# Patient Record
Sex: MALE | Race: WHITE | NOT HISPANIC OR LATINO | URBAN - METROPOLITAN AREA
[De-identification: names, ages, dates, MRNs, and addresses within clinical notes are randomized per-mention and may not be internally consistent; named-entity substitution may affect disease eponyms.]

---

## 2023-01-01 ENCOUNTER — INPATIENT (INPATIENT)
Facility: HOSPITAL | Age: 58
LOS: 12 days | DRG: 432 | End: 2024-01-12
Attending: INTERNAL MEDICINE | Admitting: INTERNAL MEDICINE
Payer: COMMERCIAL

## 2023-01-01 VITALS
SYSTOLIC BLOOD PRESSURE: 103 MMHG | HEART RATE: 90 BPM | DIASTOLIC BLOOD PRESSURE: 64 MMHG | TEMPERATURE: 98 F | RESPIRATION RATE: 18 BRPM | OXYGEN SATURATION: 99 %

## 2023-01-01 DIAGNOSIS — G93.40 ENCEPHALOPATHY, UNSPECIFIED: ICD-10-CM

## 2023-01-01 DIAGNOSIS — N17.9 ACUTE KIDNEY FAILURE, UNSPECIFIED: ICD-10-CM

## 2023-01-01 DIAGNOSIS — K72.90 HEPATIC FAILURE, UNSPECIFIED WITHOUT COMA: ICD-10-CM

## 2023-01-01 DIAGNOSIS — K74.60 UNSPECIFIED CIRRHOSIS OF LIVER: ICD-10-CM

## 2023-01-01 DIAGNOSIS — E11.9 TYPE 2 DIABETES MELLITUS WITHOUT COMPLICATIONS: ICD-10-CM

## 2023-01-01 DIAGNOSIS — D69.6 THROMBOCYTOPENIA, UNSPECIFIED: ICD-10-CM

## 2023-01-01 DIAGNOSIS — D64.9 ANEMIA, UNSPECIFIED: ICD-10-CM

## 2023-01-01 DIAGNOSIS — D72.829 ELEVATED WHITE BLOOD CELL COUNT, UNSPECIFIED: ICD-10-CM

## 2023-01-01 DIAGNOSIS — Z29.9 ENCOUNTER FOR PROPHYLACTIC MEASURES, UNSPECIFIED: ICD-10-CM

## 2023-01-01 LAB
A1C WITH ESTIMATED AVERAGE GLUCOSE RESULT: 5.2 % — SIGNIFICANT CHANGE UP (ref 4–5.6)
A1C WITH ESTIMATED AVERAGE GLUCOSE RESULT: 5.2 % — SIGNIFICANT CHANGE UP (ref 4–5.6)
ALBUMIN SERPL ELPH-MCNC: 3.8 G/DL — SIGNIFICANT CHANGE UP (ref 3.3–5)
ALBUMIN SERPL ELPH-MCNC: 3.8 G/DL — SIGNIFICANT CHANGE UP (ref 3.3–5)
ALBUMIN SERPL ELPH-MCNC: 4.2 G/DL — SIGNIFICANT CHANGE UP (ref 3.3–5)
ALBUMIN SERPL ELPH-MCNC: 4.2 G/DL — SIGNIFICANT CHANGE UP (ref 3.3–5)
ALP SERPL-CCNC: 413 U/L — HIGH (ref 40–120)
ALT FLD-CCNC: 21 U/L — SIGNIFICANT CHANGE UP (ref 10–45)
ALT FLD-CCNC: 21 U/L — SIGNIFICANT CHANGE UP (ref 10–45)
ALT FLD-CCNC: 24 U/L — SIGNIFICANT CHANGE UP (ref 10–45)
ALT FLD-CCNC: 24 U/L — SIGNIFICANT CHANGE UP (ref 10–45)
AMMONIA BLD-MCNC: 35 UMOL/L — SIGNIFICANT CHANGE UP (ref 11–55)
AMMONIA BLD-MCNC: 35 UMOL/L — SIGNIFICANT CHANGE UP (ref 11–55)
ANION GAP SERPL CALC-SCNC: 18 MMOL/L — HIGH (ref 5–17)
ANION GAP SERPL CALC-SCNC: 18 MMOL/L — HIGH (ref 5–17)
ANION GAP SERPL CALC-SCNC: 23 MMOL/L — HIGH (ref 5–17)
ANION GAP SERPL CALC-SCNC: 23 MMOL/L — HIGH (ref 5–17)
APPEARANCE UR: ABNORMAL
APPEARANCE UR: ABNORMAL
APTT BLD: 33.3 SEC — SIGNIFICANT CHANGE UP (ref 24.5–35.6)
APTT BLD: 33.3 SEC — SIGNIFICANT CHANGE UP (ref 24.5–35.6)
APTT BLD: 33.7 SEC — SIGNIFICANT CHANGE UP (ref 24.5–35.6)
APTT BLD: 33.7 SEC — SIGNIFICANT CHANGE UP (ref 24.5–35.6)
AST SERPL-CCNC: 78 U/L — HIGH (ref 10–40)
AST SERPL-CCNC: 78 U/L — HIGH (ref 10–40)
AST SERPL-CCNC: 82 U/L — HIGH (ref 10–40)
AST SERPL-CCNC: 82 U/L — HIGH (ref 10–40)
BACTERIA # UR AUTO: NEGATIVE /HPF — SIGNIFICANT CHANGE UP
BACTERIA # UR AUTO: NEGATIVE /HPF — SIGNIFICANT CHANGE UP
BILIRUB SERPL-MCNC: 12.7 MG/DL — HIGH (ref 0.2–1.2)
BILIRUB SERPL-MCNC: 12.7 MG/DL — HIGH (ref 0.2–1.2)
BILIRUB SERPL-MCNC: 12.8 MG/DL — HIGH (ref 0.2–1.2)
BILIRUB SERPL-MCNC: 12.8 MG/DL — HIGH (ref 0.2–1.2)
BILIRUB UR-MCNC: ABNORMAL
BILIRUB UR-MCNC: ABNORMAL
BLD GP AB SCN SERPL QL: NEGATIVE — SIGNIFICANT CHANGE UP
BLD GP AB SCN SERPL QL: NEGATIVE — SIGNIFICANT CHANGE UP
BUN SERPL-MCNC: 100 MG/DL — HIGH (ref 7–23)
BUN SERPL-MCNC: 100 MG/DL — HIGH (ref 7–23)
BUN SERPL-MCNC: 110 MG/DL — HIGH (ref 7–23)
BUN SERPL-MCNC: 110 MG/DL — HIGH (ref 7–23)
CALCIUM SERPL-MCNC: 10 MG/DL — SIGNIFICANT CHANGE UP (ref 8.4–10.5)
CALCIUM SERPL-MCNC: 10 MG/DL — SIGNIFICANT CHANGE UP (ref 8.4–10.5)
CALCIUM SERPL-MCNC: 9.6 MG/DL — SIGNIFICANT CHANGE UP (ref 8.4–10.5)
CALCIUM SERPL-MCNC: 9.6 MG/DL — SIGNIFICANT CHANGE UP (ref 8.4–10.5)
CAST: 0 /LPF — SIGNIFICANT CHANGE UP (ref 0–4)
CAST: 0 /LPF — SIGNIFICANT CHANGE UP (ref 0–4)
CHLORIDE SERPL-SCNC: 103 MMOL/L — SIGNIFICANT CHANGE UP (ref 96–108)
CK SERPL-CCNC: 12 U/L — LOW (ref 30–200)
CK SERPL-CCNC: 12 U/L — LOW (ref 30–200)
CO2 SERPL-SCNC: 13 MMOL/L — LOW (ref 22–31)
CO2 SERPL-SCNC: 13 MMOL/L — LOW (ref 22–31)
CO2 SERPL-SCNC: 16 MMOL/L — LOW (ref 22–31)
CO2 SERPL-SCNC: 16 MMOL/L — LOW (ref 22–31)
COLOR SPEC: SIGNIFICANT CHANGE UP
COLOR SPEC: SIGNIFICANT CHANGE UP
CREAT ?TM UR-MCNC: 183 MG/DL — SIGNIFICANT CHANGE UP
CREAT ?TM UR-MCNC: 183 MG/DL — SIGNIFICANT CHANGE UP
CREAT SERPL-MCNC: 4.14 MG/DL — HIGH (ref 0.5–1.3)
CREAT SERPL-MCNC: 4.14 MG/DL — HIGH (ref 0.5–1.3)
CREAT SERPL-MCNC: 4.67 MG/DL — HIGH (ref 0.5–1.3)
CREAT SERPL-MCNC: 4.67 MG/DL — HIGH (ref 0.5–1.3)
DIFF PNL FLD: NEGATIVE — SIGNIFICANT CHANGE UP
DIFF PNL FLD: NEGATIVE — SIGNIFICANT CHANGE UP
EGFR: 14 ML/MIN/1.73M2 — LOW
EGFR: 14 ML/MIN/1.73M2 — LOW
EGFR: 16 ML/MIN/1.73M2 — LOW
EGFR: 16 ML/MIN/1.73M2 — LOW
ESTIMATED AVERAGE GLUCOSE: 103 MG/DL — SIGNIFICANT CHANGE UP (ref 68–114)
ESTIMATED AVERAGE GLUCOSE: 103 MG/DL — SIGNIFICANT CHANGE UP (ref 68–114)
FERRITIN SERPL-MCNC: 285 NG/ML — SIGNIFICANT CHANGE UP (ref 30–400)
FERRITIN SERPL-MCNC: 285 NG/ML — SIGNIFICANT CHANGE UP (ref 30–400)
FOLATE SERPL-MCNC: 8.1 NG/ML — SIGNIFICANT CHANGE UP
FOLATE SERPL-MCNC: 8.1 NG/ML — SIGNIFICANT CHANGE UP
GLUCOSE BLDC GLUCOMTR-MCNC: 139 MG/DL — HIGH (ref 70–99)
GLUCOSE BLDC GLUCOMTR-MCNC: 139 MG/DL — HIGH (ref 70–99)
GLUCOSE BLDC GLUCOMTR-MCNC: 157 MG/DL — HIGH (ref 70–99)
GLUCOSE BLDC GLUCOMTR-MCNC: 157 MG/DL — HIGH (ref 70–99)
GLUCOSE BLDC GLUCOMTR-MCNC: 186 MG/DL — HIGH (ref 70–99)
GLUCOSE BLDC GLUCOMTR-MCNC: 186 MG/DL — HIGH (ref 70–99)
GLUCOSE BLDC GLUCOMTR-MCNC: 197 MG/DL — HIGH (ref 70–99)
GLUCOSE BLDC GLUCOMTR-MCNC: 197 MG/DL — HIGH (ref 70–99)
GLUCOSE BLDC GLUCOMTR-MCNC: 282 MG/DL — HIGH (ref 70–99)
GLUCOSE BLDC GLUCOMTR-MCNC: 282 MG/DL — HIGH (ref 70–99)
GLUCOSE SERPL-MCNC: 166 MG/DL — HIGH (ref 70–99)
GLUCOSE SERPL-MCNC: 166 MG/DL — HIGH (ref 70–99)
GLUCOSE SERPL-MCNC: 169 MG/DL — HIGH (ref 70–99)
GLUCOSE SERPL-MCNC: 169 MG/DL — HIGH (ref 70–99)
GLUCOSE UR QL: NEGATIVE MG/DL — SIGNIFICANT CHANGE UP
GLUCOSE UR QL: NEGATIVE MG/DL — SIGNIFICANT CHANGE UP
HAV IGM SER-ACNC: SIGNIFICANT CHANGE UP
HBV CORE IGM SER-ACNC: SIGNIFICANT CHANGE UP
HBV SURFACE AG SER-ACNC: SIGNIFICANT CHANGE UP
HCT VFR BLD CALC: 25.4 % — LOW (ref 39–50)
HCV AB S/CO SERPL IA: 0.17 S/CO — SIGNIFICANT CHANGE UP (ref 0–0.99)
HCV AB S/CO SERPL IA: 0.17 S/CO — SIGNIFICANT CHANGE UP (ref 0–0.99)
HCV AB S/CO SERPL IA: 0.19 S/CO — SIGNIFICANT CHANGE UP (ref 0–0.99)
HCV AB S/CO SERPL IA: 0.19 S/CO — SIGNIFICANT CHANGE UP (ref 0–0.99)
HCV AB SERPL-IMP: SIGNIFICANT CHANGE UP
HGB BLD-MCNC: 8.2 G/DL — LOW (ref 13–17)
HGB BLD-MCNC: 8.2 G/DL — LOW (ref 13–17)
HGB BLD-MCNC: 8.3 G/DL — LOW (ref 13–17)
HGB BLD-MCNC: 8.3 G/DL — LOW (ref 13–17)
HIV 1+2 AB+HIV1 P24 AG SERPL QL IA: SIGNIFICANT CHANGE UP
HIV 1+2 AB+HIV1 P24 AG SERPL QL IA: SIGNIFICANT CHANGE UP
INR BLD: 1.23 RATIO — HIGH (ref 0.85–1.18)
INR BLD: 1.23 RATIO — HIGH (ref 0.85–1.18)
INR BLD: 1.31 RATIO — HIGH (ref 0.85–1.18)
INR BLD: 1.31 RATIO — HIGH (ref 0.85–1.18)
IRON SATN MFR SERPL: 31 % — SIGNIFICANT CHANGE UP (ref 16–55)
IRON SATN MFR SERPL: 31 % — SIGNIFICANT CHANGE UP (ref 16–55)
IRON SATN MFR SERPL: 35 UG/DL — LOW (ref 45–165)
IRON SATN MFR SERPL: 35 UG/DL — LOW (ref 45–165)
KETONES UR-MCNC: ABNORMAL MG/DL
KETONES UR-MCNC: ABNORMAL MG/DL
LEUKOCYTE ESTERASE UR-ACNC: ABNORMAL
LEUKOCYTE ESTERASE UR-ACNC: ABNORMAL
MAGNESIUM SERPL-MCNC: 2.5 MG/DL — SIGNIFICANT CHANGE UP (ref 1.6–2.6)
MAGNESIUM SERPL-MCNC: 2.5 MG/DL — SIGNIFICANT CHANGE UP (ref 1.6–2.6)
MAGNESIUM SERPL-MCNC: 2.6 MG/DL — SIGNIFICANT CHANGE UP (ref 1.6–2.6)
MAGNESIUM SERPL-MCNC: 2.6 MG/DL — SIGNIFICANT CHANGE UP (ref 1.6–2.6)
MCHC RBC-ENTMCNC: 27.1 PG — SIGNIFICANT CHANGE UP (ref 27–34)
MCHC RBC-ENTMCNC: 27.1 PG — SIGNIFICANT CHANGE UP (ref 27–34)
MCHC RBC-ENTMCNC: 27.2 PG — SIGNIFICANT CHANGE UP (ref 27–34)
MCHC RBC-ENTMCNC: 27.2 PG — SIGNIFICANT CHANGE UP (ref 27–34)
MCHC RBC-ENTMCNC: 32.3 GM/DL — SIGNIFICANT CHANGE UP (ref 32–36)
MCHC RBC-ENTMCNC: 32.3 GM/DL — SIGNIFICANT CHANGE UP (ref 32–36)
MCHC RBC-ENTMCNC: 32.7 GM/DL — SIGNIFICANT CHANGE UP (ref 32–36)
MCHC RBC-ENTMCNC: 32.7 GM/DL — SIGNIFICANT CHANGE UP (ref 32–36)
MCV RBC AUTO: 83 FL — SIGNIFICANT CHANGE UP (ref 80–100)
MCV RBC AUTO: 83 FL — SIGNIFICANT CHANGE UP (ref 80–100)
MCV RBC AUTO: 84.1 FL — SIGNIFICANT CHANGE UP (ref 80–100)
MCV RBC AUTO: 84.1 FL — SIGNIFICANT CHANGE UP (ref 80–100)
MELD SCORE WITH DIALYSIS: 32 POINTS — SIGNIFICANT CHANGE UP
MELD SCORE WITH DIALYSIS: 32 POINTS — SIGNIFICANT CHANGE UP
MELD SCORE WITHOUT DIALYSIS: 32 POINTS — SIGNIFICANT CHANGE UP
MELD SCORE WITHOUT DIALYSIS: 32 POINTS — SIGNIFICANT CHANGE UP
NITRITE UR-MCNC: NEGATIVE — SIGNIFICANT CHANGE UP
NITRITE UR-MCNC: NEGATIVE — SIGNIFICANT CHANGE UP
NRBC # BLD: 0 /100 WBCS — SIGNIFICANT CHANGE UP (ref 0–0)
NT-PROBNP SERPL-SCNC: 5513 PG/ML — HIGH (ref 0–300)
NT-PROBNP SERPL-SCNC: 5513 PG/ML — HIGH (ref 0–300)
NT-PROBNP SERPL-SCNC: 5604 PG/ML — HIGH (ref 0–300)
NT-PROBNP SERPL-SCNC: 5604 PG/ML — HIGH (ref 0–300)
PH UR: 5.5 — SIGNIFICANT CHANGE UP (ref 5–8)
PH UR: 5.5 — SIGNIFICANT CHANGE UP (ref 5–8)
PHOSPHATE SERPL-MCNC: 4.1 MG/DL — SIGNIFICANT CHANGE UP (ref 2.5–4.5)
PHOSPHATE SERPL-MCNC: 4.1 MG/DL — SIGNIFICANT CHANGE UP (ref 2.5–4.5)
PHOSPHATE SERPL-MCNC: 4.7 MG/DL — HIGH (ref 2.5–4.5)
PHOSPHATE SERPL-MCNC: 4.7 MG/DL — HIGH (ref 2.5–4.5)
PLATELET # BLD AUTO: 70 K/UL — LOW (ref 150–400)
PLATELET # BLD AUTO: 70 K/UL — LOW (ref 150–400)
PLATELET # BLD AUTO: 77 K/UL — LOW (ref 150–400)
PLATELET # BLD AUTO: 77 K/UL — LOW (ref 150–400)
POTASSIUM SERPL-MCNC: 4.1 MMOL/L — SIGNIFICANT CHANGE UP (ref 3.5–5.3)
POTASSIUM SERPL-MCNC: 4.1 MMOL/L — SIGNIFICANT CHANGE UP (ref 3.5–5.3)
POTASSIUM SERPL-MCNC: 4.2 MMOL/L — SIGNIFICANT CHANGE UP (ref 3.5–5.3)
POTASSIUM SERPL-MCNC: 4.2 MMOL/L — SIGNIFICANT CHANGE UP (ref 3.5–5.3)
POTASSIUM SERPL-SCNC: 4.1 MMOL/L — SIGNIFICANT CHANGE UP (ref 3.5–5.3)
POTASSIUM SERPL-SCNC: 4.1 MMOL/L — SIGNIFICANT CHANGE UP (ref 3.5–5.3)
POTASSIUM SERPL-SCNC: 4.2 MMOL/L — SIGNIFICANT CHANGE UP (ref 3.5–5.3)
POTASSIUM SERPL-SCNC: 4.2 MMOL/L — SIGNIFICANT CHANGE UP (ref 3.5–5.3)
PROT SERPL-MCNC: 5.8 G/DL — LOW (ref 6–8.3)
PROT SERPL-MCNC: 5.8 G/DL — LOW (ref 6–8.3)
PROT SERPL-MCNC: 6.2 G/DL — SIGNIFICANT CHANGE UP (ref 6–8.3)
PROT SERPL-MCNC: 6.2 G/DL — SIGNIFICANT CHANGE UP (ref 6–8.3)
PROT UR-MCNC: 100 MG/DL
PROT UR-MCNC: 100 MG/DL
PROTHROM AB SERPL-ACNC: 13.5 SEC — HIGH (ref 9.5–13)
PROTHROM AB SERPL-ACNC: 13.5 SEC — HIGH (ref 9.5–13)
PROTHROM AB SERPL-ACNC: 13.6 SEC — HIGH (ref 9.5–13)
PROTHROM AB SERPL-ACNC: 13.6 SEC — HIGH (ref 9.5–13)
RBC # BLD: 3.02 M/UL — LOW (ref 4.2–5.8)
RBC # BLD: 3.02 M/UL — LOW (ref 4.2–5.8)
RBC # BLD: 3.06 M/UL — LOW (ref 4.2–5.8)
RBC # BLD: 3.06 M/UL — LOW (ref 4.2–5.8)
RBC # FLD: 24.8 % — HIGH (ref 10.3–14.5)
RBC CASTS # UR COMP ASSIST: 51 /HPF — HIGH (ref 0–4)
RBC CASTS # UR COMP ASSIST: 51 /HPF — HIGH (ref 0–4)
REVIEW: SIGNIFICANT CHANGE UP
REVIEW: SIGNIFICANT CHANGE UP
RH IG SCN BLD-IMP: POSITIVE — SIGNIFICANT CHANGE UP
RH IG SCN BLD-IMP: POSITIVE — SIGNIFICANT CHANGE UP
SODIUM SERPL-SCNC: 137 MMOL/L — SIGNIFICANT CHANGE UP (ref 135–145)
SODIUM SERPL-SCNC: 137 MMOL/L — SIGNIFICANT CHANGE UP (ref 135–145)
SODIUM SERPL-SCNC: 139 MMOL/L — SIGNIFICANT CHANGE UP (ref 135–145)
SODIUM SERPL-SCNC: 139 MMOL/L — SIGNIFICANT CHANGE UP (ref 135–145)
SODIUM UR-SCNC: 9 MMOL/L — SIGNIFICANT CHANGE UP
SODIUM UR-SCNC: 9 MMOL/L — SIGNIFICANT CHANGE UP
SP GR SPEC: 1.02 — SIGNIFICANT CHANGE UP (ref 1–1.03)
SP GR SPEC: 1.02 — SIGNIFICANT CHANGE UP (ref 1–1.03)
SQUAMOUS # UR AUTO: 7 /HPF — HIGH (ref 0–5)
SQUAMOUS # UR AUTO: 7 /HPF — HIGH (ref 0–5)
TIBC SERPL-MCNC: 114 UG/DL — LOW (ref 220–430)
TIBC SERPL-MCNC: 114 UG/DL — LOW (ref 220–430)
UIBC SERPL-MCNC: 78 UG/DL — LOW (ref 110–370)
UIBC SERPL-MCNC: 78 UG/DL — LOW (ref 110–370)
UROBILINOGEN FLD QL: 1 MG/DL — SIGNIFICANT CHANGE UP (ref 0.2–1)
UROBILINOGEN FLD QL: 1 MG/DL — SIGNIFICANT CHANGE UP (ref 0.2–1)
UUN UR-MCNC: 404 MG/DL — SIGNIFICANT CHANGE UP
UUN UR-MCNC: 404 MG/DL — SIGNIFICANT CHANGE UP
VIT B12 SERPL-MCNC: >2000 PG/ML — HIGH (ref 232–1245)
VIT B12 SERPL-MCNC: >2000 PG/ML — HIGH (ref 232–1245)
WBC # BLD: 10.56 K/UL — HIGH (ref 3.8–10.5)
WBC # BLD: 10.56 K/UL — HIGH (ref 3.8–10.5)
WBC # BLD: 13.57 K/UL — HIGH (ref 3.8–10.5)
WBC # BLD: 13.57 K/UL — HIGH (ref 3.8–10.5)
WBC # FLD AUTO: 10.56 K/UL — HIGH (ref 3.8–10.5)
WBC # FLD AUTO: 10.56 K/UL — HIGH (ref 3.8–10.5)
WBC # FLD AUTO: 13.57 K/UL — HIGH (ref 3.8–10.5)
WBC # FLD AUTO: 13.57 K/UL — HIGH (ref 3.8–10.5)
WBC UR QL: 211 /HPF — HIGH (ref 0–5)
WBC UR QL: 211 /HPF — HIGH (ref 0–5)
YEAST-LIKE CELLS: PRESENT
YEAST-LIKE CELLS: PRESENT

## 2023-01-01 PROCEDURE — 99223 1ST HOSP IP/OBS HIGH 75: CPT | Mod: GC

## 2023-01-01 PROCEDURE — 93010 ELECTROCARDIOGRAM REPORT: CPT

## 2023-01-01 PROCEDURE — 99223 1ST HOSP IP/OBS HIGH 75: CPT

## 2023-01-01 PROCEDURE — 36556 INSERT NON-TUNNEL CV CATH: CPT

## 2023-01-01 PROCEDURE — 99233 SBSQ HOSP IP/OBS HIGH 50: CPT

## 2023-01-01 RX ORDER — INSULIN LISPRO 100/ML
VIAL (ML) SUBCUTANEOUS
Refills: 0 | Status: DISCONTINUED | OUTPATIENT
Start: 2023-01-01 | End: 2024-01-01

## 2023-01-01 RX ORDER — LIPASE/PROTEASE/AMYLASE 16-48-48K
1 CAPSULE,DELAYED RELEASE (ENTERIC COATED) ORAL
Refills: 0 | DISCHARGE

## 2023-01-01 RX ORDER — LACTULOSE 10 G/15ML
10 SOLUTION ORAL THREE TIMES A DAY
Refills: 0 | Status: DISCONTINUED | OUTPATIENT
Start: 2023-01-01 | End: 2024-01-01

## 2023-01-01 RX ORDER — SODIUM CHLORIDE 9 MG/ML
1000 INJECTION, SOLUTION INTRAVENOUS
Refills: 0 | Status: DISCONTINUED | OUTPATIENT
Start: 2023-01-01 | End: 2024-01-01

## 2023-01-01 RX ORDER — GABAPENTIN 400 MG/1
1 CAPSULE ORAL
Refills: 0 | DISCHARGE

## 2023-01-01 RX ORDER — DEXTROSE 50 % IN WATER 50 %
25 SYRINGE (ML) INTRAVENOUS ONCE
Refills: 0 | Status: DISCONTINUED | OUTPATIENT
Start: 2023-01-01 | End: 2024-01-01

## 2023-01-01 RX ORDER — FOLIC ACID 0.8 MG
1 TABLET ORAL
Refills: 0 | DISCHARGE

## 2023-01-01 RX ORDER — FAMOTIDINE 10 MG/ML
1 INJECTION INTRAVENOUS
Refills: 0 | DISCHARGE

## 2023-01-01 RX ORDER — FUROSEMIDE 40 MG
1 TABLET ORAL
Refills: 0 | DISCHARGE

## 2023-01-01 RX ORDER — FOLIC ACID 0.8 MG
1 TABLET ORAL DAILY
Refills: 0 | Status: DISCONTINUED | OUTPATIENT
Start: 2023-01-01 | End: 2024-01-01

## 2023-01-01 RX ORDER — NADOLOL 80 MG/1
1 TABLET ORAL
Refills: 0 | DISCHARGE

## 2023-01-01 RX ORDER — MIDODRINE HYDROCHLORIDE 2.5 MG/1
10 TABLET ORAL THREE TIMES A DAY
Refills: 0 | Status: DISCONTINUED | OUTPATIENT
Start: 2023-01-01 | End: 2024-01-01

## 2023-01-01 RX ORDER — DEXTROSE 50 % IN WATER 50 %
15 SYRINGE (ML) INTRAVENOUS ONCE
Refills: 0 | Status: DISCONTINUED | OUTPATIENT
Start: 2023-01-01 | End: 2024-01-01

## 2023-01-01 RX ORDER — ALBUMIN HUMAN 25 %
100 VIAL (ML) INTRAVENOUS EVERY 8 HOURS
Refills: 0 | Status: COMPLETED | OUTPATIENT
Start: 2023-01-01 | End: 2023-01-01

## 2023-01-01 RX ORDER — FLUTICASONE PROPIONATE 220 MCG
1 AEROSOL WITH ADAPTER (GRAM) INHALATION
Refills: 0 | DISCHARGE

## 2023-01-01 RX ORDER — TAMSULOSIN HYDROCHLORIDE 0.4 MG/1
0.4 CAPSULE ORAL AT BEDTIME
Refills: 0 | Status: DISCONTINUED | OUTPATIENT
Start: 2023-01-01 | End: 2024-01-01

## 2023-01-01 RX ORDER — LIPASE/PROTEASE/AMYLASE 16-48-48K
1 CAPSULE,DELAYED RELEASE (ENTERIC COATED) ORAL
Refills: 0 | Status: DISCONTINUED | OUTPATIENT
Start: 2023-01-01 | End: 2024-01-01

## 2023-01-01 RX ORDER — THIAMINE MONONITRATE (VIT B1) 100 MG
100 TABLET ORAL DAILY
Refills: 0 | Status: DISCONTINUED | OUTPATIENT
Start: 2023-01-01 | End: 2024-01-01

## 2023-01-01 RX ORDER — PREGABALIN 225 MG/1
1000 CAPSULE ORAL DAILY
Refills: 0 | Status: DISCONTINUED | OUTPATIENT
Start: 2023-01-01 | End: 2024-01-01

## 2023-01-01 RX ORDER — DEXTROSE 50 % IN WATER 50 %
12.5 SYRINGE (ML) INTRAVENOUS ONCE
Refills: 0 | Status: DISCONTINUED | OUTPATIENT
Start: 2023-01-01 | End: 2024-01-01

## 2023-01-01 RX ORDER — LANOLIN ALCOHOL/MO/W.PET/CERES
1 CREAM (GRAM) TOPICAL AT BEDTIME
Refills: 0 | Status: DISCONTINUED | OUTPATIENT
Start: 2023-01-01 | End: 2024-01-01

## 2023-01-01 RX ORDER — PREGABALIN 225 MG/1
1 CAPSULE ORAL
Refills: 0 | DISCHARGE

## 2023-01-01 RX ORDER — TAMSULOSIN HYDROCHLORIDE 0.4 MG/1
1 CAPSULE ORAL
Refills: 0 | DISCHARGE

## 2023-01-01 RX ORDER — OCTREOTIDE ACETATE 200 UG/ML
200 INJECTION, SOLUTION INTRAVENOUS; SUBCUTANEOUS THREE TIMES A DAY
Refills: 0 | Status: DISCONTINUED | OUTPATIENT
Start: 2023-01-01 | End: 2024-01-01

## 2023-01-01 RX ORDER — GLUCAGON INJECTION, SOLUTION 0.5 MG/.1ML
1 INJECTION, SOLUTION SUBCUTANEOUS ONCE
Refills: 0 | Status: DISCONTINUED | OUTPATIENT
Start: 2023-01-01 | End: 2024-01-01

## 2023-01-01 RX ORDER — THIAMINE MONONITRATE (VIT B1) 100 MG
1 TABLET ORAL
Refills: 0 | DISCHARGE

## 2023-01-01 RX ORDER — MEROPENEM 1 G/30ML
500 INJECTION INTRAVENOUS EVERY 12 HOURS
Refills: 0 | Status: DISCONTINUED | OUTPATIENT
Start: 2023-01-01 | End: 2024-01-01

## 2023-01-01 RX ORDER — LACTULOSE 10 G/15ML
1 SOLUTION ORAL
Refills: 0 | DISCHARGE

## 2023-01-01 RX ORDER — INSULIN LISPRO 100/ML
VIAL (ML) SUBCUTANEOUS AT BEDTIME
Refills: 0 | Status: DISCONTINUED | OUTPATIENT
Start: 2023-01-01 | End: 2024-01-01

## 2023-01-01 RX ADMIN — LACTULOSE 10 GRAM(S): 10 SOLUTION ORAL at 23:37

## 2023-01-01 RX ADMIN — Medication 1 CAPSULE(S): at 11:41

## 2023-01-01 RX ADMIN — Medication 1 MILLIGRAM(S): at 11:42

## 2023-01-01 RX ADMIN — Medication 1 CAPSULE(S): at 07:46

## 2023-01-01 RX ADMIN — OCTREOTIDE ACETATE 200 MICROGRAM(S): 200 INJECTION, SOLUTION INTRAVENOUS; SUBCUTANEOUS at 23:40

## 2023-01-01 RX ADMIN — LACTULOSE 10 GRAM(S): 10 SOLUTION ORAL at 23:24

## 2023-01-01 RX ADMIN — PREGABALIN 1000 MICROGRAM(S): 225 CAPSULE ORAL at 11:43

## 2023-01-01 RX ADMIN — Medication 100 MILLIGRAM(S): at 11:42

## 2023-01-01 RX ADMIN — Medication 50 MILLILITER(S): at 13:06

## 2023-01-01 RX ADMIN — MEROPENEM 100 MILLIGRAM(S): 1 INJECTION INTRAVENOUS at 06:33

## 2023-01-01 RX ADMIN — Medication 1 CAPSULE(S): at 17:00

## 2023-01-01 RX ADMIN — Medication 50 MILLILITER(S): at 21:27

## 2023-01-01 RX ADMIN — MIDODRINE HYDROCHLORIDE 10 MILLIGRAM(S): 2.5 TABLET ORAL at 23:24

## 2023-01-01 RX ADMIN — TAMSULOSIN HYDROCHLORIDE 0.4 MILLIGRAM(S): 0.4 CAPSULE ORAL at 23:37

## 2023-01-01 RX ADMIN — MIDODRINE HYDROCHLORIDE 10 MILLIGRAM(S): 2.5 TABLET ORAL at 17:00

## 2023-01-01 RX ADMIN — MEROPENEM 100 MILLIGRAM(S): 1 INJECTION INTRAVENOUS at 17:02

## 2023-01-01 RX ADMIN — Medication 1: at 08:56

## 2023-01-01 RX ADMIN — Medication 50 MILLILITER(S): at 06:58

## 2023-01-01 RX ADMIN — TAMSULOSIN HYDROCHLORIDE 0.4 MILLIGRAM(S): 0.4 CAPSULE ORAL at 23:24

## 2023-01-01 RX ADMIN — OCTREOTIDE ACETATE 200 MICROGRAM(S): 200 INJECTION, SOLUTION INTRAVENOUS; SUBCUTANEOUS at 23:24

## 2023-01-01 RX ADMIN — OCTREOTIDE ACETATE 200 MICROGRAM(S): 200 INJECTION, SOLUTION INTRAVENOUS; SUBCUTANEOUS at 13:05

## 2023-01-01 RX ADMIN — MIDODRINE HYDROCHLORIDE 10 MILLIGRAM(S): 2.5 TABLET ORAL at 11:42

## 2023-01-01 RX ADMIN — Medication 1 MILLIGRAM(S): at 23:58

## 2023-01-01 RX ADMIN — OCTREOTIDE ACETATE 200 MICROGRAM(S): 200 INJECTION, SOLUTION INTRAVENOUS; SUBCUTANEOUS at 06:59

## 2023-01-01 RX ADMIN — Medication 3: at 12:43

## 2023-01-01 RX ADMIN — Medication 1 MILLIGRAM(S): at 23:03

## 2023-01-01 RX ADMIN — LACTULOSE 10 GRAM(S): 10 SOLUTION ORAL at 13:04

## 2023-01-01 RX ADMIN — MIDODRINE HYDROCHLORIDE 10 MILLIGRAM(S): 2.5 TABLET ORAL at 06:59

## 2023-01-01 RX ADMIN — Medication 50 MILLILITER(S): at 23:24

## 2023-01-01 RX ADMIN — LACTULOSE 10 GRAM(S): 10 SOLUTION ORAL at 06:59

## 2023-12-30 NOTE — H&P ADULT - PROBLEM SELECTOR PLAN 6
History of T2DM previously on metformin however held due to kidney dysfunction    Plan:  >ALEKS at meals and bedtime, uptitrate prn   >f/u HbA1c

## 2023-12-30 NOTE — H&P ADULT - ATTENDING COMMENTS
58  M w/ PMH recently  alcoholic cirrhosis and DM2 admitted to [facility] for acute decompensated cirrhosis c/b HRS , t/f to [facility] for hepatology/ transplant eval , patient provides limited history , transfer record incomplete,  jaundice appearing on exam , + asterixis , alert and oriented x 2, + tense but compressible ascites present , minimal peripheral edema . labs significant for Tbili 12 , scr 4.4 , platelet 77,  wbc 13 , MELD 32 , per transfer record patient was on Meropenem unclear indication , and nadolol held; Will continue to treat for HRS w/ octreotide and midodrine , can continue to hold nadolol , obtain US abd w/ hepatic doppler , IR consult for paracentesis , hepatology consult, unclear why on meropenem , but would continue until further clarified 58  M w/ PMH recently  alcoholic cirrhosis and DM2 admitted to [facility] for acute decompensated cirrhosis c/b HRS , t/f to [facility] for hepatology/ transplant eval , patient provides limited history , transfer record incomplete,  jaundice appearing on exam , + asterixis , alert and oriented x 2, + tense but compressible ascites present , minimal peripheral edema . labs significant for Tbili 12 , scr 4.4 , platelet 77,  wbc 13 , MELD 32 , per transfer record patient was on Meropenem unclear indication , and nadolol held; Will continue to treat for HRS w/ octreotide and midodrine , can continue to hold nadolol , obtain US abd w/ hepatic doppler , IR consult for paracentesis , hepatology consult, unclear why on meropenem , but would continue until further clarified

## 2023-12-30 NOTE — H&P ADULT - PROBLEM SELECTOR PLAN 2
Cr 4.14, per HCP Baseline previously ~2   C/f Hepatorenal syndrome   Started albumin, octreotide and midodrine at OSH    Plan:  >nephro consult in AM   >c/w albumin, octreotide and midodrine  >Strict I/Os   >f/u urine studies  >Ctm Cr Cr 4.14, per HCP Baseline previously ~2   C/f Hepatorenal syndrome   Started albumin, octreotide and midodrine at OSH    Plan:  >nephro consult in AM   >c/w albumin, octreotide and midodrine  >Strict I/Os   >f/u urine studies  >f/u PVR  >Ctm Cr

## 2023-12-30 NOTE — H&P ADULT - NSHPPHYSICALEXAM_GEN_ALL_CORE
T(C): 36.6 (12-30-23 @ 18:50), Max: 36.6 (12-30-23 @ 18:50)  HR: 90 (12-30-23 @ 18:50) (90 - 90)  BP: 103/64 (12-30-23 @ 18:50) (103/64 - 103/64)  RR: 18 (12-30-23 @ 18:50) (18 - 18)  SpO2: 99% (12-30-23 @ 18:50) (99% - 99%)    GENERAL: patient appears well, no acute distress, appropriate, pleasant  EYES: sclera clear, no exudates  ENMT: oropharynx clear without erythema, no exudates, moist mucous membranes  NECK: supple, soft, no thyromegaly noted  LUNGS: good air entry bilaterally, clear to auscultation, symmetric breath sounds, no wheezing or rhonchi appreciated  HEART: soft S1/S2, regular rate and rhythm, no murmurs noted, no lower extremity edema  GASTROINTESTINAL: abdomen is soft, nontender, distended with + fluid wave, normoactive bowel sounds, no palpable masses  INTEGUMENT: good skin turgor, no lesions noted  MUSCULOSKELETAL: no clubbing or cyanosis, no obvious deformity  NEUROLOGIC: awake, alert, oriented x2, asterixis noted  PSYCHIATRIC: mood is good, affect is congruent, linear and logical thought process  HEME/LYMPH: no palpable supraclavicular nodules, no obvious ecchymosis or petechiae

## 2023-12-30 NOTE — H&P ADULT - NSHPSOCIALHISTORY_GEN_ALL_CORE
History of prior alcohol use   Not used for several months per patient and HCP  Lives in Assisted Living

## 2023-12-30 NOTE — H&P ADULT - NSHPREVIEWOFSYSTEMS_GEN_ALL_CORE
REVIEW OF SYSTEMS:    CONSTITUTIONAL: Weakness  EYES/ENT: No visual changes;  No vertigo or throat pain   NECK: No pain or stiffness  RESPIRATORY: No cough, wheezing, hemoptysis; No shortness of breath  CARDIOVASCULAR: No chest pain or palpitations  GASTROINTESTINAL: No abdominal or epigastric pain. No nausea, vomiting, or hematemesis; No diarrhea or constipation. No melena or hematochezia. Abdominal Distension, increasing from prior   GENITOURINARY: No dysuria, frequency or hematuria  NEUROLOGICAL: No numbness or weakness, tremors  SKIN: No itching, rashes

## 2023-12-30 NOTE — H&P ADULT - NSHPLABSRESULTS_GEN_ALL_CORE
LABS: When present labs, imaging, and ECG were personally reviewed                          8.3    13.57 )-----------( 77       ( 30 Dec 2023 22:00 )             25.4                               PT/INR - ( 30 Dec 2023 22:00 )   PT: 13.6 sec;   INR: 1.31 ratio         PTT - ( 30 Dec 2023 22:00 )  PTT:33.3 sec    Lactate Trend            CAPILLARY BLOOD GLUCOSE                RADIOLOGY & ADDITIONAL TESTS:

## 2023-12-30 NOTE — H&P ADULT - PROBLEM SELECTOR PLAN 7
Diet: DASH with 1.2 L fluid restriction  Dispo: Medically active  DVT:SCDs  Code status: Full code; HCP form in chart WBC 13.5 unclear etiology   Patient on meropenem at OSH however no information on reason why at this time     >c/w meropenem until further information obtained

## 2023-12-30 NOTE — H&P ADULT - PROBLEM SELECTOR PLAN 1
Recently diagnosed alcoholic cirrhosis   last drink several months ago   Accepted as transfer to [facility] for transplant eval  GI emailed  c/w Thiamine, B12, Folic Acid, lactulose   IR consult placed for paracentesis   Holding off on SBP PPx at this time  Patient previously on nadolol per outside hospital notes    Plan:  >f/u GI recs  >ctm CMP, MELD, coags  >Blood cultures ordered for transplant eval workup  >f/u IR for paracentesis Recently diagnosed alcoholic cirrhosis   last drink several months ago   Accepted as transfer to [facility] for transplant eval  GI emailed  c/w Thiamine, B12, Folic Acid, lactulose   IR consult placed for paracentesis   Holding off on SBP PPx at this time  Patient previously on nadolol per outside hospital notes    Plan:  >f/u GI recs  >ctm CMP, MELD, coags  >Blood cultures ordered for transplant eval workup  >f/u IR for paracentesis Recently diagnosed alcoholic cirrhosis   last drink several months ago   Accepted as transfer to [facility] for transplant eval  GI emailed  c/w Thiamine, B12, Folic Acid, lactulose   IR consult placed for paracentesis   Holding off on SBP PPx at this time  Patient previously on nadolol per outside hospital notes    Plan:  >f/u GI recs  >ctm CMP, MELD, coags  >Blood cultures ordered for transplant eval workup  >f/u IR for paracentesis  >f/u CT A/P Recently diagnosed alcoholic cirrhosis   last drink several months ago   Accepted as transfer to [facility] for transplant eval  GI emailed  c/w Thiamine, B12, Folic Acid, lactulose   IR consult placed for paracentesis   Holding off on SBP PPx at this time  Patient previously on nadolol per outside hospital notes    Plan:  >f/u GI recs  >ctm CMP, MELD, coags  >Blood cultures ordered for transplant eval workup  >f/u IR for paracentesis  >f/u CT A/P Recently diagnosed alcoholic cirrhosis   last drink several months ago   Accepted as transfer to [facility] for transplant eval  GI emailed  c/w Thiamine, B12, Folic Acid, lactulose   IR consult placed for paracentesis   Holding off on SBP PPx at this time  Patient previously on nadolol per outside hospital notes  MELD 32    Plan:  >f/u GI recs  >ctm CMP, MELD, coags  >Blood cultures ordered for transplant eval workup  >f/u IR for paracentesis  >f/u CT A/P Recently diagnosed alcoholic cirrhosis   last drink several months ago   Accepted as transfer to [facility] for transplant eval  GI emailed  c/w Thiamine, B12, Folic Acid, lactulose   IR consult placed for paracentesis   Holding off on SBP PPx at this time  Patient previously on nadolol per outside hospital notes  MELD 32    Plan:  >f/u GI recs  >ctm CMP, MELD, coags  >Blood cultures ordered for transplant eval workup  >f/u IR for paracentesis  >f/u CT A/P Recently diagnosed alcoholic cirrhosis   last drink several months ago   Accepted as transfer to [facility] for transplant eval  GI emailed  c/w Thiamine, B12, Folic Acid, lactulose   IR consult placed for paracentesis   Holding off on SBP PPx at this time  Patient previously on nadolol per outside hospital notes; unclear why held - f/u GI recs  MELD 32    Plan:  >f/u GI recs  >ctm CMP, MELD, coags  >Blood cultures ordered for transplant eval workup  >f/u IR for paracentesis  >f/u CT A/P Recently diagnosed alcoholic cirrhosis   last drink several months ago   Accepted as transfer to [facility] for transplant eval  GI emailed  c/w Thiamine, B12, Folic Acid, lactulose   IR consult placed for paracentesis   Holding off on SBP PPx at this time  Patient previously on nadolol per outside hospital notes; unclear why held - f/u GI recs  MELD 32    Plan:  >f/u GI recs  >ctm CMP, MELD, coags  >Blood cultures ordered for transplant eval workup  >f/u IR for paracentesis  >f/u CT A/P Recently diagnosed alcoholic cirrhosis   last drink several months ago   Accepted as transfer to [facility] for transplant eval  GI emailed  c/w Thiamine, B12, Folic Acid, lactulose   IR consult placed for paracentesis   Holding off on SBP PPx at this time  Patient previously on nadolol per outside hospital notes; unclear why held - f/u GI recs  MELD 32    Plan:  >f/u GI recs  >ctm CMP, MELD, coags  >Blood cultures ordered for transplant eval workup  >f/u IR for paracentesis  >f/u Abdominal US with hepatic doppler Recently diagnosed alcoholic cirrhosis   last drink several months ago   Accepted as transfer to [facility] for transplant eval  GI emailed  c/w Thiamine, B12, Folic Acid, lactulose   IR consult placed for paracentesis   Holding off on SBP PPx at this time  Patient previously on nadolol per outside hospital notes; unclear why held - f/u GI recs  MELD 32    Plan:  >f/u GI recs  >ctm CMP, MELD, coags  >Blood cultures ordered for transplant eval workup  >f/u IR for paracentesis  >f/u Abdominal US with hepatic doppler

## 2023-12-30 NOTE — H&P ADULT - HISTORY OF PRESENT ILLNESS
Patient history limited by patient mental status. Spoke to family member/HCP Juan Grajeda who also had limited information.   57 y/o M PMH DM2, recently diagnosed alcoholic cirrhosis (3 weeks ago) presenting as a transfer from [facility] for transplant eval for decompensated liver cirrhosis (accepted by Dr. Amezquita). Patient initially presented to [facility] (from assisted living) last week with dizziness and worsening AMS. Per HCP, patient first diagnosed with cirrhosis 3 weeks ago after found lethargic. Underwent abdominal paracentesis with large volume fluid removal at that time. Patient discharged to assisted [facility] with hepatology followup. Patient represented to OSH with AMS, dizziness, and increased abdominal distension last week. Noted to have worsening renal function, however still able to produce urine.     At OSH, patient was started on albumin, octreotide, midodrine for concern for HRS. Also started on meropenem per discharge paperwork, no clear indication listed.     Patient directly transferred from [facility] to medicine Floors. Initial EKG demonstrating sinus rhythm with frequent PVCs.  Patient history limited by patient mental status. Spoke to family member/HCP Juan Grajeda who also had limited information.   59 y/o M PMH DM2, recently diagnosed alcoholic cirrhosis (3 weeks ago) presenting as a transfer from [facility] for transplant eval for decompensated liver cirrhosis (accepted by Dr. Amezquita). Patient initially presented to [facility] (from assisted living) last week with dizziness and worsening AMS. Per HCP, patient first diagnosed with cirrhosis 3 weeks ago after found lethargic. Underwent abdominal paracentesis with large volume fluid removal at that time. Patient discharged to assisted [facility] with hepatology followup. Patient represented to OSH with AMS, dizziness, and increased abdominal distension last week. Noted to have worsening renal function, however still able to produce urine.     At OSH, patient was started on albumin, octreotide, midodrine for concern for HRS. Also started on meropenem per discharge paperwork, no clear indication listed.     Patient directly transferred from [facility] to medicine Floors. Initial EKG demonstrating sinus rhythm with frequent PVCs.

## 2023-12-30 NOTE — H&P ADULT - PROBLEM SELECTOR PLAN 8
Diet: DASH with 1.2 L fluid restriction  Dispo: Medically active  DVT:SCDs  Code status: Full code; HCP form in chart    Please call [facility] in AM for further information Diet: DASH with 1.2 L fluid restriction  Dispo: Medically active  DVT:SCDs  Code status: Full code; HCP form in chart    Please call [facility] in AM for further information

## 2023-12-30 NOTE — H&P ADULT - PROBLEM SELECTOR PLAN 4
Normocytic anemia on initial CBC   likely 2/2 vitamin deficiencies  and anemia of chronic disease     >f/u B12, Folate levels  >f/u Iron studies  >transfuse Hb < 7

## 2023-12-30 NOTE — H&P ADULT - ASSESSMENT
59 y/o M PMH prior EtOH use with alcoholic cirrhosis (diagnosed 3 weeks ago) and DM2 presenting from OSH for transplant eval. Course c/b concern for hepatorenal syndrome.

## 2023-12-30 NOTE — H&P ADULT - PROBLEM SELECTOR PLAN 3
Patient with acute encephalopathy (A&Ox2, per HCP baselien A&Ox3)   Likely 2/2 hepatic encephalopathy vs acute metabolic encephalopathy     Plan:   >ctm electrolytes  >c/w lactulose 10 TID for 3-4 BM per day  >ctm mental status/asterixis  >f/u ammonia

## 2023-12-30 NOTE — H&P ADULT - PROBLEM SELECTOR PLAN 5
Likely in the setting of liver dysfunction     >ctm daily CBC   >hold chemical DVT ppx given platelets ~75   >transfuse <50 if bleeding, < 20 if febrile, < 10

## 2023-12-31 NOTE — CONSULT NOTE ADULT - ASSESSMENT
58M PMhx DM, recent dx ETOH cirrhosis, presents as tsf from [facility] for transplant evaluation i/s/o decompensated liver cirrhosis. Cf HRS requiring urgent HD. Vascular consulted for shiley placement.     Plan:   -S/p [facility]G RFV shiley placement   -OK to use Shiley for HD  -Vasc to follow  -Care per primary team    Discussed with Fellow on call on behalf of Attending Surgeon Dr. Sudheer Solo MD PGY3  Vascular, [id number]  58M PMhx DM, recent dx ETOH cirrhosis, presents as tsf from [facility] for transplant evaluation i/s/o decompensated liver cirrhosis. Cf HRS requiring urgent HD. Vascular consulted for shiley placement.     Plan:   -S/p [facility]G RFV shiley placement   -OK to use Shiley for HD  -Vasc to follow  -Care per primary team    Discussed with Fellow on call on behalf of Attending Surgeon Dr. Sudheer Solo MD PGY3  Vascular, [id number]

## 2023-12-31 NOTE — CONSULT NOTE ADULT - ASSESSMENT
59 y/o M PMH DM2, recently diagnosed alcoholic cirrhosis (3 weeks ago) presenting as a transfer from [facility] for transplant eval for decompensated liver cirrhosis. Patient initially presented to [facility] (from assisted living) last week with dizziness and worsening AMS.    #Decompensated cirrhosis likely 2/2 alcohol and MASLD  HE: present on exam   EV: unk   HCC screening: will need MRI   Ascites: seen on exam, plan for para on Tuesday   Infection hx: unclear why hes on meropenem started at OSH  Bleeding hx: no s/s of bleeding     #RAHUL   #Frailty and muscle weakness/pain     Recommendations:   - Please get full infectious workup including BCx, UCx, dx para (if not already done at OSH), CXR, RVP, skin check  - Send UA and urine lytes   - please consult transplant nephrology for worsening renal function   - continue with lactulose and titrate to 3-5BM daily   - continue with rifaxamin   - please get CPK    - abx per primary team   - Send PETH   - please send HAV, HBVsAb, HBVsAg, HBVcAb, HCV Ab, HCV RNA, Hep D and E for viral etiologies  - please send JOHNNY, anti-mitochondrial antibody, anti-smooth muscle antibody, immunoglobulins (IgG, IgM, IgA quantitative) for autoimmune etiologies   - please send EBV and CMV for viral etiology   - can consider HIV testing  - avoid hepatotoxic agents    Brandi Beltran MD  Gastroenterology/Hepatology Fellow, PGY-4  Please contact via TEAMS    NON-URGENT CONSULTS:  Please email [email or web address]  [email or web address]     59 y/o M PMH DM2, recently diagnosed alcoholic cirrhosis (3 weeks ago) presenting as a transfer from [facility] for transplant eval for decompensated liver cirrhosis. Patient initially presented to [facility] (from assisted living) last week with dizziness and worsening AMS.    #Decompensated cirrhosis likely 2/2 alcohol and MASLD  HE: present on exam   EV: unk   HCC screening: will need MRI   Ascites: seen on exam, plan for para on Tuesday   Infection hx: unclear why hes on meropenem started at OSH  Bleeding hx: no s/s of bleeding     #RAHUL   #Frailty and muscle weakness/pain     Recommendations:   - Please get full infectious workup including BCx, UCx, dx para (if not already done at OSH), CXR, RVP, skin check  - Send UA and urine lytes   - please consult transplant nephrology for worsening renal function   - continue with lactulose and titrate to 3-5BM daily   - continue with rifaxamin   - please get CPK    - abx per primary team   - Send PETH   - please send HAV, HBVsAb, HBVsAg, HBVcAb, HCV Ab, HCV RNA, Hep D and E for viral etiologies  - please send JOHNNY, anti-mitochondrial antibody, anti-smooth muscle antibody, immunoglobulins (IgG, IgM, IgA quantitative) for autoimmune etiologies   - please send EBV and CMV for viral etiology   - can consider HIV testing  - avoid hepatotoxic agents    Brandi Beltran MD  Gastroenterology/Hepatology Fellow, PGY-4  Please contact via TEAMS    NON-URGENT CONSULTS:  Please email [email or web address]  [email or web address]

## 2023-12-31 NOTE — PHYSICAL THERAPY INITIAL EVALUATION ADULT - GENERAL OBSERVATIONS, REHAB EVAL
Pt transfer from [facility] for AMS, dizziness, transplant eval. Pt received supine in bed, +IVL, +condom cath, +tele and pulse ox, A&Ox2-3, confused at times. IR consult planning for para 1/2/24. Pt transfer from [facility] for AMS, dizziness, transplant eval. Pt received supine in bed, +IVL, +condom cath, +tele and pulse ox, A&Ox2-3, confused at times. IR consult planning for para 1/2/24.

## 2023-12-31 NOTE — PHYSICAL THERAPY INITIAL EVALUATION ADULT - IMPAIRED TRANSFERS: SIT/STAND, REHAB EVAL
decreased endurance/impaired balance/cognition/impaired coordination/impaired motor control/impaired postural control/decreased strength

## 2023-12-31 NOTE — PHYSICAL THERAPY INITIAL EVALUATION ADULT - ADDITIONAL COMMENTS
Per pt and chart, pt resides at [facility] with rolling walker, reports being ind dressing, wears glasses, +RH Per pt and chart, pt resides at [facility] with rolling walker, reports being ind dressing, wears glasses, +RH

## 2023-12-31 NOTE — CONSULT NOTE ADULT - ASSESSMENT
58 year old man with h/o type II DM was on metformin, diagnosed with liver cirrhosis 3 weeks ago, complicated by ascites and encephalopathy Also with renal dysfunction of unclear duration.    - RAHUL vs CKD with BUN/Cr 110/4.6. Urine sodium low possible HRS  - Anion gap Metabolic acidosis with bicarb 13   - Remainder of electrolytes (Na, K and Phos ) are fair   - Has ascites otherwise no significant peripheral edema, not hypoxic   - Anemia Hgb 8.2   - Lethargy due to liver/kidney dysfunction       Suggest  - Obtain bilateral renal US   - Urine spot protein/creatinine ratio   - Continue HRS rx with albumin, midodrine and octreotide   - Sodium bicarbonate 1300mg po TID   -  Monitor strict I/O   -  Will likely need dialysis, will discuss with team  58 year old man with h/o type II DM was on metformin and Jardiance,  diagnosed with liver cirrhosis 3 weeks ago, complicated by ascites. He underwent paracenthesis x 3 in the past 3 weeks. Course complicated by worseking RAHUL and encephalopathy. He was transferred to [facility] for liver transplant evaluation.     - RAHUL vs CKD with BUN/Cr 110/4.6. Urine sodium low possible HRS  - Anion gap Metabolic acidosis with bicarb 13   - Remainder of electrolytes (Na, K and Phos) are fair   - Has ascites otherwise no significant peripheral edema, not hypoxic   - Anemia Hgb 8.2   - Lethargy due to liver/kidney dysfunction     I spoke to patient's emergency contact and GIO Martinez. Discussed regarding his kidney failure and possible need for dialysis.   I obtained consent for dialysis over the phone.     Suggest  - Obtain bilateral renal US   - Urine spot protein/creatinine ratio   - Continue HRS rx with albumin, midodrine and octreotide   - Sodium bicarbonate 1300mg po TID   -  Monitor strict I/O   -  Will likely need dialysis, will discuss with team    58 year old man with h/o type II DM was on metformin and Jardiance,  diagnosed with liver cirrhosis 3 weeks ago, complicated by ascites. He underwent paracenthesis x 3 in the past 3 weeks. Course complicated by worseking RAHUL and encephalopathy. He was transferred to [facility] for liver transplant evaluation.     - RAHUL vs CKD with BUN/Cr 110/4.6. Urine sodium low possible HRS  - Anion gap Metabolic acidosis with bicarb 13   - Remainder of electrolytes (Na, K and Phos) are fair   - Has ascites otherwise no significant peripheral edema, not hypoxic   - Anemia Hgb 8.2   - Lethargy due to liver/kidney dysfunction     I spoke to patient's emergency contact and GIO Martinez. Discussed regarding his kidney failure and possible need for dialysis.   I obtained consent for dialysis over the phone.     Suggest  - Obtain bilateral renal US   - Urine spot protein/creatinine ratio   - Continue HRS rx with albumin, midodrine and octreotide   - Sodium bicarbonate 1300mg po TID   -  Monitor strict I/O   -  Will likely need dialysis, will discuss with team

## 2023-12-31 NOTE — CONSULT NOTE ADULT - SUBJECTIVE AND OBJECTIVE BOX
Initial GI Consult    Patient is a 58y old  Male who presents with a chief complaint of decomp cirrhosis     HPI: Obtained from documentation in chart...  Patient history limited by patient mental status. Family member/HCP Juan Clteresa who also had limited information.     59 y/o M PMH DM2, recently diagnosed alcoholic cirrhosis (3 weeks ago) presenting as a transfer from [facility] for transplant eval for decompensated liver cirrhosis. Patient initially presented to [facility] (from assisted living) last week with dizziness and worsening AMS. Per HCP, patient first diagnosed with cirrhosis 3 weeks ago after found lethargic. Underwent abdominal paracentesis with large volume fluid removal at that time. Patient discharged to [facility] with hepatology followup. Patient represented to OSH with AMS, dizziness, and increased abdominal distension last week. Noted to have worsening renal function, however still able to produce urine.     At OSH, patient was started on albumin, octreotide, midodrine for concern for HRS. Also started on meropenem per discharge paperwork, no clear indication listed.     Patient directly transferred from [facility] to medicine Floors. Initial EKG demonstrating sinus rhythm with frequent PVCs. .    On my interview with the patient he appeared very lethargic and only answering some questions. Patient notes severe extremity pain and the inability to walk for several months. Patient denies any SOB, chest pain, fevers and chills, abd pain.      PAST MEDICAL & SURGICAL HISTORY:  Type 2 diabetes mellitus      Decompensated hepatic cirrhosis      No significant past surgical history        FH:  FAMILY HISTORY:  No pertinent family history in first degree relatives        MEDS:  MEDICATIONS  (STANDING):  albumin human 25% IVPB 100 milliLiter(s) IV Intermittent every 8 hours  cyanocobalamin 1000 MICROGram(s) Oral daily  dextrose 5%. 1000 milliLiter(s) (100 mL/Hr) IV Continuous <Continuous>  dextrose 5%. 1000 milliLiter(s) (50 mL/Hr) IV Continuous <Continuous>  dextrose 50% Injectable 25 Gram(s) IV Push once  dextrose 50% Injectable 25 Gram(s) IV Push once  dextrose 50% Injectable 12.5 Gram(s) IV Push once  folic acid 1 milliGRAM(s) Oral daily  glucagon  Injectable 1 milliGRAM(s) IntraMuscular once  insulin lispro (ADMELOG) corrective regimen sliding scale   SubCutaneous at bedtime  insulin lispro (ADMELOG) corrective regimen sliding scale   SubCutaneous three times a day before meals  lactulose Syrup 10 Gram(s) Oral three times a day  melatonin 1 milliGRAM(s) Oral at bedtime  meropenem  IVPB 500 milliGRAM(s) IV Intermittent every 12 hours  midodrine. 10 milliGRAM(s) Oral three times a day  octreotide  Injectable 200 MICROGram(s) SubCutaneous three times a day  pancrelipase  (CREON  6,000 Lipase Units) 1 Capsule(s) Oral three times a day with meals  tamsulosin 0.4 milliGRAM(s) Oral at bedtime  thiamine 100 milliGRAM(s) Oral daily    MEDICATIONS  (PRN):  dextrose Oral Gel 15 Gram(s) Oral once PRN Blood Glucose LESS THAN 70 milliGRAM(s)/deciliter    Allergies    No Known Allergies    Intolerances          Unable to perform ROS due to mental status   ______________________________________________________________________  PHYSICAL EXAM:  T(C): 36.4 (12-31-23 @ 09:00), Max: 36.8 (12-30-23 @ 23:00)  HR: 87 (12-31-23 @ 09:00)  BP: 99/58 (12-31-23 @ 09:00)  RR: 18 (12-31-23 @ 09:00)  SpO2: 95% (12-31-23 @ 09:00)  Wt(kg): --    12-30 - 12-31  --------------------------------------------------------  IN:    Oral Fluid: 120 mL  Total IN: 120 mL    OUT:    Voided (mL): 100 mL  Total OUT: 100 mL    Total NET: 20 mL      GEN: NAD, normocephalic  CVS: normal hr   CHEST: reg resp rate, no increased WOB  ABD: soft, nontender, distended, + fluid wave   EXTR: no cyanosis, no clubbing, mild edema, very tender to palpation   NEURO: A&OX3 but very lethargic and needs to be asked the questions multiple times prior to answering   SKIN:  warm;  non icteric    ______________________________________________________________________  LABS:                        8.2    10.56 )-----------( 70       ( 31 Dec 2023 07:34 )             25.4     12-31    139  |  103  |  110<H>  ----------------------------<  169<H>  4.2   |  13<L>  |  4.67<H>    Ca    10.0      31 Dec 2023 07:34  Phos  4.7     12-31  Mg     2.6     12-31    TPro  5.8<L>  /  Alb  3.8  /  TBili  12.7<H>  /  DBili  x   /  AST  82<H>  /  ALT  21  /  AlkPhos  413<H>  12-31    LIVER FUNCTIONS - ( 31 Dec 2023 07:34 )  Alb: 3.8 g/dL / Pro: 5.8 g/dL / ALK PHOS: 413 U/L / ALT: 21 U/L / AST: 82 U/L / GGT: x           PT/INR - ( 31 Dec 2023 07:34 )   PT: 13.5 sec;   INR: 1.23 ratio         PTT - ( 31 Dec 2023 07:34 )  PTT:33.7 sec  ____________________________________________         Initial GI Consult    Patient is a 58y old  Male who presents with a chief complaint of decomp cirrhosis     HPI: Obtained from documentation in chart...  Patient history limited by patient mental status. Family member/HCP Juan Clteresa who also had limited information.     57 y/o M PMH DM2, recently diagnosed alcoholic cirrhosis (3 weeks ago) presenting as a transfer from [facility] for transplant eval for decompensated liver cirrhosis. Patient initially presented to [facility] (from assisted living) last week with dizziness and worsening AMS. Per HCP, patient first diagnosed with cirrhosis 3 weeks ago after found lethargic. Underwent abdominal paracentesis with large volume fluid removal at that time. Patient discharged to [facility] with hepatology followup. Patient represented to OSH with AMS, dizziness, and increased abdominal distension last week. Noted to have worsening renal function, however still able to produce urine.     At OSH, patient was started on albumin, octreotide, midodrine for concern for HRS. Also started on meropenem per discharge paperwork, no clear indication listed.     Patient directly transferred from [facility] to medicine Floors. Initial EKG demonstrating sinus rhythm with frequent PVCs. .    On my interview with the patient he appeared very lethargic and only answering some questions. Patient notes severe extremity pain and the inability to walk for several months. Patient denies any SOB, chest pain, fevers and chills, abd pain.      PAST MEDICAL & SURGICAL HISTORY:  Type 2 diabetes mellitus      Decompensated hepatic cirrhosis      No significant past surgical history        FH:  FAMILY HISTORY:  No pertinent family history in first degree relatives        MEDS:  MEDICATIONS  (STANDING):  albumin human 25% IVPB 100 milliLiter(s) IV Intermittent every 8 hours  cyanocobalamin 1000 MICROGram(s) Oral daily  dextrose 5%. 1000 milliLiter(s) (100 mL/Hr) IV Continuous <Continuous>  dextrose 5%. 1000 milliLiter(s) (50 mL/Hr) IV Continuous <Continuous>  dextrose 50% Injectable 25 Gram(s) IV Push once  dextrose 50% Injectable 25 Gram(s) IV Push once  dextrose 50% Injectable 12.5 Gram(s) IV Push once  folic acid 1 milliGRAM(s) Oral daily  glucagon  Injectable 1 milliGRAM(s) IntraMuscular once  insulin lispro (ADMELOG) corrective regimen sliding scale   SubCutaneous at bedtime  insulin lispro (ADMELOG) corrective regimen sliding scale   SubCutaneous three times a day before meals  lactulose Syrup 10 Gram(s) Oral three times a day  melatonin 1 milliGRAM(s) Oral at bedtime  meropenem  IVPB 500 milliGRAM(s) IV Intermittent every 12 hours  midodrine. 10 milliGRAM(s) Oral three times a day  octreotide  Injectable 200 MICROGram(s) SubCutaneous three times a day  pancrelipase  (CREON  6,000 Lipase Units) 1 Capsule(s) Oral three times a day with meals  tamsulosin 0.4 milliGRAM(s) Oral at bedtime  thiamine 100 milliGRAM(s) Oral daily    MEDICATIONS  (PRN):  dextrose Oral Gel 15 Gram(s) Oral once PRN Blood Glucose LESS THAN 70 milliGRAM(s)/deciliter    Allergies    No Known Allergies    Intolerances          Unable to perform ROS due to mental status   ______________________________________________________________________  PHYSICAL EXAM:  T(C): 36.4 (12-31-23 @ 09:00), Max: 36.8 (12-30-23 @ 23:00)  HR: 87 (12-31-23 @ 09:00)  BP: 99/58 (12-31-23 @ 09:00)  RR: 18 (12-31-23 @ 09:00)  SpO2: 95% (12-31-23 @ 09:00)  Wt(kg): --    12-30 - 12-31  --------------------------------------------------------  IN:    Oral Fluid: 120 mL  Total IN: 120 mL    OUT:    Voided (mL): 100 mL  Total OUT: 100 mL    Total NET: 20 mL      GEN: NAD, normocephalic  CVS: normal hr   CHEST: reg resp rate, no increased WOB  ABD: soft, nontender, distended, + fluid wave   EXTR: no cyanosis, no clubbing, mild edema, very tender to palpation   NEURO: A&OX3 but very lethargic and needs to be asked the questions multiple times prior to answering   SKIN:  warm;  non icteric    ______________________________________________________________________  LABS:                        8.2    10.56 )-----------( 70       ( 31 Dec 2023 07:34 )             25.4     12-31    139  |  103  |  110<H>  ----------------------------<  169<H>  4.2   |  13<L>  |  4.67<H>    Ca    10.0      31 Dec 2023 07:34  Phos  4.7     12-31  Mg     2.6     12-31    TPro  5.8<L>  /  Alb  3.8  /  TBili  12.7<H>  /  DBili  x   /  AST  82<H>  /  ALT  21  /  AlkPhos  413<H>  12-31    LIVER FUNCTIONS - ( 31 Dec 2023 07:34 )  Alb: 3.8 g/dL / Pro: 5.8 g/dL / ALK PHOS: 413 U/L / ALT: 21 U/L / AST: 82 U/L / GGT: x           PT/INR - ( 31 Dec 2023 07:34 )   PT: 13.5 sec;   INR: 1.23 ratio         PTT - ( 31 Dec 2023 07:34 )  PTT:33.7 sec  ____________________________________________

## 2023-12-31 NOTE — CONSULT NOTE ADULT - ATTENDING COMMENTS
57 y/o M PMH DM2, recently diagnosed alcoholic cirrhosis (3 weeks ago) presenting as a transfer from [place] for transplant eval for decompensated liver cirrhosis. Patient initially presented to [place] (from assisted living) last week with dizziness and worsening AMS, RAHUL, shock (likely sepsis), HE 59 y/o M PMH DM2, recently diagnosed alcoholic cirrhosis (3 weeks ago) presenting as a transfer from [facility] for transplant eval for decompensated liver cirrhosis. Patient initially presented to [facility] (from assisted living) last week with dizziness and worsening AMS, RAHUL, shock (likely sepsis), HE

## 2023-12-31 NOTE — PROGRESS NOTE ADULT - PROBLEM SELECTOR PLAN 1
Recently diagnosed alcoholic cirrhosis   last drink several months ago will check PETH  Accepted as transfer to [facility] for transplant eval, appreicate recs  c/w Thiamine, B12, Folic Acid, lactulose   IR consult placed for paracentesis planned for tuesday- US pending  Holding off on SBP PPx at this time  Patient previously on nadolol per outside hospital notes; unclear why held - f/u GI recs  MELD 32  HE: present on exam   EV: unk will need screening EGD at some point  HCC screening: will need MRI- US pending first  Ascites: seen on exam, plan for para on Tuesday   Infection hx: unclear why hes on meropenem started at OSH  Bleeding hx: no s/s of bleeding Recently diagnosed alcoholic cirrhosis   last drink several months ago will check PETH  Accepted as transfer to [facility] for transplant eval, appreicate recs  c/w Thiamine, B12, Folic Acid, lactulose   IR consult placed for paracentesis planned for tuesday- US pending  Holding off on SBP PPx at this time  Patient previously on nadolol per outside hospital notes; unclear why held - f/u GI recs  MELD 32  HE: present on exam   EV: unk will need screening EGD at some point  HCC screening: will need MRI- US pending first  Ascites: seen on exam, plan for para on Tuesday   Infection hx: unclear why hes on meropenem started at OSH  Bleeding hx: no s/s of bleeding

## 2023-12-31 NOTE — PHYSICAL THERAPY INITIAL EVALUATION ADULT - PERTINENT HX OF CURRENT PROBLEM, REHAB EVAL
Pt is a  57 y/o M admitted to [facility] on 12/30/23 PMH DM2, recently diagnosed alcoholic cirrhosis (3 weeks ago) presenting as a transfer from [facility] for transplant eval for decompensated liver cirrhosis (accepted by Dr. Amezquita). Pt initially presented to [facility] (from assisted living) last week with dizziness and worsening AMS. Per HCP, pt first diagnosed with cirrhosis 3 weeks ago after found lethargic. Underwent abdominal paracentesis with large volume fluid removal at that time. Pt discharged to assisted living with hepatology followup. Pt represented to OSH with AMS, dizziness, and increased abdominal distension last week. Noted to have worsening renal function, however still able to produce urine.  At OSH, pt was started on albumin, octreotide, midodrine for concern for HRS. Also started on meropenem per discharge paperwork, no clear indication listed, tx to [facility]. Hospital course: Pt directly transferred from [facility] to medicine Floors. Initial EKG demonstrating sinus rhythm with frequent PVCs. . +admitted for transplant eval. Course c/b concern for hepatorenal syndrome. +Recently diagnosed alcoholic cirrhosis, IR consult placed for paracentesis (plan for 1/2/24), MELD 32, Pt with acute encephalopathy (A&Ox2, per HCP baselien A&Ox3), DVT:SCDs Pt is a  59 y/o M admitted to [facility] on 12/30/23 PMH DM2, recently diagnosed alcoholic cirrhosis (3 weeks ago) presenting as a transfer from [facility] for transplant eval for decompensated liver cirrhosis (accepted by Dr. Amezquita). Pt initially presented to [facility] (from assisted living) last week with dizziness and worsening AMS. Per HCP, pt first diagnosed with cirrhosis 3 weeks ago after found lethargic. Underwent abdominal paracentesis with large volume fluid removal at that time. Pt discharged to assisted living with hepatology followup. Pt represented to OSH with AMS, dizziness, and increased abdominal distension last week. Noted to have worsening renal function, however still able to produce urine.  At OSH, pt was started on albumin, octreotide, midodrine for concern for HRS. Also started on meropenem per discharge paperwork, no clear indication listed, tx to [facility]. Hospital course: Pt directly transferred from [facility] to medicine Floors. Initial EKG demonstrating sinus rhythm with frequent PVCs. . +admitted for transplant eval. Course c/b concern for hepatorenal syndrome. +Recently diagnosed alcoholic cirrhosis, IR consult placed for paracentesis (plan for 1/2/24), MELD 32, Pt with acute encephalopathy (A&Ox2, per HCP baselien A&Ox3), DVT:SCDs

## 2023-12-31 NOTE — CONSULT NOTE ADULT - SUBJECTIVE AND OBJECTIVE BOX
[facility] DIVISION OF KIDNEY DISEASES AND HYPERTENSION -- INITIAL CONSULT NOTE  --------------------------------------------------------------------------------  Authored by: Donald Vante   Cell # [phone]     HPI: 58 year old man with h/o diabetes type II on metformin, poor medical follow up, recently diagnosed with liver cirrhosis 3 weeks ago.   Patient lethargic and not able to give full history, obtained additional hx from chart review   He was  transferred from [facility] for transplant eval for decompensated liver cirrhosis. Patient initially presented to [facility] (from assisted living) last week with dizziness and worsening AMS. Per HCP, patient first diagnosed with cirrhosis 3 weeks ago after found lethargic. Underwent abdominal paracentesis with large volume fluid removal at that time. Patient discharged to [facility] living with hepatology followup.   Pt states he was told to have abnormal kidney function 3 weeks ago. No know kidney disease in the past but did not have follow up.   Noted to have RAHUL at [facility]. He was started on albumin, octreotide, midodrine for concern for HRS. Also started on meropenem          PAST HISTORY  --------------------------------------------------------------------------------  PAST MEDICAL & SURGICAL HISTORY:  Type 2 diabetes mellitus  Decompensated hepatic cirrhosis    No significant past surgical history        FAMILY HISTORY:  No pertinent family history in first degree relatives      Social History:  History of prior alcohol use   Not used for several months per patient and HCP  Lives in Assisted Living (30 Dec 2023 19:56)        ALLERGIES & MEDICATIONS  --------------------------------------------------------------------------------  Allergies  No Known Allergies    Standing Inpatient Medications  albumin human 25% IVPB 100 milliLiter(s) IV Intermittent every 8 hours  cyanocobalamin 1000 MICROGram(s) Oral daily  folic acid 1 milliGRAM(s) Oral daily  glucagon  Injectable 1 milliGRAM(s) IntraMuscular once  insulin lispro (ADMELOG) corrective regimen sliding scale   SubCutaneous at bedtime  insulin lispro (ADMELOG) corrective regimen sliding scale   SubCutaneous three times a day before meals  lactulose Syrup 10 Gram(s) Oral three times a day  melatonin 1 milliGRAM(s) Oral at bedtime  meropenem  IVPB 500 milliGRAM(s) IV Intermittent every 12 hours  midodrine. 10 milliGRAM(s) Oral three times a day  octreotide  Injectable 200 MICROGram(s) SubCutaneous three times a day  pancrelipase  (CREON  6,000 Lipase Units) 1 Capsule(s) Oral three times a day with meals  tamsulosin 0.4 milliGRAM(s) Oral at bedtime  thiamine 100 milliGRAM(s) Oral daily    PRN Inpatient Medications  dextrose Oral Gel 15 Gram(s) Oral once PRN      REVIEW OF SYSTEMS - negative except for those mentioned in HPI         VITALS/PHYSICAL EXAM  --------------------------------------------------------------------------------  T(C): 36.4 (12-31-23 @ 13:00), Max: 36.8 (12-30-23 @ 23:00)  HR: 91 (12-31-23 @ 13:00) (87 - 93)  BP: 107/66 (12-31-23 @ 13:00) (90/45 - 113/73)  RR: 18 (12-31-23 @ 13:00) (18 - 18)  SpO2: 96% (12-31-23 @ 13:00) (94% - 99%)        12-30-23 @ 07:01  -  12-31-23 @ 07:00  --------------------------------------------------------  IN: 120 mL / OUT: 100 mL / NET: 20 mL    12-31-23 @ 07:01  -  12-31-23 @ 13:28  --------------------------------------------------------  IN: 600 mL / OUT: 250 mL / NET: 350 mL      Physical Exam:  Awake, sluggish but oriented and responsive  Comfortably resting in bed    Icterus +   Lungs clear  Ascites present, softly distended non tender  No edema   No asterixes       LABS/STUDIES  --------------------------------------------------------------------------------              8.2    10.56 >-----------<  70       [12-31-23 @ 07:34]              25.4     139  |  103  |  110  ----------------------------<  169      [12-31-23 @ 07:34]  4.2   |  13  |  4.67        Ca     10.0     [12-31-23 @ 07:34]      Mg     2.6     [12-31-23 @ 07:34]      Phos  4.7     [12-31-23 @ 07:34]    TPro  5.8  /  Alb  3.8  /  TBili  12.7  /  DBili  x   /  AST  82  /  ALT  21  /  AlkPhos  413  [12-31-23 @ 07:34]    PT/INR: PT 13.5 , INR 1.23       [12-31-23 @ 07:34]  PTT: 33.7       [12-31-23 @ 07:34]      Creatinine Trend:  SCr 4.67 [12-31 @ 07:34]  SCr 4.14 [12-30 @ 22:00]    Urinalysis - [12-31-23 @ 07:34]      Color  / Appearance  / SG  / pH       Gluc 169 / Ketone   / Bili  / Urobili        Blood  / Protein  / Leuk Est  / Nitrite       RBC  / WBC  / Hyaline  / Gran  / Sq Epi  / Non Sq Epi  / Bacteria     Urine Creatinine 183      [12-31-23 @ 04:10]  Urine Sodium 9      [12-31-23 @ 04:10]  Urine Urea Nitrogen 404      [12-31-23 @ 04:10]    Iron 35, TIBC 114, %sat 31      [12-31-23 @ 07:34]  Ferritin 285      [12-31-23 @ 07:34]    HBsAg Nonreact      [12-31-23 @ 07:34]  HCV 0.17, Nonreact      [12-31-23 @ 07:34]     [facility] DIVISION OF KIDNEY DISEASES AND HYPERTENSION -- INITIAL CONSULT NOTE  --------------------------------------------------------------------------------  Authored by: Donald Vante   Cell # [phone]     HPI: 58 year old man with h/o diabetes type II on metformin, poor medical follow up, recently diagnosed with liver cirrhosis 3 weeks ago.   Patient lethargic and not able to give full history, obtained additional hx from chart review   He was  transferred from [facility] for transplant eval for decompensated liver cirrhosis. Patient initially presented to [facility] (from assisted living) last week with dizziness and worsening AMS. Per HCP, patient first diagnosed with cirrhosis 3 weeks ago after found lethargic. Underwent abdominal paracentesis with large volume fluid removal at that time. Patient discharged to [facility] living with hepatology followup.   Pt states he was told to have abnormal kidney function 3 weeks ago. No know kidney disease in the past but did not have follow up.   Noted to have RAHUL at [facility]. He was started on albumin, octreotide, midodrine for concern for HRS. Also started on meropenem          PAST HISTORY  --------------------------------------------------------------------------------  PAST MEDICAL & SURGICAL HISTORY:  Type 2 diabetes mellitus  Decompensated hepatic cirrhosis    No significant past surgical history        FAMILY HISTORY:  No pertinent family history in first degree relatives      Social History:  History of prior alcohol use   Not used for several months per patient and HCP  Lives in Assisted Living (30 Dec 2023 19:56)        ALLERGIES & MEDICATIONS  --------------------------------------------------------------------------------  Allergies  No Known Allergies    Standing Inpatient Medications  albumin human 25% IVPB 100 milliLiter(s) IV Intermittent every 8 hours  cyanocobalamin 1000 MICROGram(s) Oral daily  folic acid 1 milliGRAM(s) Oral daily  glucagon  Injectable 1 milliGRAM(s) IntraMuscular once  insulin lispro (ADMELOG) corrective regimen sliding scale   SubCutaneous at bedtime  insulin lispro (ADMELOG) corrective regimen sliding scale   SubCutaneous three times a day before meals  lactulose Syrup 10 Gram(s) Oral three times a day  melatonin 1 milliGRAM(s) Oral at bedtime  meropenem  IVPB 500 milliGRAM(s) IV Intermittent every 12 hours  midodrine. 10 milliGRAM(s) Oral three times a day  octreotide  Injectable 200 MICROGram(s) SubCutaneous three times a day  pancrelipase  (CREON  6,000 Lipase Units) 1 Capsule(s) Oral three times a day with meals  tamsulosin 0.4 milliGRAM(s) Oral at bedtime  thiamine 100 milliGRAM(s) Oral daily    PRN Inpatient Medications  dextrose Oral Gel 15 Gram(s) Oral once PRN      REVIEW OF SYSTEMS - negative except for those mentioned in HPI         VITALS/PHYSICAL EXAM  --------------------------------------------------------------------------------  T(C): 36.4 (12-31-23 @ 13:00), Max: 36.8 (12-30-23 @ 23:00)  HR: 91 (12-31-23 @ 13:00) (87 - 93)  BP: 107/66 (12-31-23 @ 13:00) (90/45 - 113/73)  RR: 18 (12-31-23 @ 13:00) (18 - 18)  SpO2: 96% (12-31-23 @ 13:00) (94% - 99%)        12-30-23 @ 07:01  -  12-31-23 @ 07:00  --------------------------------------------------------  IN: 120 mL / OUT: 100 mL / NET: 20 mL    12-31-23 @ 07:01  -  12-31-23 @ 13:28  --------------------------------------------------------  IN: 600 mL / OUT: 250 mL / NET: 350 mL      Physical Exam:  Awake, sluggish but oriented and responsive  Comfortably resting in bed    Icterus +   Lungs clear  Ascites present, softly distended non tender  No edema   No asterixes       LABS/STUDIES  --------------------------------------------------------------------------------              8.2    10.56 >-----------<  70       [12-31-23 @ 07:34]              25.4     139  |  103  |  110  ----------------------------<  169      [12-31-23 @ 07:34]  4.2   |  13  |  4.67        Ca     10.0     [12-31-23 @ 07:34]      Mg     2.6     [12-31-23 @ 07:34]      Phos  4.7     [12-31-23 @ 07:34]    TPro  5.8  /  Alb  3.8  /  TBili  12.7  /  DBili  x   /  AST  82  /  ALT  21  /  AlkPhos  413  [12-31-23 @ 07:34]    PT/INR: PT 13.5 , INR 1.23       [12-31-23 @ 07:34]  PTT: 33.7       [12-31-23 @ 07:34]      Creatinine Trend:  SCr 4.67 [12-31 @ 07:34]  SCr 4.14 [12-30 @ 22:00]    Urinalysis - [12-31-23 @ 07:34]      Color  / Appearance  / SG  / pH       Gluc 169 / Ketone   / Bili  / Urobili        Blood  / Protein  / Leuk Est  / Nitrite       RBC  / WBC  / Hyaline  / Gran  / Sq Epi  / Non Sq Epi  / Bacteria     Urine Creatinine 183      [12-31-23 @ 04:10]  Urine Sodium 9      [12-31-23 @ 04:10]  Urine Urea Nitrogen 404      [12-31-23 @ 04:10]    Iron 35, TIBC 114, %sat 31      [12-31-23 @ 07:34]  Ferritin 285      [12-31-23 @ 07:34]    HBsAg Nonreact      [12-31-23 @ 07:34]  HCV 0.17, Nonreact      [12-31-23 @ 07:34]     [facility] DIVISION OF KIDNEY DISEASES AND HYPERTENSION -- INITIAL CONSULT NOTE  --------------------------------------------------------------------------------  Authored by: Donald Olivas   Cell # [phone]     HPI: 58 year old man with h/o diabetes type II on Metformin and Jardiance , no prior kidney disease He was recently diagnosed with liver cirrhosis in November. He was transferred from [facility] for liver transplant evaluation.   Patient lethargic and not able to give full history, obtained additional hx from chart review and his emergency contact Nicci Martinez (cousin).   He initially presented to [facility] on 11/19th with lethargy, dizziness and abdominal distension. He had paracentesis done at the time, discharged to assisted living on 11/28, readmitted on 12/9 with worsening abdominal distension s/p paracenthesis 2 more times.   Per his cousin, Mr. Jeff had diabetes for 6-7 years, no other known medical problems, no known kidney disease. Kidney function was reportedly fine when he was initially admitted but started worsening last week.   She is not sure if he drank alcohol.   Pt from [place], has no other family members in the [place]. He lives alone.     He was started on albumin, octreotide, midodrine for concern for HRS. Also started on meropenem and transferred to [facility] for further evaluation         PAST HISTORY  --------------------------------------------------------------------------------  PAST MEDICAL & SURGICAL HISTORY:  Type 2 diabetes mellitus  Decompensated hepatic cirrhosis    No significant past surgical history    FAMILY HISTORY:  No pertinent family history in first degree relatives      Social History:  History of prior alcohol use   Not used for several months per patient and HCP  Single, lives alone, originally from [place], living [place] the [place]. Previously worked as an , not currently working.         ALLERGIES & MEDICATIONS  --------------------------------------------------------------------------------  Allergies  No Known Allergies    Standing Inpatient Medications  albumin human 25% IVPB 100 milliLiter(s) IV Intermittent every 8 hours  cyanocobalamin 1000 MICROGram(s) Oral daily  folic acid 1 milliGRAM(s) Oral daily  insulin lispro (ADMELOG) corrective regimen sliding scale   SubCutaneous at bedtime  insulin lispro (ADMELOG) corrective regimen sliding scale   SubCutaneous three times a day before meals  lactulose Syrup 10 Gram(s) Oral three times a day  melatonin 1 milliGRAM(s) Oral at bedtime  meropenem  IVPB 500 milliGRAM(s) IV Intermittent every 12 hours  midodrine. 10 milliGRAM(s) Oral three times a day  octreotide  Injectable 200 MICROGram(s) SubCutaneous three times a day  pancrelipase  (CREON  6,000 Lipase Units) 1 Capsule(s) Oral three times a day with meals  tamsulosin 0.4 milliGRAM(s) Oral at bedtime  thiamine 100 milliGRAM(s) Oral daily    PRN Inpatient Medications  dextrose Oral Gel 15 Gram(s) Oral once PRN      REVIEW OF SYSTEMS - negative except for those mentioned in HPI         VITALS/PHYSICAL EXAM  --------------------------------------------------------------------------------  T(C): 36.4 (12-31-23 @ 13:00), Max: 36.8 (12-30-23 @ 23:00)  HR: 91 (12-31-23 @ 13:00) (87 - 93)  BP: 107/66 (12-31-23 @ 13:00) (90/45 - 113/73)  RR: 18 (12-31-23 @ 13:00) (18 - 18)  SpO2: 96% (12-31-23 @ 13:00) (94% - 99%)        12-30-23 @ 07:01  -  12-31-23 @ 07:00  --------------------------------------------------------  IN: 120 mL / OUT: 100 mL / NET: 20 mL    12-31-23 @ 07:01  -  12-31-23 @ 13:28  --------------------------------------------------------  IN: 600 mL / OUT: 250 mL / NET: 350 mL      Physical Exam:  Awake, sluggish but oriented and responsive  Comfortably resting in bed    Icterus +   Lungs clear  Ascites present, softly distended non tender  No edema   No asterixes       LABS/STUDIES  --------------------------------------------------------------------------------              8.2    10.56 >-----------<  70       [12-31-23 @ 07:34]              25.4     139  |  103  |  110  ----------------------------<  169      [12-31-23 @ 07:34]  4.2   |  13  |  4.67        Ca     10.0     [12-31-23 @ 07:34]      Mg     2.6     [12-31-23 @ 07:34]      Phos  4.7     [12-31-23 @ 07:34]    TPro  5.8  /  Alb  3.8  /  TBili  12.7  /  DBili  x   /  AST  82  /  ALT  21  /  AlkPhos  413  [12-31-23 @ 07:34]    PT/INR: PT 13.5 , INR 1.23       [12-31-23 @ 07:34]  PTT: 33.7       [12-31-23 @ 07:34]      Creatinine Trend:  SCr 4.67 [12-31 @ 07:34]  SCr 4.14 [12-30 @ 22:00]    Urinalysis - [12-31-23 @ 07:34]      Color  / Appearance  / SG  / pH       Gluc 169 / Ketone   / Bili  / Urobili        Blood  / Protein  / Leuk Est  / Nitrite       RBC  / WBC  / Hyaline  / Gran  / Sq Epi  / Non Sq Epi  / Bacteria     Urine Creatinine 183      [12-31-23 @ 04:10]  Urine Sodium 9      [12-31-23 @ 04:10]  Urine Urea Nitrogen 404      [12-31-23 @ 04:10]    Iron 35, TIBC 114, %sat 31      [12-31-23 @ 07:34]  Ferritin 285      [12-31-23 @ 07:34]    HBsAg Nonreact      [12-31-23 @ 07:34]  HCV 0.17, Nonreact      [12-31-23 @ 07:34]     [facility] DIVISION OF KIDNEY DISEASES AND HYPERTENSION -- INITIAL CONSULT NOTE  --------------------------------------------------------------------------------  Authored by: Donald Olivas   Cell # [phone]     HPI: 58 year old man with h/o diabetes type II on Metformin and Jardiance , no prior kidney disease He was recently diagnosed with liver cirrhosis in November. He was transferred from [facility] for liver transplant evaluation.   Patient lethargic and not able to give full history, obtained additional hx from chart review and his emergency contact Nicci Martinez (cousin).   He initially presented to [facility] on 11/19th with lethargy, dizziness and abdominal distension. He had paracentesis done at the time, discharged to assisted living on 11/28, readmitted on 12/9 with worsening abdominal distension s/p paracenthesis 2 more times.   Per his cousin, Mr. Jeff had diabetes for 6-7 years, no other known medical problems, no known kidney disease. Kidney function was reportedly fine when he was initially admitted but started worsening last week.   She is not sure if he drank alcohol.   Pt from [place], has no other family members in the [place]. He lives alone.     He was started on albumin, octreotide, midodrine for concern for HRS. Also started on meropenem and transferred to [facility] for further evaluation         PAST HISTORY  --------------------------------------------------------------------------------  PAST MEDICAL & SURGICAL HISTORY:  Type 2 diabetes mellitus  Decompensated hepatic cirrhosis    No significant past surgical history    FAMILY HISTORY:  No pertinent family history in first degree relatives      Social History:  History of prior alcohol use   Not used for several months per patient and HCP  Single, lives alone, originally from [place], [place]. Previously worked as an , not currently working.         ALLERGIES & MEDICATIONS  --------------------------------------------------------------------------------  Allergies  No Known Allergies    Standing Inpatient Medications  albumin human 25% IVPB 100 milliLiter(s) IV Intermittent every 8 hours  cyanocobalamin 1000 MICROGram(s) Oral daily  folic acid 1 milliGRAM(s) Oral daily  insulin lispro (ADMELOG) corrective regimen sliding scale   SubCutaneous at bedtime  insulin lispro (ADMELOG) corrective regimen sliding scale   SubCutaneous three times a day before meals  lactulose Syrup 10 Gram(s) Oral three times a day  melatonin 1 milliGRAM(s) Oral at bedtime  meropenem  IVPB 500 milliGRAM(s) IV Intermittent every 12 hours  midodrine. 10 milliGRAM(s) Oral three times a day  octreotide  Injectable 200 MICROGram(s) SubCutaneous three times a day  pancrelipase  (CREON  6,000 Lipase Units) 1 Capsule(s) Oral three times a day with meals  tamsulosin 0.4 milliGRAM(s) Oral at bedtime  thiamine 100 milliGRAM(s) Oral daily    PRN Inpatient Medications  dextrose Oral Gel 15 Gram(s) Oral once PRN      REVIEW OF SYSTEMS - negative except for those mentioned in HPI         VITALS/PHYSICAL EXAM  --------------------------------------------------------------------------------  T(C): 36.4 (12-31-23 @ 13:00), Max: 36.8 (12-30-23 @ 23:00)  HR: 91 (12-31-23 @ 13:00) (87 - 93)  BP: 107/66 (12-31-23 @ 13:00) (90/45 - 113/73)  RR: 18 (12-31-23 @ 13:00) (18 - 18)  SpO2: 96% (12-31-23 @ 13:00) (94% - 99%)        12-30-23 @ 07:01  -  12-31-23 @ 07:00  --------------------------------------------------------  IN: 120 mL / OUT: 100 mL / NET: 20 mL    12-31-23 @ 07:01  -  12-31-23 @ 13:28  --------------------------------------------------------  IN: 600 mL / OUT: 250 mL / NET: 350 mL      Physical Exam:  Awake, sluggish but oriented and responsive  Comfortably resting in bed    Icterus +   Lungs clear  Ascites present, softly distended non tender  No edema   No asterixes       LABS/STUDIES  --------------------------------------------------------------------------------              8.2    10.56 >-----------<  70       [12-31-23 @ 07:34]              25.4     139  |  103  |  110  ----------------------------<  169      [12-31-23 @ 07:34]  4.2   |  13  |  4.67        Ca     10.0     [12-31-23 @ 07:34]      Mg     2.6     [12-31-23 @ 07:34]      Phos  4.7     [12-31-23 @ 07:34]    TPro  5.8  /  Alb  3.8  /  TBili  12.7  /  DBili  x   /  AST  82  /  ALT  21  /  AlkPhos  413  [12-31-23 @ 07:34]    PT/INR: PT 13.5 , INR 1.23       [12-31-23 @ 07:34]  PTT: 33.7       [12-31-23 @ 07:34]      Creatinine Trend:  SCr 4.67 [12-31 @ 07:34]  SCr 4.14 [12-30 @ 22:00]    Urinalysis - [12-31-23 @ 07:34]      Color  / Appearance  / SG  / pH       Gluc 169 / Ketone   / Bili  / Urobili        Blood  / Protein  / Leuk Est  / Nitrite       RBC  / WBC  / Hyaline  / Gran  / Sq Epi  / Non Sq Epi  / Bacteria     Urine Creatinine 183      [12-31-23 @ 04:10]  Urine Sodium 9      [12-31-23 @ 04:10]  Urine Urea Nitrogen 404      [12-31-23 @ 04:10]    Iron 35, TIBC 114, %sat 31      [12-31-23 @ 07:34]  Ferritin 285      [12-31-23 @ 07:34]    HBsAg Nonreact      [12-31-23 @ 07:34]  HCV 0.17, Nonreact      [12-31-23 @ 07:34]

## 2023-12-31 NOTE — CONSULT NOTE ADULT - SUBJECTIVE AND OBJECTIVE BOX
VASCULAR SURGERY CONSULT NOTE  --------------------------------------------------------------------------------------------    Patient is a 58y old  Male who presents with a chief complaint of Liver Transplant Eval (31 Dec 2023 13:27)    HPI: 58M PMhx DM, recent dx ETOH cirrhosis, presents as tsf from [facility] for transplant evaluation i/s/o decompensated liver cirrhosis. Cf HRS requiring urgent HD. Vascular consulted for shiley placement.     HDS and on RA on the floor.     Seen and examined. Subjective hx limited by patients AMS, although patient awake and following commands.       PAST MEDICAL & SURGICAL HISTORY:  Type 2 diabetes mellitus      Decompensated hepatic cirrhosis      No significant past surgical history        FAMILY HISTORY:  No pertinent family history in first degree relatives      [] Family history not pertinent as reviewed with the patient and family    ALLERGIES: No Known Allergies    CURRENT MEDICATIONS  MEDICATIONS (STANDING): cyanocobalamin 1000 MICROGram(s) Oral daily  dextrose 5%. 1000 milliLiter(s) IV Continuous <Continuous>  dextrose 5%. 1000 milliLiter(s) IV Continuous <Continuous>  dextrose 50% Injectable 25 Gram(s) IV Push once  dextrose 50% Injectable 25 Gram(s) IV Push once  dextrose 50% Injectable 12.5 Gram(s) IV Push once  folic acid 1 milliGRAM(s) Oral daily  glucagon  Injectable 1 milliGRAM(s) IntraMuscular once  insulin lispro (ADMELOG) corrective regimen sliding scale   SubCutaneous at bedtime  insulin lispro (ADMELOG) corrective regimen sliding scale   SubCutaneous three times a day before meals  lactulose Syrup 10 Gram(s) Oral three times a day  melatonin 1 milliGRAM(s) Oral at bedtime  meropenem  IVPB 500 milliGRAM(s) IV Intermittent every 12 hours  midodrine. 10 milliGRAM(s) Oral three times a day  octreotide  Injectable 200 MICROGram(s) SubCutaneous three times a day  pancrelipase  (CREON  6,000 Lipase Units) 1 Capsule(s) Oral three times a day with meals  tamsulosin 0.4 milliGRAM(s) Oral at bedtime  thiamine 100 milliGRAM(s) Oral daily    MEDICATIONS (PRN):dextrose Oral Gel 15 Gram(s) Oral once PRN Blood Glucose LESS THAN 70 milliGRAM(s)/deciliter    --------------------------------------------------------------------------------------------    Vitals:   T(C): 36.6 (12-31-23 @ 21:30), Max: 36.8 (12-30-23 @ 23:00)  HR: 92 (12-31-23 @ 21:30) (84 - 93)  BP: 124/55 (12-31-23 @ 21:30) (90/45 - 124/55)  RR: 17 (12-31-23 @ 21:30) (17 - 18)  SpO2: 97% (12-31-23 @ 21:30) (94% - 98%)  CAPILLARY BLOOD GLUCOSE      POCT Blood Glucose.: 139 mg/dL (31 Dec 2023 16:40)  POCT Blood Glucose.: 282 mg/dL (31 Dec 2023 12:35)  POCT Blood Glucose.: 186 mg/dL (31 Dec 2023 08:46)  POCT Blood Glucose.: 157 mg/dL (30 Dec 2023 22:24)    CAPILLARY BLOOD GLUCOSE      POCT Blood Glucose.: 139 mg/dL (31 Dec 2023 16:40)  POCT Blood Glucose.: 282 mg/dL (31 Dec 2023 12:35)  POCT Blood Glucose.: 186 mg/dL (31 Dec 2023 08:46)  POCT Blood Glucose.: 157 mg/dL (30 Dec 2023 22:24)      12-30 @ 07:01  -  12-31 @ 07:00  --------------------------------------------------------  IN:    Oral Fluid: 120 mL  Total IN: 120 mL    OUT:    Voided (mL): 100 mL  Total OUT: 100 mL    Total NET: 20 mL      12-31 @ 07:01  -  12-31 @ 22:01  --------------------------------------------------------  IN:    Oral Fluid: 780 mL  Total IN: 780 mL    OUT:    Voided (mL): 250 mL  Total OUT: 250 mL    Total NET: 530 mL            PHYSICAL EXAM:   General: Alert, NAD  Neuro: A+Ox1  HEENT: NC/AT, no asymmetry, no scleral icterus  Neck: Soft, supple  Cardio: RRR  Resp: Airway patent, unlabored breathing  Thorax: No chest wall tenderness  GI/Abd: Soft, NT/ND, no rebound/guarding, no masses palpated  Vascular: All 4 extremities warm   Skin: Intact, no breakdown, jaundiced   Musculoskeletal: All 4 extremities moving spontaneously, no limitations  --------------------------------------------------------------------------------------------    LABS  CBC (12-31 @ 07:34)                              8.2<L>                         10.56<H>  )----------------(  70<L>      --    % Neutrophils, --    % Lymphocytes, ANC: --                                  25.4<L>  CBC (12-30 @ 22:00)                              8.3<L>                         13.57<H>  )----------------(  77<L>      --    % Neutrophils, --    % Lymphocytes, ANC: --                                  25.4<L>    BMP (12-31 @ 07:34)             139     |  103     |  110<H>		Ca++ --      Ca 10.0               ---------------------------------( 169<H>		Mg 2.6                4.2     |  13<L>   |  4.67<H>			Ph 4.7<H>  BMP (12-30 @ 22:00)             137     |  103     |  100<H>		Ca++ --      Ca 9.6                ---------------------------------( 166<H>		Mg 2.5                4.1     |  16<L>   |  4.14<H>			Ph 4.1       LFTs (12-31 @ 07:34)      TPro 5.8<L> / Alb 3.8 / TBili 12.7<H> / DBili -- / AST 82<H> / ALT 21 / AlkPhos 413<H>  LFTs (12-30 @ 22:00)      TPro 6.2 / Alb 4.2 / TBili 12.8<H> / DBili -- / AST 78<H> / ALT 24 / AlkPhos 413<H>    Coags (12-31 @ 07:34)  aPTT 33.7 / INR 1.23<H> / PT 13.5<H>  Coags (12-30 @ 22:00)  aPTT 33.3 / INR 1.31<H> / PT 13.6<H>    Cardiac Markers (12-31 @ 14:46)     HSTrop: -- / CKMB: -- / CK: 12        --------------------------------------------------------------------------------------------    MICROBIOLOGY  Urinalysis (12-31 @ 07:34):     Color:  / Appearance:  / SG:  / pH:  / Gluc: 169<H> / Ketones:  / Bili:  / Urobili:  / Protein : / Nitrites:  / Leuk.Est:  / RBC:  / WBC:  / Sq Epi:  / Non Sq Epi:  / Bacteria          --------------------------------------------------------------------------------------------    IMAGING   VASCULAR SURGERY CONSULT NOTE  --------------------------------------------------------------------------------------------    Patient is a 58y old  Male who presents with a chief complaint of Liver Transplant Eval (31 Dec 2023 13:27)    HPI: 58M PMhx DM, recent dx ETOH cirrhosis, presents as tsf from [facility] for transplant evaluation i/s/o decompensated liver cirrhosis. Cf HRS requiring urgent HD. Vascular consulted for shiley placement.     HDS and on RA on the floor.     Seen and examined. Subjective hx limited by patients AMS, although patient awake and following commands.       PAST MEDICAL & SURGICAL HISTORY:  Type 2 diabetes mellitus      Decompensated hepatic cirrhosis      No significant past surgical history        FAMILY HISTORY:  No pertinent family history in first degree relatives      [] Family history not pertinent as reviewed with the patient and family    ALLERGIES: No Known Allergies    CURRENT MEDICATIONS  MEDICATIONS (STANDING): cyanocobalamin 1000 MICROGram(s) Oral daily  dextrose 5%. 1000 milliLiter(s) IV Continuous <Continuous>  dextrose 5%. 1000 milliLiter(s) IV Continuous <Continuous>  dextrose 50% Injectable 25 Gram(s) IV Push once  dextrose 50% Injectable 25 Gram(s) IV Push once  dextrose 50% Injectable 12.5 Gram(s) IV Push once  folic acid 1 milliGRAM(s) Oral daily  glucagon  Injectable 1 milliGRAM(s) IntraMuscular once  insulin lispro (ADMELOG) corrective regimen sliding scale   SubCutaneous at bedtime  insulin lispro (ADMELOG) corrective regimen sliding scale   SubCutaneous three times a day before meals  lactulose Syrup 10 Gram(s) Oral three times a day  melatonin 1 milliGRAM(s) Oral at bedtime  meropenem  IVPB 500 milliGRAM(s) IV Intermittent every 12 hours  midodrine. 10 milliGRAM(s) Oral three times a day  octreotide  Injectable 200 MICROGram(s) SubCutaneous three times a day  pancrelipase  (CREON  6,000 Lipase Units) 1 Capsule(s) Oral three times a day with meals  tamsulosin 0.4 milliGRAM(s) Oral at bedtime  thiamine 100 milliGRAM(s) Oral daily    MEDICATIONS (PRN):dextrose Oral Gel 15 Gram(s) Oral once PRN Blood Glucose LESS THAN 70 milliGRAM(s)/deciliter    --------------------------------------------------------------------------------------------    Vitals:   T(C): 36.6 (12-31-23 @ 21:30), Max: 36.8 (12-30-23 @ 23:00)  HR: 92 (12-31-23 @ 21:30) (84 - 93)  BP: 124/55 (12-31-23 @ 21:30) (90/45 - 124/55)  RR: 17 (12-31-23 @ 21:30) (17 - 18)  SpO2: 97% (12-31-23 @ 21:30) (94% - 98%)  CAPILLARY BLOOD GLUCOSE      POCT Blood Glucose.: 139 mg/dL (31 Dec 2023 16:40)  POCT Blood Glucose.: 282 mg/dL (31 Dec 2023 12:35)  POCT Blood Glucose.: 186 mg/dL (31 Dec 2023 08:46)  POCT Blood Glucose.: 157 mg/dL (30 Dec 2023 22:24)    CAPILLARY BLOOD GLUCOSE      POCT Blood Glucose.: 139 mg/dL (31 Dec 2023 16:40)  POCT Blood Glucose.: 282 mg/dL (31 Dec 2023 12:35)  POCT Blood Glucose.: 186 mg/dL (31 Dec 2023 08:46)  POCT Blood Glucose.: 157 mg/dL (30 Dec 2023 22:24)      12-30 @ 07:01  -  12-31 @ 07:00  --------------------------------------------------------  IN:    Oral Fluid: 120 mL  Total IN: 120 mL    OUT:    Voided (mL): 100 mL  Total OUT: 100 mL    Total NET: 20 mL      12-31 @ 07:01  -  12-31 @ 22:01  --------------------------------------------------------  IN:    Oral Fluid: 780 mL  Total IN: 780 mL    OUT:    Voided (mL): 250 mL  Total OUT: 250 mL    Total NET: 530 mL            PHYSICAL EXAM:   General: Alert, NAD  Neuro: A+Ox1  HEENT: NC/AT, no asymmetry, no scleral icterus  Neck: Soft, supple  Cardio: RRR  Resp: Airway patent, unlabored breathing  Thorax: No chest wall tenderness  GI/Abd: Soft, NT/ND, no rebound/guarding, no masses palpated  Vascular: All 4 extremities warm   Skin: Intact, no breakdown, jaundiced   Musculoskeletal: All 4 extremities moving spontaneously, no limitations  --------------------------------------------------------------------------------------------    LABS  CBC (12-31 @ 07:34)                              8.2<L>                         10.56<H>  )----------------(  70<L>      --    % Neutrophils, --    % Lymphocytes, ANC: --                                  25.4<L>  CBC (12-30 @ 22:00)                              8.3<L>                         13.57<H>  )----------------(  77<L>      --    % Neutrophils, --    % Lymphocytes, ANC: --                                  25.4<L>    BMP (12-31 @ 07:34)             139     |  103     |  110<H>		Ca++ --      Ca 10.0               ---------------------------------( 169<H>		Mg 2.6                4.2     |  13<L>   |  4.67<H>			Ph 4.7<H>  BMP (12-30 @ 22:00)             137     |  103     |  100<H>		Ca++ --      Ca 9.6                ---------------------------------( 166<H>		Mg 2.5                4.1     |  16<L>   |  4.14<H>			Ph 4.1       LFTs (12-31 @ 07:34)      TPro 5.8<L> / Alb 3.8 / TBili 12.7<H> / DBili -- / AST 82<H> / ALT 21 / AlkPhos 413<H>  LFTs (12-30 @ 22:00)      TPro 6.2 / Alb 4.2 / TBili 12.8<H> / DBili -- / AST 78<H> / ALT 24 / AlkPhos 413<H>    Coags (12-31 @ 07:34)  aPTT 33.7 / INR 1.23<H> / PT 13.5<H>  Coags (12-30 @ 22:00)  aPTT 33.3 / INR 1.31<H> / PT 13.6<H>    Cardiac Markers (12-31 @ 14:46)     HSTrop: -- / CKMB: -- / CK: 12        --------------------------------------------------------------------------------------------    MICROBIOLOGY  Urinalysis (12-31 @ 07:34):     Color:  / Appearance:  / SG:  / pH:  / Gluc: 169<H> / Ketones:  / Bili:  / Urobili:  / Protein : / Nitrites:  / Leuk.Est:  / RBC:  / WBC:  / Sq Epi:  / Non Sq Epi:  / Bacteria          --------------------------------------------------------------------------------------------    IMAGING

## 2023-12-31 NOTE — PROGRESS NOTE ADULT - ASSESSMENT
59 y/o M PMH prior EtOH use with alcoholic cirrhosis (diagnosed 3 weeks ago) and DM2 presenting from OSH for transplant eval and RAHUL.

## 2023-12-31 NOTE — PROGRESS NOTE ADULT - SUBJECTIVE AND OBJECTIVE BOX
PROGRESS NOTE:   Reena Tan, DO  Hospitalist  Teams  After 5pm/weekends or if no answer ext: [phone]      Patient is a 58y old  Male who presents with a chief complaint of Liver Transplant Eval (31 Dec 2023 10:53)      SUBJECTIVE / OVERNIGHT EVENTS:  Slow to communicate but able to provide history that he was at [facility] prior to this for about 2 days. [facility] for past 4 months prior to that was [place] [facility] [place].     ADDITIONAL REVIEW OF SYSTEMS:  no fever or chills no n/v/d      MEDICATIONS  (STANDING):  albumin human 25% IVPB 100 milliLiter(s) IV Intermittent every 8 hours  cyanocobalamin 1000 MICROGram(s) Oral daily  dextrose 5%. 1000 milliLiter(s) (100 mL/Hr) IV Continuous <Continuous>  dextrose 5%. 1000 milliLiter(s) (50 mL/Hr) IV Continuous <Continuous>  dextrose 50% Injectable 12.5 Gram(s) IV Push once  dextrose 50% Injectable 25 Gram(s) IV Push once  dextrose 50% Injectable 25 Gram(s) IV Push once  folic acid 1 milliGRAM(s) Oral daily  glucagon  Injectable 1 milliGRAM(s) IntraMuscular once  insulin lispro (ADMELOG) corrective regimen sliding scale   SubCutaneous at bedtime  insulin lispro (ADMELOG) corrective regimen sliding scale   SubCutaneous three times a day before meals  lactulose Syrup 10 Gram(s) Oral three times a day  melatonin 1 milliGRAM(s) Oral at bedtime  meropenem  IVPB 500 milliGRAM(s) IV Intermittent every 12 hours  midodrine. 10 milliGRAM(s) Oral three times a day  octreotide  Injectable 200 MICROGram(s) SubCutaneous three times a day  pancrelipase  (CREON  6,000 Lipase Units) 1 Capsule(s) Oral three times a day with meals  tamsulosin 0.4 milliGRAM(s) Oral at bedtime  thiamine 100 milliGRAM(s) Oral daily    MEDICATIONS  (PRN):  dextrose Oral Gel 15 Gram(s) Oral once PRN Blood Glucose LESS THAN 70 milliGRAM(s)/deciliter      CAPILLARY BLOOD GLUCOSE      POCT Blood Glucose.: 282 mg/dL (31 Dec 2023 12:35)  POCT Blood Glucose.: 186 mg/dL (31 Dec 2023 08:46)  POCT Blood Glucose.: 157 mg/dL (30 Dec 2023 22:24)    I&O's Summary    30 Dec 2023 07:01  -  31 Dec 2023 07:00  --------------------------------------------------------  IN: 120 mL / OUT: 100 mL / NET: 20 mL    31 Dec 2023 07:01  -  31 Dec 2023 13:16  --------------------------------------------------------  IN: 600 mL / OUT: 250 mL / NET: 350 mL        PHYSICAL EXAM:  Vital Signs Last 24 Hrs  T(C): 36.4 (31 Dec 2023 13:00), Max: 36.8 (30 Dec 2023 23:00)  T(F): 97.5 (31 Dec 2023 13:00), Max: 98.2 (30 Dec 2023 23:00)  HR: 91 (31 Dec 2023 13:00) (87 - 93)  BP: 107/66 (31 Dec 2023 13:00) (90/45 - 113/73)  BP(mean): --  RR: 18 (31 Dec 2023 13:00) (18 - 18)  SpO2: 96% (31 Dec 2023 13:00) (94% - 99%)    Parameters below as of 31 Dec 2023 13:00  Patient On (Oxygen Delivery Method): room air        CONSTITUTIONAL: NAD, thin, non toxic mild jaundice   ABDOMEN: Nontender to palpation, mildly distended, no asterixes   MUSCLOSKELETAL: no clubbing or cyanosis of digits; no joint swelling or tenderness to palpation  PSYCH: A+O to person, place, and time; affect slowed    LABS:                        8.2    10.56 )-----------( 70       ( 31 Dec 2023 07:34 )             25.4     12-31    139  |  103  |  110<H>  ----------------------------<  169<H>  4.2   |  13<L>  |  4.67<H>    Ca    10.0      31 Dec 2023 07:34  Phos  4.7     12-31  Mg     2.6     12-31    TPro  5.8<L>  /  Alb  3.8  /  TBili  12.7<H>  /  DBili  x   /  AST  82<H>  /  ALT  21  /  AlkPhos  413<H>  12-31    PT/INR - ( 31 Dec 2023 07:34 )   PT: 13.5 sec;   INR: 1.23 ratio         PTT - ( 31 Dec 2023 07:34 )  PTT:33.7 sec      Urinalysis Basic - ( 31 Dec 2023 07:34 )    Color: x / Appearance: x / SG: x / pH: x  Gluc: 169 mg/dL / Ketone: x  / Bili: x / Urobili: x   Blood: x / Protein: x / Nitrite: x   Leuk Esterase: x / RBC: x / WBC x   Sq Epi: x / Non Sq Epi: x / Bacteria: x          RADIOLOGY & ADDITIONAL TESTS:  Results Reviewed:   Imaging Personally Reviewed:  Electrocardiogram Personally Reviewed:    COORDINATION OF CARE:  Care Discussed with Consultants/Other Providers [Y/N]:  Prior or Outpatient Records Reviewed [Y/N]:   PROGRESS NOTE:   Reena Tan, DO  Hospitalist  Teams  After 5pm/weekends or if no answer ext: [phone]      Patient is a 58y old  Male who presents with a chief complaint of Liver Transplant Eval (31 Dec 2023 10:53)      SUBJECTIVE / OVERNIGHT EVENTS:  Slow to communicate but able to provide history that he was at [facility] prior to this for about 2 days. He says [facility] lives at [facility] for past 4 months prior to that was living in [facility] on his own.     ADDITIONAL REVIEW OF SYSTEMS:  no fever or chills no n/v/d      MEDICATIONS  (STANDING):  albumin human 25% IVPB 100 milliLiter(s) IV Intermittent every 8 hours  cyanocobalamin 1000 MICROGram(s) Oral daily  dextrose 5%. 1000 milliLiter(s) (100 mL/Hr) IV Continuous <Continuous>  dextrose 5%. 1000 milliLiter(s) (50 mL/Hr) IV Continuous <Continuous>  dextrose 50% Injectable 12.5 Gram(s) IV Push once  dextrose 50% Injectable 25 Gram(s) IV Push once  dextrose 50% Injectable 25 Gram(s) IV Push once  folic acid 1 milliGRAM(s) Oral daily  glucagon  Injectable 1 milliGRAM(s) IntraMuscular once  insulin lispro (ADMELOG) corrective regimen sliding scale   SubCutaneous at bedtime  insulin lispro (ADMELOG) corrective regimen sliding scale   SubCutaneous three times a day before meals  lactulose Syrup 10 Gram(s) Oral three times a day  melatonin 1 milliGRAM(s) Oral at bedtime  meropenem  IVPB 500 milliGRAM(s) IV Intermittent every 12 hours  midodrine. 10 milliGRAM(s) Oral three times a day  octreotide  Injectable 200 MICROGram(s) SubCutaneous three times a day  pancrelipase  (CREON  6,000 Lipase Units) 1 Capsule(s) Oral three times a day with meals  tamsulosin 0.4 milliGRAM(s) Oral at bedtime  thiamine 100 milliGRAM(s) Oral daily    MEDICATIONS  (PRN):  dextrose Oral Gel 15 Gram(s) Oral once PRN Blood Glucose LESS THAN 70 milliGRAM(s)/deciliter      CAPILLARY BLOOD GLUCOSE      POCT Blood Glucose.: 282 mg/dL (31 Dec 2023 12:35)  POCT Blood Glucose.: 186 mg/dL (31 Dec 2023 08:46)  POCT Blood Glucose.: 157 mg/dL (30 Dec 2023 22:24)    I&O's Summary    30 Dec 2023 07:01  -  31 Dec 2023 07:00  --------------------------------------------------------  IN: 120 mL / OUT: 100 mL / NET: 20 mL    31 Dec 2023 07:01  -  31 Dec 2023 13:16  --------------------------------------------------------  IN: 600 mL / OUT: 250 mL / NET: 350 mL        PHYSICAL EXAM:  Vital Signs Last 24 Hrs  T(C): 36.4 (31 Dec 2023 13:00), Max: 36.8 (30 Dec 2023 23:00)  T(F): 97.5 (31 Dec 2023 13:00), Max: 98.2 (30 Dec 2023 23:00)  HR: 91 (31 Dec 2023 13:00) (87 - 93)  BP: 107/66 (31 Dec 2023 13:00) (90/45 - 113/73)  BP(mean): --  RR: 18 (31 Dec 2023 13:00) (18 - 18)  SpO2: 96% (31 Dec 2023 13:00) (94% - 99%)    Parameters below as of 31 Dec 2023 13:00  Patient On (Oxygen Delivery Method): room air        CONSTITUTIONAL: NAD, thin, non toxic mild jaundice   ABDOMEN: Nontender to palpation, mildly distended, no asterixes   MUSCLOSKELETAL: no clubbing or cyanosis of digits; no joint swelling or tenderness to palpation  PSYCH: A+O to person, place, and time; affect slowed    LABS:                        8.2    10.56 )-----------( 70       ( 31 Dec 2023 07:34 )             25.4     12-31    139  |  103  |  110<H>  ----------------------------<  169<H>  4.2   |  13<L>  |  4.67<H>    Ca    10.0      31 Dec 2023 07:34  Phos  4.7     12-31  Mg     2.6     12-31    TPro  5.8<L>  /  Alb  3.8  /  TBili  12.7<H>  /  DBili  x   /  AST  82<H>  /  ALT  21  /  AlkPhos  413<H>  12-31    PT/INR - ( 31 Dec 2023 07:34 )   PT: 13.5 sec;   INR: 1.23 ratio         PTT - ( 31 Dec 2023 07:34 )  PTT:33.7 sec      Urinalysis Basic - ( 31 Dec 2023 07:34 )    Color: x / Appearance: x / SG: x / pH: x  Gluc: 169 mg/dL / Ketone: x  / Bili: x / Urobili: x   Blood: x / Protein: x / Nitrite: x   Leuk Esterase: x / RBC: x / WBC x   Sq Epi: x / Non Sq Epi: x / Bacteria: x          RADIOLOGY & ADDITIONAL TESTS:  Results Reviewed:   Imaging Personally Reviewed:  Electrocardiogram Personally Reviewed:    COORDINATION OF CARE:  Care Discussed with Consultants/Other Providers [Y/N]:  Prior or Outpatient Records Reviewed [Y/N]:

## 2023-12-31 NOTE — CONSULT NOTE ADULT - ASSESSMENT
Interventional Radiology    Evaluate for Procedure: Paracentesis    HPI: 57 y/o M PMH DM2, recently diagnosed alcoholic cirrhosis (3 weeks ago) presenting as a transfer from [facility] for transplant eval for decompensated liver cirrhosis. Patient initially presented to [facility] (from assisted living) last week with dizziness and worsening AMS. Underwent abdominal paracentesis with large volume fluid removal at that time. At OSH, patient was started on albumin, octreotide, midodrine for concern for HRS.    Allergies: No Known Allergies    Medications (Abx/Cardiac/Anticoagulation/Blood Products)  meropenem  IVPB: 100 mL/Hr IV Intermittent (12-31 @ 06:33)  midodrine.: 10 milliGRAM(s) Oral (12-31 @ 06:59)    Data:    T(C): 36.6  HR: 91  BP: 90/45  RR: 18  SpO2: 96%    -WBC 10.56 / HgB 8.2 / Hct 25.4 / Plt 70  -Na 137 / Cl 103 /  / Glucose 166  -K 4.1 / CO2 16 / Cr 4.14  -ALT 24 / Alk Phos 413 / T.Bili 12.8  -INR 1.23 / PTT 33.7      Radiology: None available at time of consult.    Assessment/Plan:   58M w/ cirrhosis for paracentesis.    -- Obtain abdominal ultrasound to evaluate for ascites.  -- If safe window, will plan for paracentesis Tuesday 1/2.  -- Hold AM AC 1/2.  -- please place IR procedure request order under Dr. Resendiz    --  Tariq Melgar, PGY-4  Vascular and Interventional Radiology   Available on [employer] Teams    - Non-emergent consults: Place IR consult order in [place]  - Emergent issues (pager): [facility] [phone]; [facility] [phone]; [phone]  - Scheduling questions: [facility] [phone]; [facility] [phone]  - Clinic/outpatient booking: [facility] [phone]; [facility] [phone] Interventional Radiology    Evaluate for Procedure: Paracentesis    HPI: 57 y/o M PMH DM2, recently diagnosed alcoholic cirrhosis (3 weeks ago) presenting as a transfer from [facility] for transplant eval for decompensated liver cirrhosis. Patient initially presented to [facility] (from assisted living) last week with dizziness and worsening AMS. Underwent abdominal paracentesis with large volume fluid removal at that time. At OSH, patient was started on albumin, octreotide, midodrine for concern for HRS.    Allergies: No Known Allergies    Medications (Abx/Cardiac/Anticoagulation/Blood Products)  meropenem  IVPB: 100 mL/Hr IV Intermittent (12-31 @ 06:33)  midodrine.: 10 milliGRAM(s) Oral (12-31 @ 06:59)    Data:    T(C): 36.6  HR: 91  BP: 90/45  RR: 18  SpO2: 96%    -WBC 10.56 / HgB 8.2 / Hct 25.4 / Plt 70  -Na 137 / Cl 103 /  / Glucose 166  -K 4.1 / CO2 16 / Cr 4.14  -ALT 24 / Alk Phos 413 / T.Bili 12.8  -INR 1.23 / PTT 33.7      Radiology: None available at time of consult.    Assessment/Plan:   58M w/ cirrhosis for paracentesis.    -- Obtain abdominal ultrasound to evaluate for ascites.  -- If safe window, will plan for paracentesis Tuesday 1/2.  -- Hold AM AC 1/2.  -- please place IR procedure request order under Dr. Resendiz    --  Tariq Melgar, PGY-4  Vascular and Interventional Radiology   Available on [employer] Teams    - Non-emergent consults: Place IR consult order in [place]  - Emergent issues (pager): [facility] [phone]; [facility] [phone]; [phone]  - Scheduling questions: [facility] [phone]; [facility] [phone]  - Clinic/outpatient booking: [facility] [phone]; [facility] [phone]

## 2024-01-01 VITALS
SYSTOLIC BLOOD PRESSURE: 91 MMHG | OXYGEN SATURATION: 100 % | DIASTOLIC BLOOD PRESSURE: 45 MMHG | HEART RATE: 95 BPM | RESPIRATION RATE: 25 BRPM

## 2024-01-01 DIAGNOSIS — Z71.89 OTHER SPECIFIED COUNSELING: ICD-10-CM

## 2024-01-01 DIAGNOSIS — C78.6 SECONDARY MALIGNANT NEOPLASM OF RETROPERITONEUM AND PERITONEUM: ICD-10-CM

## 2024-01-01 DIAGNOSIS — N28.89 OTHER SPECIFIED DISORDERS OF KIDNEY AND URETER: ICD-10-CM

## 2024-01-01 DIAGNOSIS — N17.9 ACUTE KIDNEY FAILURE, UNSPECIFIED: ICD-10-CM

## 2024-01-01 DIAGNOSIS — I95.9 HYPOTENSION, UNSPECIFIED: ICD-10-CM

## 2024-01-01 LAB
ALBUMIN FLD-MCNC: 1 G/DL — SIGNIFICANT CHANGE UP
ALBUMIN FLD-MCNC: 1 G/DL — SIGNIFICANT CHANGE UP
ALBUMIN FLD-MCNC: 1.7 G/DL — SIGNIFICANT CHANGE UP
ALBUMIN FLD-MCNC: 1.7 G/DL — SIGNIFICANT CHANGE UP
ALBUMIN SERPL ELPH-MCNC: 2.8 G/DL — LOW (ref 3.3–5)
ALBUMIN SERPL ELPH-MCNC: 2.8 G/DL — LOW (ref 3.3–5)
ALBUMIN SERPL ELPH-MCNC: 3.1 G/DL — LOW (ref 3.3–5)
ALBUMIN SERPL ELPH-MCNC: 3.1 G/DL — LOW (ref 3.3–5)
ALBUMIN SERPL ELPH-MCNC: 3.2 G/DL — LOW (ref 3.3–5)
ALBUMIN SERPL ELPH-MCNC: 3.3 G/DL — SIGNIFICANT CHANGE UP (ref 3.3–5)
ALBUMIN SERPL ELPH-MCNC: 3.4 G/DL — SIGNIFICANT CHANGE UP (ref 3.3–5)
ALBUMIN SERPL ELPH-MCNC: 3.5 G/DL — SIGNIFICANT CHANGE UP (ref 3.3–5)
ALBUMIN SERPL ELPH-MCNC: 3.6 G/DL — SIGNIFICANT CHANGE UP (ref 3.3–5)
ALBUMIN SERPL ELPH-MCNC: 3.7 G/DL — SIGNIFICANT CHANGE UP (ref 3.3–5)
ALBUMIN SERPL ELPH-MCNC: 3.8 G/DL — SIGNIFICANT CHANGE UP (ref 3.3–5)
ALBUMIN SERPL ELPH-MCNC: 3.8 G/DL — SIGNIFICANT CHANGE UP (ref 3.3–5)
ALP SERPL-CCNC: 466 U/L — HIGH (ref 40–120)
ALP SERPL-CCNC: 466 U/L — HIGH (ref 40–120)
ALP SERPL-CCNC: 503 U/L — HIGH (ref 40–120)
ALP SERPL-CCNC: 503 U/L — HIGH (ref 40–120)
ALP SERPL-CCNC: 506 U/L — HIGH (ref 40–120)
ALP SERPL-CCNC: 506 U/L — HIGH (ref 40–120)
ALP SERPL-CCNC: 560 U/L — HIGH (ref 40–120)
ALP SERPL-CCNC: 560 U/L — HIGH (ref 40–120)
ALP SERPL-CCNC: 564 U/L — HIGH (ref 40–120)
ALP SERPL-CCNC: 564 U/L — HIGH (ref 40–120)
ALP SERPL-CCNC: 575 U/L — HIGH (ref 40–120)
ALP SERPL-CCNC: 575 U/L — HIGH (ref 40–120)
ALP SERPL-CCNC: 577 U/L — HIGH (ref 40–120)
ALP SERPL-CCNC: 577 U/L — HIGH (ref 40–120)
ALP SERPL-CCNC: 584 U/L — HIGH (ref 40–120)
ALP SERPL-CCNC: 584 U/L — HIGH (ref 40–120)
ALP SERPL-CCNC: 588 U/L — HIGH (ref 40–120)
ALP SERPL-CCNC: 588 U/L — HIGH (ref 40–120)
ALP SERPL-CCNC: 591 U/L — HIGH (ref 40–120)
ALP SERPL-CCNC: 591 U/L — HIGH (ref 40–120)
ALP SERPL-CCNC: 592 U/L — HIGH (ref 40–120)
ALP SERPL-CCNC: 592 U/L — HIGH (ref 40–120)
ALP SERPL-CCNC: 599 U/L — HIGH (ref 40–120)
ALP SERPL-CCNC: 599 U/L — HIGH (ref 40–120)
ALP SERPL-CCNC: 603 U/L — HIGH (ref 40–120)
ALP SERPL-CCNC: 603 U/L — HIGH (ref 40–120)
ALP SERPL-CCNC: 608 U/L — HIGH (ref 40–120)
ALP SERPL-CCNC: 608 U/L — HIGH (ref 40–120)
ALP SERPL-CCNC: 613 U/L — HIGH (ref 40–120)
ALP SERPL-CCNC: 613 U/L — HIGH (ref 40–120)
ALP SERPL-CCNC: 619 U/L — HIGH (ref 40–120)
ALP SERPL-CCNC: 619 U/L — HIGH (ref 40–120)
ALP SERPL-CCNC: 653 U/L — HIGH (ref 40–120)
ALP SERPL-CCNC: 653 U/L — HIGH (ref 40–120)
ALP SERPL-CCNC: 661 U/L — HIGH (ref 40–120)
ALP SERPL-CCNC: 661 U/L — HIGH (ref 40–120)
ALP SERPL-CCNC: 662 U/L — HIGH (ref 40–120)
ALP SERPL-CCNC: 662 U/L — HIGH (ref 40–120)
ALP SERPL-CCNC: 673 U/L — HIGH (ref 40–120)
ALP SERPL-CCNC: 673 U/L — HIGH (ref 40–120)
ALP SERPL-CCNC: 694 U/L — HIGH (ref 40–120)
ALP SERPL-CCNC: 694 U/L — HIGH (ref 40–120)
ALP SERPL-CCNC: 696 U/L — HIGH (ref 40–120)
ALP SERPL-CCNC: 696 U/L — HIGH (ref 40–120)
ALP SERPL-CCNC: 724 U/L — HIGH (ref 40–120)
ALP SERPL-CCNC: 724 U/L — HIGH (ref 40–120)
ALP SERPL-CCNC: 725 U/L — HIGH (ref 40–120)
ALP SERPL-CCNC: 725 U/L — HIGH (ref 40–120)
ALP SERPL-CCNC: 749 U/L — HIGH (ref 40–120)
ALP SERPL-CCNC: 749 U/L — HIGH (ref 40–120)
ALP SERPL-CCNC: 783 U/L — HIGH (ref 40–120)
ALP SERPL-CCNC: 783 U/L — HIGH (ref 40–120)
ALP SERPL-CCNC: 788 U/L — HIGH (ref 40–120)
ALP SERPL-CCNC: 788 U/L — HIGH (ref 40–120)
ALP SERPL-CCNC: 811 U/L — HIGH (ref 40–120)
ALP SERPL-CCNC: 811 U/L — HIGH (ref 40–120)
ALT FLD-CCNC: 10 U/L — SIGNIFICANT CHANGE UP (ref 10–45)
ALT FLD-CCNC: 11 U/L — SIGNIFICANT CHANGE UP (ref 10–45)
ALT FLD-CCNC: 12 U/L — SIGNIFICANT CHANGE UP (ref 10–45)
ALT FLD-CCNC: 14 U/L — SIGNIFICANT CHANGE UP (ref 10–45)
ALT FLD-CCNC: 14 U/L — SIGNIFICANT CHANGE UP (ref 10–45)
ALT FLD-CCNC: 15 U/L — SIGNIFICANT CHANGE UP (ref 10–45)
ALT FLD-CCNC: 16 U/L — SIGNIFICANT CHANGE UP (ref 10–45)
ALT FLD-CCNC: 17 U/L — SIGNIFICANT CHANGE UP (ref 10–45)
ALT FLD-CCNC: 18 U/L — SIGNIFICANT CHANGE UP (ref 10–45)
ALT FLD-CCNC: 19 U/L — SIGNIFICANT CHANGE UP (ref 10–45)
ALT FLD-CCNC: 19 U/L — SIGNIFICANT CHANGE UP (ref 10–45)
ALT FLD-CCNC: 21 U/L — SIGNIFICANT CHANGE UP (ref 10–45)
ALT FLD-CCNC: 21 U/L — SIGNIFICANT CHANGE UP (ref 10–45)
ALT FLD-CCNC: 22 U/L — SIGNIFICANT CHANGE UP (ref 10–45)
ALT FLD-CCNC: 22 U/L — SIGNIFICANT CHANGE UP (ref 10–45)
ALT FLD-CCNC: 25 U/L — SIGNIFICANT CHANGE UP (ref 10–45)
ALT FLD-CCNC: 25 U/L — SIGNIFICANT CHANGE UP (ref 10–45)
ALT FLD-CCNC: 8 U/L — LOW (ref 10–45)
ALT FLD-CCNC: 8 U/L — LOW (ref 10–45)
ALT FLD-CCNC: 9 U/L — LOW (ref 10–45)
AMMONIA BLD-MCNC: 35 UMOL/L — SIGNIFICANT CHANGE UP (ref 11–55)
AMMONIA BLD-MCNC: 35 UMOL/L — SIGNIFICANT CHANGE UP (ref 11–55)
ANA PAT FLD IF-IMP: ABNORMAL
ANA PAT FLD IF-IMP: ABNORMAL
ANA TITR SER: ABNORMAL
ANA TITR SER: ABNORMAL
ANION GAP SERPL CALC-SCNC: 12 MMOL/L — SIGNIFICANT CHANGE UP (ref 5–17)
ANION GAP SERPL CALC-SCNC: 13 MMOL/L — SIGNIFICANT CHANGE UP (ref 5–17)
ANION GAP SERPL CALC-SCNC: 14 MMOL/L — SIGNIFICANT CHANGE UP (ref 5–17)
ANION GAP SERPL CALC-SCNC: 15 MMOL/L — SIGNIFICANT CHANGE UP (ref 5–17)
ANION GAP SERPL CALC-SCNC: 16 MMOL/L — SIGNIFICANT CHANGE UP (ref 5–17)
ANION GAP SERPL CALC-SCNC: 17 MMOL/L — SIGNIFICANT CHANGE UP (ref 5–17)
ANION GAP SERPL CALC-SCNC: 18 MMOL/L — HIGH (ref 5–17)
ANION GAP SERPL CALC-SCNC: 18 MMOL/L — HIGH (ref 5–17)
ANION GAP SERPL CALC-SCNC: 19 MMOL/L — HIGH (ref 5–17)
ANION GAP SERPL CALC-SCNC: 20 MMOL/L — HIGH (ref 5–17)
ANION GAP SERPL CALC-SCNC: 20 MMOL/L — HIGH (ref 5–17)
ANION GAP SERPL CALC-SCNC: 21 MMOL/L — HIGH (ref 5–17)
ANION GAP SERPL CALC-SCNC: 22 MMOL/L — HIGH (ref 5–17)
ANION GAP SERPL CALC-SCNC: 22 MMOL/L — HIGH (ref 5–17)
APTT BLD: 29.9 SEC — SIGNIFICANT CHANGE UP (ref 24.5–35.6)
APTT BLD: 29.9 SEC — SIGNIFICANT CHANGE UP (ref 24.5–35.6)
APTT BLD: 30.6 SEC — SIGNIFICANT CHANGE UP (ref 24.5–35.6)
APTT BLD: 30.6 SEC — SIGNIFICANT CHANGE UP (ref 24.5–35.6)
APTT BLD: 32.6 SEC — SIGNIFICANT CHANGE UP (ref 24.5–35.6)
APTT BLD: 32.6 SEC — SIGNIFICANT CHANGE UP (ref 24.5–35.6)
APTT BLD: 34 SEC — SIGNIFICANT CHANGE UP (ref 24.5–35.6)
APTT BLD: 34 SEC — SIGNIFICANT CHANGE UP (ref 24.5–35.6)
APTT BLD: 34.9 SEC — SIGNIFICANT CHANGE UP (ref 24.5–35.6)
APTT BLD: 34.9 SEC — SIGNIFICANT CHANGE UP (ref 24.5–35.6)
APTT BLD: 35.1 SEC — SIGNIFICANT CHANGE UP (ref 24.5–35.6)
APTT BLD: 35.1 SEC — SIGNIFICANT CHANGE UP (ref 24.5–35.6)
APTT BLD: 35.3 SEC — SIGNIFICANT CHANGE UP (ref 24.5–35.6)
APTT BLD: 35.3 SEC — SIGNIFICANT CHANGE UP (ref 24.5–35.6)
APTT BLD: 35.8 SEC — HIGH (ref 24.5–35.6)
APTT BLD: 35.8 SEC — HIGH (ref 24.5–35.6)
APTT BLD: 37.7 SEC — HIGH (ref 24.5–35.6)
APTT BLD: 37.7 SEC — HIGH (ref 24.5–35.6)
APTT BLD: 39.4 SEC — HIGH (ref 24.5–35.6)
APTT BLD: 39.4 SEC — HIGH (ref 24.5–35.6)
APTT BLD: 40.7 SEC — HIGH (ref 24.5–35.6)
APTT BLD: 40.7 SEC — HIGH (ref 24.5–35.6)
APTT BLD: 41 SEC — HIGH (ref 24.5–35.6)
APTT BLD: 41 SEC — HIGH (ref 24.5–35.6)
AST SERPL-CCNC: 101 U/L — HIGH (ref 10–40)
AST SERPL-CCNC: 101 U/L — HIGH (ref 10–40)
AST SERPL-CCNC: 104 U/L — HIGH (ref 10–40)
AST SERPL-CCNC: 104 U/L — HIGH (ref 10–40)
AST SERPL-CCNC: 107 U/L — HIGH (ref 10–40)
AST SERPL-CCNC: 111 U/L — HIGH (ref 10–40)
AST SERPL-CCNC: 112 U/L — HIGH (ref 10–40)
AST SERPL-CCNC: 112 U/L — HIGH (ref 10–40)
AST SERPL-CCNC: 113 U/L — HIGH (ref 10–40)
AST SERPL-CCNC: 115 U/L — HIGH (ref 10–40)
AST SERPL-CCNC: 117 U/L — HIGH (ref 10–40)
AST SERPL-CCNC: 117 U/L — HIGH (ref 10–40)
AST SERPL-CCNC: 118 U/L — HIGH (ref 10–40)
AST SERPL-CCNC: 119 U/L — HIGH (ref 10–40)
AST SERPL-CCNC: 121 U/L — HIGH (ref 10–40)
AST SERPL-CCNC: 121 U/L — HIGH (ref 10–40)
AST SERPL-CCNC: 122 U/L — HIGH (ref 10–40)
AST SERPL-CCNC: 123 U/L — HIGH (ref 10–40)
AST SERPL-CCNC: 123 U/L — HIGH (ref 10–40)
AST SERPL-CCNC: 125 U/L — HIGH (ref 10–40)
AST SERPL-CCNC: 125 U/L — HIGH (ref 10–40)
AST SERPL-CCNC: 128 U/L — HIGH (ref 10–40)
AST SERPL-CCNC: 130 U/L — HIGH (ref 10–40)
AST SERPL-CCNC: 133 U/L — HIGH (ref 10–40)
AST SERPL-CCNC: 133 U/L — HIGH (ref 10–40)
AST SERPL-CCNC: 90 U/L — HIGH (ref 10–40)
AST SERPL-CCNC: 90 U/L — HIGH (ref 10–40)
B PERT IGG+IGM PNL SER: ABNORMAL
B PERT IGG+IGM PNL SER: SIGNIFICANT CHANGE UP
B PERT IGG+IGM PNL SER: SIGNIFICANT CHANGE UP
BASE EXCESS BLDV CALC-SCNC: -10.9 MMOL/L — LOW (ref -2–3)
BASE EXCESS BLDV CALC-SCNC: -10.9 MMOL/L — LOW (ref -2–3)
BASE EXCESS BLDV CALC-SCNC: -11.1 MMOL/L — LOW (ref -2–3)
BASE EXCESS BLDV CALC-SCNC: -11.1 MMOL/L — LOW (ref -2–3)
BASE EXCESS BLDV CALC-SCNC: -4.5 MMOL/L — LOW (ref -2–3)
BASE EXCESS BLDV CALC-SCNC: -4.5 MMOL/L — LOW (ref -2–3)
BASE EXCESS BLDV CALC-SCNC: -4.6 MMOL/L — LOW (ref -2–3)
BASE EXCESS BLDV CALC-SCNC: -4.6 MMOL/L — LOW (ref -2–3)
BASE EXCESS BLDV CALC-SCNC: -4.8 MMOL/L — LOW (ref -2–3)
BASE EXCESS BLDV CALC-SCNC: -4.8 MMOL/L — LOW (ref -2–3)
BASE EXCESS BLDV CALC-SCNC: -4.9 MMOL/L — LOW (ref -2–3)
BASE EXCESS BLDV CALC-SCNC: -4.9 MMOL/L — LOW (ref -2–3)
BASE EXCESS BLDV CALC-SCNC: -5.1 MMOL/L — LOW (ref -2–3)
BASE EXCESS BLDV CALC-SCNC: -5.1 MMOL/L — LOW (ref -2–3)
BASE EXCESS BLDV CALC-SCNC: -5.2 MMOL/L — LOW (ref -2–3)
BASE EXCESS BLDV CALC-SCNC: -5.2 MMOL/L — LOW (ref -2–3)
BASE EXCESS BLDV CALC-SCNC: -5.3 MMOL/L — LOW (ref -2–3)
BASE EXCESS BLDV CALC-SCNC: -5.3 MMOL/L — LOW (ref -2–3)
BASE EXCESS BLDV CALC-SCNC: -5.8 MMOL/L — LOW (ref -2–3)
BASE EXCESS BLDV CALC-SCNC: -6.1 MMOL/L — LOW (ref -2–3)
BASE EXCESS BLDV CALC-SCNC: -6.1 MMOL/L — LOW (ref -2–3)
BASE EXCESS BLDV CALC-SCNC: -6.6 MMOL/L — LOW (ref -2–3)
BASE EXCESS BLDV CALC-SCNC: -6.6 MMOL/L — LOW (ref -2–3)
BASE EXCESS BLDV CALC-SCNC: -7.2 MMOL/L — LOW (ref -2–3)
BASE EXCESS BLDV CALC-SCNC: -7.2 MMOL/L — LOW (ref -2–3)
BASE EXCESS BLDV CALC-SCNC: -7.5 MMOL/L — LOW (ref -2–3)
BASE EXCESS BLDV CALC-SCNC: -7.5 MMOL/L — LOW (ref -2–3)
BASOPHILS # BLD AUTO: 0.02 K/UL — SIGNIFICANT CHANGE UP (ref 0–0.2)
BASOPHILS NFR BLD AUTO: 0.2 % — SIGNIFICANT CHANGE UP (ref 0–2)
BILIRUB SERPL-MCNC: 12.3 MG/DL — HIGH (ref 0.2–1.2)
BILIRUB SERPL-MCNC: 12.3 MG/DL — HIGH (ref 0.2–1.2)
BILIRUB SERPL-MCNC: 13.2 MG/DL — HIGH (ref 0.2–1.2)
BILIRUB SERPL-MCNC: 13.2 MG/DL — HIGH (ref 0.2–1.2)
BILIRUB SERPL-MCNC: 13.3 MG/DL — HIGH (ref 0.2–1.2)
BILIRUB SERPL-MCNC: 13.4 MG/DL — HIGH (ref 0.2–1.2)
BILIRUB SERPL-MCNC: 13.4 MG/DL — HIGH (ref 0.2–1.2)
BILIRUB SERPL-MCNC: 14.1 MG/DL — HIGH (ref 0.2–1.2)
BILIRUB SERPL-MCNC: 14.1 MG/DL — HIGH (ref 0.2–1.2)
BILIRUB SERPL-MCNC: 14.2 MG/DL — HIGH (ref 0.2–1.2)
BILIRUB SERPL-MCNC: 14.2 MG/DL — HIGH (ref 0.2–1.2)
BILIRUB SERPL-MCNC: 14.3 MG/DL — HIGH (ref 0.2–1.2)
BILIRUB SERPL-MCNC: 14.5 MG/DL — HIGH (ref 0.2–1.2)
BILIRUB SERPL-MCNC: 14.5 MG/DL — HIGH (ref 0.2–1.2)
BILIRUB SERPL-MCNC: 14.6 MG/DL — HIGH (ref 0.2–1.2)
BILIRUB SERPL-MCNC: 14.6 MG/DL — HIGH (ref 0.2–1.2)
BILIRUB SERPL-MCNC: 15.7 MG/DL — HIGH (ref 0.2–1.2)
BILIRUB SERPL-MCNC: 15.7 MG/DL — HIGH (ref 0.2–1.2)
BILIRUB SERPL-MCNC: 16.2 MG/DL — HIGH (ref 0.2–1.2)
BILIRUB SERPL-MCNC: 16.2 MG/DL — HIGH (ref 0.2–1.2)
BILIRUB SERPL-MCNC: 17.4 MG/DL — HIGH (ref 0.2–1.2)
BILIRUB SERPL-MCNC: 17.4 MG/DL — HIGH (ref 0.2–1.2)
BILIRUB SERPL-MCNC: 18.2 MG/DL — HIGH (ref 0.2–1.2)
BILIRUB SERPL-MCNC: 18.2 MG/DL — HIGH (ref 0.2–1.2)
BILIRUB SERPL-MCNC: 18.5 MG/DL — HIGH (ref 0.2–1.2)
BILIRUB SERPL-MCNC: 18.5 MG/DL — HIGH (ref 0.2–1.2)
BILIRUB SERPL-MCNC: 18.6 MG/DL — HIGH (ref 0.2–1.2)
BILIRUB SERPL-MCNC: 18.6 MG/DL — HIGH (ref 0.2–1.2)
BILIRUB SERPL-MCNC: 18.7 MG/DL — HIGH (ref 0.2–1.2)
BILIRUB SERPL-MCNC: 18.7 MG/DL — HIGH (ref 0.2–1.2)
BILIRUB SERPL-MCNC: 18.8 MG/DL — HIGH (ref 0.2–1.2)
BILIRUB SERPL-MCNC: 18.8 MG/DL — HIGH (ref 0.2–1.2)
BILIRUB SERPL-MCNC: 19.1 MG/DL — HIGH (ref 0.2–1.2)
BILIRUB SERPL-MCNC: 19.2 MG/DL — HIGH (ref 0.2–1.2)
BILIRUB SERPL-MCNC: 19.2 MG/DL — HIGH (ref 0.2–1.2)
BILIRUB SERPL-MCNC: 19.5 MG/DL — HIGH (ref 0.2–1.2)
BILIRUB SERPL-MCNC: 19.6 MG/DL — HIGH (ref 0.2–1.2)
BILIRUB SERPL-MCNC: 19.6 MG/DL — HIGH (ref 0.2–1.2)
BILIRUB SERPL-MCNC: 19.9 MG/DL — HIGH (ref 0.2–1.2)
BILIRUB SERPL-MCNC: 19.9 MG/DL — HIGH (ref 0.2–1.2)
BLD GP AB SCN SERPL QL: NEGATIVE — SIGNIFICANT CHANGE UP
BLD GP AB SCN SERPL QL: NEGATIVE — SIGNIFICANT CHANGE UP
BUN SERPL-MCNC: 103 MG/DL — HIGH (ref 7–23)
BUN SERPL-MCNC: 103 MG/DL — HIGH (ref 7–23)
BUN SERPL-MCNC: 104 MG/DL — HIGH (ref 7–23)
BUN SERPL-MCNC: 105 MG/DL — HIGH (ref 7–23)
BUN SERPL-MCNC: 105 MG/DL — HIGH (ref 7–23)
BUN SERPL-MCNC: 17 MG/DL — SIGNIFICANT CHANGE UP (ref 7–23)
BUN SERPL-MCNC: 19 MG/DL — SIGNIFICANT CHANGE UP (ref 7–23)
BUN SERPL-MCNC: 20 MG/DL — SIGNIFICANT CHANGE UP (ref 7–23)
BUN SERPL-MCNC: 20 MG/DL — SIGNIFICANT CHANGE UP (ref 7–23)
BUN SERPL-MCNC: 22 MG/DL — SIGNIFICANT CHANGE UP (ref 7–23)
BUN SERPL-MCNC: 24 MG/DL — HIGH (ref 7–23)
BUN SERPL-MCNC: 26 MG/DL — HIGH (ref 7–23)
BUN SERPL-MCNC: 26 MG/DL — HIGH (ref 7–23)
BUN SERPL-MCNC: 28 MG/DL — HIGH (ref 7–23)
BUN SERPL-MCNC: 28 MG/DL — HIGH (ref 7–23)
BUN SERPL-MCNC: 30 MG/DL — HIGH (ref 7–23)
BUN SERPL-MCNC: 30 MG/DL — HIGH (ref 7–23)
BUN SERPL-MCNC: 31 MG/DL — HIGH (ref 7–23)
BUN SERPL-MCNC: 31 MG/DL — HIGH (ref 7–23)
BUN SERPL-MCNC: 32 MG/DL — HIGH (ref 7–23)
BUN SERPL-MCNC: 32 MG/DL — HIGH (ref 7–23)
BUN SERPL-MCNC: 35 MG/DL — HIGH (ref 7–23)
BUN SERPL-MCNC: 35 MG/DL — HIGH (ref 7–23)
BUN SERPL-MCNC: 36 MG/DL — HIGH (ref 7–23)
BUN SERPL-MCNC: 36 MG/DL — HIGH (ref 7–23)
BUN SERPL-MCNC: 43 MG/DL — HIGH (ref 7–23)
BUN SERPL-MCNC: 43 MG/DL — HIGH (ref 7–23)
BUN SERPL-MCNC: 52 MG/DL — HIGH (ref 7–23)
BUN SERPL-MCNC: 52 MG/DL — HIGH (ref 7–23)
BUN SERPL-MCNC: 66 MG/DL — HIGH (ref 7–23)
BUN SERPL-MCNC: 66 MG/DL — HIGH (ref 7–23)
BUN SERPL-MCNC: 71 MG/DL — HIGH (ref 7–23)
BUN SERPL-MCNC: 71 MG/DL — HIGH (ref 7–23)
BUN SERPL-MCNC: 82 MG/DL — HIGH (ref 7–23)
BUN SERPL-MCNC: 82 MG/DL — HIGH (ref 7–23)
BUN SERPL-MCNC: 94 MG/DL — HIGH (ref 7–23)
BUN SERPL-MCNC: 94 MG/DL — HIGH (ref 7–23)
CA-I SERPL-SCNC: 1.16 MMOL/L — SIGNIFICANT CHANGE UP (ref 1.15–1.33)
CA-I SERPL-SCNC: 1.16 MMOL/L — SIGNIFICANT CHANGE UP (ref 1.15–1.33)
CA-I SERPL-SCNC: 1.17 MMOL/L — SIGNIFICANT CHANGE UP (ref 1.15–1.33)
CA-I SERPL-SCNC: 1.18 MMOL/L — SIGNIFICANT CHANGE UP (ref 1.15–1.33)
CA-I SERPL-SCNC: 1.18 MMOL/L — SIGNIFICANT CHANGE UP (ref 1.15–1.33)
CA-I SERPL-SCNC: 1.19 MMOL/L — SIGNIFICANT CHANGE UP (ref 1.15–1.33)
CA-I SERPL-SCNC: 1.2 MMOL/L — SIGNIFICANT CHANGE UP (ref 1.15–1.33)
CA-I SERPL-SCNC: 1.2 MMOL/L — SIGNIFICANT CHANGE UP (ref 1.15–1.33)
CA-I SERPL-SCNC: 1.22 MMOL/L — SIGNIFICANT CHANGE UP (ref 1.15–1.33)
CA-I SERPL-SCNC: 1.23 MMOL/L — SIGNIFICANT CHANGE UP (ref 1.15–1.33)
CA-I SERPL-SCNC: 1.25 MMOL/L — SIGNIFICANT CHANGE UP (ref 1.15–1.33)
CALCIUM SERPL-MCNC: 8.3 MG/DL — LOW (ref 8.4–10.5)
CALCIUM SERPL-MCNC: 8.3 MG/DL — LOW (ref 8.4–10.5)
CALCIUM SERPL-MCNC: 8.4 MG/DL — SIGNIFICANT CHANGE UP (ref 8.4–10.5)
CALCIUM SERPL-MCNC: 8.5 MG/DL — SIGNIFICANT CHANGE UP (ref 8.4–10.5)
CALCIUM SERPL-MCNC: 8.5 MG/DL — SIGNIFICANT CHANGE UP (ref 8.4–10.5)
CALCIUM SERPL-MCNC: 8.6 MG/DL — SIGNIFICANT CHANGE UP (ref 8.4–10.5)
CALCIUM SERPL-MCNC: 8.7 MG/DL — SIGNIFICANT CHANGE UP (ref 8.4–10.5)
CALCIUM SERPL-MCNC: 8.8 MG/DL — SIGNIFICANT CHANGE UP (ref 8.4–10.5)
CALCIUM SERPL-MCNC: 8.9 MG/DL — SIGNIFICANT CHANGE UP (ref 8.4–10.5)
CALCIUM SERPL-MCNC: 9 MG/DL — SIGNIFICANT CHANGE UP (ref 8.4–10.5)
CALCIUM SERPL-MCNC: 9.1 MG/DL — SIGNIFICANT CHANGE UP (ref 8.4–10.5)
CALCIUM SERPL-MCNC: 9.2 MG/DL — SIGNIFICANT CHANGE UP (ref 8.4–10.5)
CALCIUM SERPL-MCNC: 9.2 MG/DL — SIGNIFICANT CHANGE UP (ref 8.4–10.5)
CALCIUM SERPL-MCNC: 9.3 MG/DL — SIGNIFICANT CHANGE UP (ref 8.4–10.5)
CALCIUM SERPL-MCNC: 9.3 MG/DL — SIGNIFICANT CHANGE UP (ref 8.4–10.5)
CALCIUM SERPL-MCNC: 9.4 MG/DL — SIGNIFICANT CHANGE UP (ref 8.4–10.5)
CALCIUM SERPL-MCNC: 9.4 MG/DL — SIGNIFICANT CHANGE UP (ref 8.4–10.5)
CALCIUM SERPL-MCNC: 9.6 MG/DL — SIGNIFICANT CHANGE UP (ref 8.4–10.5)
CHLORIDE BLDV-SCNC: 100 MMOL/L — SIGNIFICANT CHANGE UP (ref 96–108)
CHLORIDE BLDV-SCNC: 100 MMOL/L — SIGNIFICANT CHANGE UP (ref 96–108)
CHLORIDE BLDV-SCNC: 101 MMOL/L — SIGNIFICANT CHANGE UP (ref 96–108)
CHLORIDE BLDV-SCNC: 102 MMOL/L — SIGNIFICANT CHANGE UP (ref 96–108)
CHLORIDE BLDV-SCNC: 103 MMOL/L — SIGNIFICANT CHANGE UP (ref 96–108)
CHLORIDE BLDV-SCNC: 104 MMOL/L — SIGNIFICANT CHANGE UP (ref 96–108)
CHLORIDE BLDV-SCNC: 104 MMOL/L — SIGNIFICANT CHANGE UP (ref 96–108)
CHLORIDE BLDV-SCNC: 98 MMOL/L — SIGNIFICANT CHANGE UP (ref 96–108)
CHLORIDE BLDV-SCNC: 98 MMOL/L — SIGNIFICANT CHANGE UP (ref 96–108)
CHLORIDE BLDV-SCNC: 99 MMOL/L — SIGNIFICANT CHANGE UP (ref 96–108)
CHLORIDE BLDV-SCNC: 99 MMOL/L — SIGNIFICANT CHANGE UP (ref 96–108)
CHLORIDE SERPL-SCNC: 100 MMOL/L — SIGNIFICANT CHANGE UP (ref 96–108)
CHLORIDE SERPL-SCNC: 101 MMOL/L — SIGNIFICANT CHANGE UP (ref 96–108)
CHLORIDE SERPL-SCNC: 102 MMOL/L — SIGNIFICANT CHANGE UP (ref 96–108)
CHLORIDE SERPL-SCNC: 102 MMOL/L — SIGNIFICANT CHANGE UP (ref 96–108)
CHLORIDE SERPL-SCNC: 103 MMOL/L — SIGNIFICANT CHANGE UP (ref 96–108)
CHLORIDE SERPL-SCNC: 103 MMOL/L — SIGNIFICANT CHANGE UP (ref 96–108)
CHLORIDE SERPL-SCNC: 96 MMOL/L — SIGNIFICANT CHANGE UP (ref 96–108)
CHLORIDE SERPL-SCNC: 97 MMOL/L — SIGNIFICANT CHANGE UP (ref 96–108)
CHLORIDE SERPL-SCNC: 98 MMOL/L — SIGNIFICANT CHANGE UP (ref 96–108)
CHLORIDE SERPL-SCNC: 99 MMOL/L — SIGNIFICANT CHANGE UP (ref 96–108)
CMV IGG FLD QL: >10 U/ML — HIGH
CMV IGG FLD QL: >10 U/ML — HIGH
CMV IGG SERPL-IMP: POSITIVE
CMV IGG SERPL-IMP: POSITIVE
CMV IGM FLD-ACNC: 12 AU/ML — SIGNIFICANT CHANGE UP
CMV IGM FLD-ACNC: 12 AU/ML — SIGNIFICANT CHANGE UP
CMV IGM SERPL QL: NEGATIVE — SIGNIFICANT CHANGE UP
CMV IGM SERPL QL: NEGATIVE — SIGNIFICANT CHANGE UP
CO2 BLDV-SCNC: 17 MMOL/L — LOW (ref 22–26)
CO2 BLDV-SCNC: 19 MMOL/L — LOW (ref 22–26)
CO2 BLDV-SCNC: 19 MMOL/L — LOW (ref 22–26)
CO2 BLDV-SCNC: 20 MMOL/L — LOW (ref 22–26)
CO2 BLDV-SCNC: 20 MMOL/L — LOW (ref 22–26)
CO2 BLDV-SCNC: 22 MMOL/L — SIGNIFICANT CHANGE UP (ref 22–26)
CO2 BLDV-SCNC: 23 MMOL/L — SIGNIFICANT CHANGE UP (ref 22–26)
CO2 BLDV-SCNC: 24 MMOL/L — SIGNIFICANT CHANGE UP (ref 22–26)
CO2 BLDV-SCNC: 24 MMOL/L — SIGNIFICANT CHANGE UP (ref 22–26)
CO2 SERPL-SCNC: 14 MMOL/L — LOW (ref 22–31)
CO2 SERPL-SCNC: 15 MMOL/L — LOW (ref 22–31)
CO2 SERPL-SCNC: 16 MMOL/L — LOW (ref 22–31)
CO2 SERPL-SCNC: 17 MMOL/L — LOW (ref 22–31)
CO2 SERPL-SCNC: 17 MMOL/L — LOW (ref 22–31)
CO2 SERPL-SCNC: 18 MMOL/L — LOW (ref 22–31)
CO2 SERPL-SCNC: 19 MMOL/L — LOW (ref 22–31)
CO2 SERPL-SCNC: 20 MMOL/L — LOW (ref 22–31)
CO2 SERPL-SCNC: 21 MMOL/L — LOW (ref 22–31)
COLOR FLD: ABNORMAL
COLOR FLD: SIGNIFICANT CHANGE UP
COLOR FLD: SIGNIFICANT CHANGE UP
CREAT ?TM UR-MCNC: 206 MG/DL — SIGNIFICANT CHANGE UP
CREAT ?TM UR-MCNC: 206 MG/DL — SIGNIFICANT CHANGE UP
CREAT SERPL-MCNC: 1.39 MG/DL — HIGH (ref 0.5–1.3)
CREAT SERPL-MCNC: 1.39 MG/DL — HIGH (ref 0.5–1.3)
CREAT SERPL-MCNC: 1.43 MG/DL — HIGH (ref 0.5–1.3)
CREAT SERPL-MCNC: 1.43 MG/DL — HIGH (ref 0.5–1.3)
CREAT SERPL-MCNC: 1.45 MG/DL — HIGH (ref 0.5–1.3)
CREAT SERPL-MCNC: 1.45 MG/DL — HIGH (ref 0.5–1.3)
CREAT SERPL-MCNC: 1.5 MG/DL — HIGH (ref 0.5–1.3)
CREAT SERPL-MCNC: 1.5 MG/DL — HIGH (ref 0.5–1.3)
CREAT SERPL-MCNC: 1.51 MG/DL — HIGH (ref 0.5–1.3)
CREAT SERPL-MCNC: 1.51 MG/DL — HIGH (ref 0.5–1.3)
CREAT SERPL-MCNC: 1.61 MG/DL — HIGH (ref 0.5–1.3)
CREAT SERPL-MCNC: 1.61 MG/DL — HIGH (ref 0.5–1.3)
CREAT SERPL-MCNC: 1.62 MG/DL — HIGH (ref 0.5–1.3)
CREAT SERPL-MCNC: 1.62 MG/DL — HIGH (ref 0.5–1.3)
CREAT SERPL-MCNC: 1.69 MG/DL — HIGH (ref 0.5–1.3)
CREAT SERPL-MCNC: 1.69 MG/DL — HIGH (ref 0.5–1.3)
CREAT SERPL-MCNC: 1.74 MG/DL — HIGH (ref 0.5–1.3)
CREAT SERPL-MCNC: 1.79 MG/DL — HIGH (ref 0.5–1.3)
CREAT SERPL-MCNC: 1.79 MG/DL — HIGH (ref 0.5–1.3)
CREAT SERPL-MCNC: 1.8 MG/DL — HIGH (ref 0.5–1.3)
CREAT SERPL-MCNC: 1.8 MG/DL — HIGH (ref 0.5–1.3)
CREAT SERPL-MCNC: 1.91 MG/DL — HIGH (ref 0.5–1.3)
CREAT SERPL-MCNC: 1.91 MG/DL — HIGH (ref 0.5–1.3)
CREAT SERPL-MCNC: 2.03 MG/DL — HIGH (ref 0.5–1.3)
CREAT SERPL-MCNC: 2.03 MG/DL — HIGH (ref 0.5–1.3)
CREAT SERPL-MCNC: 2.12 MG/DL — HIGH (ref 0.5–1.3)
CREAT SERPL-MCNC: 2.12 MG/DL — HIGH (ref 0.5–1.3)
CREAT SERPL-MCNC: 2.17 MG/DL — HIGH (ref 0.5–1.3)
CREAT SERPL-MCNC: 2.17 MG/DL — HIGH (ref 0.5–1.3)
CREAT SERPL-MCNC: 2.36 MG/DL — HIGH (ref 0.5–1.3)
CREAT SERPL-MCNC: 2.36 MG/DL — HIGH (ref 0.5–1.3)
CREAT SERPL-MCNC: 2.47 MG/DL — HIGH (ref 0.5–1.3)
CREAT SERPL-MCNC: 2.47 MG/DL — HIGH (ref 0.5–1.3)
CREAT SERPL-MCNC: 2.66 MG/DL — HIGH (ref 0.5–1.3)
CREAT SERPL-MCNC: 2.66 MG/DL — HIGH (ref 0.5–1.3)
CREAT SERPL-MCNC: 3.04 MG/DL — HIGH (ref 0.5–1.3)
CREAT SERPL-MCNC: 3.04 MG/DL — HIGH (ref 0.5–1.3)
CREAT SERPL-MCNC: 3.84 MG/DL — HIGH (ref 0.5–1.3)
CREAT SERPL-MCNC: 3.84 MG/DL — HIGH (ref 0.5–1.3)
CREAT SERPL-MCNC: 4.04 MG/DL — HIGH (ref 0.5–1.3)
CREAT SERPL-MCNC: 4.04 MG/DL — HIGH (ref 0.5–1.3)
CREAT SERPL-MCNC: 4.6 MG/DL — HIGH (ref 0.5–1.3)
CREAT SERPL-MCNC: 4.6 MG/DL — HIGH (ref 0.5–1.3)
CREAT SERPL-MCNC: 4.71 MG/DL — HIGH (ref 0.5–1.3)
CREAT SERPL-MCNC: 4.71 MG/DL — HIGH (ref 0.5–1.3)
CREAT SERPL-MCNC: 5.1 MG/DL — HIGH (ref 0.5–1.3)
CREAT SERPL-MCNC: 5.1 MG/DL — HIGH (ref 0.5–1.3)
CREAT SERPL-MCNC: 5.14 MG/DL — HIGH (ref 0.5–1.3)
CREAT SERPL-MCNC: 5.14 MG/DL — HIGH (ref 0.5–1.3)
CREAT SERPL-MCNC: 5.21 MG/DL — HIGH (ref 0.5–1.3)
CREAT SERPL-MCNC: 5.21 MG/DL — HIGH (ref 0.5–1.3)
CREAT SERPL-MCNC: 5.73 MG/DL — HIGH (ref 0.5–1.3)
CREAT SERPL-MCNC: 5.73 MG/DL — HIGH (ref 0.5–1.3)
CREAT SERPL-MCNC: 6.09 MG/DL — HIGH (ref 0.5–1.3)
CREAT SERPL-MCNC: 6.09 MG/DL — HIGH (ref 0.5–1.3)
CULTURE RESULTS: SIGNIFICANT CHANGE UP
D DIMER BLD IA.RAPID-MCNC: 2623 NG/ML DDU — HIGH
D DIMER BLD IA.RAPID-MCNC: 2623 NG/ML DDU — HIGH
D DIMER BLD IA.RAPID-MCNC: 3521 NG/ML DDU — HIGH
D DIMER BLD IA.RAPID-MCNC: 3521 NG/ML DDU — HIGH
EBV EA AB SER IA-ACNC: <5 U/ML — SIGNIFICANT CHANGE UP
EBV EA AB SER IA-ACNC: <5 U/ML — SIGNIFICANT CHANGE UP
EBV EA AB TITR SER IF: POSITIVE
EBV EA AB TITR SER IF: POSITIVE
EBV EA IGG SER-ACNC: NEGATIVE — SIGNIFICANT CHANGE UP
EBV EA IGG SER-ACNC: NEGATIVE — SIGNIFICANT CHANGE UP
EBV NA IGG SER IA-ACNC: >600 U/ML — HIGH
EBV NA IGG SER IA-ACNC: >600 U/ML — HIGH
EBV PATRN SPEC IB-IMP: SIGNIFICANT CHANGE UP
EBV PATRN SPEC IB-IMP: SIGNIFICANT CHANGE UP
EBV VCA IGG AVIDITY SER QL IA: POSITIVE
EBV VCA IGG AVIDITY SER QL IA: POSITIVE
EBV VCA IGM SER IA-ACNC: 20.9 U/ML — SIGNIFICANT CHANGE UP
EBV VCA IGM SER IA-ACNC: 20.9 U/ML — SIGNIFICANT CHANGE UP
EBV VCA IGM SER IA-ACNC: 615 U/ML — HIGH
EBV VCA IGM SER IA-ACNC: 615 U/ML — HIGH
EBV VCA IGM TITR FLD: NEGATIVE — SIGNIFICANT CHANGE UP
EBV VCA IGM TITR FLD: NEGATIVE — SIGNIFICANT CHANGE UP
EGFR: 10 ML/MIN/1.73M2 — LOW
EGFR: 10 ML/MIN/1.73M2 — LOW
EGFR: 11 ML/MIN/1.73M2 — LOW
EGFR: 11 ML/MIN/1.73M2 — LOW
EGFR: 12 ML/MIN/1.73M2 — LOW
EGFR: 14 ML/MIN/1.73M2 — LOW
EGFR: 16 ML/MIN/1.73M2 — LOW
EGFR: 16 ML/MIN/1.73M2 — LOW
EGFR: 17 ML/MIN/1.73M2 — LOW
EGFR: 17 ML/MIN/1.73M2 — LOW
EGFR: 23 ML/MIN/1.73M2 — LOW
EGFR: 23 ML/MIN/1.73M2 — LOW
EGFR: 27 ML/MIN/1.73M2 — LOW
EGFR: 27 ML/MIN/1.73M2 — LOW
EGFR: 29 ML/MIN/1.73M2 — LOW
EGFR: 29 ML/MIN/1.73M2 — LOW
EGFR: 31 ML/MIN/1.73M2 — LOW
EGFR: 31 ML/MIN/1.73M2 — LOW
EGFR: 34 ML/MIN/1.73M2 — LOW
EGFR: 34 ML/MIN/1.73M2 — LOW
EGFR: 35 ML/MIN/1.73M2 — LOW
EGFR: 35 ML/MIN/1.73M2 — LOW
EGFR: 37 ML/MIN/1.73M2 — LOW
EGFR: 37 ML/MIN/1.73M2 — LOW
EGFR: 40 ML/MIN/1.73M2 — LOW
EGFR: 40 ML/MIN/1.73M2 — LOW
EGFR: 43 ML/MIN/1.73M2 — LOW
EGFR: 45 ML/MIN/1.73M2 — LOW
EGFR: 46 ML/MIN/1.73M2 — LOW
EGFR: 46 ML/MIN/1.73M2 — LOW
EGFR: 49 ML/MIN/1.73M2 — LOW
EGFR: 53 ML/MIN/1.73M2 — LOW
EGFR: 56 ML/MIN/1.73M2 — LOW
EGFR: 58 ML/MIN/1.73M2 — LOW
EGFR: 58 ML/MIN/1.73M2 — LOW
EOSINOPHIL # BLD AUTO: 0.14 K/UL — SIGNIFICANT CHANGE UP (ref 0–0.5)
EOSINOPHIL # BLD AUTO: 0.14 K/UL — SIGNIFICANT CHANGE UP (ref 0–0.5)
EOSINOPHIL # BLD AUTO: 0.17 K/UL — SIGNIFICANT CHANGE UP (ref 0–0.5)
EOSINOPHIL # BLD AUTO: 0.17 K/UL — SIGNIFICANT CHANGE UP (ref 0–0.5)
EOSINOPHIL # FLD: 1 % — SIGNIFICANT CHANGE UP
EOSINOPHIL # FLD: 1 % — SIGNIFICANT CHANGE UP
EOSINOPHIL NFR BLD AUTO: 1.3 % — SIGNIFICANT CHANGE UP (ref 0–6)
FACT II INHIB PPP-ACNC: 37 % — LOW (ref 80–135)
FACT II INHIB PPP-ACNC: 37 % — LOW (ref 80–135)
FACT V ACT/NOR PPP: 73 % — SIGNIFICANT CHANGE UP (ref 50–150)
FACT V ACT/NOR PPP: 73 % — SIGNIFICANT CHANGE UP (ref 50–150)
FACT VII ACT/NOR PPP: 47 % — LOW (ref 50–165)
FACT VII ACT/NOR PPP: 47 % — LOW (ref 50–165)
FACT VIII ACT/NOR PPP: 251 % — HIGH (ref 60–125)
FACT VIII ACT/NOR PPP: 251 % — HIGH (ref 60–125)
FACT X ACT/NOR PPP: 72 % — SIGNIFICANT CHANGE UP (ref 70–170)
FACT X ACT/NOR PPP: 72 % — SIGNIFICANT CHANGE UP (ref 70–170)
FIBRINOGEN PPP-MCNC: 212 MG/DL — SIGNIFICANT CHANGE UP (ref 200–445)
FIBRINOGEN PPP-MCNC: 212 MG/DL — SIGNIFICANT CHANGE UP (ref 200–445)
FIBRINOGEN PPP-MCNC: 259 MG/DL — SIGNIFICANT CHANGE UP (ref 200–445)
FIBRINOGEN PPP-MCNC: 259 MG/DL — SIGNIFICANT CHANGE UP (ref 200–445)
FLUID INTAKE SUBSTANCE CLASS: SIGNIFICANT CHANGE UP
GAS PNL BLDA: SIGNIFICANT CHANGE UP
GAS PNL BLDV: 127 MMOL/L — LOW (ref 136–145)
GAS PNL BLDV: 127 MMOL/L — LOW (ref 136–145)
GAS PNL BLDV: 130 MMOL/L — LOW (ref 136–145)
GAS PNL BLDV: 131 MMOL/L — LOW (ref 136–145)
GAS PNL BLDV: 132 MMOL/L — LOW (ref 136–145)
GAS PNL BLDV: 133 MMOL/L — LOW (ref 136–145)
GAS PNL BLDV: 134 MMOL/L — LOW (ref 136–145)
GAS PNL BLDV: SIGNIFICANT CHANGE UP
GLUCOSE BLDC GLUCOMTR-MCNC: 120 MG/DL — HIGH (ref 70–99)
GLUCOSE BLDC GLUCOMTR-MCNC: 127 MG/DL — HIGH (ref 70–99)
GLUCOSE BLDC GLUCOMTR-MCNC: 127 MG/DL — HIGH (ref 70–99)
GLUCOSE BLDC GLUCOMTR-MCNC: 131 MG/DL — HIGH (ref 70–99)
GLUCOSE BLDC GLUCOMTR-MCNC: 135 MG/DL — HIGH (ref 70–99)
GLUCOSE BLDC GLUCOMTR-MCNC: 135 MG/DL — HIGH (ref 70–99)
GLUCOSE BLDC GLUCOMTR-MCNC: 136 MG/DL — HIGH (ref 70–99)
GLUCOSE BLDC GLUCOMTR-MCNC: 136 MG/DL — HIGH (ref 70–99)
GLUCOSE BLDC GLUCOMTR-MCNC: 137 MG/DL — HIGH (ref 70–99)
GLUCOSE BLDC GLUCOMTR-MCNC: 137 MG/DL — HIGH (ref 70–99)
GLUCOSE BLDC GLUCOMTR-MCNC: 145 MG/DL — HIGH (ref 70–99)
GLUCOSE BLDC GLUCOMTR-MCNC: 145 MG/DL — HIGH (ref 70–99)
GLUCOSE BLDC GLUCOMTR-MCNC: 147 MG/DL — HIGH (ref 70–99)
GLUCOSE BLDC GLUCOMTR-MCNC: 147 MG/DL — HIGH (ref 70–99)
GLUCOSE BLDC GLUCOMTR-MCNC: 148 MG/DL — HIGH (ref 70–99)
GLUCOSE BLDC GLUCOMTR-MCNC: 148 MG/DL — HIGH (ref 70–99)
GLUCOSE BLDC GLUCOMTR-MCNC: 149 MG/DL — HIGH (ref 70–99)
GLUCOSE BLDC GLUCOMTR-MCNC: 149 MG/DL — HIGH (ref 70–99)
GLUCOSE BLDC GLUCOMTR-MCNC: 153 MG/DL — HIGH (ref 70–99)
GLUCOSE BLDC GLUCOMTR-MCNC: 153 MG/DL — HIGH (ref 70–99)
GLUCOSE BLDC GLUCOMTR-MCNC: 154 MG/DL — HIGH (ref 70–99)
GLUCOSE BLDC GLUCOMTR-MCNC: 154 MG/DL — HIGH (ref 70–99)
GLUCOSE BLDC GLUCOMTR-MCNC: 164 MG/DL — HIGH (ref 70–99)
GLUCOSE BLDC GLUCOMTR-MCNC: 168 MG/DL — HIGH (ref 70–99)
GLUCOSE BLDC GLUCOMTR-MCNC: 168 MG/DL — HIGH (ref 70–99)
GLUCOSE BLDC GLUCOMTR-MCNC: 170 MG/DL — HIGH (ref 70–99)
GLUCOSE BLDC GLUCOMTR-MCNC: 170 MG/DL — HIGH (ref 70–99)
GLUCOSE BLDC GLUCOMTR-MCNC: 174 MG/DL — HIGH (ref 70–99)
GLUCOSE BLDC GLUCOMTR-MCNC: 174 MG/DL — HIGH (ref 70–99)
GLUCOSE BLDC GLUCOMTR-MCNC: 175 MG/DL — HIGH (ref 70–99)
GLUCOSE BLDC GLUCOMTR-MCNC: 175 MG/DL — HIGH (ref 70–99)
GLUCOSE BLDC GLUCOMTR-MCNC: 184 MG/DL — HIGH (ref 70–99)
GLUCOSE BLDC GLUCOMTR-MCNC: 184 MG/DL — HIGH (ref 70–99)
GLUCOSE BLDC GLUCOMTR-MCNC: 186 MG/DL — HIGH (ref 70–99)
GLUCOSE BLDC GLUCOMTR-MCNC: 186 MG/DL — HIGH (ref 70–99)
GLUCOSE BLDC GLUCOMTR-MCNC: 188 MG/DL — HIGH (ref 70–99)
GLUCOSE BLDC GLUCOMTR-MCNC: 188 MG/DL — HIGH (ref 70–99)
GLUCOSE BLDC GLUCOMTR-MCNC: 192 MG/DL — HIGH (ref 70–99)
GLUCOSE BLDC GLUCOMTR-MCNC: 192 MG/DL — HIGH (ref 70–99)
GLUCOSE BLDC GLUCOMTR-MCNC: 195 MG/DL — HIGH (ref 70–99)
GLUCOSE BLDC GLUCOMTR-MCNC: 205 MG/DL — HIGH (ref 70–99)
GLUCOSE BLDC GLUCOMTR-MCNC: 205 MG/DL — HIGH (ref 70–99)
GLUCOSE BLDC GLUCOMTR-MCNC: 216 MG/DL — HIGH (ref 70–99)
GLUCOSE BLDC GLUCOMTR-MCNC: 216 MG/DL — HIGH (ref 70–99)
GLUCOSE BLDC GLUCOMTR-MCNC: 236 MG/DL — HIGH (ref 70–99)
GLUCOSE BLDC GLUCOMTR-MCNC: 236 MG/DL — HIGH (ref 70–99)
GLUCOSE BLDC GLUCOMTR-MCNC: 242 MG/DL — HIGH (ref 70–99)
GLUCOSE BLDC GLUCOMTR-MCNC: 242 MG/DL — HIGH (ref 70–99)
GLUCOSE BLDC GLUCOMTR-MCNC: 283 MG/DL — HIGH (ref 70–99)
GLUCOSE BLDC GLUCOMTR-MCNC: 283 MG/DL — HIGH (ref 70–99)
GLUCOSE BLDC GLUCOMTR-MCNC: 401 MG/DL — HIGH (ref 70–99)
GLUCOSE BLDC GLUCOMTR-MCNC: 401 MG/DL — HIGH (ref 70–99)
GLUCOSE BLDC GLUCOMTR-MCNC: >600 MG/DL — CRITICAL HIGH (ref 70–99)
GLUCOSE BLDC GLUCOMTR-MCNC: >600 MG/DL — CRITICAL HIGH (ref 70–99)
GLUCOSE BLDV-MCNC: 143 MG/DL — HIGH (ref 70–99)
GLUCOSE BLDV-MCNC: 143 MG/DL — HIGH (ref 70–99)
GLUCOSE BLDV-MCNC: 151 MG/DL — HIGH (ref 70–99)
GLUCOSE BLDV-MCNC: 151 MG/DL — HIGH (ref 70–99)
GLUCOSE BLDV-MCNC: 159 MG/DL — HIGH (ref 70–99)
GLUCOSE BLDV-MCNC: 159 MG/DL — HIGH (ref 70–99)
GLUCOSE BLDV-MCNC: 161 MG/DL — HIGH (ref 70–99)
GLUCOSE BLDV-MCNC: 161 MG/DL — HIGH (ref 70–99)
GLUCOSE BLDV-MCNC: 166 MG/DL — HIGH (ref 70–99)
GLUCOSE BLDV-MCNC: 166 MG/DL — HIGH (ref 70–99)
GLUCOSE BLDV-MCNC: 168 MG/DL — HIGH (ref 70–99)
GLUCOSE BLDV-MCNC: 168 MG/DL — HIGH (ref 70–99)
GLUCOSE BLDV-MCNC: 171 MG/DL — HIGH (ref 70–99)
GLUCOSE BLDV-MCNC: 171 MG/DL — HIGH (ref 70–99)
GLUCOSE BLDV-MCNC: 174 MG/DL — HIGH (ref 70–99)
GLUCOSE BLDV-MCNC: 174 MG/DL — HIGH (ref 70–99)
GLUCOSE BLDV-MCNC: 175 MG/DL — HIGH (ref 70–99)
GLUCOSE BLDV-MCNC: 175 MG/DL — HIGH (ref 70–99)
GLUCOSE BLDV-MCNC: 178 MG/DL — HIGH (ref 70–99)
GLUCOSE BLDV-MCNC: 178 MG/DL — HIGH (ref 70–99)
GLUCOSE BLDV-MCNC: 188 MG/DL — HIGH (ref 70–99)
GLUCOSE BLDV-MCNC: 188 MG/DL — HIGH (ref 70–99)
GLUCOSE BLDV-MCNC: 191 MG/DL — HIGH (ref 70–99)
GLUCOSE BLDV-MCNC: 191 MG/DL — HIGH (ref 70–99)
GLUCOSE BLDV-MCNC: 212 MG/DL — HIGH (ref 70–99)
GLUCOSE BLDV-MCNC: 212 MG/DL — HIGH (ref 70–99)
GLUCOSE BLDV-MCNC: 214 MG/DL — HIGH (ref 70–99)
GLUCOSE FLD-MCNC: 136 MG/DL — SIGNIFICANT CHANGE UP
GLUCOSE FLD-MCNC: 136 MG/DL — SIGNIFICANT CHANGE UP
GLUCOSE FLD-MCNC: 151 MG/DL — SIGNIFICANT CHANGE UP
GLUCOSE FLD-MCNC: 151 MG/DL — SIGNIFICANT CHANGE UP
GLUCOSE SERPL-MCNC: 101 MG/DL — HIGH (ref 70–99)
GLUCOSE SERPL-MCNC: 101 MG/DL — HIGH (ref 70–99)
GLUCOSE SERPL-MCNC: 116 MG/DL — HIGH (ref 70–99)
GLUCOSE SERPL-MCNC: 116 MG/DL — HIGH (ref 70–99)
GLUCOSE SERPL-MCNC: 118 MG/DL — HIGH (ref 70–99)
GLUCOSE SERPL-MCNC: 118 MG/DL — HIGH (ref 70–99)
GLUCOSE SERPL-MCNC: 141 MG/DL — HIGH (ref 70–99)
GLUCOSE SERPL-MCNC: 141 MG/DL — HIGH (ref 70–99)
GLUCOSE SERPL-MCNC: 142 MG/DL — HIGH (ref 70–99)
GLUCOSE SERPL-MCNC: 142 MG/DL — HIGH (ref 70–99)
GLUCOSE SERPL-MCNC: 143 MG/DL — HIGH (ref 70–99)
GLUCOSE SERPL-MCNC: 143 MG/DL — HIGH (ref 70–99)
GLUCOSE SERPL-MCNC: 145 MG/DL — HIGH (ref 70–99)
GLUCOSE SERPL-MCNC: 145 MG/DL — HIGH (ref 70–99)
GLUCOSE SERPL-MCNC: 146 MG/DL — HIGH (ref 70–99)
GLUCOSE SERPL-MCNC: 146 MG/DL — HIGH (ref 70–99)
GLUCOSE SERPL-MCNC: 152 MG/DL — HIGH (ref 70–99)
GLUCOSE SERPL-MCNC: 152 MG/DL — HIGH (ref 70–99)
GLUCOSE SERPL-MCNC: 154 MG/DL — HIGH (ref 70–99)
GLUCOSE SERPL-MCNC: 154 MG/DL — HIGH (ref 70–99)
GLUCOSE SERPL-MCNC: 155 MG/DL — HIGH (ref 70–99)
GLUCOSE SERPL-MCNC: 156 MG/DL — HIGH (ref 70–99)
GLUCOSE SERPL-MCNC: 156 MG/DL — HIGH (ref 70–99)
GLUCOSE SERPL-MCNC: 157 MG/DL — HIGH (ref 70–99)
GLUCOSE SERPL-MCNC: 157 MG/DL — HIGH (ref 70–99)
GLUCOSE SERPL-MCNC: 164 MG/DL — HIGH (ref 70–99)
GLUCOSE SERPL-MCNC: 164 MG/DL — HIGH (ref 70–99)
GLUCOSE SERPL-MCNC: 165 MG/DL — HIGH (ref 70–99)
GLUCOSE SERPL-MCNC: 165 MG/DL — HIGH (ref 70–99)
GLUCOSE SERPL-MCNC: 171 MG/DL — HIGH (ref 70–99)
GLUCOSE SERPL-MCNC: 171 MG/DL — HIGH (ref 70–99)
GLUCOSE SERPL-MCNC: 176 MG/DL — HIGH (ref 70–99)
GLUCOSE SERPL-MCNC: 177 MG/DL — HIGH (ref 70–99)
GLUCOSE SERPL-MCNC: 178 MG/DL — HIGH (ref 70–99)
GLUCOSE SERPL-MCNC: 178 MG/DL — HIGH (ref 70–99)
GLUCOSE SERPL-MCNC: 183 MG/DL — HIGH (ref 70–99)
GLUCOSE SERPL-MCNC: 183 MG/DL — HIGH (ref 70–99)
GLUCOSE SERPL-MCNC: 187 MG/DL — HIGH (ref 70–99)
GLUCOSE SERPL-MCNC: 187 MG/DL — HIGH (ref 70–99)
GLUCOSE SERPL-MCNC: 190 MG/DL — HIGH (ref 70–99)
GLUCOSE SERPL-MCNC: 190 MG/DL — HIGH (ref 70–99)
GLUCOSE SERPL-MCNC: 207 MG/DL — HIGH (ref 70–99)
GLUCOSE SERPL-MCNC: 207 MG/DL — HIGH (ref 70–99)
GLUCOSE SERPL-MCNC: 212 MG/DL — HIGH (ref 70–99)
GLUCOSE SERPL-MCNC: 212 MG/DL — HIGH (ref 70–99)
GLUCOSE SERPL-MCNC: 217 MG/DL — HIGH (ref 70–99)
GLUCOSE SERPL-MCNC: 217 MG/DL — HIGH (ref 70–99)
GLUCOSE SERPL-MCNC: 219 MG/DL — HIGH (ref 70–99)
GLUCOSE SERPL-MCNC: 219 MG/DL — HIGH (ref 70–99)
GRAM STN FLD: SIGNIFICANT CHANGE UP
HCO3 BLDV-SCNC: 16 MMOL/L — LOW (ref 22–29)
HCO3 BLDV-SCNC: 18 MMOL/L — LOW (ref 22–29)
HCO3 BLDV-SCNC: 18 MMOL/L — LOW (ref 22–29)
HCO3 BLDV-SCNC: 19 MMOL/L — LOW (ref 22–29)
HCO3 BLDV-SCNC: 19 MMOL/L — LOW (ref 22–29)
HCO3 BLDV-SCNC: 20 MMOL/L — LOW (ref 22–29)
HCO3 BLDV-SCNC: 21 MMOL/L — LOW (ref 22–29)
HCO3 BLDV-SCNC: 22 MMOL/L — SIGNIFICANT CHANGE UP (ref 22–29)
HCO3 BLDV-SCNC: 22 MMOL/L — SIGNIFICANT CHANGE UP (ref 22–29)
HCO3 BLDV-SCNC: 23 MMOL/L — SIGNIFICANT CHANGE UP (ref 22–29)
HCO3 BLDV-SCNC: 23 MMOL/L — SIGNIFICANT CHANGE UP (ref 22–29)
HCT VFR BLD CALC: 20 % — CRITICAL LOW (ref 39–50)
HCT VFR BLD CALC: 20 % — CRITICAL LOW (ref 39–50)
HCT VFR BLD CALC: 20.7 % — CRITICAL LOW (ref 39–50)
HCT VFR BLD CALC: 20.7 % — CRITICAL LOW (ref 39–50)
HCT VFR BLD CALC: 21.2 % — LOW (ref 39–50)
HCT VFR BLD CALC: 21.2 % — LOW (ref 39–50)
HCT VFR BLD CALC: 21.8 % — LOW (ref 39–50)
HCT VFR BLD CALC: 21.8 % — LOW (ref 39–50)
HCT VFR BLD CALC: 22.3 % — LOW (ref 39–50)
HCT VFR BLD CALC: 22.3 % — LOW (ref 39–50)
HCT VFR BLD CALC: 22.6 % — LOW (ref 39–50)
HCT VFR BLD CALC: 22.7 % — LOW (ref 39–50)
HCT VFR BLD CALC: 23.2 % — LOW (ref 39–50)
HCT VFR BLD CALC: 23.2 % — LOW (ref 39–50)
HCT VFR BLD CALC: 23.5 % — LOW (ref 39–50)
HCT VFR BLD CALC: 23.6 % — LOW (ref 39–50)
HCT VFR BLD CALC: 23.6 % — LOW (ref 39–50)
HCT VFR BLD CALC: 23.7 % — LOW (ref 39–50)
HCT VFR BLD CALC: 23.8 % — LOW (ref 39–50)
HCT VFR BLD CALC: 23.8 % — LOW (ref 39–50)
HCT VFR BLD CALC: 23.9 % — LOW (ref 39–50)
HCT VFR BLD CALC: 23.9 % — LOW (ref 39–50)
HCT VFR BLD CALC: 24 % — LOW (ref 39–50)
HCT VFR BLD CALC: 24 % — LOW (ref 39–50)
HCT VFR BLD CALC: 24.1 % — LOW (ref 39–50)
HCT VFR BLD CALC: 24.1 % — LOW (ref 39–50)
HCT VFR BLD CALC: 24.4 % — LOW (ref 39–50)
HCT VFR BLD CALC: 24.4 % — LOW (ref 39–50)
HCT VFR BLD CALC: 24.7 % — LOW (ref 39–50)
HCT VFR BLD CALC: 24.7 % — LOW (ref 39–50)
HCT VFR BLD CALC: 24.9 % — LOW (ref 39–50)
HCT VFR BLD CALC: 24.9 % — LOW (ref 39–50)
HCT VFR BLD CALC: 25.2 % — LOW (ref 39–50)
HCT VFR BLD CALC: 25.2 % — LOW (ref 39–50)
HCT VFR BLD CALC: 25.9 % — LOW (ref 39–50)
HCT VFR BLD CALC: 25.9 % — LOW (ref 39–50)
HCT VFR BLDA CALC: 20 % — CRITICAL LOW (ref 39–51)
HCT VFR BLDA CALC: 20 % — CRITICAL LOW (ref 39–51)
HCT VFR BLDA CALC: 21 % — CRITICAL LOW (ref 39–51)
HCT VFR BLDA CALC: 21 % — CRITICAL LOW (ref 39–51)
HCT VFR BLDA CALC: 22 % — LOW (ref 39–51)
HCT VFR BLDA CALC: 23 % — LOW (ref 39–51)
HCT VFR BLDA CALC: 24 % — LOW (ref 39–51)
HCT VFR BLDA CALC: 26 % — LOW (ref 39–51)
HCT VFR BLDA CALC: 26 % — LOW (ref 39–51)
HCT VFR BLDA CALC: 31 % — LOW (ref 39–51)
HCT VFR BLDA CALC: 31 % — LOW (ref 39–51)
HEPARINASE TEG R TIME: 7.2 MIN — SIGNIFICANT CHANGE UP (ref 4.3–8.3)
HEPARINASE TEG R TIME: 7.2 MIN — SIGNIFICANT CHANGE UP (ref 4.3–8.3)
HGB BLD CALC-MCNC: 10.4 G/DL — LOW (ref 12.6–17.4)
HGB BLD CALC-MCNC: 10.4 G/DL — LOW (ref 12.6–17.4)
HGB BLD CALC-MCNC: 6.8 G/DL — CRITICAL LOW (ref 12.6–17.4)
HGB BLD CALC-MCNC: 6.8 G/DL — CRITICAL LOW (ref 12.6–17.4)
HGB BLD CALC-MCNC: 7.1 G/DL — LOW (ref 12.6–17.4)
HGB BLD CALC-MCNC: 7.1 G/DL — LOW (ref 12.6–17.4)
HGB BLD CALC-MCNC: 7.2 G/DL — LOW (ref 12.6–17.4)
HGB BLD CALC-MCNC: 7.2 G/DL — LOW (ref 12.6–17.4)
HGB BLD CALC-MCNC: 7.4 G/DL — LOW (ref 12.6–17.4)
HGB BLD CALC-MCNC: 7.4 G/DL — LOW (ref 12.6–17.4)
HGB BLD CALC-MCNC: 7.5 G/DL — LOW (ref 12.6–17.4)
HGB BLD CALC-MCNC: 7.5 G/DL — LOW (ref 12.6–17.4)
HGB BLD CALC-MCNC: 7.6 G/DL — LOW (ref 12.6–17.4)
HGB BLD CALC-MCNC: 7.8 G/DL — LOW (ref 12.6–17.4)
HGB BLD CALC-MCNC: 7.8 G/DL — LOW (ref 12.6–17.4)
HGB BLD CALC-MCNC: 8 G/DL — LOW (ref 12.6–17.4)
HGB BLD CALC-MCNC: 8 G/DL — LOW (ref 12.6–17.4)
HGB BLD CALC-MCNC: 8.1 G/DL — LOW (ref 12.6–17.4)
HGB BLD CALC-MCNC: 8.6 G/DL — LOW (ref 12.6–17.4)
HGB BLD CALC-MCNC: 8.6 G/DL — LOW (ref 12.6–17.4)
HGB BLD-MCNC: 6.6 G/DL — CRITICAL LOW (ref 13–17)
HGB BLD-MCNC: 7 G/DL — CRITICAL LOW (ref 13–17)
HGB BLD-MCNC: 7.3 G/DL — LOW (ref 13–17)
HGB BLD-MCNC: 7.4 G/DL — LOW (ref 13–17)
HGB BLD-MCNC: 7.4 G/DL — LOW (ref 13–17)
HGB BLD-MCNC: 7.5 G/DL — LOW (ref 13–17)
HGB BLD-MCNC: 7.5 G/DL — LOW (ref 13–17)
HGB BLD-MCNC: 7.6 G/DL — LOW (ref 13–17)
HGB BLD-MCNC: 7.7 G/DL — LOW (ref 13–17)
HGB BLD-MCNC: 7.9 G/DL — LOW (ref 13–17)
HGB BLD-MCNC: 8 G/DL — LOW (ref 13–17)
HGB BLD-MCNC: 8 G/DL — LOW (ref 13–17)
HGB BLD-MCNC: 8.1 G/DL — LOW (ref 13–17)
HGB BLD-MCNC: 8.1 G/DL — LOW (ref 13–17)
HGB BLD-MCNC: 8.3 G/DL — LOW (ref 13–17)
HGB BLD-MCNC: 8.3 G/DL — LOW (ref 13–17)
HGB BLD-MCNC: 8.4 G/DL — LOW (ref 13–17)
HOROWITZ INDEX BLDV+IHG-RTO: 28 — SIGNIFICANT CHANGE UP
HOROWITZ INDEX BLDV+IHG-RTO: 30 — SIGNIFICANT CHANGE UP
HOROWITZ INDEX BLDV+IHG-RTO: 30 — SIGNIFICANT CHANGE UP
HOROWITZ INDEX BLDV+IHG-RTO: 40 — SIGNIFICANT CHANGE UP
IGA FLD-MCNC: 325 MG/DL — SIGNIFICANT CHANGE UP (ref 84–499)
IGA FLD-MCNC: 325 MG/DL — SIGNIFICANT CHANGE UP (ref 84–499)
IGG FLD-MCNC: 693 MG/DL — SIGNIFICANT CHANGE UP (ref 610–1660)
IGG FLD-MCNC: 693 MG/DL — SIGNIFICANT CHANGE UP (ref 610–1660)
IGM SERPL-MCNC: 173 MG/DL — SIGNIFICANT CHANGE UP (ref 35–242)
IGM SERPL-MCNC: 173 MG/DL — SIGNIFICANT CHANGE UP (ref 35–242)
IMM GRANULOCYTES NFR BLD AUTO: 0.8 % — SIGNIFICANT CHANGE UP (ref 0–0.9)
INR BLD: 1.18 RATIO — SIGNIFICANT CHANGE UP (ref 0.85–1.18)
INR BLD: 1.18 RATIO — SIGNIFICANT CHANGE UP (ref 0.85–1.18)
INR BLD: 1.2 RATIO — HIGH (ref 0.85–1.18)
INR BLD: 1.2 RATIO — HIGH (ref 0.85–1.18)
INR BLD: 1.23 RATIO — HIGH (ref 0.85–1.18)
INR BLD: 1.23 RATIO — HIGH (ref 0.85–1.18)
INR BLD: 1.25 RATIO — HIGH (ref 0.85–1.18)
INR BLD: 1.25 RATIO — HIGH (ref 0.85–1.18)
INR BLD: 1.32 RATIO — HIGH (ref 0.85–1.18)
INR BLD: 1.32 RATIO — HIGH (ref 0.85–1.18)
INR BLD: 1.34 RATIO — HIGH (ref 0.85–1.18)
INR BLD: 1.34 RATIO — HIGH (ref 0.85–1.18)
INR BLD: 1.37 RATIO — HIGH (ref 0.85–1.18)
INR BLD: 1.37 RATIO — HIGH (ref 0.85–1.18)
INR BLD: 1.4 RATIO — HIGH (ref 0.85–1.18)
INR BLD: 1.4 RATIO — HIGH (ref 0.85–1.18)
INR BLD: 1.5 RATIO — HIGH (ref 0.85–1.18)
INR BLD: 1.5 RATIO — HIGH (ref 0.85–1.18)
INR BLD: 1.64 RATIO — HIGH (ref 0.85–1.18)
INR BLD: 1.64 RATIO — HIGH (ref 0.85–1.18)
INR BLD: 1.79 RATIO — HIGH (ref 0.85–1.18)
INR BLD: 1.79 RATIO — HIGH (ref 0.85–1.18)
INR BLD: 1.88 RATIO — HIGH (ref 0.85–1.18)
INR BLD: 1.88 RATIO — HIGH (ref 0.85–1.18)
INR BLD: 1.9 RATIO — HIGH (ref 0.85–1.18)
INR BLD: 1.9 RATIO — HIGH (ref 0.85–1.18)
KAPPA LC SER QL IFE: 21.71 MG/DL — HIGH (ref 0.33–1.94)
KAPPA LC SER QL IFE: 21.71 MG/DL — HIGH (ref 0.33–1.94)
KAPPA/LAMBDA FREE LIGHT CHAIN RATIO, SERUM: 1.65 RATIO — SIGNIFICANT CHANGE UP (ref 0.26–1.65)
KAPPA/LAMBDA FREE LIGHT CHAIN RATIO, SERUM: 1.65 RATIO — SIGNIFICANT CHANGE UP (ref 0.26–1.65)
LACTATE BLDV-MCNC: 2.1 MMOL/L — HIGH (ref 0.5–2)
LACTATE BLDV-MCNC: 2.1 MMOL/L — HIGH (ref 0.5–2)
LACTATE BLDV-MCNC: 2.2 MMOL/L — HIGH (ref 0.5–2)
LACTATE BLDV-MCNC: 2.2 MMOL/L — HIGH (ref 0.5–2)
LACTATE BLDV-MCNC: 2.3 MMOL/L — HIGH (ref 0.5–2)
LACTATE BLDV-MCNC: 2.4 MMOL/L — HIGH (ref 0.5–2)
LACTATE BLDV-MCNC: 2.5 MMOL/L — HIGH (ref 0.5–2)
LACTATE BLDV-MCNC: 2.7 MMOL/L — HIGH (ref 0.5–2)
LACTATE BLDV-MCNC: 2.7 MMOL/L — HIGH (ref 0.5–2)
LACTATE BLDV-MCNC: 2.8 MMOL/L — HIGH (ref 0.5–2)
LACTATE BLDV-MCNC: 2.8 MMOL/L — HIGH (ref 0.5–2)
LACTATE BLDV-MCNC: 2.9 MMOL/L — HIGH (ref 0.5–2)
LACTATE BLDV-MCNC: 2.9 MMOL/L — HIGH (ref 0.5–2)
LACTATE BLDV-MCNC: 3.3 MMOL/L — HIGH (ref 0.5–2)
LACTATE BLDV-MCNC: 3.3 MMOL/L — HIGH (ref 0.5–2)
LACTATE SERPL-SCNC: 2.6 MMOL/L — HIGH (ref 0.5–2)
LACTATE SERPL-SCNC: 2.6 MMOL/L — HIGH (ref 0.5–2)
LAMBDA LC SER QL IFE: 13.12 MG/DL — HIGH (ref 0.57–2.63)
LAMBDA LC SER QL IFE: 13.12 MG/DL — HIGH (ref 0.57–2.63)
LDH SERPL L TO P-CCNC: 59 U/L — SIGNIFICANT CHANGE UP
LDH SERPL L TO P-CCNC: 59 U/L — SIGNIFICANT CHANGE UP
LDH SERPL L TO P-CCNC: 79 U/L — SIGNIFICANT CHANGE UP
LDH SERPL L TO P-CCNC: 79 U/L — SIGNIFICANT CHANGE UP
LYMPHOCYTES # BLD AUTO: 1.16 K/UL — SIGNIFICANT CHANGE UP (ref 1–3.3)
LYMPHOCYTES # BLD AUTO: 1.16 K/UL — SIGNIFICANT CHANGE UP (ref 1–3.3)
LYMPHOCYTES # BLD AUTO: 1.4 K/UL — SIGNIFICANT CHANGE UP (ref 1–3.3)
LYMPHOCYTES # BLD AUTO: 1.4 K/UL — SIGNIFICANT CHANGE UP (ref 1–3.3)
LYMPHOCYTES # BLD AUTO: 10.5 % — LOW (ref 13–44)
LYMPHOCYTES # BLD AUTO: 10.5 % — LOW (ref 13–44)
LYMPHOCYTES # BLD AUTO: 11 % — LOW (ref 13–44)
LYMPHOCYTES # BLD AUTO: 11 % — LOW (ref 13–44)
LYMPHOCYTES # FLD: 29 % — SIGNIFICANT CHANGE UP
LYMPHOCYTES # FLD: 29 % — SIGNIFICANT CHANGE UP
LYMPHOCYTES # FLD: 5 % — SIGNIFICANT CHANGE UP
LYMPHOCYTES # FLD: 5 % — SIGNIFICANT CHANGE UP
LYMPHOCYTES # FLD: 61 % — SIGNIFICANT CHANGE UP
LYMPHOCYTES # FLD: 61 % — SIGNIFICANT CHANGE UP
MAGNESIUM SERPL-MCNC: 2.3 MG/DL — SIGNIFICANT CHANGE UP (ref 1.6–2.6)
MAGNESIUM SERPL-MCNC: 2.3 MG/DL — SIGNIFICANT CHANGE UP (ref 1.6–2.6)
MAGNESIUM SERPL-MCNC: 2.4 MG/DL — SIGNIFICANT CHANGE UP (ref 1.6–2.6)
MAGNESIUM SERPL-MCNC: 2.5 MG/DL — SIGNIFICANT CHANGE UP (ref 1.6–2.6)
MAGNESIUM SERPL-MCNC: 2.6 MG/DL — SIGNIFICANT CHANGE UP (ref 1.6–2.6)
MCHC RBC-ENTMCNC: 27.3 PG — SIGNIFICANT CHANGE UP (ref 27–34)
MCHC RBC-ENTMCNC: 27.3 PG — SIGNIFICANT CHANGE UP (ref 27–34)
MCHC RBC-ENTMCNC: 27.4 PG — SIGNIFICANT CHANGE UP (ref 27–34)
MCHC RBC-ENTMCNC: 27.4 PG — SIGNIFICANT CHANGE UP (ref 27–34)
MCHC RBC-ENTMCNC: 27.5 PG — SIGNIFICANT CHANGE UP (ref 27–34)
MCHC RBC-ENTMCNC: 27.5 PG — SIGNIFICANT CHANGE UP (ref 27–34)
MCHC RBC-ENTMCNC: 27.6 PG — SIGNIFICANT CHANGE UP (ref 27–34)
MCHC RBC-ENTMCNC: 27.6 PG — SIGNIFICANT CHANGE UP (ref 27–34)
MCHC RBC-ENTMCNC: 27.8 PG — SIGNIFICANT CHANGE UP (ref 27–34)
MCHC RBC-ENTMCNC: 27.8 PG — SIGNIFICANT CHANGE UP (ref 27–34)
MCHC RBC-ENTMCNC: 27.9 PG — SIGNIFICANT CHANGE UP (ref 27–34)
MCHC RBC-ENTMCNC: 28 PG — SIGNIFICANT CHANGE UP (ref 27–34)
MCHC RBC-ENTMCNC: 28 PG — SIGNIFICANT CHANGE UP (ref 27–34)
MCHC RBC-ENTMCNC: 28.3 PG — SIGNIFICANT CHANGE UP (ref 27–34)
MCHC RBC-ENTMCNC: 28.3 PG — SIGNIFICANT CHANGE UP (ref 27–34)
MCHC RBC-ENTMCNC: 28.4 PG — SIGNIFICANT CHANGE UP (ref 27–34)
MCHC RBC-ENTMCNC: 28.4 PG — SIGNIFICANT CHANGE UP (ref 27–34)
MCHC RBC-ENTMCNC: 28.7 PG — SIGNIFICANT CHANGE UP (ref 27–34)
MCHC RBC-ENTMCNC: 28.7 PG — SIGNIFICANT CHANGE UP (ref 27–34)
MCHC RBC-ENTMCNC: 28.8 PG — SIGNIFICANT CHANGE UP (ref 27–34)
MCHC RBC-ENTMCNC: 28.8 PG — SIGNIFICANT CHANGE UP (ref 27–34)
MCHC RBC-ENTMCNC: 28.9 PG — SIGNIFICANT CHANGE UP (ref 27–34)
MCHC RBC-ENTMCNC: 28.9 PG — SIGNIFICANT CHANGE UP (ref 27–34)
MCHC RBC-ENTMCNC: 29 PG — SIGNIFICANT CHANGE UP (ref 27–34)
MCHC RBC-ENTMCNC: 29 PG — SIGNIFICANT CHANGE UP (ref 27–34)
MCHC RBC-ENTMCNC: 29.2 PG — SIGNIFICANT CHANGE UP (ref 27–34)
MCHC RBC-ENTMCNC: 29.3 PG — SIGNIFICANT CHANGE UP (ref 27–34)
MCHC RBC-ENTMCNC: 29.3 PG — SIGNIFICANT CHANGE UP (ref 27–34)
MCHC RBC-ENTMCNC: 29.5 PG — SIGNIFICANT CHANGE UP (ref 27–34)
MCHC RBC-ENTMCNC: 29.7 PG — SIGNIFICANT CHANGE UP (ref 27–34)
MCHC RBC-ENTMCNC: 29.7 PG — SIGNIFICANT CHANGE UP (ref 27–34)
MCHC RBC-ENTMCNC: 30 PG — SIGNIFICANT CHANGE UP (ref 27–34)
MCHC RBC-ENTMCNC: 30 PG — SIGNIFICANT CHANGE UP (ref 27–34)
MCHC RBC-ENTMCNC: 30.2 PG — SIGNIFICANT CHANGE UP (ref 27–34)
MCHC RBC-ENTMCNC: 30.2 PG — SIGNIFICANT CHANGE UP (ref 27–34)
MCHC RBC-ENTMCNC: 30.3 PG — SIGNIFICANT CHANGE UP (ref 27–34)
MCHC RBC-ENTMCNC: 30.3 PG — SIGNIFICANT CHANGE UP (ref 27–34)
MCHC RBC-ENTMCNC: 31.1 GM/DL — LOW (ref 32–36)
MCHC RBC-ENTMCNC: 31.1 GM/DL — LOW (ref 32–36)
MCHC RBC-ENTMCNC: 31.1 PG — SIGNIFICANT CHANGE UP (ref 27–34)
MCHC RBC-ENTMCNC: 31.1 PG — SIGNIFICANT CHANGE UP (ref 27–34)
MCHC RBC-ENTMCNC: 31.4 GM/DL — LOW (ref 32–36)
MCHC RBC-ENTMCNC: 31.4 GM/DL — LOW (ref 32–36)
MCHC RBC-ENTMCNC: 31.5 GM/DL — LOW (ref 32–36)
MCHC RBC-ENTMCNC: 31.5 GM/DL — LOW (ref 32–36)
MCHC RBC-ENTMCNC: 31.7 GM/DL — LOW (ref 32–36)
MCHC RBC-ENTMCNC: 31.7 GM/DL — LOW (ref 32–36)
MCHC RBC-ENTMCNC: 32.1 GM/DL — SIGNIFICANT CHANGE UP (ref 32–36)
MCHC RBC-ENTMCNC: 32.1 GM/DL — SIGNIFICANT CHANGE UP (ref 32–36)
MCHC RBC-ENTMCNC: 32.2 GM/DL — SIGNIFICANT CHANGE UP (ref 32–36)
MCHC RBC-ENTMCNC: 32.2 GM/DL — SIGNIFICANT CHANGE UP (ref 32–36)
MCHC RBC-ENTMCNC: 32.3 GM/DL — SIGNIFICANT CHANGE UP (ref 32–36)
MCHC RBC-ENTMCNC: 32.3 GM/DL — SIGNIFICANT CHANGE UP (ref 32–36)
MCHC RBC-ENTMCNC: 32.4 GM/DL — SIGNIFICANT CHANGE UP (ref 32–36)
MCHC RBC-ENTMCNC: 32.6 GM/DL — SIGNIFICANT CHANGE UP (ref 32–36)
MCHC RBC-ENTMCNC: 32.6 GM/DL — SIGNIFICANT CHANGE UP (ref 32–36)
MCHC RBC-ENTMCNC: 32.7 GM/DL — SIGNIFICANT CHANGE UP (ref 32–36)
MCHC RBC-ENTMCNC: 32.7 GM/DL — SIGNIFICANT CHANGE UP (ref 32–36)
MCHC RBC-ENTMCNC: 32.8 GM/DL — SIGNIFICANT CHANGE UP (ref 32–36)
MCHC RBC-ENTMCNC: 32.8 GM/DL — SIGNIFICANT CHANGE UP (ref 32–36)
MCHC RBC-ENTMCNC: 33 GM/DL — SIGNIFICANT CHANGE UP (ref 32–36)
MCHC RBC-ENTMCNC: 33.1 GM/DL — SIGNIFICANT CHANGE UP (ref 32–36)
MCHC RBC-ENTMCNC: 33.1 GM/DL — SIGNIFICANT CHANGE UP (ref 32–36)
MCHC RBC-ENTMCNC: 33.3 GM/DL — SIGNIFICANT CHANGE UP (ref 32–36)
MCHC RBC-ENTMCNC: 33.3 GM/DL — SIGNIFICANT CHANGE UP (ref 32–36)
MCHC RBC-ENTMCNC: 33.5 GM/DL — SIGNIFICANT CHANGE UP (ref 32–36)
MCHC RBC-ENTMCNC: 33.5 GM/DL — SIGNIFICANT CHANGE UP (ref 32–36)
MCHC RBC-ENTMCNC: 33.6 GM/DL — SIGNIFICANT CHANGE UP (ref 32–36)
MCHC RBC-ENTMCNC: 33.7 GM/DL — SIGNIFICANT CHANGE UP (ref 32–36)
MCHC RBC-ENTMCNC: 33.7 GM/DL — SIGNIFICANT CHANGE UP (ref 32–36)
MCHC RBC-ENTMCNC: 33.8 GM/DL — SIGNIFICANT CHANGE UP (ref 32–36)
MCHC RBC-ENTMCNC: 33.8 GM/DL — SIGNIFICANT CHANGE UP (ref 32–36)
MCHC RBC-ENTMCNC: 34.1 GM/DL — SIGNIFICANT CHANGE UP (ref 32–36)
MCHC RBC-ENTMCNC: 34.1 GM/DL — SIGNIFICANT CHANGE UP (ref 32–36)
MCHC RBC-ENTMCNC: 34.2 GM/DL — SIGNIFICANT CHANGE UP (ref 32–36)
MCHC RBC-ENTMCNC: 34.2 GM/DL — SIGNIFICANT CHANGE UP (ref 32–36)
MCHC RBC-ENTMCNC: 35 GM/DL — SIGNIFICANT CHANGE UP (ref 32–36)
MCHC RBC-ENTMCNC: 35 GM/DL — SIGNIFICANT CHANGE UP (ref 32–36)
MCV RBC AUTO: 80.6 FL — SIGNIFICANT CHANGE UP (ref 80–100)
MCV RBC AUTO: 80.6 FL — SIGNIFICANT CHANGE UP (ref 80–100)
MCV RBC AUTO: 81 FL — SIGNIFICANT CHANGE UP (ref 80–100)
MCV RBC AUTO: 81 FL — SIGNIFICANT CHANGE UP (ref 80–100)
MCV RBC AUTO: 81.6 FL — SIGNIFICANT CHANGE UP (ref 80–100)
MCV RBC AUTO: 81.6 FL — SIGNIFICANT CHANGE UP (ref 80–100)
MCV RBC AUTO: 81.9 FL — SIGNIFICANT CHANGE UP (ref 80–100)
MCV RBC AUTO: 82 FL — SIGNIFICANT CHANGE UP (ref 80–100)
MCV RBC AUTO: 82 FL — SIGNIFICANT CHANGE UP (ref 80–100)
MCV RBC AUTO: 82.1 FL — SIGNIFICANT CHANGE UP (ref 80–100)
MCV RBC AUTO: 82.1 FL — SIGNIFICANT CHANGE UP (ref 80–100)
MCV RBC AUTO: 82.9 FL — SIGNIFICANT CHANGE UP (ref 80–100)
MCV RBC AUTO: 82.9 FL — SIGNIFICANT CHANGE UP (ref 80–100)
MCV RBC AUTO: 83.7 FL — SIGNIFICANT CHANGE UP (ref 80–100)
MCV RBC AUTO: 83.7 FL — SIGNIFICANT CHANGE UP (ref 80–100)
MCV RBC AUTO: 87.1 FL — SIGNIFICANT CHANGE UP (ref 80–100)
MCV RBC AUTO: 87.1 FL — SIGNIFICANT CHANGE UP (ref 80–100)
MCV RBC AUTO: 87.8 FL — SIGNIFICANT CHANGE UP (ref 80–100)
MCV RBC AUTO: 87.8 FL — SIGNIFICANT CHANGE UP (ref 80–100)
MCV RBC AUTO: 88.3 FL — SIGNIFICANT CHANGE UP (ref 80–100)
MCV RBC AUTO: 88.5 FL — SIGNIFICANT CHANGE UP (ref 80–100)
MCV RBC AUTO: 88.5 FL — SIGNIFICANT CHANGE UP (ref 80–100)
MCV RBC AUTO: 88.8 FL — SIGNIFICANT CHANGE UP (ref 80–100)
MCV RBC AUTO: 88.8 FL — SIGNIFICANT CHANGE UP (ref 80–100)
MCV RBC AUTO: 90.1 FL — SIGNIFICANT CHANGE UP (ref 80–100)
MCV RBC AUTO: 90.1 FL — SIGNIFICANT CHANGE UP (ref 80–100)
MCV RBC AUTO: 90.7 FL — SIGNIFICANT CHANGE UP (ref 80–100)
MCV RBC AUTO: 90.7 FL — SIGNIFICANT CHANGE UP (ref 80–100)
MCV RBC AUTO: 92.6 FL — SIGNIFICANT CHANGE UP (ref 80–100)
MCV RBC AUTO: 92.6 FL — SIGNIFICANT CHANGE UP (ref 80–100)
MCV RBC AUTO: 93.5 FL — SIGNIFICANT CHANGE UP (ref 80–100)
MCV RBC AUTO: 93.5 FL — SIGNIFICANT CHANGE UP (ref 80–100)
MCV RBC AUTO: 94.1 FL — SIGNIFICANT CHANGE UP (ref 80–100)
MCV RBC AUTO: 94.6 FL — SIGNIFICANT CHANGE UP (ref 80–100)
MCV RBC AUTO: 94.6 FL — SIGNIFICANT CHANGE UP (ref 80–100)
MCV RBC AUTO: 94.9 FL — SIGNIFICANT CHANGE UP (ref 80–100)
MCV RBC AUTO: 94.9 FL — SIGNIFICANT CHANGE UP (ref 80–100)
MCV RBC AUTO: 95.6 FL — SIGNIFICANT CHANGE UP (ref 80–100)
MCV RBC AUTO: 95.6 FL — SIGNIFICANT CHANGE UP (ref 80–100)
MESOTHL CELL # FLD: 10 % — SIGNIFICANT CHANGE UP
MESOTHL CELL # FLD: 10 % — SIGNIFICANT CHANGE UP
MESOTHL CELL # FLD: 3 % — SIGNIFICANT CHANGE UP
MESOTHL CELL # FLD: 3 % — SIGNIFICANT CHANGE UP
MITOCHONDRIA AB SER-ACNC: SIGNIFICANT CHANGE UP
MITOCHONDRIA AB SER-ACNC: SIGNIFICANT CHANGE UP
MONOCYTES # BLD AUTO: 0.54 K/UL — SIGNIFICANT CHANGE UP (ref 0–0.9)
MONOCYTES # BLD AUTO: 0.54 K/UL — SIGNIFICANT CHANGE UP (ref 0–0.9)
MONOCYTES # BLD AUTO: 0.61 K/UL — SIGNIFICANT CHANGE UP (ref 0–0.9)
MONOCYTES # BLD AUTO: 0.61 K/UL — SIGNIFICANT CHANGE UP (ref 0–0.9)
MONOCYTES NFR BLD AUTO: 4.8 % — SIGNIFICANT CHANGE UP (ref 2–14)
MONOCYTES NFR BLD AUTO: 4.8 % — SIGNIFICANT CHANGE UP (ref 2–14)
MONOCYTES NFR BLD AUTO: 4.9 % — SIGNIFICANT CHANGE UP (ref 2–14)
MONOCYTES NFR BLD AUTO: 4.9 % — SIGNIFICANT CHANGE UP (ref 2–14)
MONOS+MACROS # FLD: 31 % — SIGNIFICANT CHANGE UP
MONOS+MACROS # FLD: 31 % — SIGNIFICANT CHANGE UP
MONOS+MACROS # FLD: 5 % — SIGNIFICANT CHANGE UP
MONOS+MACROS # FLD: 5 % — SIGNIFICANT CHANGE UP
MONOS+MACROS # FLD: 54 % — SIGNIFICANT CHANGE UP
MONOS+MACROS # FLD: 54 % — SIGNIFICANT CHANGE UP
MRSA PCR RESULT.: SIGNIFICANT CHANGE UP
NEUTROPHILS # BLD AUTO: 10.48 K/UL — HIGH (ref 1.8–7.4)
NEUTROPHILS # BLD AUTO: 10.48 K/UL — HIGH (ref 1.8–7.4)
NEUTROPHILS # BLD AUTO: 9.11 K/UL — HIGH (ref 1.8–7.4)
NEUTROPHILS # BLD AUTO: 9.11 K/UL — HIGH (ref 1.8–7.4)
NEUTROPHILS NFR BLD AUTO: 81.9 % — HIGH (ref 43–77)
NEUTROPHILS NFR BLD AUTO: 81.9 % — HIGH (ref 43–77)
NEUTROPHILS NFR BLD AUTO: 82.3 % — HIGH (ref 43–77)
NEUTROPHILS NFR BLD AUTO: 82.3 % — HIGH (ref 43–77)
NEUTROPHILS-BODY FLUID: 31 % — SIGNIFICANT CHANGE UP
NEUTROPHILS-BODY FLUID: 31 % — SIGNIFICANT CHANGE UP
NEUTROPHILS-BODY FLUID: 33 % — SIGNIFICANT CHANGE UP
NEUTROPHILS-BODY FLUID: 33 % — SIGNIFICANT CHANGE UP
NEUTROPHILS-BODY FLUID: 37 % — SIGNIFICANT CHANGE UP
NEUTROPHILS-BODY FLUID: 37 % — SIGNIFICANT CHANGE UP
NIGHT BLUE STAIN TISS: SIGNIFICANT CHANGE UP
NIGHT BLUE STAIN TISS: SIGNIFICANT CHANGE UP
NON-GYNECOLOGICAL CYTOLOGY STUDY: SIGNIFICANT CHANGE UP
NON-GYNECOLOGICAL CYTOLOGY STUDY: SIGNIFICANT CHANGE UP
NRBC # BLD: 0 /100 WBCS — SIGNIFICANT CHANGE UP (ref 0–0)
NRBC # BLD: 1 /100 WBCS — HIGH (ref 0–0)
NRBC # BLD: 2 /100 WBCS — HIGH (ref 0–0)
NRBC # BLD: 3 /100 WBCS — HIGH (ref 0–0)
NRBC # BLD: 3 /100 WBCS — HIGH (ref 0–0)
OB PNL STL: POSITIVE
OB PNL STL: POSITIVE
OTHER CELLS CSF MANUAL: 7.2 ML/DL — LOW (ref 18–22)
OTHER CELLS CSF MANUAL: 7.2 ML/DL — LOW (ref 18–22)
OTHER CELLS CSF MANUAL: 8.8 ML/DL — LOW (ref 18–22)
OTHER CELLS CSF MANUAL: 8.8 ML/DL — LOW (ref 18–22)
PCO2 BLDV: 36 MMHG — LOW (ref 42–55)
PCO2 BLDV: 36 MMHG — LOW (ref 42–55)
PCO2 BLDV: 38 MMHG — LOW (ref 42–55)
PCO2 BLDV: 38 MMHG — LOW (ref 42–55)
PCO2 BLDV: 39 MMHG — LOW (ref 42–55)
PCO2 BLDV: 40 MMHG — LOW (ref 42–55)
PCO2 BLDV: 40 MMHG — LOW (ref 42–55)
PCO2 BLDV: 41 MMHG — LOW (ref 42–55)
PCO2 BLDV: 43 MMHG — SIGNIFICANT CHANGE UP (ref 42–55)
PCO2 BLDV: 43 MMHG — SIGNIFICANT CHANGE UP (ref 42–55)
PCO2 BLDV: 44 MMHG — SIGNIFICANT CHANGE UP (ref 42–55)
PCO2 BLDV: 44 MMHG — SIGNIFICANT CHANGE UP (ref 42–55)
PCO2 BLDV: 46 MMHG — SIGNIFICANT CHANGE UP (ref 42–55)
PCO2 BLDV: 46 MMHG — SIGNIFICANT CHANGE UP (ref 42–55)
PCO2 BLDV: 47 MMHG — SIGNIFICANT CHANGE UP (ref 42–55)
PCO2 BLDV: 47 MMHG — SIGNIFICANT CHANGE UP (ref 42–55)
PCO2 BLDV: 52 MMHG — SIGNIFICANT CHANGE UP (ref 42–55)
PCO2 BLDV: 52 MMHG — SIGNIFICANT CHANGE UP (ref 42–55)
PETH 16:0/18:1: NEGATIVE NG/ML — SIGNIFICANT CHANGE UP
PETH 16:0/18:1: NEGATIVE NG/ML — SIGNIFICANT CHANGE UP
PETH 16:0/18:2: NEGATIVE NG/ML — SIGNIFICANT CHANGE UP
PETH 16:0/18:2: NEGATIVE NG/ML — SIGNIFICANT CHANGE UP
PETH COMMENTS: SIGNIFICANT CHANGE UP
PETH COMMENTS: SIGNIFICANT CHANGE UP
PH BLDV: 7.22 — LOW (ref 7.32–7.43)
PH BLDV: 7.25 — LOW (ref 7.32–7.43)
PH BLDV: 7.27 — LOW (ref 7.32–7.43)
PH BLDV: 7.27 — LOW (ref 7.32–7.43)
PH BLDV: 7.28 — LOW (ref 7.32–7.43)
PH BLDV: 7.28 — LOW (ref 7.32–7.43)
PH BLDV: 7.29 — LOW (ref 7.32–7.43)
PH BLDV: 7.3 — LOW (ref 7.32–7.43)
PH BLDV: 7.31 — LOW (ref 7.32–7.43)
PH BLDV: 7.33 — SIGNIFICANT CHANGE UP (ref 7.32–7.43)
PH BLDV: 7.33 — SIGNIFICANT CHANGE UP (ref 7.32–7.43)
PH BLDV: 7.34 — SIGNIFICANT CHANGE UP (ref 7.32–7.43)
PH BLDV: 7.34 — SIGNIFICANT CHANGE UP (ref 7.32–7.43)
PHOSPHATE SERPL-MCNC: 3 MG/DL — SIGNIFICANT CHANGE UP (ref 2.5–4.5)
PHOSPHATE SERPL-MCNC: 3.1 MG/DL — SIGNIFICANT CHANGE UP (ref 2.5–4.5)
PHOSPHATE SERPL-MCNC: 3.1 MG/DL — SIGNIFICANT CHANGE UP (ref 2.5–4.5)
PHOSPHATE SERPL-MCNC: 3.2 MG/DL — SIGNIFICANT CHANGE UP (ref 2.5–4.5)
PHOSPHATE SERPL-MCNC: 3.2 MG/DL — SIGNIFICANT CHANGE UP (ref 2.5–4.5)
PHOSPHATE SERPL-MCNC: 3.3 MG/DL — SIGNIFICANT CHANGE UP (ref 2.5–4.5)
PHOSPHATE SERPL-MCNC: 3.4 MG/DL — SIGNIFICANT CHANGE UP (ref 2.5–4.5)
PHOSPHATE SERPL-MCNC: 3.5 MG/DL — SIGNIFICANT CHANGE UP (ref 2.5–4.5)
PHOSPHATE SERPL-MCNC: 3.6 MG/DL — SIGNIFICANT CHANGE UP (ref 2.5–4.5)
PHOSPHATE SERPL-MCNC: 3.6 MG/DL — SIGNIFICANT CHANGE UP (ref 2.5–4.5)
PHOSPHATE SERPL-MCNC: 3.7 MG/DL — SIGNIFICANT CHANGE UP (ref 2.5–4.5)
PHOSPHATE SERPL-MCNC: 3.7 MG/DL — SIGNIFICANT CHANGE UP (ref 2.5–4.5)
PHOSPHATE SERPL-MCNC: 3.8 MG/DL — SIGNIFICANT CHANGE UP (ref 2.5–4.5)
PHOSPHATE SERPL-MCNC: 3.8 MG/DL — SIGNIFICANT CHANGE UP (ref 2.5–4.5)
PHOSPHATE SERPL-MCNC: 3.9 MG/DL — SIGNIFICANT CHANGE UP (ref 2.5–4.5)
PHOSPHATE SERPL-MCNC: 3.9 MG/DL — SIGNIFICANT CHANGE UP (ref 2.5–4.5)
PHOSPHATE SERPL-MCNC: 4 MG/DL — SIGNIFICANT CHANGE UP (ref 2.5–4.5)
PHOSPHATE SERPL-MCNC: 4 MG/DL — SIGNIFICANT CHANGE UP (ref 2.5–4.5)
PHOSPHATE SERPL-MCNC: 4.1 MG/DL — SIGNIFICANT CHANGE UP (ref 2.5–4.5)
PHOSPHATE SERPL-MCNC: 4.1 MG/DL — SIGNIFICANT CHANGE UP (ref 2.5–4.5)
PHOSPHATE SERPL-MCNC: 4.5 MG/DL — SIGNIFICANT CHANGE UP (ref 2.5–4.5)
PHOSPHATE SERPL-MCNC: 4.5 MG/DL — SIGNIFICANT CHANGE UP (ref 2.5–4.5)
PHOSPHATE SERPL-MCNC: 4.6 MG/DL — HIGH (ref 2.5–4.5)
PHOSPHATE SERPL-MCNC: 5.4 MG/DL — HIGH (ref 2.5–4.5)
PHOSPHATE SERPL-MCNC: 5.4 MG/DL — HIGH (ref 2.5–4.5)
PHOSPHATE SERPL-MCNC: 6.9 MG/DL — HIGH (ref 2.5–4.5)
PHOSPHATE SERPL-MCNC: 6.9 MG/DL — HIGH (ref 2.5–4.5)
PLATELET # BLD AUTO: 105 K/UL — LOW (ref 150–400)
PLATELET # BLD AUTO: 105 K/UL — LOW (ref 150–400)
PLATELET # BLD AUTO: 39 K/UL — LOW (ref 150–400)
PLATELET # BLD AUTO: 39 K/UL — LOW (ref 150–400)
PLATELET # BLD AUTO: 47 K/UL — LOW (ref 150–400)
PLATELET # BLD AUTO: 47 K/UL — LOW (ref 150–400)
PLATELET # BLD AUTO: 51 K/UL — LOW (ref 150–400)
PLATELET # BLD AUTO: 51 K/UL — LOW (ref 150–400)
PLATELET # BLD AUTO: 55 K/UL — LOW (ref 150–400)
PLATELET # BLD AUTO: 55 K/UL — LOW (ref 150–400)
PLATELET # BLD AUTO: 56 K/UL — LOW (ref 150–400)
PLATELET # BLD AUTO: 56 K/UL — LOW (ref 150–400)
PLATELET # BLD AUTO: 57 K/UL — LOW (ref 150–400)
PLATELET # BLD AUTO: 57 K/UL — LOW (ref 150–400)
PLATELET # BLD AUTO: 58 K/UL — LOW (ref 150–400)
PLATELET # BLD AUTO: 61 K/UL — LOW (ref 150–400)
PLATELET # BLD AUTO: 64 K/UL — LOW (ref 150–400)
PLATELET # BLD AUTO: 64 K/UL — LOW (ref 150–400)
PLATELET # BLD AUTO: 65 K/UL — LOW (ref 150–400)
PLATELET # BLD AUTO: 65 K/UL — LOW (ref 150–400)
PLATELET # BLD AUTO: 66 K/UL — LOW (ref 150–400)
PLATELET # BLD AUTO: 66 K/UL — LOW (ref 150–400)
PLATELET # BLD AUTO: 68 K/UL — LOW (ref 150–400)
PLATELET # BLD AUTO: 68 K/UL — LOW (ref 150–400)
PLATELET # BLD AUTO: 73 K/UL — LOW (ref 150–400)
PLATELET # BLD AUTO: 75 K/UL — LOW (ref 150–400)
PLATELET # BLD AUTO: 75 K/UL — LOW (ref 150–400)
PLATELET # BLD AUTO: 76 K/UL — LOW (ref 150–400)
PLATELET # BLD AUTO: 76 K/UL — LOW (ref 150–400)
PLATELET # BLD AUTO: 84 K/UL — LOW (ref 150–400)
PLATELET # BLD AUTO: 91 K/UL — LOW (ref 150–400)
PLATELET # BLD AUTO: 96 K/UL — LOW (ref 150–400)
PLATELET # BLD AUTO: 96 K/UL — LOW (ref 150–400)
PO2 BLDV: 34 MMHG — SIGNIFICANT CHANGE UP (ref 25–45)
PO2 BLDV: 34 MMHG — SIGNIFICANT CHANGE UP (ref 25–45)
PO2 BLDV: 39 MMHG — SIGNIFICANT CHANGE UP (ref 25–45)
PO2 BLDV: 39 MMHG — SIGNIFICANT CHANGE UP (ref 25–45)
PO2 BLDV: 40 MMHG — SIGNIFICANT CHANGE UP (ref 25–45)
PO2 BLDV: 40 MMHG — SIGNIFICANT CHANGE UP (ref 25–45)
PO2 BLDV: 45 MMHG — SIGNIFICANT CHANGE UP (ref 25–45)
PO2 BLDV: 46 MMHG — HIGH (ref 25–45)
PO2 BLDV: 48 MMHG — HIGH (ref 25–45)
PO2 BLDV: 52 MMHG — HIGH (ref 25–45)
PO2 BLDV: 54 MMHG — HIGH (ref 25–45)
PO2 BLDV: 54 MMHG — HIGH (ref 25–45)
PO2 BLDV: 56 MMHG — HIGH (ref 25–45)
PO2 BLDV: 56 MMHG — HIGH (ref 25–45)
POTASSIUM BLDV-SCNC: 3.7 MMOL/L — SIGNIFICANT CHANGE UP (ref 3.5–5.1)
POTASSIUM BLDV-SCNC: 3.7 MMOL/L — SIGNIFICANT CHANGE UP (ref 3.5–5.1)
POTASSIUM BLDV-SCNC: 3.8 MMOL/L — SIGNIFICANT CHANGE UP (ref 3.5–5.1)
POTASSIUM BLDV-SCNC: 3.8 MMOL/L — SIGNIFICANT CHANGE UP (ref 3.5–5.1)
POTASSIUM BLDV-SCNC: 4 MMOL/L — SIGNIFICANT CHANGE UP (ref 3.5–5.1)
POTASSIUM BLDV-SCNC: 4 MMOL/L — SIGNIFICANT CHANGE UP (ref 3.5–5.1)
POTASSIUM BLDV-SCNC: 4.1 MMOL/L — SIGNIFICANT CHANGE UP (ref 3.5–5.1)
POTASSIUM BLDV-SCNC: 4.2 MMOL/L — SIGNIFICANT CHANGE UP (ref 3.5–5.1)
POTASSIUM BLDV-SCNC: 4.3 MMOL/L — SIGNIFICANT CHANGE UP (ref 3.5–5.1)
POTASSIUM BLDV-SCNC: 4.4 MMOL/L — SIGNIFICANT CHANGE UP (ref 3.5–5.1)
POTASSIUM BLDV-SCNC: 4.8 MMOL/L — SIGNIFICANT CHANGE UP (ref 3.5–5.1)
POTASSIUM BLDV-SCNC: 4.8 MMOL/L — SIGNIFICANT CHANGE UP (ref 3.5–5.1)
POTASSIUM BLDV-SCNC: 4.9 MMOL/L — SIGNIFICANT CHANGE UP (ref 3.5–5.1)
POTASSIUM BLDV-SCNC: 4.9 MMOL/L — SIGNIFICANT CHANGE UP (ref 3.5–5.1)
POTASSIUM BLDV-SCNC: 5 MMOL/L — SIGNIFICANT CHANGE UP (ref 3.5–5.1)
POTASSIUM BLDV-SCNC: 5 MMOL/L — SIGNIFICANT CHANGE UP (ref 3.5–5.1)
POTASSIUM SERPL-MCNC: 3.7 MMOL/L — SIGNIFICANT CHANGE UP (ref 3.5–5.3)
POTASSIUM SERPL-MCNC: 3.7 MMOL/L — SIGNIFICANT CHANGE UP (ref 3.5–5.3)
POTASSIUM SERPL-MCNC: 3.8 MMOL/L — SIGNIFICANT CHANGE UP (ref 3.5–5.3)
POTASSIUM SERPL-MCNC: 3.8 MMOL/L — SIGNIFICANT CHANGE UP (ref 3.5–5.3)
POTASSIUM SERPL-MCNC: 4 MMOL/L — SIGNIFICANT CHANGE UP (ref 3.5–5.3)
POTASSIUM SERPL-MCNC: 4.1 MMOL/L — SIGNIFICANT CHANGE UP (ref 3.5–5.3)
POTASSIUM SERPL-MCNC: 4.2 MMOL/L — SIGNIFICANT CHANGE UP (ref 3.5–5.3)
POTASSIUM SERPL-MCNC: 4.3 MMOL/L — SIGNIFICANT CHANGE UP (ref 3.5–5.3)
POTASSIUM SERPL-MCNC: 4.4 MMOL/L — SIGNIFICANT CHANGE UP (ref 3.5–5.3)
POTASSIUM SERPL-MCNC: 4.5 MMOL/L — SIGNIFICANT CHANGE UP (ref 3.5–5.3)
POTASSIUM SERPL-MCNC: 4.6 MMOL/L — SIGNIFICANT CHANGE UP (ref 3.5–5.3)
POTASSIUM SERPL-MCNC: 4.6 MMOL/L — SIGNIFICANT CHANGE UP (ref 3.5–5.3)
POTASSIUM SERPL-MCNC: 4.8 MMOL/L — SIGNIFICANT CHANGE UP (ref 3.5–5.3)
POTASSIUM SERPL-MCNC: 4.9 MMOL/L — SIGNIFICANT CHANGE UP (ref 3.5–5.3)
POTASSIUM SERPL-MCNC: 4.9 MMOL/L — SIGNIFICANT CHANGE UP (ref 3.5–5.3)
POTASSIUM SERPL-MCNC: 5 MMOL/L — SIGNIFICANT CHANGE UP (ref 3.5–5.3)
POTASSIUM SERPL-MCNC: 5 MMOL/L — SIGNIFICANT CHANGE UP (ref 3.5–5.3)
POTASSIUM SERPL-SCNC: 3.7 MMOL/L — SIGNIFICANT CHANGE UP (ref 3.5–5.3)
POTASSIUM SERPL-SCNC: 3.7 MMOL/L — SIGNIFICANT CHANGE UP (ref 3.5–5.3)
POTASSIUM SERPL-SCNC: 3.8 MMOL/L — SIGNIFICANT CHANGE UP (ref 3.5–5.3)
POTASSIUM SERPL-SCNC: 3.8 MMOL/L — SIGNIFICANT CHANGE UP (ref 3.5–5.3)
POTASSIUM SERPL-SCNC: 4 MMOL/L — SIGNIFICANT CHANGE UP (ref 3.5–5.3)
POTASSIUM SERPL-SCNC: 4.1 MMOL/L — SIGNIFICANT CHANGE UP (ref 3.5–5.3)
POTASSIUM SERPL-SCNC: 4.2 MMOL/L — SIGNIFICANT CHANGE UP (ref 3.5–5.3)
POTASSIUM SERPL-SCNC: 4.3 MMOL/L — SIGNIFICANT CHANGE UP (ref 3.5–5.3)
POTASSIUM SERPL-SCNC: 4.4 MMOL/L — SIGNIFICANT CHANGE UP (ref 3.5–5.3)
POTASSIUM SERPL-SCNC: 4.5 MMOL/L — SIGNIFICANT CHANGE UP (ref 3.5–5.3)
POTASSIUM SERPL-SCNC: 4.6 MMOL/L — SIGNIFICANT CHANGE UP (ref 3.5–5.3)
POTASSIUM SERPL-SCNC: 4.6 MMOL/L — SIGNIFICANT CHANGE UP (ref 3.5–5.3)
POTASSIUM SERPL-SCNC: 4.8 MMOL/L — SIGNIFICANT CHANGE UP (ref 3.5–5.3)
POTASSIUM SERPL-SCNC: 4.9 MMOL/L — SIGNIFICANT CHANGE UP (ref 3.5–5.3)
POTASSIUM SERPL-SCNC: 4.9 MMOL/L — SIGNIFICANT CHANGE UP (ref 3.5–5.3)
POTASSIUM SERPL-SCNC: 5 MMOL/L — SIGNIFICANT CHANGE UP (ref 3.5–5.3)
POTASSIUM SERPL-SCNC: 5 MMOL/L — SIGNIFICANT CHANGE UP (ref 3.5–5.3)
PROT ?TM UR-MCNC: 83 MG/DL — HIGH (ref 0–12)
PROT ?TM UR-MCNC: 83 MG/DL — HIGH (ref 0–12)
PROT FLD-MCNC: 1.5 G/DL — SIGNIFICANT CHANGE UP
PROT FLD-MCNC: 1.5 G/DL — SIGNIFICANT CHANGE UP
PROT FLD-MCNC: 2.3 G/DL — SIGNIFICANT CHANGE UP
PROT FLD-MCNC: 2.3 G/DL — SIGNIFICANT CHANGE UP
PROT SERPL-MCNC: 5 G/DL — LOW (ref 6–8.3)
PROT SERPL-MCNC: 5 G/DL — LOW (ref 6–8.3)
PROT SERPL-MCNC: 5.1 G/DL — LOW (ref 6–8.3)
PROT SERPL-MCNC: 5.2 G/DL — LOW (ref 6–8.3)
PROT SERPL-MCNC: 5.3 G/DL — LOW (ref 6–8.3)
PROT SERPL-MCNC: 5.4 G/DL — LOW (ref 6–8.3)
PROT SERPL-MCNC: 5.5 G/DL — LOW (ref 6–8.3)
PROT SERPL-MCNC: 5.6 G/DL — LOW (ref 6–8.3)
PROT SERPL-MCNC: 5.7 G/DL — LOW (ref 6–8.3)
PROT SERPL-MCNC: 5.8 G/DL — LOW (ref 6–8.3)
PROT SERPL-MCNC: 5.8 G/DL — LOW (ref 6–8.3)
PROT/CREAT UR-RTO: 0.4 RATIO — HIGH (ref 0–0.2)
PROT/CREAT UR-RTO: 0.4 RATIO — HIGH (ref 0–0.2)
PROTHROM AB SERPL-ACNC: 12.3 SEC — SIGNIFICANT CHANGE UP (ref 9.5–13)
PROTHROM AB SERPL-ACNC: 12.3 SEC — SIGNIFICANT CHANGE UP (ref 9.5–13)
PROTHROM AB SERPL-ACNC: 13.1 SEC — HIGH (ref 9.5–13)
PROTHROM AB SERPL-ACNC: 13.1 SEC — HIGH (ref 9.5–13)
PROTHROM AB SERPL-ACNC: 13.5 SEC — HIGH (ref 9.5–13)
PROTHROM AB SERPL-ACNC: 13.5 SEC — HIGH (ref 9.5–13)
PROTHROM AB SERPL-ACNC: 13.7 SEC — HIGH (ref 9.5–13)
PROTHROM AB SERPL-ACNC: 13.7 SEC — HIGH (ref 9.5–13)
PROTHROM AB SERPL-ACNC: 14 SEC — HIGH (ref 9.5–13)
PROTHROM AB SERPL-ACNC: 14 SEC — HIGH (ref 9.5–13)
PROTHROM AB SERPL-ACNC: 14.3 SEC — HIGH (ref 9.5–13)
PROTHROM AB SERPL-ACNC: 14.3 SEC — HIGH (ref 9.5–13)
PROTHROM AB SERPL-ACNC: 14.4 SEC — HIGH (ref 9.5–13)
PROTHROM AB SERPL-ACNC: 14.4 SEC — HIGH (ref 9.5–13)
PROTHROM AB SERPL-ACNC: 15.2 SEC — HIGH (ref 9.5–13)
PROTHROM AB SERPL-ACNC: 15.2 SEC — HIGH (ref 9.5–13)
PROTHROM AB SERPL-ACNC: 15.6 SEC — HIGH (ref 9.5–13)
PROTHROM AB SERPL-ACNC: 15.6 SEC — HIGH (ref 9.5–13)
PROTHROM AB SERPL-ACNC: 17 SEC — HIGH (ref 9.5–13)
PROTHROM AB SERPL-ACNC: 17 SEC — HIGH (ref 9.5–13)
PROTHROM AB SERPL-ACNC: 18.5 SEC — HIGH (ref 9.5–13)
PROTHROM AB SERPL-ACNC: 18.5 SEC — HIGH (ref 9.5–13)
PROTHROM AB SERPL-ACNC: 19.6 SEC — HIGH (ref 9.5–13)
PROTHROM AB SERPL-ACNC: 19.6 SEC — HIGH (ref 9.5–13)
PROTHROM AB SERPL-ACNC: 20.3 SEC — HIGH (ref 9.5–13)
PROTHROM AB SERPL-ACNC: 20.3 SEC — HIGH (ref 9.5–13)
RAPID RVP RESULT: SIGNIFICANT CHANGE UP
RAPID RVP RESULT: SIGNIFICANT CHANGE UP
RAPIDTEG MAXIMUM AMPLITUDE: 43.4 MM — LOW (ref 52–70)
RAPIDTEG MAXIMUM AMPLITUDE: 43.4 MM — LOW (ref 52–70)
RBC # BLD: 2.24 M/UL — LOW (ref 4.2–5.8)
RBC # BLD: 2.24 M/UL — LOW (ref 4.2–5.8)
RBC # BLD: 2.26 M/UL — LOW (ref 4.2–5.8)
RBC # BLD: 2.26 M/UL — LOW (ref 4.2–5.8)
RBC # BLD: 2.37 M/UL — LOW (ref 4.2–5.8)
RBC # BLD: 2.37 M/UL — LOW (ref 4.2–5.8)
RBC # BLD: 2.52 M/UL — LOW (ref 4.2–5.8)
RBC # BLD: 2.53 M/UL — LOW (ref 4.2–5.8)
RBC # BLD: 2.53 M/UL — LOW (ref 4.2–5.8)
RBC # BLD: 2.54 M/UL — LOW (ref 4.2–5.8)
RBC # BLD: 2.54 M/UL — LOW (ref 4.2–5.8)
RBC # BLD: 2.56 M/UL — LOW (ref 4.2–5.8)
RBC # BLD: 2.57 M/UL — LOW (ref 4.2–5.8)
RBC # BLD: 2.57 M/UL — LOW (ref 4.2–5.8)
RBC # BLD: 2.59 M/UL — LOW (ref 4.2–5.8)
RBC # BLD: 2.59 M/UL — LOW (ref 4.2–5.8)
RBC # BLD: 2.67 M/UL — LOW (ref 4.2–5.8)
RBC # BLD: 2.67 M/UL — LOW (ref 4.2–5.8)
RBC # BLD: 2.68 M/UL — LOW (ref 4.2–5.8)
RBC # BLD: 2.68 M/UL — LOW (ref 4.2–5.8)
RBC # BLD: 2.71 M/UL — LOW (ref 4.2–5.8)
RBC # BLD: 2.71 M/UL — LOW (ref 4.2–5.8)
RBC # BLD: 2.77 M/UL — LOW (ref 4.2–5.8)
RBC # BLD: 2.77 M/UL — LOW (ref 4.2–5.8)
RBC # BLD: 2.78 M/UL — LOW (ref 4.2–5.8)
RBC # BLD: 2.78 M/UL — LOW (ref 4.2–5.8)
RBC # BLD: 2.79 M/UL — LOW (ref 4.2–5.8)
RBC # BLD: 2.79 M/UL — LOW (ref 4.2–5.8)
RBC # BLD: 2.87 M/UL — LOW (ref 4.2–5.8)
RBC # BLD: 2.87 M/UL — LOW (ref 4.2–5.8)
RBC # BLD: 2.88 M/UL — LOW (ref 4.2–5.8)
RBC # BLD: 2.88 M/UL — LOW (ref 4.2–5.8)
RBC # BLD: 2.89 M/UL — LOW (ref 4.2–5.8)
RBC # BLD: 2.89 M/UL — LOW (ref 4.2–5.8)
RBC # BLD: 2.98 M/UL — LOW (ref 4.2–5.8)
RBC # BLD: 2.98 M/UL — LOW (ref 4.2–5.8)
RBC # BLD: 3.01 M/UL — LOW (ref 4.2–5.8)
RBC # BLD: 3.01 M/UL — LOW (ref 4.2–5.8)
RBC # BLD: 3.04 M/UL — LOW (ref 4.2–5.8)
RBC # BLD: 3.04 M/UL — LOW (ref 4.2–5.8)
RBC # FLD: 22.8 % — HIGH (ref 10.3–14.5)
RBC # FLD: 22.8 % — HIGH (ref 10.3–14.5)
RBC # FLD: 22.9 % — HIGH (ref 10.3–14.5)
RBC # FLD: 22.9 % — HIGH (ref 10.3–14.5)
RBC # FLD: 23.1 % — HIGH (ref 10.3–14.5)
RBC # FLD: 23.1 % — HIGH (ref 10.3–14.5)
RBC # FLD: 23.2 % — HIGH (ref 10.3–14.5)
RBC # FLD: 23.2 % — HIGH (ref 10.3–14.5)
RBC # FLD: 23.6 % — HIGH (ref 10.3–14.5)
RBC # FLD: 23.6 % — HIGH (ref 10.3–14.5)
RBC # FLD: 24.9 % — HIGH (ref 10.3–14.5)
RBC # FLD: 24.9 % — HIGH (ref 10.3–14.5)
RBC # FLD: 25 % — HIGH (ref 10.3–14.5)
RBC # FLD: 25 % — HIGH (ref 10.3–14.5)
RBC # FLD: 25.1 % — HIGH (ref 10.3–14.5)
RBC # FLD: 25.1 % — HIGH (ref 10.3–14.5)
RBC # FLD: 25.2 % — HIGH (ref 10.3–14.5)
RBC # FLD: 25.2 % — HIGH (ref 10.3–14.5)
RBC # FLD: 25.3 % — HIGH (ref 10.3–14.5)
RBC # FLD: 25.4 % — HIGH (ref 10.3–14.5)
RBC # FLD: 25.5 % — HIGH (ref 10.3–14.5)
RBC # FLD: 25.6 % — HIGH (ref 10.3–14.5)
RBC # FLD: 25.7 % — HIGH (ref 10.3–14.5)
RBC # FLD: 25.8 % — HIGH (ref 10.3–14.5)
RBC # FLD: 26 % — HIGH (ref 10.3–14.5)
RBC # FLD: 26 % — HIGH (ref 10.3–14.5)
RBC # FLD: 26.8 % — HIGH (ref 10.3–14.5)
RBC # FLD: 26.8 % — HIGH (ref 10.3–14.5)
RCV VOL RI: <2000 CELLS/UL — HIGH (ref 0–5)
RCV VOL RI: <2000 CELLS/UL — HIGH (ref 0–5)
RCV VOL RI: HIGH CELLS/UL (ref 0–5)
RH IG SCN BLD-IMP: POSITIVE — SIGNIFICANT CHANGE UP
RH IG SCN BLD-IMP: POSITIVE — SIGNIFICANT CHANGE UP
S AUREUS DNA NOSE QL NAA+PROBE: SIGNIFICANT CHANGE UP
SAO2 % BLDV: 51.3 % — LOW (ref 67–88)
SAO2 % BLDV: 51.3 % — LOW (ref 67–88)
SAO2 % BLDV: 58.7 % — LOW (ref 67–88)
SAO2 % BLDV: 58.7 % — LOW (ref 67–88)
SAO2 % BLDV: 71.4 % — SIGNIFICANT CHANGE UP (ref 67–88)
SAO2 % BLDV: 71.4 % — SIGNIFICANT CHANGE UP (ref 67–88)
SAO2 % BLDV: 76.5 % — SIGNIFICANT CHANGE UP (ref 67–88)
SAO2 % BLDV: 76.5 % — SIGNIFICANT CHANGE UP (ref 67–88)
SAO2 % BLDV: 77 % — SIGNIFICANT CHANGE UP (ref 67–88)
SAO2 % BLDV: 77.3 % — SIGNIFICANT CHANGE UP (ref 67–88)
SAO2 % BLDV: 77.3 % — SIGNIFICANT CHANGE UP (ref 67–88)
SAO2 % BLDV: 78 % — SIGNIFICANT CHANGE UP (ref 67–88)
SAO2 % BLDV: 78 % — SIGNIFICANT CHANGE UP (ref 67–88)
SAO2 % BLDV: 78.1 % — SIGNIFICANT CHANGE UP (ref 67–88)
SAO2 % BLDV: 78.1 % — SIGNIFICANT CHANGE UP (ref 67–88)
SAO2 % BLDV: 78.3 % — SIGNIFICANT CHANGE UP (ref 67–88)
SAO2 % BLDV: 78.3 % — SIGNIFICANT CHANGE UP (ref 67–88)
SAO2 % BLDV: 80.2 % — SIGNIFICANT CHANGE UP (ref 67–88)
SAO2 % BLDV: 80.2 % — SIGNIFICANT CHANGE UP (ref 67–88)
SAO2 % BLDV: 83.1 % — SIGNIFICANT CHANGE UP (ref 67–88)
SAO2 % BLDV: 83.1 % — SIGNIFICANT CHANGE UP (ref 67–88)
SAO2 % BLDV: 84.8 % — SIGNIFICANT CHANGE UP (ref 67–88)
SAO2 % BLDV: 84.8 % — SIGNIFICANT CHANGE UP (ref 67–88)
SAO2 % BLDV: 85.3 % — SIGNIFICANT CHANGE UP (ref 67–88)
SAO2 % BLDV: 85.3 % — SIGNIFICANT CHANGE UP (ref 67–88)
SAO2 % BLDV: 89.5 % — HIGH (ref 67–88)
SAO2 % BLDV: 89.5 % — HIGH (ref 67–88)
SARS-COV-2 RNA SPEC QL NAA+PROBE: SIGNIFICANT CHANGE UP
SARS-COV-2 RNA SPEC QL NAA+PROBE: SIGNIFICANT CHANGE UP
SMOOTH MUSCLE AB SER-ACNC: SIGNIFICANT CHANGE UP
SMOOTH MUSCLE AB SER-ACNC: SIGNIFICANT CHANGE UP
SODIUM SERPL-SCNC: 131 MMOL/L — LOW (ref 135–145)
SODIUM SERPL-SCNC: 132 MMOL/L — LOW (ref 135–145)
SODIUM SERPL-SCNC: 132 MMOL/L — LOW (ref 135–145)
SODIUM SERPL-SCNC: 133 MMOL/L — LOW (ref 135–145)
SODIUM SERPL-SCNC: 134 MMOL/L — LOW (ref 135–145)
SODIUM SERPL-SCNC: 135 MMOL/L — SIGNIFICANT CHANGE UP (ref 135–145)
SODIUM SERPL-SCNC: 136 MMOL/L — SIGNIFICANT CHANGE UP (ref 135–145)
SPECIMEN SOURCE: SIGNIFICANT CHANGE UP
TEG FUNCTIONAL FIBRINOGEN: 11.8 MM — LOW (ref 15–32)
TEG FUNCTIONAL FIBRINOGEN: 11.8 MM — LOW (ref 15–32)
TEG MAXIMUM AMPLITUDE: 49.7 MM — LOW (ref 52–69)
TEG MAXIMUM AMPLITUDE: 49.7 MM — LOW (ref 52–69)
TEG REACTION TIME: 7.7 MIN — SIGNIFICANT CHANGE UP (ref 4.6–9.1)
TEG REACTION TIME: 7.7 MIN — SIGNIFICANT CHANGE UP (ref 4.6–9.1)
TOTAL NUCLEATED CELL COUNT, BODY FLUID: 204 CELLS/UL — HIGH (ref 0–5)
TOTAL NUCLEATED CELL COUNT, BODY FLUID: 204 CELLS/UL — HIGH (ref 0–5)
TOTAL NUCLEATED CELL COUNT, BODY FLUID: 229 CELLS/UL — HIGH (ref 0–5)
TOTAL NUCLEATED CELL COUNT, BODY FLUID: 229 CELLS/UL — HIGH (ref 0–5)
TOTAL NUCLEATED CELL COUNT, BODY FLUID: 446 CELLS/UL — HIGH (ref 0–5)
TOTAL NUCLEATED CELL COUNT, BODY FLUID: 446 CELLS/UL — HIGH (ref 0–5)
TUBE TYPE: SIGNIFICANT CHANGE UP
WBC # BLD: 10.35 K/UL — SIGNIFICANT CHANGE UP (ref 3.8–10.5)
WBC # BLD: 10.35 K/UL — SIGNIFICANT CHANGE UP (ref 3.8–10.5)
WBC # BLD: 10.51 K/UL — HIGH (ref 3.8–10.5)
WBC # BLD: 10.51 K/UL — HIGH (ref 3.8–10.5)
WBC # BLD: 11.06 K/UL — HIGH (ref 3.8–10.5)
WBC # BLD: 11.06 K/UL — HIGH (ref 3.8–10.5)
WBC # BLD: 11.75 K/UL — HIGH (ref 3.8–10.5)
WBC # BLD: 11.75 K/UL — HIGH (ref 3.8–10.5)
WBC # BLD: 12.28 K/UL — HIGH (ref 3.8–10.5)
WBC # BLD: 12.28 K/UL — HIGH (ref 3.8–10.5)
WBC # BLD: 12.78 K/UL — HIGH (ref 3.8–10.5)
WBC # BLD: 12.78 K/UL — HIGH (ref 3.8–10.5)
WBC # BLD: 13.04 K/UL — HIGH (ref 3.8–10.5)
WBC # BLD: 13.04 K/UL — HIGH (ref 3.8–10.5)
WBC # BLD: 13.06 K/UL — HIGH (ref 3.8–10.5)
WBC # BLD: 13.06 K/UL — HIGH (ref 3.8–10.5)
WBC # BLD: 13.56 K/UL — HIGH (ref 3.8–10.5)
WBC # BLD: 13.56 K/UL — HIGH (ref 3.8–10.5)
WBC # BLD: 13.89 K/UL — HIGH (ref 3.8–10.5)
WBC # BLD: 13.89 K/UL — HIGH (ref 3.8–10.5)
WBC # BLD: 13.91 K/UL — HIGH (ref 3.8–10.5)
WBC # BLD: 13.91 K/UL — HIGH (ref 3.8–10.5)
WBC # BLD: 14.35 K/UL — HIGH (ref 3.8–10.5)
WBC # BLD: 14.35 K/UL — HIGH (ref 3.8–10.5)
WBC # BLD: 14.54 K/UL — HIGH (ref 3.8–10.5)
WBC # BLD: 14.54 K/UL — HIGH (ref 3.8–10.5)
WBC # BLD: 14.56 K/UL — HIGH (ref 3.8–10.5)
WBC # BLD: 14.56 K/UL — HIGH (ref 3.8–10.5)
WBC # BLD: 14.6 K/UL — HIGH (ref 3.8–10.5)
WBC # BLD: 14.6 K/UL — HIGH (ref 3.8–10.5)
WBC # BLD: 14.93 K/UL — HIGH (ref 3.8–10.5)
WBC # BLD: 14.93 K/UL — HIGH (ref 3.8–10.5)
WBC # BLD: 15.02 K/UL — HIGH (ref 3.8–10.5)
WBC # BLD: 15.02 K/UL — HIGH (ref 3.8–10.5)
WBC # BLD: 15.09 K/UL — HIGH (ref 3.8–10.5)
WBC # BLD: 15.09 K/UL — HIGH (ref 3.8–10.5)
WBC # BLD: 15.47 K/UL — HIGH (ref 3.8–10.5)
WBC # BLD: 15.47 K/UL — HIGH (ref 3.8–10.5)
WBC # BLD: 15.61 K/UL — HIGH (ref 3.8–10.5)
WBC # BLD: 15.61 K/UL — HIGH (ref 3.8–10.5)
WBC # BLD: 15.83 K/UL — HIGH (ref 3.8–10.5)
WBC # BLD: 15.83 K/UL — HIGH (ref 3.8–10.5)
WBC # BLD: 16.8 K/UL — HIGH (ref 3.8–10.5)
WBC # BLD: 16.8 K/UL — HIGH (ref 3.8–10.5)
WBC # BLD: 16.98 K/UL — HIGH (ref 3.8–10.5)
WBC # BLD: 16.98 K/UL — HIGH (ref 3.8–10.5)
WBC # BLD: 17.75 K/UL — HIGH (ref 3.8–10.5)
WBC # BLD: 17.75 K/UL — HIGH (ref 3.8–10.5)
WBC # FLD AUTO: 10.35 K/UL — SIGNIFICANT CHANGE UP (ref 3.8–10.5)
WBC # FLD AUTO: 10.35 K/UL — SIGNIFICANT CHANGE UP (ref 3.8–10.5)
WBC # FLD AUTO: 10.51 K/UL — HIGH (ref 3.8–10.5)
WBC # FLD AUTO: 10.51 K/UL — HIGH (ref 3.8–10.5)
WBC # FLD AUTO: 11.06 K/UL — HIGH (ref 3.8–10.5)
WBC # FLD AUTO: 11.06 K/UL — HIGH (ref 3.8–10.5)
WBC # FLD AUTO: 11.75 K/UL — HIGH (ref 3.8–10.5)
WBC # FLD AUTO: 11.75 K/UL — HIGH (ref 3.8–10.5)
WBC # FLD AUTO: 12.28 K/UL — HIGH (ref 3.8–10.5)
WBC # FLD AUTO: 12.28 K/UL — HIGH (ref 3.8–10.5)
WBC # FLD AUTO: 12.78 K/UL — HIGH (ref 3.8–10.5)
WBC # FLD AUTO: 12.78 K/UL — HIGH (ref 3.8–10.5)
WBC # FLD AUTO: 13.04 K/UL — HIGH (ref 3.8–10.5)
WBC # FLD AUTO: 13.04 K/UL — HIGH (ref 3.8–10.5)
WBC # FLD AUTO: 13.06 K/UL — HIGH (ref 3.8–10.5)
WBC # FLD AUTO: 13.06 K/UL — HIGH (ref 3.8–10.5)
WBC # FLD AUTO: 13.56 K/UL — HIGH (ref 3.8–10.5)
WBC # FLD AUTO: 13.56 K/UL — HIGH (ref 3.8–10.5)
WBC # FLD AUTO: 13.89 K/UL — HIGH (ref 3.8–10.5)
WBC # FLD AUTO: 13.89 K/UL — HIGH (ref 3.8–10.5)
WBC # FLD AUTO: 13.91 K/UL — HIGH (ref 3.8–10.5)
WBC # FLD AUTO: 13.91 K/UL — HIGH (ref 3.8–10.5)
WBC # FLD AUTO: 14.35 K/UL — HIGH (ref 3.8–10.5)
WBC # FLD AUTO: 14.35 K/UL — HIGH (ref 3.8–10.5)
WBC # FLD AUTO: 14.54 K/UL — HIGH (ref 3.8–10.5)
WBC # FLD AUTO: 14.54 K/UL — HIGH (ref 3.8–10.5)
WBC # FLD AUTO: 14.56 K/UL — HIGH (ref 3.8–10.5)
WBC # FLD AUTO: 14.56 K/UL — HIGH (ref 3.8–10.5)
WBC # FLD AUTO: 14.6 K/UL — HIGH (ref 3.8–10.5)
WBC # FLD AUTO: 14.6 K/UL — HIGH (ref 3.8–10.5)
WBC # FLD AUTO: 14.93 K/UL — HIGH (ref 3.8–10.5)
WBC # FLD AUTO: 14.93 K/UL — HIGH (ref 3.8–10.5)
WBC # FLD AUTO: 15.02 K/UL — HIGH (ref 3.8–10.5)
WBC # FLD AUTO: 15.02 K/UL — HIGH (ref 3.8–10.5)
WBC # FLD AUTO: 15.09 K/UL — HIGH (ref 3.8–10.5)
WBC # FLD AUTO: 15.09 K/UL — HIGH (ref 3.8–10.5)
WBC # FLD AUTO: 15.47 K/UL — HIGH (ref 3.8–10.5)
WBC # FLD AUTO: 15.47 K/UL — HIGH (ref 3.8–10.5)
WBC # FLD AUTO: 15.61 K/UL — HIGH (ref 3.8–10.5)
WBC # FLD AUTO: 15.61 K/UL — HIGH (ref 3.8–10.5)
WBC # FLD AUTO: 15.83 K/UL — HIGH (ref 3.8–10.5)
WBC # FLD AUTO: 15.83 K/UL — HIGH (ref 3.8–10.5)
WBC # FLD AUTO: 16.8 K/UL — HIGH (ref 3.8–10.5)
WBC # FLD AUTO: 16.8 K/UL — HIGH (ref 3.8–10.5)
WBC # FLD AUTO: 16.98 K/UL — HIGH (ref 3.8–10.5)
WBC # FLD AUTO: 16.98 K/UL — HIGH (ref 3.8–10.5)
WBC # FLD AUTO: 17.75 K/UL — HIGH (ref 3.8–10.5)
WBC # FLD AUTO: 17.75 K/UL — HIGH (ref 3.8–10.5)

## 2024-01-01 PROCEDURE — 82272 OCCULT BLD FECES 1-3 TESTS: CPT

## 2024-01-01 PROCEDURE — 83605 ASSAY OF LACTIC ACID: CPT

## 2024-01-01 PROCEDURE — 84156 ASSAY OF PROTEIN URINE: CPT

## 2024-01-01 PROCEDURE — 99232 SBSQ HOSP IP/OBS MODERATE 35: CPT

## 2024-01-01 PROCEDURE — 82565 ASSAY OF CREATININE: CPT

## 2024-01-01 PROCEDURE — 87070 CULTURE OTHR SPECIMN AEROBIC: CPT

## 2024-01-01 PROCEDURE — 88112 CYTOPATH CELL ENHANCE TECH: CPT | Mod: 26

## 2024-01-01 PROCEDURE — 82746 ASSAY OF FOLIC ACID SERUM: CPT

## 2024-01-01 PROCEDURE — 87116 MYCOBACTERIA CULTURE: CPT

## 2024-01-01 PROCEDURE — 87206 SMEAR FLUORESCENT/ACID STAI: CPT

## 2024-01-01 PROCEDURE — 85240 CLOT FACTOR VIII AHG 1 STAGE: CPT

## 2024-01-01 PROCEDURE — 99291 CRITICAL CARE FIRST HOUR: CPT | Mod: GC

## 2024-01-01 PROCEDURE — C1729: CPT

## 2024-01-01 PROCEDURE — 99291 CRITICAL CARE FIRST HOUR: CPT

## 2024-01-01 PROCEDURE — 99232 SBSQ HOSP IP/OBS MODERATE 35: CPT | Mod: GC,25

## 2024-01-01 PROCEDURE — 76937 US GUIDE VASCULAR ACCESS: CPT

## 2024-01-01 PROCEDURE — 76775 US EXAM ABDO BACK WALL LIM: CPT | Mod: 26,XU

## 2024-01-01 PROCEDURE — 85730 THROMBOPLASTIN TIME PARTIAL: CPT

## 2024-01-01 PROCEDURE — 93005 ELECTROCARDIOGRAM TRACING: CPT

## 2024-01-01 PROCEDURE — 99233 SBSQ HOSP IP/OBS HIGH 50: CPT | Mod: GC

## 2024-01-01 PROCEDURE — 88112 CYTOPATH CELL ENHANCE TECH: CPT

## 2024-01-01 PROCEDURE — 77001 FLUOROGUIDE FOR VEIN DEVICE: CPT

## 2024-01-01 PROCEDURE — 88312 SPECIAL STAINS GROUP 1: CPT | Mod: 26

## 2024-01-01 PROCEDURE — 93975 VASCULAR STUDY: CPT

## 2024-01-01 PROCEDURE — 83880 ASSAY OF NATRIURETIC PEPTIDE: CPT

## 2024-01-01 PROCEDURE — 44360 SMALL BOWEL ENDOSCOPY: CPT | Mod: GC

## 2024-01-01 PROCEDURE — 99233 SBSQ HOSP IP/OBS HIGH 50: CPT | Mod: 25

## 2024-01-01 PROCEDURE — 99497 ADVNCD CARE PLAN 30 MIN: CPT | Mod: 25

## 2024-01-01 PROCEDURE — 82962 GLUCOSE BLOOD TEST: CPT

## 2024-01-01 PROCEDURE — 99232 SBSQ HOSP IP/OBS MODERATE 35: CPT | Mod: GC

## 2024-01-01 PROCEDURE — 36556 INSERT NON-TUNNEL CV CATH: CPT

## 2024-01-01 PROCEDURE — 85732 THROMBOPLASTIN TIME PARTIAL: CPT

## 2024-01-01 PROCEDURE — 93306 TTE W/DOPPLER COMPLETE: CPT | Mod: 26

## 2024-01-01 PROCEDURE — 85014 HEMATOCRIT: CPT

## 2024-01-01 PROCEDURE — C1769: CPT

## 2024-01-01 PROCEDURE — 80048 BASIC METABOLIC PNL TOTAL CA: CPT

## 2024-01-01 PROCEDURE — 76705 ECHO EXAM OF ABDOMEN: CPT

## 2024-01-01 PROCEDURE — 82945 GLUCOSE OTHER FLUID: CPT

## 2024-01-01 PROCEDURE — 87389 HIV-1 AG W/HIV-1&-2 AB AG IA: CPT

## 2024-01-01 PROCEDURE — 83735 ASSAY OF MAGNESIUM: CPT

## 2024-01-01 PROCEDURE — 88312 SPECIAL STAINS GROUP 1: CPT

## 2024-01-01 PROCEDURE — 85027 COMPLETE CBC AUTOMATED: CPT

## 2024-01-01 PROCEDURE — P9047: CPT

## 2024-01-01 PROCEDURE — 86038 ANTINUCLEAR ANTIBODIES: CPT

## 2024-01-01 PROCEDURE — 31500 INSERT EMERGENCY AIRWAY: CPT | Mod: GC

## 2024-01-01 PROCEDURE — 83615 LACTATE (LD) (LDH) ENZYME: CPT

## 2024-01-01 PROCEDURE — 85230 CLOT FACTOR VII PROCONVERTIN: CPT

## 2024-01-01 PROCEDURE — 99233 SBSQ HOSP IP/OBS HIGH 50: CPT

## 2024-01-01 PROCEDURE — 36415 COLL VENOUS BLD VENIPUNCTURE: CPT

## 2024-01-01 PROCEDURE — 71045 X-RAY EXAM CHEST 1 VIEW: CPT | Mod: 26

## 2024-01-01 PROCEDURE — 76705 ECHO EXAM OF ABDOMEN: CPT | Mod: 26

## 2024-01-01 PROCEDURE — 49083 ABD PARACENTESIS W/IMAGING: CPT | Mod: GC

## 2024-01-01 PROCEDURE — 85210 CLOT FACTOR II PROTHROM SPEC: CPT

## 2024-01-01 PROCEDURE — 95700 EEG CONT REC W/VID EEG TECH: CPT

## 2024-01-01 PROCEDURE — 85025 COMPLETE CBC W/AUTO DIFF WBC: CPT

## 2024-01-01 PROCEDURE — 86381 MITOCHONDRIAL ANTIBODY EACH: CPT

## 2024-01-01 PROCEDURE — 76705 ECHO EXAM OF ABDOMEN: CPT | Mod: 26,XU

## 2024-01-01 PROCEDURE — 87205 SMEAR GRAM STAIN: CPT

## 2024-01-01 PROCEDURE — 86255 FLUORESCENT ANTIBODY SCREEN: CPT

## 2024-01-01 PROCEDURE — 86850 RBC ANTIBODY SCREEN: CPT

## 2024-01-01 PROCEDURE — 86663 EPSTEIN-BARR ANTIBODY: CPT

## 2024-01-01 PROCEDURE — C1752: CPT

## 2024-01-01 PROCEDURE — 82570 ASSAY OF URINE CREATININE: CPT

## 2024-01-01 PROCEDURE — 99221 1ST HOSP IP/OBS SF/LOW 40: CPT

## 2024-01-01 PROCEDURE — G0480: CPT

## 2024-01-01 PROCEDURE — 89051 BODY FLUID CELL COUNT: CPT

## 2024-01-01 PROCEDURE — 95714 VEEG EA 12-26 HR UNMNTR: CPT

## 2024-01-01 PROCEDURE — 86644 CMV ANTIBODY: CPT

## 2024-01-01 PROCEDURE — C1894: CPT

## 2024-01-01 PROCEDURE — 86923 COMPATIBILITY TEST ELECTRIC: CPT

## 2024-01-01 PROCEDURE — 74177 CT ABD & PELVIS W/CONTRAST: CPT

## 2024-01-01 PROCEDURE — 87075 CULTR BACTERIA EXCEPT BLOOD: CPT

## 2024-01-01 PROCEDURE — 82330 ASSAY OF CALCIUM: CPT

## 2024-01-01 PROCEDURE — 99222 1ST HOSP IP/OBS MODERATE 55: CPT | Mod: GC

## 2024-01-01 PROCEDURE — 82042 OTHER SOURCE ALBUMIN QUAN EA: CPT

## 2024-01-01 PROCEDURE — 84157 ASSAY OF PROTEIN OTHER: CPT

## 2024-01-01 PROCEDURE — 84132 ASSAY OF SERUM POTASSIUM: CPT

## 2024-01-01 PROCEDURE — 85384 FIBRINOGEN ACTIVITY: CPT

## 2024-01-01 PROCEDURE — 87641 MR-STAPH DNA AMP PROBE: CPT

## 2024-01-01 PROCEDURE — 36430 TRANSFUSION BLD/BLD COMPNT: CPT

## 2024-01-01 PROCEDURE — 86900 BLOOD TYPING SEROLOGIC ABO: CPT

## 2024-01-01 PROCEDURE — 85220 BLOOC CLOT FACTOR V TEST: CPT

## 2024-01-01 PROCEDURE — 85610 PROTHROMBIN TIME: CPT

## 2024-01-01 PROCEDURE — 82947 ASSAY GLUCOSE BLOOD QUANT: CPT

## 2024-01-01 PROCEDURE — P9040: CPT

## 2024-01-01 PROCEDURE — 49083 ABD PARACENTESIS W/IMAGING: CPT

## 2024-01-01 PROCEDURE — 87102 FUNGUS ISOLATION CULTURE: CPT

## 2024-01-01 PROCEDURE — 94003 VENT MGMT INPAT SUBQ DAY: CPT

## 2024-01-01 PROCEDURE — P9012: CPT

## 2024-01-01 PROCEDURE — 88305 TISSUE EXAM BY PATHOLOGIST: CPT | Mod: 26

## 2024-01-01 PROCEDURE — 85396 CLOTTING ASSAY WHOLE BLOOD: CPT

## 2024-01-01 PROCEDURE — 97110 THERAPEUTIC EXERCISES: CPT

## 2024-01-01 PROCEDURE — 71045 X-RAY EXAM CHEST 1 VIEW: CPT

## 2024-01-01 PROCEDURE — 77001 FLUOROGUIDE FOR VEIN DEVICE: CPT | Mod: 26,59

## 2024-01-01 PROCEDURE — 86965 POOLING BLOOD PLATELETS: CPT

## 2024-01-01 PROCEDURE — 31624 DX BRONCHOSCOPE/LAVAGE: CPT | Mod: GC

## 2024-01-01 PROCEDURE — 81001 URINALYSIS AUTO W/SCOPE: CPT

## 2024-01-01 PROCEDURE — 84100 ASSAY OF PHOSPHORUS: CPT

## 2024-01-01 PROCEDURE — 86664 EPSTEIN-BARR NUCLEAR ANTIGEN: CPT

## 2024-01-01 PROCEDURE — 86665 EPSTEIN-BARR CAPSID VCA: CPT

## 2024-01-01 PROCEDURE — 99223 1ST HOSP IP/OBS HIGH 75: CPT

## 2024-01-01 PROCEDURE — 94002 VENT MGMT INPAT INIT DAY: CPT

## 2024-01-01 PROCEDURE — 83036 HEMOGLOBIN GLYCOSYLATED A1C: CPT

## 2024-01-01 PROCEDURE — 82803 BLOOD GASES ANY COMBINATION: CPT

## 2024-01-01 PROCEDURE — 80074 ACUTE HEPATITIS PANEL: CPT

## 2024-01-01 PROCEDURE — 83550 IRON BINDING TEST: CPT

## 2024-01-01 PROCEDURE — 99231 SBSQ HOSP IP/OBS SF/LOW 25: CPT

## 2024-01-01 PROCEDURE — 82607 VITAMIN B-12: CPT

## 2024-01-01 PROCEDURE — 93975 VASCULAR STUDY: CPT | Mod: 26

## 2024-01-01 PROCEDURE — 84540 ASSAY OF URINE/UREA-N: CPT

## 2024-01-01 PROCEDURE — 31645 BRNCHSC W/THER ASPIR 1ST: CPT | Mod: GC

## 2024-01-01 PROCEDURE — 85018 HEMOGLOBIN: CPT

## 2024-01-01 PROCEDURE — 84295 ASSAY OF SERUM SODIUM: CPT

## 2024-01-01 PROCEDURE — 82435 ASSAY OF BLOOD CHLORIDE: CPT

## 2024-01-01 PROCEDURE — 99239 HOSP IP/OBS DSCHRG MGMT >30: CPT

## 2024-01-01 PROCEDURE — 71260 CT THORAX DX C+: CPT | Mod: 26

## 2024-01-01 PROCEDURE — 97162 PT EVAL MOD COMPLEX 30 MIN: CPT

## 2024-01-01 PROCEDURE — 86645 CMV ANTIBODY IGM: CPT

## 2024-01-01 PROCEDURE — 86901 BLOOD TYPING SEROLOGIC RH(D): CPT

## 2024-01-01 PROCEDURE — 99291 CRITICAL CARE FIRST HOUR: CPT | Mod: 25

## 2024-01-01 PROCEDURE — 99291 CRITICAL CARE FIRST HOUR: CPT | Mod: GC,25

## 2024-01-01 PROCEDURE — 76775 US EXAM ABDO BACK WALL LIM: CPT

## 2024-01-01 PROCEDURE — 84300 ASSAY OF URINE SODIUM: CPT

## 2024-01-01 PROCEDURE — 87040 BLOOD CULTURE FOR BACTERIA: CPT

## 2024-01-01 PROCEDURE — 82784 ASSAY IGA/IGD/IGG/IGM EACH: CPT

## 2024-01-01 PROCEDURE — 71260 CT THORAX DX C+: CPT

## 2024-01-01 PROCEDURE — 76937 US GUIDE VASCULAR ACCESS: CPT | Mod: 26

## 2024-01-01 PROCEDURE — 87015 SPECIMEN INFECT AGNT CONCNTJ: CPT

## 2024-01-01 PROCEDURE — 82140 ASSAY OF AMMONIA: CPT

## 2024-01-01 PROCEDURE — P9100: CPT

## 2024-01-01 PROCEDURE — 95720 EEG PHY/QHP EA INCR W/VEEG: CPT

## 2024-01-01 PROCEDURE — 83540 ASSAY OF IRON: CPT

## 2024-01-01 PROCEDURE — 85379 FIBRIN DEGRADATION QUANT: CPT

## 2024-01-01 PROCEDURE — 80053 COMPREHEN METABOLIC PANEL: CPT

## 2024-01-01 PROCEDURE — P9016: CPT

## 2024-01-01 PROCEDURE — 87640 STAPH A DNA AMP PROBE: CPT

## 2024-01-01 PROCEDURE — 0225U NFCT DS DNA&RNA 21 SARSCOV2: CPT

## 2024-01-01 PROCEDURE — 99261: CPT

## 2024-01-01 PROCEDURE — 87086 URINE CULTURE/COLONY COUNT: CPT

## 2024-01-01 PROCEDURE — P9037: CPT

## 2024-01-01 PROCEDURE — P9045: CPT

## 2024-01-01 PROCEDURE — 82550 ASSAY OF CK (CPK): CPT

## 2024-01-01 PROCEDURE — 82728 ASSAY OF FERRITIN: CPT

## 2024-01-01 PROCEDURE — 93306 TTE W/DOPPLER COMPLETE: CPT

## 2024-01-01 PROCEDURE — 85260 CLOT FACTOR X STUART-POWER: CPT

## 2024-01-01 PROCEDURE — 97530 THERAPEUTIC ACTIVITIES: CPT

## 2024-01-01 PROCEDURE — 88305 TISSUE EXAM BY PATHOLOGIST: CPT

## 2024-01-01 PROCEDURE — 74177 CT ABD & PELVIS W/CONTRAST: CPT | Mod: 26

## 2024-01-01 RX ORDER — MIDODRINE HYDROCHLORIDE 2.5 MG/1
10 TABLET ORAL ONCE
Refills: 0 | Status: COMPLETED | OUTPATIENT
Start: 2024-01-01 | End: 2024-01-01

## 2024-01-01 RX ORDER — ALBUMIN HUMAN 25 %
50 VIAL (ML) INTRAVENOUS ONCE
Refills: 0 | Status: COMPLETED | OUTPATIENT
Start: 2024-01-01 | End: 2024-01-01

## 2024-01-01 RX ORDER — NOREPINEPHRINE BITARTRATE/D5W 8 MG/250ML
0.62 PLASTIC BAG, INJECTION (ML) INTRAVENOUS
Qty: 8 | Refills: 0 | Status: DISCONTINUED | OUTPATIENT
Start: 2024-01-01 | End: 2024-01-01

## 2024-01-01 RX ORDER — FOLIC ACID 0.8 MG
1 TABLET ORAL DAILY
Refills: 0 | Status: DISCONTINUED | OUTPATIENT
Start: 2024-01-01 | End: 2024-01-01

## 2024-01-01 RX ORDER — ALBUMIN HUMAN 25 %
750 VIAL (ML) INTRAVENOUS ONCE
Refills: 0 | Status: DISCONTINUED | OUTPATIENT
Start: 2024-01-01 | End: 2024-01-01

## 2024-01-01 RX ORDER — QUETIAPINE FUMARATE 200 MG/1
25 TABLET, FILM COATED ORAL DAILY
Refills: 0 | Status: DISCONTINUED | OUTPATIENT
Start: 2024-01-01 | End: 2024-01-01

## 2024-01-01 RX ORDER — CEFTRIAXONE 500 MG/1
2000 INJECTION, POWDER, FOR SOLUTION INTRAMUSCULAR; INTRAVENOUS EVERY 24 HOURS
Refills: 0 | Status: DISCONTINUED | OUTPATIENT
Start: 2024-01-01 | End: 2024-01-01

## 2024-01-01 RX ORDER — ACETAMINOPHEN 500 MG
1000 TABLET ORAL ONCE
Refills: 0 | Status: COMPLETED | OUTPATIENT
Start: 2024-01-01 | End: 2024-01-01

## 2024-01-01 RX ORDER — HEPARIN SODIUM 5000 [USP'U]/ML
300 INJECTION INTRAVENOUS; SUBCUTANEOUS
Qty: 10000 | Refills: 0 | Status: DISCONTINUED | OUTPATIENT
Start: 2024-01-01 | End: 2024-01-01

## 2024-01-01 RX ORDER — PIPERACILLIN AND TAZOBACTAM 4; .5 G/20ML; G/20ML
3.38 INJECTION, POWDER, LYOPHILIZED, FOR SOLUTION INTRAVENOUS ONCE
Refills: 0 | Status: COMPLETED | OUTPATIENT
Start: 2024-01-01 | End: 2024-01-01

## 2024-01-01 RX ORDER — URSODIOL 250 MG/1
250 TABLET, FILM COATED ORAL EVERY 12 HOURS
Refills: 0 | Status: DISCONTINUED | OUTPATIENT
Start: 2024-01-01 | End: 2024-01-01

## 2024-01-01 RX ORDER — MIDODRINE HYDROCHLORIDE 2.5 MG/1
10 TABLET ORAL ONCE
Refills: 0 | Status: DISCONTINUED | OUTPATIENT
Start: 2024-01-01 | End: 2024-01-01

## 2024-01-01 RX ORDER — CEFTRIAXONE 500 MG/1
1000 INJECTION, POWDER, FOR SOLUTION INTRAMUSCULAR; INTRAVENOUS EVERY 24 HOURS
Refills: 0 | Status: DISCONTINUED | OUTPATIENT
Start: 2024-01-01 | End: 2024-01-01

## 2024-01-01 RX ORDER — CHLORHEXIDINE GLUCONATE 213 G/1000ML
15 SOLUTION TOPICAL EVERY 12 HOURS
Refills: 0 | Status: DISCONTINUED | OUTPATIENT
Start: 2024-01-01 | End: 2024-01-01

## 2024-01-01 RX ORDER — ALBUMIN HUMAN 25 %
150 VIAL (ML) INTRAVENOUS ONCE
Refills: 0 | Status: COMPLETED | OUTPATIENT
Start: 2024-01-01 | End: 2024-01-01

## 2024-01-01 RX ORDER — INSULIN GLARGINE 100 [IU]/ML
5 INJECTION, SOLUTION SUBCUTANEOUS AT BEDTIME
Refills: 0 | Status: DISCONTINUED | OUTPATIENT
Start: 2024-01-01 | End: 2024-01-01

## 2024-01-01 RX ORDER — HYDROMORPHONE HYDROCHLORIDE 2 MG/ML
0.2 INJECTION INTRAMUSCULAR; INTRAVENOUS; SUBCUTANEOUS
Refills: 0 | Status: DISCONTINUED | OUTPATIENT
Start: 2024-01-01 | End: 2024-01-01

## 2024-01-01 RX ORDER — PHYTONADIONE (VIT K1) 5 MG
10 TABLET ORAL ONCE
Refills: 0 | Status: COMPLETED | OUTPATIENT
Start: 2024-01-01 | End: 2024-01-01

## 2024-01-01 RX ORDER — CHLORHEXIDINE GLUCONATE 213 G/1000ML
1 SOLUTION TOPICAL DAILY
Refills: 0 | Status: DISCONTINUED | OUTPATIENT
Start: 2024-01-01 | End: 2024-01-01

## 2024-01-01 RX ORDER — PROPOFOL 10 MG/ML
50 INJECTION, EMULSION INTRAVENOUS
Qty: 1000 | Refills: 0 | Status: DISCONTINUED | OUTPATIENT
Start: 2024-01-01 | End: 2024-01-01

## 2024-01-01 RX ORDER — ROBINUL 0.2 MG/ML
0.4 INJECTION INTRAMUSCULAR; INTRAVENOUS ONCE
Refills: 0 | Status: DISCONTINUED | OUTPATIENT
Start: 2024-01-01 | End: 2024-01-01

## 2024-01-01 RX ORDER — INSULIN LISPRO 100/ML
VIAL (ML) SUBCUTANEOUS EVERY 6 HOURS
Refills: 0 | Status: DISCONTINUED | OUTPATIENT
Start: 2024-01-01 | End: 2024-01-01

## 2024-01-01 RX ORDER — THIAMINE MONONITRATE (VIT B1) 100 MG
100 TABLET ORAL DAILY
Refills: 0 | Status: DISCONTINUED | OUTPATIENT
Start: 2024-01-01 | End: 2024-01-01

## 2024-01-01 RX ORDER — ACETAMINOPHEN 500 MG
650 TABLET ORAL ONCE
Refills: 0 | Status: COMPLETED | OUTPATIENT
Start: 2024-01-01 | End: 2024-01-01

## 2024-01-01 RX ORDER — KETAMINE HYDROCHLORIDE 100 MG/ML
47.8 INJECTION INTRAMUSCULAR; INTRAVENOUS ONCE
Refills: 0 | Status: DISCONTINUED | OUTPATIENT
Start: 2024-01-01 | End: 2024-01-01

## 2024-01-01 RX ORDER — NOREPINEPHRINE BITARTRATE/D5W 8 MG/250ML
0.62 PLASTIC BAG, INJECTION (ML) INTRAVENOUS
Qty: 16 | Refills: 0 | Status: DISCONTINUED | OUTPATIENT
Start: 2024-01-01 | End: 2024-01-01

## 2024-01-01 RX ORDER — MIDODRINE HYDROCHLORIDE 2.5 MG/1
30 TABLET ORAL THREE TIMES A DAY
Refills: 0 | Status: DISCONTINUED | OUTPATIENT
Start: 2024-01-01 | End: 2024-01-01

## 2024-01-01 RX ORDER — LACTULOSE 10 G/15ML
10 SOLUTION ORAL THREE TIMES A DAY
Refills: 0 | Status: DISCONTINUED | OUTPATIENT
Start: 2024-01-01 | End: 2024-01-01

## 2024-01-01 RX ORDER — KETAMINE HYDROCHLORIDE 100 MG/ML
0.2 INJECTION INTRAMUSCULAR; INTRAVENOUS
Qty: 1000 | Refills: 0 | Status: DISCONTINUED | OUTPATIENT
Start: 2024-01-01 | End: 2024-01-01

## 2024-01-01 RX ORDER — PANTOPRAZOLE SODIUM 20 MG/1
40 TABLET, DELAYED RELEASE ORAL ONCE
Refills: 0 | Status: COMPLETED | OUTPATIENT
Start: 2024-01-01 | End: 2024-01-01

## 2024-01-01 RX ORDER — LACTULOSE 10 G/15ML
200 SOLUTION ORAL EVERY 8 HOURS
Refills: 0 | Status: DISCONTINUED | OUTPATIENT
Start: 2024-01-01 | End: 2024-01-01

## 2024-01-01 RX ORDER — ALBUMIN HUMAN 25 %
250 VIAL (ML) INTRAVENOUS ONCE
Refills: 0 | Status: COMPLETED | OUTPATIENT
Start: 2024-01-01 | End: 2024-01-01

## 2024-01-01 RX ORDER — SODIUM CHLORIDE 9 MG/ML
10 INJECTION INTRAMUSCULAR; INTRAVENOUS; SUBCUTANEOUS
Refills: 0 | Status: DISCONTINUED | OUTPATIENT
Start: 2024-01-01 | End: 2024-01-01

## 2024-01-01 RX ORDER — FENTANYL CITRATE 50 UG/ML
100 INJECTION INTRAVENOUS ONCE
Refills: 0 | Status: DISCONTINUED | OUTPATIENT
Start: 2024-01-01 | End: 2024-01-01

## 2024-01-01 RX ORDER — PROPOFOL 10 MG/ML
50 INJECTION, EMULSION INTRAVENOUS ONCE
Refills: 0 | Status: COMPLETED | OUTPATIENT
Start: 2024-01-01 | End: 2024-01-01

## 2024-01-01 RX ORDER — DEXMEDETOMIDINE HYDROCHLORIDE IN 0.9% SODIUM CHLORIDE 4 UG/ML
0.6 INJECTION INTRAVENOUS
Qty: 200 | Refills: 0 | Status: DISCONTINUED | OUTPATIENT
Start: 2024-01-01 | End: 2024-01-01

## 2024-01-01 RX ORDER — PANTOPRAZOLE SODIUM 20 MG/1
40 TABLET, DELAYED RELEASE ORAL EVERY 12 HOURS
Refills: 0 | Status: DISCONTINUED | OUTPATIENT
Start: 2024-01-01 | End: 2024-01-01

## 2024-01-01 RX ORDER — HEPARIN SODIUM 5000 [USP'U]/ML
5000 INJECTION INTRAVENOUS; SUBCUTANEOUS EVERY 8 HOURS
Refills: 0 | Status: DISCONTINUED | OUTPATIENT
Start: 2024-01-01 | End: 2024-01-01

## 2024-01-01 RX ORDER — NOREPINEPHRINE BITARTRATE/D5W 8 MG/250ML
0.6 PLASTIC BAG, INJECTION (ML) INTRAVENOUS
Qty: 16 | Refills: 0 | Status: DISCONTINUED | OUTPATIENT
Start: 2024-01-01 | End: 2024-01-01

## 2024-01-01 RX ORDER — VANCOMYCIN HCL 1 G
1000 VIAL (EA) INTRAVENOUS ONCE
Refills: 0 | Status: COMPLETED | OUTPATIENT
Start: 2024-01-01 | End: 2024-01-01

## 2024-01-01 RX ORDER — PIPERACILLIN AND TAZOBACTAM 4; .5 G/20ML; G/20ML
3.38 INJECTION, POWDER, LYOPHILIZED, FOR SOLUTION INTRAVENOUS EVERY 8 HOURS
Refills: 0 | Status: DISCONTINUED | OUTPATIENT
Start: 2024-01-01 | End: 2024-01-01

## 2024-01-01 RX ORDER — OCTREOTIDE ACETATE 200 UG/ML
50 INJECTION, SOLUTION INTRAVENOUS; SUBCUTANEOUS
Qty: 500 | Refills: 0 | Status: DISCONTINUED | OUTPATIENT
Start: 2024-01-01 | End: 2024-01-01

## 2024-01-01 RX ORDER — ALBUMIN HUMAN 25 %
100 VIAL (ML) INTRAVENOUS
Refills: 0 | Status: COMPLETED | OUTPATIENT
Start: 2024-01-01 | End: 2024-01-01

## 2024-01-01 RX ORDER — MIDODRINE HYDROCHLORIDE 2.5 MG/1
20 TABLET ORAL THREE TIMES A DAY
Refills: 0 | Status: DISCONTINUED | OUTPATIENT
Start: 2024-01-01 | End: 2024-01-01

## 2024-01-01 RX ORDER — MEN-PHOR .5; .5 G/G; G/G
1 LOTION TOPICAL
Refills: 0 | Status: DISCONTINUED | OUTPATIENT
Start: 2024-01-01 | End: 2024-01-01

## 2024-01-01 RX ORDER — ERYTHROMYCIN ETHYLSUCCINATE 400 MG
250 TABLET ORAL ONCE
Refills: 0 | Status: COMPLETED | OUTPATIENT
Start: 2024-01-01 | End: 2024-01-01

## 2024-01-01 RX ORDER — PHENYLEPHRINE HYDROCHLORIDE 10 MG/ML
0.1 INJECTION INTRAVENOUS
Qty: 40 | Refills: 0 | Status: DISCONTINUED | OUTPATIENT
Start: 2024-01-01 | End: 2024-01-01

## 2024-01-01 RX ORDER — NOREPINEPHRINE BITARTRATE/D5W 8 MG/250ML
0.05 PLASTIC BAG, INJECTION (ML) INTRAVENOUS
Qty: 8 | Refills: 0 | Status: DISCONTINUED | OUTPATIENT
Start: 2024-01-01 | End: 2024-01-01

## 2024-01-01 RX ORDER — ALBUMIN HUMAN 25 %
50 VIAL (ML) INTRAVENOUS
Refills: 0 | Status: COMPLETED | OUTPATIENT
Start: 2024-01-01 | End: 2024-01-01

## 2024-01-01 RX ORDER — KETAMINE HYDROCHLORIDE 100 MG/ML
76 INJECTION INTRAMUSCULAR; INTRAVENOUS ONCE
Refills: 0 | Status: DISCONTINUED | OUTPATIENT
Start: 2024-01-01 | End: 2024-01-01

## 2024-01-01 RX ORDER — METOCLOPRAMIDE HCL 10 MG
10 TABLET ORAL ONCE
Refills: 0 | Status: DISCONTINUED | OUTPATIENT
Start: 2024-01-01 | End: 2024-01-01

## 2024-01-01 RX ORDER — VASOPRESSIN 20 [USP'U]/ML
0.04 INJECTION INTRAVENOUS
Qty: 40 | Refills: 0 | Status: DISCONTINUED | OUTPATIENT
Start: 2024-01-01 | End: 2024-01-01

## 2024-01-01 RX ORDER — ROBINUL 0.2 MG/ML
0.2 INJECTION INTRAMUSCULAR; INTRAVENOUS EVERY 6 HOURS
Refills: 0 | Status: DISCONTINUED | OUTPATIENT
Start: 2024-01-01 | End: 2024-01-01

## 2024-01-01 RX ORDER — LACTULOSE 10 G/15ML
10 SOLUTION ORAL
Refills: 0 | Status: DISCONTINUED | OUTPATIENT
Start: 2024-01-01 | End: 2024-01-01

## 2024-01-01 RX ORDER — CHLORHEXIDINE GLUCONATE 213 G/1000ML
1 SOLUTION TOPICAL
Refills: 0 | Status: DISCONTINUED | OUTPATIENT
Start: 2024-01-01 | End: 2024-01-01

## 2024-01-01 RX ADMIN — VASOPRESSIN 6 UNIT(S)/MIN: 20 INJECTION INTRAVENOUS at 22:42

## 2024-01-01 RX ADMIN — Medication 1: at 09:18

## 2024-01-01 RX ADMIN — PREGABALIN 1000 MICROGRAM(S): 225 CAPSULE ORAL at 11:59

## 2024-01-01 RX ADMIN — PROPOFOL 28.7 MICROGRAM(S)/KG/MIN: 10 INJECTION, EMULSION INTRAVENOUS at 20:27

## 2024-01-01 RX ADMIN — URSODIOL 250 MILLIGRAM(S): 250 TABLET, FILM COATED ORAL at 05:28

## 2024-01-01 RX ADMIN — LACTULOSE 10 GRAM(S): 10 SOLUTION ORAL at 23:26

## 2024-01-01 RX ADMIN — MIDODRINE HYDROCHLORIDE 10 MILLIGRAM(S): 2.5 TABLET ORAL at 13:28

## 2024-01-01 RX ADMIN — MEROPENEM 100 MILLIGRAM(S): 1 INJECTION INTRAVENOUS at 17:51

## 2024-01-01 RX ADMIN — Medication 53.8 MICROGRAM(S)/KG/MIN: at 22:43

## 2024-01-01 RX ADMIN — HYDROMORPHONE HYDROCHLORIDE 0.2 MILLIGRAM(S): 2 INJECTION INTRAMUSCULAR; INTRAVENOUS; SUBCUTANEOUS at 14:35

## 2024-01-01 RX ADMIN — Medication 1 MILLIGRAM(S): at 22:04

## 2024-01-01 RX ADMIN — CHLORHEXIDINE GLUCONATE 15 MILLILITER(S): 213 SOLUTION TOPICAL at 18:43

## 2024-01-01 RX ADMIN — OCTREOTIDE ACETATE 10 MICROGRAM(S)/HR: 200 INJECTION, SOLUTION INTRAVENOUS; SUBCUTANEOUS at 21:40

## 2024-01-01 RX ADMIN — Medication 53.8 MICROGRAM(S)/KG/MIN: at 22:52

## 2024-01-01 RX ADMIN — Medication 250 MILLIGRAM(S): at 11:27

## 2024-01-01 RX ADMIN — PIPERACILLIN AND TAZOBACTAM 25 GRAM(S): 4; .5 INJECTION, POWDER, LYOPHILIZED, FOR SOLUTION INTRAVENOUS at 22:43

## 2024-01-01 RX ADMIN — LACTULOSE 10 GRAM(S): 10 SOLUTION ORAL at 15:07

## 2024-01-01 RX ADMIN — KETAMINE HYDROCHLORIDE 76 MILLIGRAM(S): 100 INJECTION INTRAMUSCULAR; INTRAVENOUS at 14:00

## 2024-01-01 RX ADMIN — TAMSULOSIN HYDROCHLORIDE 0.4 MILLIGRAM(S): 0.4 CAPSULE ORAL at 21:40

## 2024-01-01 RX ADMIN — PANTOPRAZOLE SODIUM 40 MILLIGRAM(S): 20 TABLET, DELAYED RELEASE ORAL at 05:22

## 2024-01-01 RX ADMIN — INSULIN GLARGINE 5 UNIT(S): 100 INJECTION, SOLUTION SUBCUTANEOUS at 21:46

## 2024-01-01 RX ADMIN — MEROPENEM 100 MILLIGRAM(S): 1 INJECTION INTRAVENOUS at 05:30

## 2024-01-01 RX ADMIN — INSULIN GLARGINE 5 UNIT(S): 100 INJECTION, SOLUTION SUBCUTANEOUS at 21:39

## 2024-01-01 RX ADMIN — LACTULOSE 10 GRAM(S): 10 SOLUTION ORAL at 11:12

## 2024-01-01 RX ADMIN — Medication 100 MILLIGRAM(S): at 11:23

## 2024-01-01 RX ADMIN — CHLORHEXIDINE GLUCONATE 15 MILLILITER(S): 213 SOLUTION TOPICAL at 05:14

## 2024-01-01 RX ADMIN — MEN-PHOR 1 APPLICATION(S): .5; .5 LOTION TOPICAL at 06:07

## 2024-01-01 RX ADMIN — MEROPENEM 100 MILLIGRAM(S): 1 INJECTION INTRAVENOUS at 05:35

## 2024-01-01 RX ADMIN — Medication 100 MILLILITER(S): at 12:04

## 2024-01-01 RX ADMIN — KETAMINE HYDROCHLORIDE 1.91 MG/KG/HR: 100 INJECTION INTRAMUSCULAR; INTRAVENOUS at 22:43

## 2024-01-01 RX ADMIN — Medication 1: at 21:54

## 2024-01-01 RX ADMIN — DEXMEDETOMIDINE HYDROCHLORIDE IN 0.9% SODIUM CHLORIDE 14.3 MICROGRAM(S)/KG/HR: 4 INJECTION INTRAVENOUS at 16:19

## 2024-01-01 RX ADMIN — MEN-PHOR 1 APPLICATION(S): .5; .5 LOTION TOPICAL at 17:06

## 2024-01-01 RX ADMIN — LACTULOSE 10 GRAM(S): 10 SOLUTION ORAL at 17:52

## 2024-01-01 RX ADMIN — CHLORHEXIDINE GLUCONATE 1 APPLICATION(S): 213 SOLUTION TOPICAL at 12:54

## 2024-01-01 RX ADMIN — Medication 1 CAPSULE(S): at 17:32

## 2024-01-01 RX ADMIN — PANTOPRAZOLE SODIUM 40 MILLIGRAM(S): 20 TABLET, DELAYED RELEASE ORAL at 18:43

## 2024-01-01 RX ADMIN — Medication 100 MILLILITER(S): at 11:34

## 2024-01-01 RX ADMIN — OCTREOTIDE ACETATE 200 MICROGRAM(S): 200 INJECTION, SOLUTION INTRAVENOUS; SUBCUTANEOUS at 21:40

## 2024-01-01 RX ADMIN — CHLORHEXIDINE GLUCONATE 1 APPLICATION(S): 213 SOLUTION TOPICAL at 15:53

## 2024-01-01 RX ADMIN — MIDODRINE HYDROCHLORIDE 10 MILLIGRAM(S): 2.5 TABLET ORAL at 05:31

## 2024-01-01 RX ADMIN — LACTULOSE 10 GRAM(S): 10 SOLUTION ORAL at 17:33

## 2024-01-01 RX ADMIN — PANTOPRAZOLE SODIUM 40 MILLIGRAM(S): 20 TABLET, DELAYED RELEASE ORAL at 05:39

## 2024-01-01 RX ADMIN — Medication 102 MILLIGRAM(S): at 12:52

## 2024-01-01 RX ADMIN — OCTREOTIDE ACETATE 200 MICROGRAM(S): 200 INJECTION, SOLUTION INTRAVENOUS; SUBCUTANEOUS at 21:00

## 2024-01-01 RX ADMIN — Medication 1 MILLIGRAM(S): at 21:47

## 2024-01-01 RX ADMIN — Medication 100 MILLIGRAM(S): at 11:59

## 2024-01-01 RX ADMIN — PREGABALIN 1000 MICROGRAM(S): 225 CAPSULE ORAL at 11:11

## 2024-01-01 RX ADMIN — Medication 1 MILLIGRAM(S): at 12:47

## 2024-01-01 RX ADMIN — PIPERACILLIN AND TAZOBACTAM 25 GRAM(S): 4; .5 INJECTION, POWDER, LYOPHILIZED, FOR SOLUTION INTRAVENOUS at 14:05

## 2024-01-01 RX ADMIN — Medication 1 MILLIGRAM(S): at 12:48

## 2024-01-01 RX ADMIN — OCTREOTIDE ACETATE 200 MICROGRAM(S): 200 INJECTION, SOLUTION INTRAVENOUS; SUBCUTANEOUS at 13:06

## 2024-01-01 RX ADMIN — Medication 8.96 MICROGRAM(S)/KG/MIN: at 17:13

## 2024-01-01 RX ADMIN — CHLORHEXIDINE GLUCONATE 1 APPLICATION(S): 213 SOLUTION TOPICAL at 05:28

## 2024-01-01 RX ADMIN — LACTULOSE 10 GRAM(S): 10 SOLUTION ORAL at 05:36

## 2024-01-01 RX ADMIN — Medication 53.8 MICROGRAM(S)/KG/MIN: at 09:26

## 2024-01-01 RX ADMIN — CHLORHEXIDINE GLUCONATE 1 APPLICATION(S): 213 SOLUTION TOPICAL at 05:14

## 2024-01-01 RX ADMIN — Medication 102 MILLIGRAM(S): at 20:26

## 2024-01-01 RX ADMIN — Medication 2: at 12:48

## 2024-01-01 RX ADMIN — PROPOFOL 28.7 MICROGRAM(S)/KG/MIN: 10 INJECTION, EMULSION INTRAVENOUS at 16:45

## 2024-01-01 RX ADMIN — LACTULOSE 10 GRAM(S): 10 SOLUTION ORAL at 05:23

## 2024-01-01 RX ADMIN — Medication 1: at 13:25

## 2024-01-01 RX ADMIN — MEN-PHOR 1 APPLICATION(S): .5; .5 LOTION TOPICAL at 05:43

## 2024-01-01 RX ADMIN — Medication 100 MILLIGRAM(S): at 12:47

## 2024-01-01 RX ADMIN — Medication 1 CAPSULE(S): at 08:45

## 2024-01-01 RX ADMIN — LACTULOSE 10 GRAM(S): 10 SOLUTION ORAL at 06:08

## 2024-01-01 RX ADMIN — OCTREOTIDE ACETATE 200 MICROGRAM(S): 200 INJECTION, SOLUTION INTRAVENOUS; SUBCUTANEOUS at 21:28

## 2024-01-01 RX ADMIN — PANTOPRAZOLE SODIUM 40 MILLIGRAM(S): 20 TABLET, DELAYED RELEASE ORAL at 05:43

## 2024-01-01 RX ADMIN — Medication 1: at 09:23

## 2024-01-01 RX ADMIN — PANTOPRAZOLE SODIUM 40 MILLIGRAM(S): 20 TABLET, DELAYED RELEASE ORAL at 17:25

## 2024-01-01 RX ADMIN — Medication 8.96 MICROGRAM(S)/KG/MIN: at 21:55

## 2024-01-01 RX ADMIN — Medication 1 CAPSULE(S): at 17:06

## 2024-01-01 RX ADMIN — PANTOPRAZOLE SODIUM 40 MILLIGRAM(S): 20 TABLET, DELAYED RELEASE ORAL at 05:15

## 2024-01-01 RX ADMIN — MIDODRINE HYDROCHLORIDE 10 MILLIGRAM(S): 2.5 TABLET ORAL at 17:52

## 2024-01-01 RX ADMIN — MEN-PHOR 1 APPLICATION(S): .5; .5 LOTION TOPICAL at 17:15

## 2024-01-01 RX ADMIN — PIPERACILLIN AND TAZOBACTAM 25 GRAM(S): 4; .5 INJECTION, POWDER, LYOPHILIZED, FOR SOLUTION INTRAVENOUS at 05:24

## 2024-01-01 RX ADMIN — Medication 1 CAPSULE(S): at 17:02

## 2024-01-01 RX ADMIN — Medication 100 MILLIGRAM(S): at 11:11

## 2024-01-01 RX ADMIN — MIDODRINE HYDROCHLORIDE 10 MILLIGRAM(S): 2.5 TABLET ORAL at 11:33

## 2024-01-01 RX ADMIN — MIDODRINE HYDROCHLORIDE 10 MILLIGRAM(S): 2.5 TABLET ORAL at 14:54

## 2024-01-01 RX ADMIN — INSULIN GLARGINE 5 UNIT(S): 100 INJECTION, SOLUTION SUBCUTANEOUS at 21:27

## 2024-01-01 RX ADMIN — MIDODRINE HYDROCHLORIDE 20 MILLIGRAM(S): 2.5 TABLET ORAL at 17:32

## 2024-01-01 RX ADMIN — OCTREOTIDE ACETATE 10 MICROGRAM(S)/HR: 200 INJECTION, SOLUTION INTRAVENOUS; SUBCUTANEOUS at 22:14

## 2024-01-01 RX ADMIN — LACTULOSE 10 GRAM(S): 10 SOLUTION ORAL at 17:13

## 2024-01-01 RX ADMIN — Medication 1 MILLIGRAM(S): at 22:13

## 2024-01-01 RX ADMIN — OCTREOTIDE ACETATE 200 MICROGRAM(S): 200 INJECTION, SOLUTION INTRAVENOUS; SUBCUTANEOUS at 05:35

## 2024-01-01 RX ADMIN — LACTULOSE 10 GRAM(S): 10 SOLUTION ORAL at 21:31

## 2024-01-01 RX ADMIN — OCTREOTIDE ACETATE 10 MICROGRAM(S)/HR: 200 INJECTION, SOLUTION INTRAVENOUS; SUBCUTANEOUS at 22:52

## 2024-01-01 RX ADMIN — MIDODRINE HYDROCHLORIDE 10 MILLIGRAM(S): 2.5 TABLET ORAL at 08:46

## 2024-01-01 RX ADMIN — OCTREOTIDE ACETATE 200 MICROGRAM(S): 200 INJECTION, SOLUTION INTRAVENOUS; SUBCUTANEOUS at 14:05

## 2024-01-01 RX ADMIN — Medication 1 MILLIGRAM(S): at 12:01

## 2024-01-01 RX ADMIN — MEN-PHOR 1 APPLICATION(S): .5; .5 LOTION TOPICAL at 18:45

## 2024-01-01 RX ADMIN — OCTREOTIDE ACETATE 200 MICROGRAM(S): 200 INJECTION, SOLUTION INTRAVENOUS; SUBCUTANEOUS at 21:54

## 2024-01-01 RX ADMIN — MIDODRINE HYDROCHLORIDE 10 MILLIGRAM(S): 2.5 TABLET ORAL at 05:35

## 2024-01-01 RX ADMIN — Medication 2: at 12:55

## 2024-01-01 RX ADMIN — Medication 650 MILLIGRAM(S): at 11:11

## 2024-01-01 RX ADMIN — CHLORHEXIDINE GLUCONATE 15 MILLILITER(S): 213 SOLUTION TOPICAL at 17:06

## 2024-01-01 RX ADMIN — Medication 1 CAPSULE(S): at 08:17

## 2024-01-01 RX ADMIN — VASOPRESSIN 6 UNIT(S)/MIN: 20 INJECTION INTRAVENOUS at 12:43

## 2024-01-01 RX ADMIN — Medication 1: at 00:42

## 2024-01-01 RX ADMIN — Medication 1: at 13:03

## 2024-01-01 RX ADMIN — MIDODRINE HYDROCHLORIDE 10 MILLIGRAM(S): 2.5 TABLET ORAL at 11:58

## 2024-01-01 RX ADMIN — MEN-PHOR 1 APPLICATION(S): .5; .5 LOTION TOPICAL at 05:14

## 2024-01-01 RX ADMIN — CHLORHEXIDINE GLUCONATE 15 MILLILITER(S): 213 SOLUTION TOPICAL at 17:13

## 2024-01-01 RX ADMIN — MIDODRINE HYDROCHLORIDE 10 MILLIGRAM(S): 2.5 TABLET ORAL at 05:36

## 2024-01-01 RX ADMIN — TAMSULOSIN HYDROCHLORIDE 0.4 MILLIGRAM(S): 0.4 CAPSULE ORAL at 21:27

## 2024-01-01 RX ADMIN — Medication 1: at 00:27

## 2024-01-01 RX ADMIN — OCTREOTIDE ACETATE 200 MICROGRAM(S): 200 INJECTION, SOLUTION INTRAVENOUS; SUBCUTANEOUS at 15:29

## 2024-01-01 RX ADMIN — LACTULOSE 10 GRAM(S): 10 SOLUTION ORAL at 14:05

## 2024-01-01 RX ADMIN — OCTREOTIDE ACETATE 10 MICROGRAM(S)/HR: 200 INJECTION, SOLUTION INTRAVENOUS; SUBCUTANEOUS at 11:23

## 2024-01-01 RX ADMIN — Medication 53.8 MICROGRAM(S)/KG/MIN: at 12:43

## 2024-01-01 RX ADMIN — MEN-PHOR 1 APPLICATION(S): .5; .5 LOTION TOPICAL at 17:32

## 2024-01-01 RX ADMIN — TAMSULOSIN HYDROCHLORIDE 0.4 MILLIGRAM(S): 0.4 CAPSULE ORAL at 21:00

## 2024-01-01 RX ADMIN — Medication 1 MILLIGRAM(S): at 21:26

## 2024-01-01 RX ADMIN — Medication 1 MILLIGRAM(S): at 11:23

## 2024-01-01 RX ADMIN — LACTULOSE 10 GRAM(S): 10 SOLUTION ORAL at 22:50

## 2024-01-01 RX ADMIN — Medication 1 MILLIGRAM(S): at 21:39

## 2024-01-01 RX ADMIN — CEFTRIAXONE 100 MILLIGRAM(S): 500 INJECTION, POWDER, FOR SOLUTION INTRAMUSCULAR; INTRAVENOUS at 10:07

## 2024-01-01 RX ADMIN — MEROPENEM 100 MILLIGRAM(S): 1 INJECTION INTRAVENOUS at 05:29

## 2024-01-01 RX ADMIN — HEPARIN SODIUM 5000 UNIT(S): 5000 INJECTION INTRAVENOUS; SUBCUTANEOUS at 15:35

## 2024-01-01 RX ADMIN — Medication 125 MILLILITER(S): at 15:59

## 2024-01-01 RX ADMIN — Medication 1 MILLIGRAM(S): at 12:43

## 2024-01-01 RX ADMIN — TAMSULOSIN HYDROCHLORIDE 0.4 MILLIGRAM(S): 0.4 CAPSULE ORAL at 21:55

## 2024-01-01 RX ADMIN — LACTULOSE 10 GRAM(S): 10 SOLUTION ORAL at 21:01

## 2024-01-01 RX ADMIN — Medication 1 CAPSULE(S): at 08:43

## 2024-01-01 RX ADMIN — CHLORHEXIDINE GLUCONATE 1 APPLICATION(S): 213 SOLUTION TOPICAL at 11:34

## 2024-01-01 RX ADMIN — Medication 1 CAPSULE(S): at 17:11

## 2024-01-01 RX ADMIN — LACTULOSE 10 GRAM(S): 10 SOLUTION ORAL at 05:43

## 2024-01-01 RX ADMIN — LACTULOSE 10 GRAM(S): 10 SOLUTION ORAL at 12:48

## 2024-01-01 RX ADMIN — PIPERACILLIN AND TAZOBACTAM 25 GRAM(S): 4; .5 INJECTION, POWDER, LYOPHILIZED, FOR SOLUTION INTRAVENOUS at 21:30

## 2024-01-01 RX ADMIN — MIDODRINE HYDROCHLORIDE 10 MILLIGRAM(S): 2.5 TABLET ORAL at 12:48

## 2024-01-01 RX ADMIN — Medication 1 CAPSULE(S): at 12:48

## 2024-01-01 RX ADMIN — Medication 1: at 18:43

## 2024-01-01 RX ADMIN — MIDODRINE HYDROCHLORIDE 10 MILLIGRAM(S): 2.5 TABLET ORAL at 05:14

## 2024-01-01 RX ADMIN — CHLORHEXIDINE GLUCONATE 1 APPLICATION(S): 213 SOLUTION TOPICAL at 05:57

## 2024-01-01 RX ADMIN — LACTULOSE 10 GRAM(S): 10 SOLUTION ORAL at 17:06

## 2024-01-01 RX ADMIN — LACTULOSE 10 GRAM(S): 10 SOLUTION ORAL at 00:13

## 2024-01-01 RX ADMIN — CEFTRIAXONE 100 MILLIGRAM(S): 500 INJECTION, POWDER, FOR SOLUTION INTRAMUSCULAR; INTRAVENOUS at 10:27

## 2024-01-01 RX ADMIN — KETAMINE HYDROCHLORIDE 47.8 MILLIGRAM(S): 100 INJECTION INTRAMUSCULAR; INTRAVENOUS at 15:02

## 2024-01-01 RX ADMIN — Medication 100 MILLIGRAM(S): at 12:43

## 2024-01-01 RX ADMIN — PREGABALIN 1000 MICROGRAM(S): 225 CAPSULE ORAL at 12:48

## 2024-01-01 RX ADMIN — Medication 1 MILLIGRAM(S): at 11:33

## 2024-01-01 RX ADMIN — MEN-PHOR 1 APPLICATION(S): .5; .5 LOTION TOPICAL at 05:58

## 2024-01-01 RX ADMIN — TAMSULOSIN HYDROCHLORIDE 0.4 MILLIGRAM(S): 0.4 CAPSULE ORAL at 21:51

## 2024-01-01 RX ADMIN — CHLORHEXIDINE GLUCONATE 1 APPLICATION(S): 213 SOLUTION TOPICAL at 12:21

## 2024-01-01 RX ADMIN — OCTREOTIDE ACETATE 10 MICROGRAM(S)/HR: 200 INJECTION, SOLUTION INTRAVENOUS; SUBCUTANEOUS at 17:02

## 2024-01-01 RX ADMIN — Medication 53.8 MICROGRAM(S)/KG/MIN: at 11:24

## 2024-01-01 RX ADMIN — Medication 1 CAPSULE(S): at 17:52

## 2024-01-01 RX ADMIN — OCTREOTIDE ACETATE 200 MICROGRAM(S): 200 INJECTION, SOLUTION INTRAVENOUS; SUBCUTANEOUS at 05:31

## 2024-01-01 RX ADMIN — Medication 8.96 MICROGRAM(S)/KG/MIN: at 17:02

## 2024-01-01 RX ADMIN — OCTREOTIDE ACETATE 10 MICROGRAM(S)/HR: 200 INJECTION, SOLUTION INTRAVENOUS; SUBCUTANEOUS at 09:19

## 2024-01-01 RX ADMIN — VASOPRESSIN 6 UNIT(S)/MIN: 20 INJECTION INTRAVENOUS at 11:24

## 2024-01-01 RX ADMIN — Medication 100 MILLILITER(S): at 12:18

## 2024-01-01 RX ADMIN — KETAMINE HYDROCHLORIDE 1.91 MG/KG/HR: 100 INJECTION INTRAMUSCULAR; INTRAVENOUS at 20:26

## 2024-01-01 RX ADMIN — CHLORHEXIDINE GLUCONATE 15 MILLILITER(S): 213 SOLUTION TOPICAL at 05:21

## 2024-01-01 RX ADMIN — Medication 1 MILLIGRAM(S): at 21:55

## 2024-01-01 RX ADMIN — CHLORHEXIDINE GLUCONATE 1 APPLICATION(S): 213 SOLUTION TOPICAL at 06:15

## 2024-01-01 RX ADMIN — Medication 100 MILLIGRAM(S): at 12:00

## 2024-01-01 RX ADMIN — Medication 500 MILLIGRAM(S): at 14:33

## 2024-01-01 RX ADMIN — Medication 400 MILLIGRAM(S): at 00:16

## 2024-01-01 RX ADMIN — PREGABALIN 1000 MICROGRAM(S): 225 CAPSULE ORAL at 12:01

## 2024-01-01 RX ADMIN — Medication 100 MILLIGRAM(S): at 12:01

## 2024-01-01 RX ADMIN — Medication 1 CAPSULE(S): at 11:58

## 2024-01-01 RX ADMIN — MIDODRINE HYDROCHLORIDE 30 MILLIGRAM(S): 2.5 TABLET ORAL at 17:13

## 2024-01-01 RX ADMIN — Medication 1: at 12:02

## 2024-01-01 RX ADMIN — Medication 100 MILLILITER(S): at 11:41

## 2024-01-01 RX ADMIN — PREGABALIN 1000 MICROGRAM(S): 225 CAPSULE ORAL at 12:16

## 2024-01-01 RX ADMIN — PIPERACILLIN AND TAZOBACTAM 200 GRAM(S): 4; .5 INJECTION, POWDER, LYOPHILIZED, FOR SOLUTION INTRAVENOUS at 10:49

## 2024-01-01 RX ADMIN — Medication 1 MILLIGRAM(S): at 21:51

## 2024-01-01 RX ADMIN — QUETIAPINE FUMARATE 25 MILLIGRAM(S): 200 TABLET, FILM COATED ORAL at 22:13

## 2024-01-01 RX ADMIN — Medication 1: at 18:20

## 2024-01-01 RX ADMIN — PANTOPRAZOLE SODIUM 40 MILLIGRAM(S): 20 TABLET, DELAYED RELEASE ORAL at 17:14

## 2024-01-01 RX ADMIN — PHENYLEPHRINE HYDROCHLORIDE 3.59 MICROGRAM(S)/KG/MIN: 10 INJECTION INTRAVENOUS at 10:50

## 2024-01-01 RX ADMIN — VASOPRESSIN 6 UNIT(S)/MIN: 20 INJECTION INTRAVENOUS at 20:27

## 2024-01-01 RX ADMIN — Medication 1 CAPSULE(S): at 09:18

## 2024-01-01 RX ADMIN — MEN-PHOR 1 APPLICATION(S): .5; .5 LOTION TOPICAL at 17:52

## 2024-01-01 RX ADMIN — PIPERACILLIN AND TAZOBACTAM 25 GRAM(S): 4; .5 INJECTION, POWDER, LYOPHILIZED, FOR SOLUTION INTRAVENOUS at 22:50

## 2024-01-01 RX ADMIN — DEXMEDETOMIDINE HYDROCHLORIDE IN 0.9% SODIUM CHLORIDE 14.3 MICROGRAM(S)/KG/HR: 4 INJECTION INTRAVENOUS at 22:14

## 2024-01-01 RX ADMIN — Medication 1: at 05:24

## 2024-01-01 RX ADMIN — Medication 3 MILLIGRAM(S): at 16:44

## 2024-01-01 RX ADMIN — CHLORHEXIDINE GLUCONATE 15 MILLILITER(S): 213 SOLUTION TOPICAL at 05:24

## 2024-01-01 RX ADMIN — Medication 1 CAPSULE(S): at 17:14

## 2024-01-01 RX ADMIN — URSODIOL 250 MILLIGRAM(S): 250 TABLET, FILM COATED ORAL at 17:48

## 2024-01-01 RX ADMIN — Medication 53.8 MICROGRAM(S)/KG/MIN: at 10:49

## 2024-01-01 RX ADMIN — LACTULOSE 10 GRAM(S): 10 SOLUTION ORAL at 17:48

## 2024-01-01 RX ADMIN — Medication 1 CAPSULE(S): at 08:31

## 2024-01-01 RX ADMIN — Medication 1 CAPSULE(S): at 17:48

## 2024-01-01 RX ADMIN — Medication 8.96 MICROGRAM(S)/KG/MIN: at 09:19

## 2024-01-01 RX ADMIN — MEROPENEM 100 MILLIGRAM(S): 1 INJECTION INTRAVENOUS at 17:10

## 2024-01-01 RX ADMIN — Medication 1 CAPSULE(S): at 07:26

## 2024-01-01 RX ADMIN — LACTULOSE 10 GRAM(S): 10 SOLUTION ORAL at 17:02

## 2024-01-01 RX ADMIN — Medication 50 MILLILITER(S): at 18:44

## 2024-01-01 RX ADMIN — Medication 100 MILLIGRAM(S): at 12:48

## 2024-01-01 RX ADMIN — MIDODRINE HYDROCHLORIDE 10 MILLIGRAM(S): 2.5 TABLET ORAL at 17:11

## 2024-01-01 RX ADMIN — QUETIAPINE FUMARATE 25 MILLIGRAM(S): 200 TABLET, FILM COATED ORAL at 21:51

## 2024-01-01 RX ADMIN — MEN-PHOR 1 APPLICATION(S): .5; .5 LOTION TOPICAL at 17:03

## 2024-01-01 RX ADMIN — TAMSULOSIN HYDROCHLORIDE 0.4 MILLIGRAM(S): 0.4 CAPSULE ORAL at 22:13

## 2024-01-01 RX ADMIN — Medication 53.8 MICROGRAM(S)/KG/MIN: at 22:14

## 2024-01-01 RX ADMIN — INSULIN GLARGINE 5 UNIT(S): 100 INJECTION, SOLUTION SUBCUTANEOUS at 21:51

## 2024-01-01 RX ADMIN — Medication 100 MILLIGRAM(S): at 12:16

## 2024-01-01 RX ADMIN — MEN-PHOR 1 APPLICATION(S): .5; .5 LOTION TOPICAL at 05:22

## 2024-01-01 RX ADMIN — MIDODRINE HYDROCHLORIDE 10 MILLIGRAM(S): 2.5 TABLET ORAL at 20:02

## 2024-01-01 RX ADMIN — MEN-PHOR 1 APPLICATION(S): .5; .5 LOTION TOPICAL at 17:50

## 2024-01-01 RX ADMIN — Medication 8.96 MICROGRAM(S)/KG/MIN: at 21:39

## 2024-01-01 RX ADMIN — Medication 1000 MILLIGRAM(S): at 01:22

## 2024-01-01 RX ADMIN — Medication 1 MILLIGRAM(S): at 14:54

## 2024-01-01 RX ADMIN — Medication 150 MILLILITER(S): at 16:35

## 2024-01-01 RX ADMIN — PIPERACILLIN AND TAZOBACTAM 25 GRAM(S): 4; .5 INJECTION, POWDER, LYOPHILIZED, FOR SOLUTION INTRAVENOUS at 05:58

## 2024-01-01 RX ADMIN — LACTULOSE 10 GRAM(S): 10 SOLUTION ORAL at 14:54

## 2024-01-01 RX ADMIN — PIPERACILLIN AND TAZOBACTAM 25 GRAM(S): 4; .5 INJECTION, POWDER, LYOPHILIZED, FOR SOLUTION INTRAVENOUS at 14:07

## 2024-01-01 RX ADMIN — TAMSULOSIN HYDROCHLORIDE 0.4 MILLIGRAM(S): 0.4 CAPSULE ORAL at 21:39

## 2024-01-01 RX ADMIN — VASOPRESSIN 6 UNIT(S)/MIN: 20 INJECTION INTRAVENOUS at 22:52

## 2024-01-01 RX ADMIN — Medication 1: at 09:25

## 2024-01-01 RX ADMIN — PIPERACILLIN AND TAZOBACTAM 25 GRAM(S): 4; .5 INJECTION, POWDER, LYOPHILIZED, FOR SOLUTION INTRAVENOUS at 14:45

## 2024-01-01 RX ADMIN — URSODIOL 250 MILLIGRAM(S): 250 TABLET, FILM COATED ORAL at 05:39

## 2024-01-01 RX ADMIN — Medication 650 MILLIGRAM(S): at 12:00

## 2024-01-01 RX ADMIN — MEN-PHOR 1 APPLICATION(S): .5; .5 LOTION TOPICAL at 05:38

## 2024-01-01 RX ADMIN — LACTULOSE 10 GRAM(S): 10 SOLUTION ORAL at 23:13

## 2024-01-01 RX ADMIN — INSULIN GLARGINE 5 UNIT(S): 100 INJECTION, SOLUTION SUBCUTANEOUS at 22:13

## 2024-01-01 RX ADMIN — PANTOPRAZOLE SODIUM 40 MILLIGRAM(S): 20 TABLET, DELAYED RELEASE ORAL at 18:10

## 2024-01-01 RX ADMIN — MEROPENEM 100 MILLIGRAM(S): 1 INJECTION INTRAVENOUS at 17:32

## 2024-01-01 RX ADMIN — OCTREOTIDE ACETATE 10 MICROGRAM(S)/HR: 200 INJECTION, SOLUTION INTRAVENOUS; SUBCUTANEOUS at 20:27

## 2024-01-01 RX ADMIN — CHLORHEXIDINE GLUCONATE 1 APPLICATION(S): 213 SOLUTION TOPICAL at 12:48

## 2024-01-01 RX ADMIN — PANTOPRAZOLE SODIUM 40 MILLIGRAM(S): 20 TABLET, DELAYED RELEASE ORAL at 05:28

## 2024-01-01 RX ADMIN — Medication 1 MILLIGRAM(S): at 12:00

## 2024-01-01 RX ADMIN — Medication 1 CAPSULE(S): at 12:14

## 2024-01-01 RX ADMIN — PROPOFOL 50 MILLIGRAM(S): 10 INJECTION, EMULSION INTRAVENOUS at 14:00

## 2024-01-01 RX ADMIN — OCTREOTIDE ACETATE 200 MICROGRAM(S): 200 INJECTION, SOLUTION INTRAVENOUS; SUBCUTANEOUS at 13:41

## 2024-01-01 RX ADMIN — PREGABALIN 1000 MICROGRAM(S): 225 CAPSULE ORAL at 11:33

## 2024-01-01 RX ADMIN — Medication 100 MILLILITER(S): at 11:50

## 2024-01-01 RX ADMIN — CEFTRIAXONE 100 MILLIGRAM(S): 500 INJECTION, POWDER, FOR SOLUTION INTRAMUSCULAR; INTRAVENOUS at 10:51

## 2024-01-01 RX ADMIN — VASOPRESSIN 6 UNIT(S)/MIN: 20 INJECTION INTRAVENOUS at 10:50

## 2024-01-01 RX ADMIN — OCTREOTIDE ACETATE 200 MICROGRAM(S): 200 INJECTION, SOLUTION INTRAVENOUS; SUBCUTANEOUS at 05:14

## 2024-01-01 RX ADMIN — Medication 250 MILLIGRAM(S): at 21:48

## 2024-01-01 RX ADMIN — MEN-PHOR 1 APPLICATION(S): .5; .5 LOTION TOPICAL at 05:28

## 2024-01-01 RX ADMIN — MEN-PHOR 1 APPLICATION(S): .5; .5 LOTION TOPICAL at 05:24

## 2024-01-01 RX ADMIN — OCTREOTIDE ACETATE 200 MICROGRAM(S): 200 INJECTION, SOLUTION INTRAVENOUS; SUBCUTANEOUS at 06:08

## 2024-01-01 RX ADMIN — INSULIN GLARGINE 5 UNIT(S): 100 INJECTION, SOLUTION SUBCUTANEOUS at 21:19

## 2024-01-01 RX ADMIN — Medication 1 CAPSULE(S): at 11:32

## 2024-01-01 RX ADMIN — CHLORHEXIDINE GLUCONATE 1 APPLICATION(S): 213 SOLUTION TOPICAL at 05:25

## 2024-01-01 RX ADMIN — Medication 100 MILLIGRAM(S): at 14:54

## 2024-01-01 RX ADMIN — Medication 1 MILLIGRAM(S): at 12:16

## 2024-01-01 RX ADMIN — PANTOPRAZOLE SODIUM 40 MILLIGRAM(S): 20 TABLET, DELAYED RELEASE ORAL at 05:24

## 2024-01-01 RX ADMIN — PROPOFOL 28.7 MICROGRAM(S)/KG/MIN: 10 INJECTION, EMULSION INTRAVENOUS at 10:49

## 2024-01-01 RX ADMIN — CHLORHEXIDINE GLUCONATE 1 APPLICATION(S): 213 SOLUTION TOPICAL at 05:43

## 2024-01-01 RX ADMIN — OCTREOTIDE ACETATE 10 MICROGRAM(S)/HR: 200 INJECTION, SOLUTION INTRAVENOUS; SUBCUTANEOUS at 09:27

## 2024-01-01 RX ADMIN — Medication 1: at 18:21

## 2024-01-01 RX ADMIN — CHLORHEXIDINE GLUCONATE 15 MILLILITER(S): 213 SOLUTION TOPICAL at 17:50

## 2024-01-01 RX ADMIN — OCTREOTIDE ACETATE 5 MICROGRAM(S)/HR: 200 INJECTION, SOLUTION INTRAVENOUS; SUBCUTANEOUS at 17:13

## 2024-01-01 RX ADMIN — OCTREOTIDE ACETATE 10 MICROGRAM(S)/HR: 200 INJECTION, SOLUTION INTRAVENOUS; SUBCUTANEOUS at 10:50

## 2024-01-01 RX ADMIN — CHLORHEXIDINE GLUCONATE 1 APPLICATION(S): 213 SOLUTION TOPICAL at 12:14

## 2024-01-01 RX ADMIN — PANTOPRAZOLE SODIUM 40 MILLIGRAM(S): 20 TABLET, DELAYED RELEASE ORAL at 17:49

## 2024-01-01 RX ADMIN — MIDODRINE HYDROCHLORIDE 20 MILLIGRAM(S): 2.5 TABLET ORAL at 06:07

## 2024-01-01 RX ADMIN — PHENYLEPHRINE HYDROCHLORIDE 3.59 MICROGRAM(S)/KG/MIN: 10 INJECTION INTRAVENOUS at 22:42

## 2024-01-01 RX ADMIN — MEN-PHOR 1 APPLICATION(S): .5; .5 LOTION TOPICAL at 05:31

## 2024-01-01 RX ADMIN — MIDODRINE HYDROCHLORIDE 30 MILLIGRAM(S): 2.5 TABLET ORAL at 12:57

## 2024-01-01 RX ADMIN — VASOPRESSIN 6 UNIT(S)/MIN: 20 INJECTION INTRAVENOUS at 09:27

## 2024-01-01 RX ADMIN — Medication 100 MILLIGRAM(S): at 11:33

## 2024-01-01 RX ADMIN — CHLORHEXIDINE GLUCONATE 15 MILLILITER(S): 213 SOLUTION TOPICAL at 05:56

## 2024-01-01 RX ADMIN — PANTOPRAZOLE SODIUM 40 MILLIGRAM(S): 20 TABLET, DELAYED RELEASE ORAL at 05:57

## 2024-01-01 RX ADMIN — PROPOFOL 28.7 MICROGRAM(S)/KG/MIN: 10 INJECTION, EMULSION INTRAVENOUS at 22:42

## 2024-01-01 RX ADMIN — Medication 1: at 12:46

## 2024-01-01 RX ADMIN — LACTULOSE 10 GRAM(S): 10 SOLUTION ORAL at 11:32

## 2024-01-01 RX ADMIN — LACTULOSE 10 GRAM(S): 10 SOLUTION ORAL at 12:15

## 2024-01-01 RX ADMIN — Medication 2: at 05:26

## 2024-01-01 RX ADMIN — Medication 2: at 12:24

## 2024-01-01 RX ADMIN — CHLORHEXIDINE GLUCONATE 1 APPLICATION(S): 213 SOLUTION TOPICAL at 05:39

## 2024-01-01 RX ADMIN — CHLORHEXIDINE GLUCONATE 1 APPLICATION(S): 213 SOLUTION TOPICAL at 05:24

## 2024-01-01 RX ADMIN — Medication 1: at 17:25

## 2024-01-01 RX ADMIN — MEROPENEM 100 MILLIGRAM(S): 1 INJECTION INTRAVENOUS at 05:14

## 2024-01-01 RX ADMIN — INSULIN GLARGINE 5 UNIT(S): 100 INJECTION, SOLUTION SUBCUTANEOUS at 22:06

## 2024-01-01 RX ADMIN — Medication 0: at 21:53

## 2024-01-01 RX ADMIN — OCTREOTIDE ACETATE 200 MICROGRAM(S): 200 INJECTION, SOLUTION INTRAVENOUS; SUBCUTANEOUS at 05:39

## 2024-01-01 RX ADMIN — CHLORHEXIDINE GLUCONATE 1 APPLICATION(S): 213 SOLUTION TOPICAL at 14:49

## 2024-01-01 RX ADMIN — Medication 1 MILLIGRAM(S): at 11:59

## 2024-01-01 RX ADMIN — CHLORHEXIDINE GLUCONATE 1 APPLICATION(S): 213 SOLUTION TOPICAL at 12:02

## 2024-01-01 RX ADMIN — MEN-PHOR 1 APPLICATION(S): .5; .5 LOTION TOPICAL at 17:49

## 2024-01-01 RX ADMIN — PREGABALIN 1000 MICROGRAM(S): 225 CAPSULE ORAL at 12:47

## 2024-01-01 RX ADMIN — FENTANYL CITRATE 100 MICROGRAM(S): 50 INJECTION INTRAVENOUS at 05:24

## 2024-01-01 RX ADMIN — LACTULOSE 10 GRAM(S): 10 SOLUTION ORAL at 12:03

## 2024-01-01 RX ADMIN — LACTULOSE 10 GRAM(S): 10 SOLUTION ORAL at 13:05

## 2024-01-01 RX ADMIN — Medication 53.8 MICROGRAM(S)/KG/MIN: at 20:27

## 2024-01-01 RX ADMIN — OCTREOTIDE ACETATE 10 MICROGRAM(S)/HR: 200 INJECTION, SOLUTION INTRAVENOUS; SUBCUTANEOUS at 21:55

## 2024-01-01 RX ADMIN — URSODIOL 250 MILLIGRAM(S): 250 TABLET, FILM COATED ORAL at 17:02

## 2024-01-01 RX ADMIN — Medication 1: at 00:16

## 2024-01-01 RX ADMIN — Medication 1: at 16:57

## 2024-01-01 RX ADMIN — PREGABALIN 1000 MICROGRAM(S): 225 CAPSULE ORAL at 14:54

## 2024-01-01 RX ADMIN — LACTULOSE 10 GRAM(S): 10 SOLUTION ORAL at 05:35

## 2024-01-01 RX ADMIN — PANTOPRAZOLE SODIUM 40 MILLIGRAM(S): 20 TABLET, DELAYED RELEASE ORAL at 17:06

## 2024-01-01 RX ADMIN — Medication 2: at 17:44

## 2024-01-01 RX ADMIN — MEN-PHOR 1 APPLICATION(S): .5; .5 LOTION TOPICAL at 17:14

## 2024-01-01 RX ADMIN — HEPARIN SODIUM 0.6 UNIT(S)/HR: 5000 INJECTION INTRAVENOUS; SUBCUTANEOUS at 22:52

## 2024-01-01 RX ADMIN — PANTOPRAZOLE SODIUM 40 MILLIGRAM(S): 20 TABLET, DELAYED RELEASE ORAL at 17:02

## 2024-01-01 RX ADMIN — PIPERACILLIN AND TAZOBACTAM 25 GRAM(S): 4; .5 INJECTION, POWDER, LYOPHILIZED, FOR SOLUTION INTRAVENOUS at 05:14

## 2024-01-01 RX ADMIN — Medication 1 MILLIGRAM(S): at 11:11

## 2024-01-01 NOTE — PROGRESS NOTE ADULT - SUBJECTIVE AND OBJECTIVE BOX
Surgery Progress Note    S: Patient seen and examined. No acute events overnight.     O:  Physical Exam:  Gen: Laying in bed, NAD  HEENT: atrumatic, EMOI  Resp: Unlabored breathing  R groin: shiley in place, dressing intact, no hematoma or masses on palpation    Vital Signs Last 24 Hrs  T(C): 36.4 (01 Jan 2024 09:00), Max: 36.6 (31 Dec 2023 17:00)  T(F): 97.5 (01 Jan 2024 09:00), Max: 97.9 (31 Dec 2023 21:30)  HR: 92 (01 Jan 2024 09:00) (84 - 98)  BP: 107/60 (01 Jan 2024 09:00) (100/62 - 124/55)  BP(mean): --  RR: 18 (01 Jan 2024 09:00) (16 - 18)  SpO2: 96% (01 Jan 2024 09:00) (96% - 98%)    Parameters below as of 01 Jan 2024 09:00  Patient On (Oxygen Delivery Method): room air        I&O's Detail    31 Dec 2023 07:01  -  01 Jan 2024 07:00  --------------------------------------------------------  IN:    Oral Fluid: 1120 mL  Total IN: 1120 mL    OUT:    Other (mL): 0 mL    Voided (mL): 430 mL  Total OUT: 430 mL    Total NET: 690 mL      01 Jan 2024 07:01  -  01 Jan 2024 11:15  --------------------------------------------------------  IN:    Oral Fluid: 240 mL  Total IN: 240 mL    OUT:  Total OUT: 0 mL    Total NET: 240 mL                                8.2    10.56 )-----------( 70       ( 31 Dec 2023 07:34 )             25.4       12-31    139  |  103  |  110<H>  ----------------------------<  169<H>  4.2   |  13<L>  |  4.67<H>    Ca    10.0      31 Dec 2023 07:34  Phos  4.7     12-31  Mg     2.6     12-31    TPro  5.8<L>  /  Alb  3.8  /  TBili  12.7<H>  /  DBili  x   /  AST  82<H>  /  ALT  21  /  AlkPhos  413<H>  12-31

## 2024-01-01 NOTE — PROGRESS NOTE ADULT - PROBLEM SELECTOR PLAN 6
History of T2DM previously on metformin however held due to kidney dysfunction    Plan:  >ALEKS at meals and bedtime, uptitrate prn   >f/u HbA1c  > Added Lantus 5uints qhs (has used 7 units in last 24hrs) and would go slow with fluctuating renal function

## 2024-01-01 NOTE — PROGRESS NOTE ADULT - SUBJECTIVE AND OBJECTIVE BOX
[facility] DIVISION OF KIDNEY DISEASES AND HYPERTENSION -- FOLLOW UP NOTE  --------------------------------------------------------------------------------  Authored by: Donald Olivas   Cell # [phone]     FRANK MENDOZA was seen and examined at bedside.   Patient is a 58y old  Male who presents with a chief complaint of Liver Transplant Eval (12-31-23)      24 hour events/subjective: Right femoral shiley placed and he underwent HD yesterday. No ultrafiltration.  UOP 430ml.   No major events. Mental status waxing and waning.       Standing Inpatient Medications  chlorhexidine 4% Liquid 1 Application(s) Topical daily  cyanocobalamin 1000 MICROGram(s) Oral daily  folic acid 1 milliGRAM(s) Oral daily  glucagon  Injectable 1 milliGRAM(s) IntraMuscular once  insulin lispro (ADMELOG) corrective regimen sliding scale   SubCutaneous at bedtime  insulin lispro (ADMELOG) corrective regimen sliding scale   SubCutaneous three times a day before meals  lactulose Syrup 10 Gram(s) Oral three times a day  melatonin 1 milliGRAM(s) Oral at bedtime  meropenem  IVPB 500 milliGRAM(s) IV Intermittent every 12 hours  midodrine. 10 milliGRAM(s) Oral three times a day  octreotide  Injectable 200 MICROGram(s) SubCutaneous three times a day  pancrelipase  (CREON  6,000 Lipase Units) 1 Capsule(s) Oral three times a day with meals  tamsulosin 0.4 milliGRAM(s) Oral at bedtime  thiamine 100 milliGRAM(s) Oral daily    PRN Inpatient Medications  dextrose Oral Gel 15 Gram(s) Oral once PRN        VITALS/PHYSICAL EXAM  --------------------------------------------------------------------------------  T(C): 36.6 (01-01-24 @ 06:10), Max: 36.6 (12-31-23 @ 17:00)  HR: 95 (01-01-24 @ 06:10) (84 - 98)  BP: 101/59 (01-01-24 @ 06:10) (100/62 - 124/55)  RR: 16 (01-01-24 @ 06:10) (16 - 18)  SpO2: 96% (01-01-24 @ 06:10) (96% - 98%)      12-31-23 @ 07:01  -  01-01-24 @ 07:00  --------------------------------------------------------  IN: 1120 mL / OUT: 430 mL / NET: 690 mL    01-01-24 @ 07:01  -  01-01-24 @ 10:03  --------------------------------------------------------  IN: 240 mL / OUT: 0 mL / NET: 240 mL      Physical Exam:  Awake, sluggish, oriented x 1   Comfortably resting in bed    Icterus +   Lungs clear  Ascites present, softly distended non tender  Right femoral temporary dialysis catheter   No edema   No asterixes     LABS/STUDIES  --------------------------------------------------------------------------------              8.2    10.56 >-----------<  70       [12-31-23 @ 07:34]              25.4     139  |  103  |  110  ----------------------------<  169      [12-31-23 @ 07:34]  4.2   |  13  |  4.67        Ca     10.0     [12-31-23 @ 07:34]      Mg     2.6     [12-31-23 @ 07:34]      Phos  4.7     [12-31-23 @ 07:34]    TPro  5.8  /  Alb  3.8  /  TBili  12.7  /  DBili  x   /  AST  82  /  ALT  21  /  AlkPhos  413  [12-31-23 @ 07:34]    PT/INR: PT 13.5 , INR 1.23       [12-31-23 @ 07:34]  PTT: 33.7       [12-31-23 @ 07:34]    CK 12      [12-31-23 @ 14:46]    Creatinine Trend:  SCr 4.67 [12-31 @ 07:34]  SCr 4.14 [12-30 @ 22:00]      POCT Blood Glucose.: 195 mg/dL (01 Jan 2024 08:47)  POCT Blood Glucose.: 197 mg/dL (31 Dec 2023 23:32)  POCT Blood Glucose.: 139 mg/dL (31 Dec 2023 16:40)  POCT Blood Glucose.: 282 mg/dL (31 Dec 2023 12:35)         [facility] DIVISION OF KIDNEY DISEASES AND HYPERTENSION -- FOLLOW UP NOTE  --------------------------------------------------------------------------------  Authored by: Donald Olivas   Cell # [phone]     FRANK MENDOZA was seen and examined at bedside.   Patient is a 58y old  Male who presents with a chief complaint of Liver Transplant Eval (12-31-23)      24 hour events/subjective: Right femoral shiley placed and he underwent HD yesterday. No ultrafiltration.  UOP 430ml.   No major events. Mental status waxing and waning.       Standing Inpatient Medications  chlorhexidine 4% Liquid 1 Application(s) Topical daily  cyanocobalamin 1000 MICROGram(s) Oral daily  folic acid 1 milliGRAM(s) Oral daily  glucagon  Injectable 1 milliGRAM(s) IntraMuscular once  insulin lispro (ADMELOG) corrective regimen sliding scale   SubCutaneous at bedtime  insulin lispro (ADMELOG) corrective regimen sliding scale   SubCutaneous three times a day before meals  lactulose Syrup 10 Gram(s) Oral three times a day  melatonin 1 milliGRAM(s) Oral at bedtime  meropenem  IVPB 500 milliGRAM(s) IV Intermittent every 12 hours  midodrine. 10 milliGRAM(s) Oral three times a day  octreotide  Injectable 200 MICROGram(s) SubCutaneous three times a day  pancrelipase  (CREON  6,000 Lipase Units) 1 Capsule(s) Oral three times a day with meals  tamsulosin 0.4 milliGRAM(s) Oral at bedtime  thiamine 100 milliGRAM(s) Oral daily    PRN Inpatient Medications  dextrose Oral Gel 15 Gram(s) Oral once PRN        VITALS/PHYSICAL EXAM  --------------------------------------------------------------------------------  T(C): 36.6 (01-01-24 @ 06:10), Max: 36.6 (12-31-23 @ 17:00)  HR: 95 (01-01-24 @ 06:10) (84 - 98)  BP: 101/59 (01-01-24 @ 06:10) (100/62 - 124/55)  RR: 16 (01-01-24 @ 06:10) (16 - 18)  SpO2: 96% (01-01-24 @ 06:10) (96% - 98%)      12-31-23 @ 07:01  -  01-01-24 @ 07:00  --------------------------------------------------------  IN: 1120 mL / OUT: 430 mL / NET: 690 mL    01-01-24 @ 07:01  -  01-01-24 @ 10:03  --------------------------------------------------------  IN: 240 mL / OUT: 0 mL / NET: 240 mL      Physical Exam:  Awake, sluggish, oriented x 1   Comfortably resting in bed    Icterus +   Lungs clear  Ascites present, softly distended non tender  Right femoral temporary dialysis catheter   No edema   No asterixes     LABS/STUDIES  --------------------------------------------------------------------------------              8.2    10.56 >-----------<  70       [12-31-23 @ 07:34]              25.4     139  |  103  |  110  ----------------------------<  169      [12-31-23 @ 07:34]  4.2   |  13  |  4.67        Ca     10.0     [12-31-23 @ 07:34]      Mg     2.6     [12-31-23 @ 07:34]      Phos  4.7     [12-31-23 @ 07:34]    TPro  5.8  /  Alb  3.8  /  TBili  12.7  /  DBili  x   /  AST  82  /  ALT  21  /  AlkPhos  413  [12-31-23 @ 07:34]    PT/INR: PT 13.5 , INR 1.23       [12-31-23 @ 07:34]  PTT: 33.7       [12-31-23 @ 07:34]    CK 12      [12-31-23 @ 14:46]    Creatinine Trend:  SCr 4.67 [12-31 @ 07:34]  SCr 4.14 [12-30 @ 22:00]      POCT Blood Glucose.: 195 mg/dL (01 Jan 2024 08:47)  POCT Blood Glucose.: 197 mg/dL (31 Dec 2023 23:32)  POCT Blood Glucose.: 139 mg/dL (31 Dec 2023 16:40)  POCT Blood Glucose.: 282 mg/dL (31 Dec 2023 12:35)

## 2024-01-01 NOTE — PROVIDER CONTACT NOTE (OTHER) - SITUATION
pts fs blood sugar [id number] 401 repeat 600 then at 1252 216 pts fs blood sugar [id number] 401 repeat 600 then at 1252 216

## 2024-01-01 NOTE — PROGRESS NOTE ADULT - PROBLEM SELECTOR PLAN 2
Cr 4.14, per HCP Baseline previously ~2   C/f Hepatorenal syndrome   Started albumin, octreotide and midodrine at OSH    Plan:  >transplant nephro consulted, HD started and vascular placed cesar much appreciated  >c/w albumin, octreotide and midodrine  >Strict I/Os   >FENA prerenal- Albumin ordered

## 2024-01-01 NOTE — PROGRESS NOTE ADULT - PROBLEM SELECTOR PLAN 1
Recently diagnosed alcoholic cirrhosis   last drink several months ago will check PETH  Accepted as transfer to [facility] for transplant eval, appreicate recs  c/w Thiamine, B12, Folic Acid, lactulose   IR consult placed for paracentesis planned for Tuesday- US pending  Holding off on SBP PPx at this time  Patient previously on nadolol per outside hospital notes; unclear why held - f/u GI recs  MELD 32  HE: present on exam Lactulose 10 TID (5 stools yesterday and 2 so far today)  EV: unk will need screening EGD at some point  HCC screening: will need MRI- US pending first  Ascites: seen on exam, plan for para on Tuesday   Infection hx: unclear why hes on meropenem started at OSH  Bleeding hx: no s/s of bleeding Recently diagnosed alcoholic cirrhosis   last drink several months ago will check PETH  Accepted as transfer to [facility] for transplant eval, appreicate recs  c/w Thiamine, B12, Folic Acid, lactulose   IR consult placed for paracentesis planned for Tuesday- US pending  Holding off on SBP PPx at this time  Patient previously on nadolol per outside hospital notes; unclear why held - f/u GI recs  MELD 32  HE: present on exam Lactulose 10 TID (5 stools yesterday and 2 so far today)  EV: unk will need screening EGD at some point  HCC screening: will need MRI- US pending first  Ascites: seen on exam, plan for para on Tuesday   Infection hx: unclear why hes on meropenem started at OSH  Bleeding hx: no s/s of bleeding

## 2024-01-01 NOTE — PROVIDER CONTACT NOTE (OTHER) - ASSESSMENT
pt alox2-3 vital signs stable . pt was having orange piece in his hand after cleaning hand repeat fs 216

## 2024-01-01 NOTE — PROGRESS NOTE ADULT - ASSESSMENT
59 y/o M PMH prior EtOH use with alcoholic cirrhosis (diagnosed 3 weeks ago) and DM2 presenting from OSH for transplant eval and RAHUL. 57 y/o M PMH prior EtOH use with alcoholic cirrhosis (diagnosed 3 weeks ago) and DM2 presenting from OSH for transplant eval and RAHUL.

## 2024-01-01 NOTE — PROGRESS NOTE ADULT - SUBJECTIVE AND OBJECTIVE BOX
PROGRESS NOTE:   Reena Tan, DO  Hospitalist  Teams  After 5pm/weekends or if no answer ext: [phone]      Patient is a 58y old  Male who presents with a chief complaint of Liver Transplant Eval (01 Jan 2024 11:14)      SUBJECTIVE / OVERNIGHT EVENTS:  Had HD last night.  Still slow affect but answering all questions.    ADDITIONAL REVIEW OF SYSTEMS:  No fever or chills no n/v/d    MEDICATIONS  (STANDING):  chlorhexidine 4% Liquid 1 Application(s) Topical daily  cyanocobalamin 1000 MICROGram(s) Oral daily  dextrose 5%. 1000 milliLiter(s) (100 mL/Hr) IV Continuous <Continuous>  dextrose 5%. 1000 milliLiter(s) (50 mL/Hr) IV Continuous <Continuous>  dextrose 50% Injectable 12.5 Gram(s) IV Push once  dextrose 50% Injectable 25 Gram(s) IV Push once  dextrose 50% Injectable 25 Gram(s) IV Push once  folic acid 1 milliGRAM(s) Oral daily  glucagon  Injectable 1 milliGRAM(s) IntraMuscular once  insulin lispro (ADMELOG) corrective regimen sliding scale   SubCutaneous at bedtime  insulin lispro (ADMELOG) corrective regimen sliding scale   SubCutaneous three times a day before meals  lactulose Syrup 10 Gram(s) Oral three times a day  melatonin 1 milliGRAM(s) Oral at bedtime  meropenem  IVPB 500 milliGRAM(s) IV Intermittent every 12 hours  midodrine. 10 milliGRAM(s) Oral three times a day  octreotide  Injectable 200 MICROGram(s) SubCutaneous three times a day  pancrelipase  (CREON  6,000 Lipase Units) 1 Capsule(s) Oral three times a day with meals  tamsulosin 0.4 milliGRAM(s) Oral at bedtime  thiamine 100 milliGRAM(s) Oral daily    MEDICATIONS  (PRN):  dextrose Oral Gel 15 Gram(s) Oral once PRN Blood Glucose LESS THAN 70 milliGRAM(s)/deciliter      CAPILLARY BLOOD GLUCOSE      POCT Blood Glucose.: 216 mg/dL (01 Jan 2024 12:52)  POCT Blood Glucose.: >600 mg/dL (01 Jan 2024 12:45)  POCT Blood Glucose.: 401 mg/dL (01 Jan 2024 12:43)  POCT Blood Glucose.: 195 mg/dL (01 Jan 2024 08:47)  POCT Blood Glucose.: 197 mg/dL (31 Dec 2023 23:32)  POCT Blood Glucose.: 139 mg/dL (31 Dec 2023 16:40)    I&O's Summary    31 Dec 2023 07:01  -  01 Jan 2024 07:00  --------------------------------------------------------  IN: 1120 mL / OUT: 430 mL / NET: 690 mL    01 Jan 2024 07:01  -  01 Jan 2024 13:34  --------------------------------------------------------  IN: 600 mL / OUT: 50 mL / NET: 550 mL        PHYSICAL EXAM:  Vital Signs Last 24 Hrs  T(C): 36.4 (01 Jan 2024 09:00), Max: 36.6 (31 Dec 2023 17:00)  T(F): 97.5 (01 Jan 2024 09:00), Max: 97.9 (31 Dec 2023 21:30)  HR: 92 (01 Jan 2024 09:00) (84 - 98)  BP: 107/60 (01 Jan 2024 09:00) (100/62 - 124/55)  BP(mean): --  RR: 18 (01 Jan 2024 09:00) (16 - 18)  SpO2: 96% (01 Jan 2024 09:00) (96% - 98%)    Parameters below as of 01 Jan 2024 09:00  Patient On (Oxygen Delivery Method): room air        CONSTITUTIONAL: NAD, well-developed, non toxic  ABDOMEN: Distended, some asterxis, R groin shiley  MUSCLOSKELETAL: no clubbing or cyanosis of digits; no joint swelling or tenderness to palpation  PSYCH: A+O to person, place, and time; affect slowed    LABS:                        8.3    11.06 )-----------( 75       ( 01 Jan 2024 11:17 )             24.7     01-01    134<L>  |  99  |  104<H>  ----------------------------<  217<H>  4.2   |  15<L>  |  4.71<H>    Ca    9.4      01 Jan 2024 11:17  Phos  4.6     01-01  Mg     2.4     01-01    TPro  5.7<L>  /  Alb  3.7  /  TBili  13.3<H>  /  DBili  x   /  AST  90<H>  /  ALT  18  /  AlkPhos  466<H>  01-01    PT/INR - ( 01 Jan 2024 11:21 )   PT: 13.7 sec;   INR: 1.25 ratio         PTT - ( 01 Jan 2024 11:21 )  PTT:34.0 sec  CARDIAC MARKERS ( 31 Dec 2023 14:46 )  x     / x     / 12 U/L / x     / x          Urinalysis Basic - ( 01 Jan 2024 11:17 )    Color: x / Appearance: x / SG: x / pH: x  Gluc: 217 mg/dL / Ketone: x  / Bili: x / Urobili: x   Blood: x / Protein: x / Nitrite: x   Leuk Esterase: x / RBC: x / WBC x   Sq Epi: x / Non Sq Epi: x / Bacteria: x        Culture - Blood (collected 31 Dec 2023 07:46)  Source: .Blood Blood-Peripheral  Preliminary Report (01 Jan 2024 09:01):    No growth at 24 hours    Culture - Blood (collected 31 Dec 2023 07:37)  Source: .Blood Blood-Peripheral  Preliminary Report (01 Jan 2024 09:01):    No growth at 24 hours        RADIOLOGY & ADDITIONAL TESTS:  Results Reviewed:   Imaging Personally Reviewed:  Electrocardiogram Personally Reviewed:    COORDINATION OF CARE:  Care Discussed with Consultants/Other Providers [Y/N]:  Prior or Outpatient Records Reviewed [Y/N]:   PROGRESS NOTE:   Reena Tan, DO  Hospitalist  Teams  After 5pm/weekends or if no answer ext: [phone]      Patient is a 58y old  Male who presents with a chief complaint of Liver Transplant Eval (01 Jan 2024 11:14)      SUBJECTIVE / OVERNIGHT EVENTS:  Had HD last night.  Still slow affect but answering all questions.    ADDITIONAL REVIEW OF SYSTEMS:  No fever or chills no n/v/d    MEDICATIONS  (STANDING):  chlorhexidine 4% Liquid 1 Application(s) Topical daily  cyanocobalamin 1000 MICROGram(s) Oral daily  dextrose 5%. 1000 milliLiter(s) (100 mL/Hr) IV Continuous <Continuous>  dextrose 5%. 1000 milliLiter(s) (50 mL/Hr) IV Continuous <Continuous>  dextrose 50% Injectable 12.5 Gram(s) IV Push once  dextrose 50% Injectable 25 Gram(s) IV Push once  dextrose 50% Injectable 25 Gram(s) IV Push once  folic acid 1 milliGRAM(s) Oral daily  glucagon  Injectable 1 milliGRAM(s) IntraMuscular once  insulin lispro (ADMELOG) corrective regimen sliding scale   SubCutaneous at bedtime  insulin lispro (ADMELOG) corrective regimen sliding scale   SubCutaneous three times a day before meals  lactulose Syrup 10 Gram(s) Oral three times a day  melatonin 1 milliGRAM(s) Oral at bedtime  meropenem  IVPB 500 milliGRAM(s) IV Intermittent every 12 hours  midodrine. 10 milliGRAM(s) Oral three times a day  octreotide  Injectable 200 MICROGram(s) SubCutaneous three times a day  pancrelipase  (CREON  6,000 Lipase Units) 1 Capsule(s) Oral three times a day with meals  tamsulosin 0.4 milliGRAM(s) Oral at bedtime  thiamine 100 milliGRAM(s) Oral daily    MEDICATIONS  (PRN):  dextrose Oral Gel 15 Gram(s) Oral once PRN Blood Glucose LESS THAN 70 milliGRAM(s)/deciliter      CAPILLARY BLOOD GLUCOSE      POCT Blood Glucose.: 216 mg/dL (01 Jan 2024 12:52)  POCT Blood Glucose.: >600 mg/dL (01 Jan 2024 12:45)  POCT Blood Glucose.: 401 mg/dL (01 Jan 2024 12:43)  POCT Blood Glucose.: 195 mg/dL (01 Jan 2024 08:47)  POCT Blood Glucose.: 197 mg/dL (31 Dec 2023 23:32)  POCT Blood Glucose.: 139 mg/dL (31 Dec 2023 16:40)    I&O's Summary    31 Dec 2023 07:01  -  01 Jan 2024 07:00  --------------------------------------------------------  IN: 1120 mL / OUT: 430 mL / NET: 690 mL    01 Jan 2024 07:01  -  01 Jan 2024 13:34  --------------------------------------------------------  IN: 600 mL / OUT: 50 mL / NET: 550 mL        PHYSICAL EXAM:  Vital Signs Last 24 Hrs  T(C): 36.4 (01 Jan 2024 09:00), Max: 36.6 (31 Dec 2023 17:00)  T(F): 97.5 (01 Jan 2024 09:00), Max: 97.9 (31 Dec 2023 21:30)  HR: 92 (01 Jan 2024 09:00) (84 - 98)  BP: 107/60 (01 Jan 2024 09:00) (100/62 - 124/55)  BP(mean): --  RR: 18 (01 Jan 2024 09:00) (16 - 18)  SpO2: 96% (01 Jan 2024 09:00) (96% - 98%)    Parameters below as of 01 Jan 2024 09:00  Patient On (Oxygen Delivery Method): room air        CONSTITUTIONAL: NAD, well-developed, non toxic  ABDOMEN: Distended, some asterxis, R groin shiley  MUSCLOSKELETAL: no clubbing or cyanosis of digits; no joint swelling or tenderness to palpation  PSYCH: A+O to person, place, and time; affect slowed    LABS:                        8.3    11.06 )-----------( 75       ( 01 Jan 2024 11:17 )             24.7     01-01    134<L>  |  99  |  104<H>  ----------------------------<  217<H>  4.2   |  15<L>  |  4.71<H>    Ca    9.4      01 Jan 2024 11:17  Phos  4.6     01-01  Mg     2.4     01-01    TPro  5.7<L>  /  Alb  3.7  /  TBili  13.3<H>  /  DBili  x   /  AST  90<H>  /  ALT  18  /  AlkPhos  466<H>  01-01    PT/INR - ( 01 Jan 2024 11:21 )   PT: 13.7 sec;   INR: 1.25 ratio         PTT - ( 01 Jan 2024 11:21 )  PTT:34.0 sec  CARDIAC MARKERS ( 31 Dec 2023 14:46 )  x     / x     / 12 U/L / x     / x          Urinalysis Basic - ( 01 Jan 2024 11:17 )    Color: x / Appearance: x / SG: x / pH: x  Gluc: 217 mg/dL / Ketone: x  / Bili: x / Urobili: x   Blood: x / Protein: x / Nitrite: x   Leuk Esterase: x / RBC: x / WBC x   Sq Epi: x / Non Sq Epi: x / Bacteria: x        Culture - Blood (collected 31 Dec 2023 07:46)  Source: .Blood Blood-Peripheral  Preliminary Report (01 Jan 2024 09:01):    No growth at 24 hours    Culture - Blood (collected 31 Dec 2023 07:37)  Source: .Blood Blood-Peripheral  Preliminary Report (01 Jan 2024 09:01):    No growth at 24 hours        RADIOLOGY & ADDITIONAL TESTS:  Results Reviewed:   Imaging Personally Reviewed:  Electrocardiogram Personally Reviewed:    COORDINATION OF CARE:  Care Discussed with Consultants/Other Providers [Y/N]:  Prior or Outpatient Records Reviewed [Y/N]:

## 2024-01-01 NOTE — PROVIDER CONTACT NOTE (OTHER) - BACKGROUND
58 M w/ PMH recently  alcoholic cirrhosis and DM2 transferred from [facility] for acute decompensated cirrhosis c/b HRS ,  for hepatology/ transplant eval 58 M w/ PMH recently  alcoholic cirrhosis and DM2 transferred from [facility] for acute decompensated cirrhosis c/b HRS ,  for hepatology/ transplant eval

## 2024-01-01 NOTE — PROGRESS NOTE ADULT - ASSESSMENT
58M PMhx DM, recent dx ETOH cirrhosis, presents as tsf from [facility] for transplant evaluation i/s/o decompensated liver cirrhosis. Cf HRS requiring urgent HD. Vascular consulted for shiley placement.     Plan:   -S/p USG RFV shiley placement   -OK to use Shiley for HD  -Vasc to follow  -Care per primary team    Vascular Surgery [id number] 58M PMhx DM, recent dx ETOH cirrhosis, presents as tsf from [facility] for transplant evaluation i/s/o decompensated liver cirrhosis. Cf HRS requiring urgent HD. Vascular consulted for shiley placement.     Plan:   -S/p USG RFV shiley placement   -OK to use Shiley for HD  -Vasc to follow  -Care per primary team    Vascular Surgery [id number] 58M PMhx DM, recent dx ETOH cirrhosis, presents as tsf from [facility] for transplant evaluation i/s/o decompensated liver cirrhosis. Cf HRS requiring urgent HD. Vascular consulted for shiley placement.     Plan:   -S/p USG RFV shiley placement   -OK to use Shiley for HD  -Please inform vascular surgery if patient will need long term dialysis for AVF planning.   -Vasc to follow  -Care per primary team    Vascular Surgery [id number] 58M PMhx DM, recent dx ETOH cirrhosis, presents as tsf from [facility] for transplant evaluation i/s/o decompensated liver cirrhosis. Cf HRS requiring urgent HD. Vascular consulted for shiley placement.     Plan:   -S/p USG RFV shiley placement   -OK to use Shiley for HD  -Please inform vascular surgery if patient will need long term dialysis for AVF planning.   -Vasc to follow  -Care per primary team    Vascular Surgery [id number]

## 2024-01-01 NOTE — PROVIDER CONTACT NOTE (OTHER) - REASON
pts fs blood sugar [id number] 401 repeat 600 then at 1252 216 pts fs blood sugar [id number] 401 repeat 600 then at 1252 216

## 2024-01-01 NOTE — PROGRESS NOTE ADULT - ASSESSMENT
58 year old man with h/o type II DM for ~ 6-7 years, was on metformin and Jardiance,  diagnosed with liver cirrhosis 3 weeks ago, complicated by ascites. He underwent paracentesis x 3 in the past 3 weeks. Course complicated by worsening RAHUL and encephalopathy. He was transferred to [facility] for liver transplant evaluation.     RAHUL on CKD - unclear baseline renal function. No significant proteinuria UPCR 0.4    AMS metabolic encephalopathy from liver/kidney dysfunction    Metabolic acidosis in the setting of renal failure    Decompensated liver failure with ascites, no peripheral edema     HD #1 done yesterday, tolerated well. Oliguric.   No labs today   Will wait for labs and decide if needs HD today vs tomorrow.   Obtain bilateral renal US            58 year old man with h/o type II DM for ~ 6-7 years, was on metformin and Jardiance,  diagnosed with liver cirrhosis 3 weeks ago, complicated by ascites. He underwent paracentesis x 3 in the past 3 weeks. Course complicated by worsening RAHUL and encephalopathy. He was transferred to [facility] for liver transplant evaluation.     RAHUL on CKD - unclear baseline renal function. No significant proteinuria UPCR 0.4    AMS metabolic encephalopathy from liver/kidney dysfunction    Metabolic acidosis in the setting of renal failure    Decompensated liver failure with ascites, no peripheral edema     HD #1 done yesterday, tolerated well. Oliguric.   No labs today   Will wait for labs and decide if needs HD today vs tomorrow.   Obtain bilateral renal US

## 2024-01-02 NOTE — PROVIDER CONTACT NOTE (MEDICATION) - RECOMMENDATIONS
don't want to agitate patient more by forcing insulin, will reinforce education as needed and administer if patient is willing to take medications

## 2024-01-02 NOTE — PROGRESS NOTE ADULT - ASSESSMENT
58 year old man with h/o type II DM for ~ 6-7 years, was on metformin and Jardiance,  diagnosed with liver cirrhosis 3 weeks ago, complicated by ascites. He underwent paracentesis x 3 in the past 3 weeks. Course complicated by worsening RAHUL and encephalopathy. He was transferred to [facility] for liver transplant evaluation.  58 year old man with h/o type II DM for ~ 6-7 years, was on metformin and Jardiance,  diagnosed with liver cirrhosis 3 weeks ago, complicated by ascites. He underwent paracentesis x 3 in the past 3 weeks. Course complicated by worsening RAHUL and encephalopathy. He was transferred to [facility] for liver transplant evaluation.

## 2024-01-02 NOTE — PATIENT PROFILE ADULT - NSPROEXTENSIONSOFSELF_GEN_A_NUR
[place] on green laniard with patient/eyeglasses [place] on green laniard with patient/eyeglasses

## 2024-01-02 NOTE — PROGRESS NOTE ADULT - PROBLEM SELECTOR PLAN 1
Recently diagnosed alcoholic cirrhosis   last drink several months ago will check PETH  Accepted as transfer to [facility] for transplant eval, appreicate recs  c/w Thiamine, B12, Folic Acid, lactulose   IR consult placed for paracentesis planned for Wednesday as he is gettign HD today    Patient previously on nadolol per outside hospital notes; unclear why held - f/u GI recs  MELD 32  HE: present on exam Lactulose 10 TID ammonia low  EV: unk will need screening EGD at some point  HCC screening: will need MRI- US  done pending result  Ascites: seen on exam, plan for para on 1/3   Bleeding hx: no s/s of bleeding Recently diagnosed alcoholic cirrhosis   last drink several months ago will check PETH  Accepted as transfer to [facility] for transplant eval, appreicate recs  c/w Thiamine, B12, Folic Acid, lactulose   IR consult placed for paracentesis planned for Wednesday as he is gettign HD today    Patient previously on nadolol per outside hospital notes; unclear why held - f/u GI recs  MELD 32  HE: present on exam Lactulose 10 TID ammonia low  EV: unk will need screening EGD at some point  HCC screening: will need MRI- US  done pending result  Ascites: seen on exam, plan for para on 1/3   Bleeding hx: no s/s of bleeding

## 2024-01-02 NOTE — PATIENT PROFILE ADULT - FALL HARM RISK - HARM RISK INTERVENTIONS
Assistance with ambulation/Assistance OOB with selected safe patient handling equipment/Communicate Risk of Fall with Harm to all staff/Discuss with provider need for PT consult/Monitor gait and stability/Reinforce activity limits and safety measures with patient and family/Tailored Fall Risk Interventions/Visual Cue: Yellow wristband and red socks/Bed in lowest position, wheels locked, appropriate side rails in place/Call bell, personal items and telephone in reach/Instruct patient to call for assistance before getting out of bed or chair/Non-slip footwear when patient is out of bed/[place] to call system/Physically safe environment - no spills, clutter or unnecessary equipment/Purposeful Proactive Rounding/Room/bathroom lighting operational, light cord in reach Assistance with ambulation/Assistance OOB with selected safe patient handling equipment/Communicate Risk of Fall with Harm to all staff/Discuss with provider need for PT consult/Monitor gait and stability/Reinforce activity limits and safety measures with patient and family/Tailored Fall Risk Interventions/Visual Cue: Yellow wristband and red socks/Bed in lowest position, wheels locked, appropriate side rails in place/Call bell, personal items and telephone in reach/Instruct patient to call for assistance before getting out of bed or chair/Non-slip footwear when patient is out of bed/[place] to call system/Physically safe environment - no spills, clutter or unnecessary equipment/Purposeful Proactive Rounding/Room/bathroom lighting operational, light cord in reach

## 2024-01-02 NOTE — CHART NOTE - NSCHARTNOTEFT_GEN_A_CORE
58M PMhx DM, recent dx ETOH cirrhosis, presents as tsf from [facility] for transplant evaluation i/s/o decompensated liver cirrhosis. Cf HRS requiring urgent HD. Now s/p ultrasound guided RFV shiley placement for urgent dialysis on 12/31. HD initiated on 12/31. Per chart review, unclear if patient will need long term hemodialysis. Please reconsult Vascular Surgery for AVF planning if patient will require long term HD access.     Vascular Surgery [id number] 58M PMhx DM, recent dx ETOH cirrhosis, presents as tsf from [facility] for transplant evaluation i/s/o decompensated liver cirrhosis. Cf HRS requiring urgent HD. Now s/p ultrasound guided RFV shiley placement for urgent dialysis on 12/31. HD initiated on 12/31. Per chart review, unclear if patient will need long term hemodialysis. Please reconsult Vascular Surgery for AVF planning if patient will require long term HD access.     Vascular Surgery [id number]

## 2024-01-02 NOTE — PROGRESS NOTE ADULT - SUBJECTIVE AND OBJECTIVE BOX
Interval Events:   -patient denies n/v/AP  -no prior hx of EGD/colonoscopy  -reports no social support, has been living in assisted living x4 months  -[place]  -last EtOH was >1 year ago  -moved from [place] to  in     ROS:   12 point review of systems performed and negative except otherwise noted in HPI.    Hospital Medications:  camphor 0.5%/menthol 0.5% Topical Lotion 1 Application(s) Topical two times a day  chlorhexidine 2% Cloths 1 Application(s) Topical daily  cyanocobalamin 1000 MICROGram(s) Oral daily  dextrose 5%. 1000 milliLiter(s) IV Continuous <Continuous>  dextrose 5%. 1000 milliLiter(s) IV Continuous <Continuous>  dextrose 50% Injectable 12.5 Gram(s) IV Push once  dextrose 50% Injectable 25 Gram(s) IV Push once  dextrose 50% Injectable 25 Gram(s) IV Push once  dextrose Oral Gel 15 Gram(s) Oral once PRN  folic acid 1 milliGRAM(s) Oral daily  glucagon  Injectable 1 milliGRAM(s) IntraMuscular once  insulin glargine Injectable (LANTUS) 5 Unit(s) SubCutaneous at bedtime  insulin lispro (ADMELOG) corrective regimen sliding scale   SubCutaneous at bedtime  insulin lispro (ADMELOG) corrective regimen sliding scale   SubCutaneous three times a day before meals  lactulose Syrup 10 Gram(s) Oral three times a day  melatonin 1 milliGRAM(s) Oral at bedtime  meropenem  IVPB 500 milliGRAM(s) IV Intermittent every 12 hours  midodrine. 10 milliGRAM(s) Oral three times a day  octreotide  Injectable 200 MICROGram(s) SubCutaneous three times a day  pancrelipase  (CREON  6,000 Lipase Units) 1 Capsule(s) Oral three times a day with meals  tamsulosin 0.4 milliGRAM(s) Oral at bedtime  thiamine 100 milliGRAM(s) Oral daily      PHYSICAL EXAM:   Vital Signs:  Vital Signs Last 24 Hrs  T(C): 36.3 (2024 09:07), Max: 36.3 (2024 17:00)  T(F): 97.3 (2024 09:07), Max: 97.4 (2024 17:00)  HR: 87 (2024 09:07) (69 - 87)  BP: 98/61 (2024 09:07) (91/56 - 107/65)  BP(mean): --  RR: 18 (2024 09:07) (18 - 18)  SpO2: 99% (2024 09:07) (97% - 100%)    Parameters below as of 2024 09:07  Patient On (Oxygen Delivery Method): room air      Daily Height in cm: 187.96 (2024 05:00)    Daily Weight in k.6 (2024 05:00)    GEN: NAD, normocephalic  CVS: normal hr   CHEST: reg resp rate, no increased WOB  ABD: soft, nontender, distended  EXTR: no b/l LE edema. RUE edematous R>L  NEURO: A&OX3    LABS: reviewed                        8.4    12.78 )-----------( 91       ( 2024 07:09 )             25.2     01-02    132<L>  |  97  |  103<H>  ----------------------------<  142<H>  4.4   |  14<L>  |  5.14<H>    Ca    9.6      2024 07:11  Phos  5.4     01-02  Mg     2.4     01-02    TPro  5.8<L>  /  Alb  3.7  /  TBili  13.4<H>  /  DBili  x   /  AST  101<H>  /  ALT  15  /  AlkPhos  506<H>  01-02    LIVER FUNCTIONS - ( 2024 07:11 )  Alb: 3.7 g/dL / Pro: 5.8 g/dL / ALK PHOS: 506 U/L / ALT: 15 U/L / AST: 101 U/L / GGT: x             Interval Diagnostic Studies: see sunrise for full report   Interval Events:   -patient denies n/v/AP  -no prior hx of EGD/colonoscopy  -reports no social support, has been living in assisted living x4 months  -reports family lives [place]  -last EtOH was >1 year ago  -moved from [place] to  in     ROS:   12 point review of systems performed and negative except otherwise noted in HPI.    Hospital Medications:  camphor 0.5%/menthol 0.5% Topical Lotion 1 Application(s) Topical two times a day  chlorhexidine 2% Cloths 1 Application(s) Topical daily  cyanocobalamin 1000 MICROGram(s) Oral daily  dextrose 5%. 1000 milliLiter(s) IV Continuous <Continuous>  dextrose 5%. 1000 milliLiter(s) IV Continuous <Continuous>  dextrose 50% Injectable 12.5 Gram(s) IV Push once  dextrose 50% Injectable 25 Gram(s) IV Push once  dextrose 50% Injectable 25 Gram(s) IV Push once  dextrose Oral Gel 15 Gram(s) Oral once PRN  folic acid 1 milliGRAM(s) Oral daily  glucagon  Injectable 1 milliGRAM(s) IntraMuscular once  insulin glargine Injectable (LANTUS) 5 Unit(s) SubCutaneous at bedtime  insulin lispro (ADMELOG) corrective regimen sliding scale   SubCutaneous at bedtime  insulin lispro (ADMELOG) corrective regimen sliding scale   SubCutaneous three times a day before meals  lactulose Syrup 10 Gram(s) Oral three times a day  melatonin 1 milliGRAM(s) Oral at bedtime  meropenem  IVPB 500 milliGRAM(s) IV Intermittent every 12 hours  midodrine. 10 milliGRAM(s) Oral three times a day  octreotide  Injectable 200 MICROGram(s) SubCutaneous three times a day  pancrelipase  (CREON  6,000 Lipase Units) 1 Capsule(s) Oral three times a day with meals  tamsulosin 0.4 milliGRAM(s) Oral at bedtime  thiamine 100 milliGRAM(s) Oral daily      PHYSICAL EXAM:   Vital Signs:  Vital Signs Last 24 Hrs  T(C): 36.3 (2024 09:07), Max: 36.3 (2024 17:00)  T(F): 97.3 (2024 09:07), Max: 97.4 (2024 17:00)  HR: 87 (2024 09:07) (69 - 87)  BP: 98/61 (2024 09:07) (91/56 - 107/65)  BP(mean): --  RR: 18 (2024 09:07) (18 - 18)  SpO2: 99% (2024 09:07) (97% - 100%)    Parameters below as of 2024 09:07  Patient On (Oxygen Delivery Method): room air      Daily Height in cm: 187.96 (2024 05:00)    Daily Weight in k.6 (2024 05:00)    GEN: NAD, normocephalic  CVS: normal hr   CHEST: reg resp rate, no increased WOB  ABD: soft, nontender, distended  EXTR: no b/l LE edema. RUE edematous R>L  NEURO: A&OX3    LABS: reviewed                        8.4    12.78 )-----------( 91       ( 2024 07:09 )             25.2     01-02    132<L>  |  97  |  103<H>  ----------------------------<  142<H>  4.4   |  14<L>  |  5.14<H>    Ca    9.6      2024 07:11  Phos  5.4     01-02  Mg     2.4     01-02    TPro  5.8<L>  /  Alb  3.7  /  TBili  13.4<H>  /  DBili  x   /  AST  101<H>  /  ALT  15  /  AlkPhos  506<H>  01-02    LIVER FUNCTIONS - ( 2024 07:11 )  Alb: 3.7 g/dL / Pro: 5.8 g/dL / ALK PHOS: 506 U/L / ALT: 15 U/L / AST: 101 U/L / GGT: x             Interval Diagnostic Studies: see sunrise for full report

## 2024-01-02 NOTE — PROGRESS NOTE ADULT - PROBLEM SELECTOR PLAN 2
Cr 4.14, per HCP Baseline previously ~2   C/f Hepatorenal syndrome   Started albumin, octreotide and midodrine at OSH    Plan:  >transplant nephro consulted, HD started and vascular placed cesar much appreciated- will need to remove groin cath asap- reached out to renal to see if needs Permcath and when could we place it so groin cath can be removed  >c/w albumin, octreotide and midodrine  >Strict I/Os   >FENA prerenal- Albumin ordered Cr 4.14, per HCP Baseline previously ~2   C/f Hepatorenal syndrome   Started albumin, octreotide and midodrine at OSH    Plan:  >transplant nephro consulted, HD started and vascular placed cesar much appreciated- will need to remove groin cath asap- reached out to renal who says its unclear if will be long term HD at this point and agree w/ replacing Shinolan in more sterile location. IR consulted  >c/w albumin, octreotide and midodrine  >Strict I/Os   >FENA prerenal- Albumin ordered

## 2024-01-02 NOTE — PROGRESS NOTE ADULT - ATTENDING COMMENTS
A 58-year-old male with a past medical history of Type 2 Diabetes Mellitus and recently diagnosed alcoholic cirrhosis (3 weeks ago) was transferred from [facility] for a liver transplant evaluation due to decompensated liver cirrhosis. The patient, initially admitted to [facility] from assisted living, presented with dizziness and worsening altered mental status. Now with RAHUL on CKD. Suspect HRS Type 2. Noted Urine Na < 10. Agree with Octreotide 200 mcg tid, and Midodrine 10 mg tid. Noted Nephrology recommendations.   Clinical findings suggest decompensated cirrhosis, likely due to alcohol and MASLD (MetALD). Hepatic encephalopathy (HE), now resolved. Planned for paracentesis today.  HD # session on 1/1/23.   An infectious workup including blood, and urine has been negative so far. Diagnostic paracentesis, chest X-ray, pending.   Continue lactulose and rifaximin, Meropenem empiric. Recommend Transplant ID consult.   Recommend ECHO as part of evaluation of ascites.   Patient medically appears to be a good candidate for liver transplantation, but does not have any support. Lives in a assisted living facility. He has 4 children all live [place]. We will get SW review his support system prior to considering evaluation for OLT.     MELD 33,  Blood type AB positive. A 58-year-old male with a past medical history of Type 2 Diabetes Mellitus and recently diagnosed alcoholic cirrhosis (3 weeks ago) was transferred from [facility] for a liver transplant evaluation due to decompensated liver cirrhosis. The patient, initially admitted to [facility] from assisted living, presented with dizziness and worsening altered mental status. Now with RAHUL on CKD. Suspect HRS Type 2. Noted Urine Na < 10. Agree with Octreotide 200 mcg tid, and Midodrine 10 mg tid. Noted Nephrology recommendations.   Clinical findings suggest decompensated cirrhosis, likely due to alcohol and MASLD (MetALD). Hepatic encephalopathy (HE), now resolved. Planned for paracentesis today.  HD # session on 1/1/23.   An infectious workup including blood, and urine has been negative so far. Diagnostic paracentesis, chest X-ray, pending.   Continue lactulose and rifaximin, Meropenem empiric. Recommend Transplant ID consult.   Recommend ECHO as part of evaluation of ascites.   Patient medically appears to be a good candidate for liver transplantation, but does not have any support. Lives in a assisted living facility. He has 4 children all live [place]. We will get SW review his support system prior to considering evaluation for OLT.     MELD 33,  Blood type AB positive. A 58-year-old male with a past medical history of Type 2 Diabetes Mellitus and recently diagnosed alcoholic cirrhosis (3 weeks ago) was transferred from [facility] for a liver transplant evaluation due to decompensated liver cirrhosis. The patient, initially admitted to [facility] from assisted living, presented with dizziness and worsening altered mental status. Now with RAHUL on CKD. Suspect HRS Type 2. Noted Urine Na < 10. Agree with Octreotide 200 mcg tid, and Midodrine 10 mg tid. Noted Nephrology recommendations. HD # session on 1/1/23, and planned for session#2 today.  Clinical findings suggest decompensated cirrhosis, likely due to alcohol and MASLD (MetALD). Hepatic encephalopathy (HE), now resolved. Planned for paracentesis today.    An infectious workup including blood, and urine has been negative so far. Diagnostic paracentesis (rescheduled for 1/3 by IR), chest X-ray, pending.   Continue lactulose and rifaximin, Meropenem empiric. Recommend Transplant ID consult.   Recommend ECHO as part of evaluation of ascites.   Lives in a assisted living facility. He has 4 children, all live [place].   We will get SW review his support system.   In addition, extreme immobility, inactivity, incontinence of stool and urine and poor nutritional status with sacral decubitus puts him a poor candidate for OLT. We have PT/Nutrition team work with him to help improve his overall frailty/sarcopenia.      MELD 33,  Blood type AB positive. A 58-year-old male with a past medical history of Type 2 Diabetes Mellitus and recently diagnosed alcoholic cirrhosis (3 weeks ago) was transferred from [facility] for a liver transplant evaluation due to decompensated liver cirrhosis. The patient, initially admitted to [facility] from assisted living, presented with dizziness and worsening altered mental status. Now with RAHUL on CKD. Suspect HRS Type 2. Noted Urine Na < 10. Agree with Octreotide 200 mcg tid, and Midodrine 10 mg tid. Noted Nephrology recommendations. HD # session on 1/1/23, and planned for session#2 today.  Clinical findings suggest decompensated cirrhosis, likely due to alcohol and MASLD (MetALD). Hepatic encephalopathy (HE), now resolved. Planned for paracentesis today.    An infectious workup including blood, and urine has been negative so far. Diagnostic paracentesis (rescheduled for 1/3 by IR), chest X-ray, pending.   Continue lactulose and rifaximin, Meropenem empiric. Recommend Transplant ID consult.   Recommend ECHO as part of evaluation of ascites.   Lives in a assisted living facility. He has 4 children, all live [place].   We will get SW review his support system.   In addition, extreme immobility, inactivity, incontinence of stool and urine and poor nutritional status with sacral decubitus puts him a poor candidate for OLT. We have PT/Nutrition team work with him to help improve his overall frailty/sarcopenia.      MELD 33,  Blood type AB positive.

## 2024-01-02 NOTE — CONSULT NOTE ADULT - ASSESSMENT
Impression:    Sacral/bilateral Buttocks deep tissue damage present on admission  Ecchymosis lower back  Incontinence of bowel and bladder  Incontinence Dermatitis  Pruritis    Recommend:  1.) topical therapy: sacral/buttock injury - cleanse with incontinence cleanser, pat dry, apply Karlo ointment BID and PRN for incontinent episodes  2.) Incontinence Management - incontinence cleanser, pads, pericare BID  3.) Maintain on an alternating air with low air loss surface  4.) Turn and reposition Q 2 hours  5.) Nutrition optimization  6.) Offload heels/feet with complete cair air fluidized boots; ensure that the soles of the feet are not resting on the foot board of the bed.  7.) Upper back - cleanse with soap and water, pat dry, apply Sarna BID PRN for itching    Care as per medicine. Will not actively follow but will remain available. Please recall for new issues or deterioration.  Upon discharge [facility] [address] [place] [address] [phone]  Thank you for this consult  Tanja Garcia, JOON-C, CWOCN via TEAMS Impression:    Sacral/bilateral Buttocks deep tissue damage present on admission  Ecchymosis lower back  Incontinence of bowel and bladder  Incontinence Dermatitis  Pruritis    Recommend:  1.) topical therapy: sacral/buttock injury - cleanse with incontinence cleanser, pat dry, apply Karlo ointment BID and PRN for incontinent episodes  2.) Incontinence Management - incontinence cleanser, pads, pericare BID  3.) Maintain on an alternating air with low air loss surface  4.) Turn and reposition Q 2 hours  5.) Nutrition optimization  6.) Offload heels/feet with complete cair air fluidized boots; ensure that the soles of the feet are not resting on the foot board of the bed.  7.) Upper back - cleanse with soap and water, pat dry, apply Sarna BID PRN for itching    Care as per medicine. Will not actively follow but will remain available. Please recall for new issues or deterioration.  Upon discharge f/u as outpatient at [facility] [address] [place] [address] [phone]  Thank you for this consult  Tanja Garcia, JOON-C, CWOCN via TEAMS

## 2024-01-02 NOTE — PROGRESS NOTE ADULT - SUBJECTIVE AND OBJECTIVE BOX
PROGRESS NOTE:   Reena Tan, DO  Hospitalist  Teams  After 5pm/weekends or if no answer ext: [phone]      Patient is a 58y old  Male who presents with a chief complaint of Liver Transplant Eval (01 Jan 2024 13:34)      SUBJECTIVE / OVERNIGHT EVENTS:  Was alert this morning per nurse at bedside, but more sleepy on my exam. Arousable and answering questions but lethargic.     ADDITIONAL REVIEW OF SYSTEMS:  no fever or chills    no n/v + diarrhea     MEDICATIONS  (STANDING):  chlorhexidine 2% Cloths 1 Application(s) Topical daily  cyanocobalamin 1000 MICROGram(s) Oral daily  dextrose 5%. 1000 milliLiter(s) (100 mL/Hr) IV Continuous <Continuous>  dextrose 5%. 1000 milliLiter(s) (50 mL/Hr) IV Continuous <Continuous>  dextrose 50% Injectable 25 Gram(s) IV Push once  dextrose 50% Injectable 25 Gram(s) IV Push once  dextrose 50% Injectable 12.5 Gram(s) IV Push once  folic acid 1 milliGRAM(s) Oral daily  glucagon  Injectable 1 milliGRAM(s) IntraMuscular once  insulin glargine Injectable (LANTUS) 5 Unit(s) SubCutaneous at bedtime  insulin lispro (ADMELOG) corrective regimen sliding scale   SubCutaneous three times a day before meals  insulin lispro (ADMELOG) corrective regimen sliding scale   SubCutaneous at bedtime  lactulose Syrup 10 Gram(s) Oral three times a day  melatonin 1 milliGRAM(s) Oral at bedtime  meropenem  IVPB 500 milliGRAM(s) IV Intermittent every 12 hours  midodrine. 10 milliGRAM(s) Oral three times a day  octreotide  Injectable 200 MICROGram(s) SubCutaneous three times a day  pancrelipase  (CREON  6,000 Lipase Units) 1 Capsule(s) Oral three times a day with meals  tamsulosin 0.4 milliGRAM(s) Oral at bedtime  thiamine 100 milliGRAM(s) Oral daily    MEDICATIONS  (PRN):  dextrose Oral Gel 15 Gram(s) Oral once PRN Blood Glucose LESS THAN 70 milliGRAM(s)/deciliter      CAPILLARY BLOOD GLUCOSE      POCT Blood Glucose.: 188 mg/dL (02 Jan 2024 08:57)  POCT Blood Glucose.: 236 mg/dL (01 Jan 2024 21:05)  POCT Blood Glucose.: 136 mg/dL (01 Jan 2024 16:57)  POCT Blood Glucose.: 216 mg/dL (01 Jan 2024 12:52)  POCT Blood Glucose.: >600 mg/dL (01 Jan 2024 12:45)  POCT Blood Glucose.: 401 mg/dL (01 Jan 2024 12:43)    I&O's Summary    01 Jan 2024 07:01  -  02 Jan 2024 07:00  --------------------------------------------------------  IN: 1020 mL / OUT: 475 mL / NET: 545 mL        PHYSICAL EXAM:  Vital Signs Last 24 Hrs  T(C): 36.2 (02 Jan 2024 09:00), Max: 36.3 (01 Jan 2024 13:00)  T(F): 97.2 (02 Jan 2024 09:00), Max: 97.4 (01 Jan 2024 13:00)  HR: 85 (02 Jan 2024 09:00) (69 - 85)  BP: 99/63 (02 Jan 2024 09:00) (91/56 - 108/62)  BP(mean): --  RR: 18 (02 Jan 2024 09:00) (18 - 18)  SpO2: 99% (02 Jan 2024 09:00) (97% - 100%)    Parameters below as of 02 Jan 2024 09:00  Patient On (Oxygen Delivery Method): room air        CONSTITUTIONAL: NAD, jaundice, lethargic    ABDOMEN: distended  MUSCLOSKELETAL: no clubbing or cyanosis of digits; no joint swelling or tenderness to palpation  PSYCH: A+O to person, place     LABS:                        8.4    12.78 )-----------( 91       ( 02 Jan 2024 07:09 )             25.2     01-02    132<L>  |  97  |  103<H>  ----------------------------<  142<H>  4.4   |  14<L>  |  5.14<H>    Ca    9.6      02 Jan 2024 07:11  Phos  5.4     01-02  Mg     2.4     01-02    TPro  5.8<L>  /  Alb  3.7  /  TBili  13.4<H>  /  DBili  x   /  AST  101<H>  /  ALT  15  /  AlkPhos  506<H>  01-02    PT/INR - ( 02 Jan 2024 07:08 )   PT: 13.1 sec;   INR: 1.20 ratio         PTT - ( 01 Jan 2024 11:21 )  PTT:34.0 sec  CARDIAC MARKERS ( 31 Dec 2023 14:46 )  x     / x     / 12 U/L / x     / x          Urinalysis Basic - ( 02 Jan 2024 07:11 )    Color: x / Appearance: x / SG: x / pH: x  Gluc: 142 mg/dL / Ketone: x  / Bili: x / Urobili: x   Blood: x / Protein: x / Nitrite: x   Leuk Esterase: x / RBC: x / WBC x   Sq Epi: x / Non Sq Epi: x / Bacteria: x        Culture - Urine (collected 01 Jan 2024 10:04)  Source: Clean Catch Clean Catch (Midstream)  Final Report (02 Jan 2024 10:19):    <10,000 CFU/mL Normal Urogenital Fabienne    Culture - Blood (collected 31 Dec 2023 07:46)  Source: .Blood Blood-Peripheral  Preliminary Report (02 Jan 2024 09:02):    No growth at 48 Hours    Culture - Blood (collected 31 Dec 2023 07:37)  Source: .Blood Blood-Peripheral  Preliminary Report (02 Jan 2024 09:02):    No growth at 48 Hours        RADIOLOGY & ADDITIONAL TESTS:  Results Reviewed:   Imaging Personally Reviewed:  Electrocardiogram Personally Reviewed:    COORDINATION OF CARE:  Care Discussed with Consultants/Other Providers [Y/N]:  Prior or Outpatient Records Reviewed [Y/N]:   PROGRESS NOTE:   Reena Tan, DO  Hospitalist  Teams  After 5pm/weekends or if no answer ext: [phone]      Patient is a 58y old  Male who presents with a chief complaint of Liver Transplant Eval (01 Jan 2024 13:34)      SUBJECTIVE / OVERNIGHT EVENTS:  Was alert this morning per nurse at bedside, but more sleepy on my exam. Arousable and answering questions but lethargic.     ADDITIONAL REVIEW OF SYSTEMS:  no fever or chills    no n/v + diarrhea     MEDICATIONS  (STANDING):  chlorhexidine 2% Cloths 1 Application(s) Topical daily  cyanocobalamin 1000 MICROGram(s) Oral daily  dextrose 5%. 1000 milliLiter(s) (100 mL/Hr) IV Continuous <Continuous>  dextrose 5%. 1000 milliLiter(s) (50 mL/Hr) IV Continuous <Continuous>  dextrose 50% Injectable 25 Gram(s) IV Push once  dextrose 50% Injectable 25 Gram(s) IV Push once  dextrose 50% Injectable 12.5 Gram(s) IV Push once  folic acid 1 milliGRAM(s) Oral daily  glucagon  Injectable 1 milliGRAM(s) IntraMuscular once  insulin glargine Injectable (LANTUS) 5 Unit(s) SubCutaneous at bedtime  insulin lispro (ADMELOG) corrective regimen sliding scale   SubCutaneous three times a day before meals  insulin lispro (ADMELOG) corrective regimen sliding scale   SubCutaneous at bedtime  lactulose Syrup 10 Gram(s) Oral three times a day  melatonin 1 milliGRAM(s) Oral at bedtime  meropenem  IVPB 500 milliGRAM(s) IV Intermittent every 12 hours  midodrine. 10 milliGRAM(s) Oral three times a day  octreotide  Injectable 200 MICROGram(s) SubCutaneous three times a day  pancrelipase  (CREON  6,000 Lipase Units) 1 Capsule(s) Oral three times a day with meals  tamsulosin 0.4 milliGRAM(s) Oral at bedtime  thiamine 100 milliGRAM(s) Oral daily    MEDICATIONS  (PRN):  dextrose Oral Gel 15 Gram(s) Oral once PRN Blood Glucose LESS THAN 70 milliGRAM(s)/deciliter      CAPILLARY BLOOD GLUCOSE      POCT Blood Glucose.: 188 mg/dL (02 Jan 2024 08:57)  POCT Blood Glucose.: 236 mg/dL (01 Jan 2024 21:05)  POCT Blood Glucose.: 136 mg/dL (01 Jan 2024 16:57)  POCT Blood Glucose.: 216 mg/dL (01 Jan 2024 12:52)  POCT Blood Glucose.: >600 mg/dL (01 Jan 2024 12:45)  POCT Blood Glucose.: 401 mg/dL (01 Jan 2024 12:43)    I&O's Summary    01 Jan 2024 07:01  -  02 Jan 2024 07:00  --------------------------------------------------------  IN: 1020 mL / OUT: 475 mL / NET: 545 mL        PHYSICAL EXAM:  Vital Signs Last 24 Hrs  T(C): 36.2 (02 Jan 2024 09:00), Max: 36.3 (01 Jan 2024 13:00)  T(F): 97.2 (02 Jan 2024 09:00), Max: 97.4 (01 Jan 2024 13:00)  HR: 85 (02 Jan 2024 09:00) (69 - 85)  BP: 99/63 (02 Jan 2024 09:00) (91/56 - 108/62)  BP(mean): --  RR: 18 (02 Jan 2024 09:00) (18 - 18)  SpO2: 99% (02 Jan 2024 09:00) (97% - 100%)    Parameters below as of 02 Jan 2024 09:00  Patient On (Oxygen Delivery Method): room air        CONSTITUTIONAL: NAD, jaundice, lethargic    ABDOMEN: distended  MUSCLOSKELETAL: no clubbing or cyanosis of digits; no joint swelling or tenderness to palpation  PSYCH: A+O to person, place     LABS:                        8.4    12.78 )-----------( 91       ( 02 Jan 2024 07:09 )             25.2     01-02    132<L>  |  97  |  103<H>  ----------------------------<  142<H>  4.4   |  14<L>  |  5.14<H>    Ca    9.6      02 Jan 2024 07:11  Phos  5.4     01-02  Mg     2.4     01-02    TPro  5.8<L>  /  Alb  3.7  /  TBili  13.4<H>  /  DBili  x   /  AST  101<H>  /  ALT  15  /  AlkPhos  506<H>  01-02    PT/INR - ( 02 Jan 2024 07:08 )   PT: 13.1 sec;   INR: 1.20 ratio         PTT - ( 01 Jan 2024 11:21 )  PTT:34.0 sec  CARDIAC MARKERS ( 31 Dec 2023 14:46 )  x     / x     / 12 U/L / x     / x          Urinalysis Basic - ( 02 Jan 2024 07:11 )    Color: x / Appearance: x / SG: x / pH: x  Gluc: 142 mg/dL / Ketone: x  / Bili: x / Urobili: x   Blood: x / Protein: x / Nitrite: x   Leuk Esterase: x / RBC: x / WBC x   Sq Epi: x / Non Sq Epi: x / Bacteria: x        Culture - Urine (collected 01 Jan 2024 10:04)  Source: Clean Catch Clean Catch (Midstream)  Final Report (02 Jan 2024 10:19):    <10,000 CFU/mL Normal Urogenital Fabienne    Culture - Blood (collected 31 Dec 2023 07:46)  Source: .Blood Blood-Peripheral  Preliminary Report (02 Jan 2024 09:02):    No growth at 48 Hours    Culture - Blood (collected 31 Dec 2023 07:37)  Source: .Blood Blood-Peripheral  Preliminary Report (02 Jan 2024 09:02):    No growth at 48 Hours        RADIOLOGY & ADDITIONAL TESTS:  Results Reviewed:   Imaging Personally Reviewed:  Electrocardiogram Personally Reviewed:    COORDINATION OF CARE:  Care Discussed with Consultants/Other Providers [Y/N]:  Prior or Outpatient Records Reviewed [Y/N]:

## 2024-01-02 NOTE — PROGRESS NOTE ADULT - PROBLEM SELECTOR PLAN 6
History of T2DM previously on metformin however held due to kidney dysfunction    Plan:  >ALEKS at meals and bedtime, uptitrate prn   >f/u HbA1c  > Added Lantus 5uints qhs   and would go slow with fluctuating renal function

## 2024-01-02 NOTE — CONSULT NOTE ADULT - SUBJECTIVE AND OBJECTIVE BOX
Wound Surgery Consult Note:    HPI:  Patient history limited by patient mental status. Spoke to family member/HCP Juan Grajeda who also had limited information.   59 y/o M PMH DM2, recently diagnosed alcoholic cirrhosis (3 weeks ago) presenting as a transfer from [facility] for transplant eval for decompensated liver cirrhosis (accepted by Dr. Amezquita). Patient initially presented to [facility] (from assisted living) last week with dizziness and worsening AMS. Per HCP, patient first diagnosed with cirrhosis 3 weeks ago after found lethargic. Underwent abdominal paracentesis with large volume fluid removal at that time. Patient discharged to assisted [facility] with hepatology followup. Patient represented to OSH with AMS, dizziness, and increased abdominal distension last week. Noted to have worsening renal function, however still able to produce urine.     At OSH, patient was started on albumin, octreotide, midodrine for concern for HRS. Also started on meropenem per discharge paperwork, no clear indication listed.     Patient directly transferred from [facility] to medicine Floors. Initial EKG demonstrating sinus rhythm with frequent PVCs.  (30 Dec 2023 19:56)    Request for wound care consult for sacral/bilateral buttocks skin injury received from nursing. Mr. Jeff was encountered on an alternating air with low air loss surface. He is incontinent of stool and urine at this time. He stated that he does have pain and ecchymosis related to his buttocks/back and he attributes it to a fall. Lesion over center of sacrum with dark redness and flaky skin. Patient endorses a history of psoriasis however, he is not a reliable historian. He complained of pruritis to his upper back. Due to patient's current immobility, cannot rule out a deep tissue injury at this time to the sacrum and bilateral buttocks. He required assistance to turn/shift in bed. His extreme immobility, inactivity, incontinence of stool and urine and poor nutritional status all contribute to his high risk for pressure injury development and hinder healing.  Pressure Injury prevention and management interventions including but not limited to turning and repositioning discussed with patient.    PAST MEDICAL & SURGICAL HISTORY:  Type 2 diabetes mellitus  Decompensated hepatic cirrhosis  No significant past surgical history    REVIEW OF SYSTEMS:  Unable to obtain due to patient's mental status    MEDICATIONS  (STANDING):  chlorhexidine 2% Cloths 1 Application(s) Topical daily  cyanocobalamin 1000 MICROGram(s) Oral daily  dextrose 5%. 1000 milliLiter(s) (100 mL/Hr) IV Continuous <Continuous>  dextrose 5%. 1000 milliLiter(s) (50 mL/Hr) IV Continuous <Continuous>  dextrose 50% Injectable 12.5 Gram(s) IV Push once  dextrose 50% Injectable 25 Gram(s) IV Push once  dextrose 50% Injectable 25 Gram(s) IV Push once  folic acid 1 milliGRAM(s) Oral daily  glucagon  Injectable 1 milliGRAM(s) IntraMuscular once  insulin glargine Injectable (LANTUS) 5 Unit(s) SubCutaneous at bedtime  insulin lispro (ADMELOG) corrective regimen sliding scale   SubCutaneous three times a day before meals  insulin lispro (ADMELOG) corrective regimen sliding scale   SubCutaneous at bedtime  lactulose Syrup 10 Gram(s) Oral three times a day  melatonin 1 milliGRAM(s) Oral at bedtime  meropenem  IVPB 500 milliGRAM(s) IV Intermittent every 12 hours  midodrine. 10 milliGRAM(s) Oral three times a day  octreotide  Injectable 200 MICROGram(s) SubCutaneous three times a day  pancrelipase  (CREON  6,000 Lipase Units) 1 Capsule(s) Oral three times a day with meals  tamsulosin 0.4 milliGRAM(s) Oral at bedtime  thiamine 100 milliGRAM(s) Oral daily    MEDICATIONS  (PRN):  dextrose Oral Gel 15 Gram(s) Oral once PRN Blood Glucose LESS THAN 70 milliGRAM(s)/deciliter    Allergies    No Known Allergies    Intolerances    SOCIAL HISTORY:      FAMILY HISTORY:  No pertinent family history in first degree relatives    Vital Signs Last 24 Hrs  T(C): 36.3 (02 Jan 2024 09:07), Max: 36.3 (01 Jan 2024 17:00)  T(F): 97.3 (02 Jan 2024 09:07), Max: 97.4 (01 Jan 2024 17:00)  HR: 87 (02 Jan 2024 09:07) (69 - 87)  BP: 98/61 (02 Jan 2024 09:07) (91/56 - 107/65)  BP(mean): --  RR: 18 (02 Jan 2024 09:07) (18 - 18)  SpO2: 99% (02 Jan 2024 09:07) (97% - 100%)    Parameters below as of 02 Jan 2024 09:07  Patient On (Oxygen Delivery Method): room air    Physical Exam:  General: Alert, WN  Ophthamology: sclera clear  ENMT: moist mucous membranes, trachea midline  Respiratory: equal chest rise with respirations  Gastrointestinal: soft NT/ND  Neurology: verbal, following commands  Psych: calm, appropriate  Musculoskeletal: no contractures  Vascular: BLE edema equal  Skin:  Sacral/bilateral buttocks with dark reddened discolored flaky skin in and around the gluteal cleft a L 1.5cm X W 1.5cm x D none, no drainage  Upper buttocks/lower back small red patches with intact skin  No odor, erythema, increased warmth, tenderness, induration, fluctuance    LABS:  01-02    132<L>  |  97  |  103<H>  ----------------------------<  142<H>  4.4   |  14<L>  |  5.14<H>    Ca    9.6      02 Jan 2024 07:11  Phos  5.4     01-02  Mg     2.4     01-02    TPro  5.8<L>  /  Alb  3.7  /  TBili  13.4<H>  /  DBili  x   /  AST  101<H>  /  ALT  15  /  AlkPhos  506<H>  01-02                          8.4    12.78 )-----------( 91       ( 02 Jan 2024 07:09 )             25.2     PT/INR - ( 02 Jan 2024 07:08 )   PT: 13.1 sec;   INR: 1.20 ratio         PTT - ( 01 Jan 2024 11:21 )  PTT:34.0 sec  Urinalysis Basic - ( 02 Jan 2024 07:11 )    Color: x / Appearance: x / SG: x / pH: x  Gluc: 142 mg/dL / Ketone: x  / Bili: x / Urobili: x   Blood: x / Protein: x / Nitrite: x   Leuk Esterase: x / RBC: x / WBC x   Sq Epi: x / Non Sq Epi: x / Bacteria: x           Wound Surgery Consult Note:    HPI:  Patient history limited by patient mental status. Spoke to family member/HCP Juan Grajeda who also had limited information.   57 y/o M PMH DM2, recently diagnosed alcoholic cirrhosis (3 weeks ago) presenting as a transfer from [facility] for transplant eval for decompensated liver cirrhosis (accepted by Dr. Amezquita). Patient initially presented to [facility] (from assisted living) last week with dizziness and worsening AMS. Per HCP, patient first diagnosed with cirrhosis 3 weeks ago after found lethargic. Underwent abdominal paracentesis with large volume fluid removal at that time. Patient discharged to assisted [facility] with hepatology followup. Patient represented to OSH with AMS, dizziness, and increased abdominal distension last week. Noted to have worsening renal function, however still able to produce urine.     At OSH, patient was started on albumin, octreotide, midodrine for concern for HRS. Also started on meropenem per discharge paperwork, no clear indication listed.     Patient directly transferred from [facility] to medicine Floors. Initial EKG demonstrating sinus rhythm with frequent PVCs.  (30 Dec 2023 19:56)    Request for wound care consult for sacral/bilateral buttocks skin injury received from nursing. Mr. Jeff was encountered on an alternating air with low air loss surface. He is incontinent of stool and urine at this time. He stated that he does have pain and ecchymosis related to his buttocks/back and he attributes it to a fall. Lesion over center of sacrum with dark redness and flaky skin. Patient endorses a history of psoriasis however, he is not a reliable historian. He complained of pruritis to his upper back. Due to patient's current immobility, cannot rule out a deep tissue injury at this time to the sacrum and bilateral buttocks. He required assistance to turn/shift in bed. His extreme immobility, inactivity, incontinence of stool and urine and poor nutritional status all contribute to his high risk for pressure injury development and hinder healing.  Pressure Injury prevention and management interventions including but not limited to turning and repositioning discussed with patient.    PAST MEDICAL & SURGICAL HISTORY:  Type 2 diabetes mellitus  Decompensated hepatic cirrhosis  No significant past surgical history    REVIEW OF SYSTEMS:  Unable to obtain due to patient's mental status    MEDICATIONS  (STANDING):  chlorhexidine 2% Cloths 1 Application(s) Topical daily  cyanocobalamin 1000 MICROGram(s) Oral daily  dextrose 5%. 1000 milliLiter(s) (100 mL/Hr) IV Continuous <Continuous>  dextrose 5%. 1000 milliLiter(s) (50 mL/Hr) IV Continuous <Continuous>  dextrose 50% Injectable 12.5 Gram(s) IV Push once  dextrose 50% Injectable 25 Gram(s) IV Push once  dextrose 50% Injectable 25 Gram(s) IV Push once  folic acid 1 milliGRAM(s) Oral daily  glucagon  Injectable 1 milliGRAM(s) IntraMuscular once  insulin glargine Injectable (LANTUS) 5 Unit(s) SubCutaneous at bedtime  insulin lispro (ADMELOG) corrective regimen sliding scale   SubCutaneous three times a day before meals  insulin lispro (ADMELOG) corrective regimen sliding scale   SubCutaneous at bedtime  lactulose Syrup 10 Gram(s) Oral three times a day  melatonin 1 milliGRAM(s) Oral at bedtime  meropenem  IVPB 500 milliGRAM(s) IV Intermittent every 12 hours  midodrine. 10 milliGRAM(s) Oral three times a day  octreotide  Injectable 200 MICROGram(s) SubCutaneous three times a day  pancrelipase  (CREON  6,000 Lipase Units) 1 Capsule(s) Oral three times a day with meals  tamsulosin 0.4 milliGRAM(s) Oral at bedtime  thiamine 100 milliGRAM(s) Oral daily    MEDICATIONS  (PRN):  dextrose Oral Gel 15 Gram(s) Oral once PRN Blood Glucose LESS THAN 70 milliGRAM(s)/deciliter    Allergies    No Known Allergies    Intolerances    SOCIAL HISTORY:      FAMILY HISTORY:  No pertinent family history in first degree relatives    Vital Signs Last 24 Hrs  T(C): 36.3 (02 Jan 2024 09:07), Max: 36.3 (01 Jan 2024 17:00)  T(F): 97.3 (02 Jan 2024 09:07), Max: 97.4 (01 Jan 2024 17:00)  HR: 87 (02 Jan 2024 09:07) (69 - 87)  BP: 98/61 (02 Jan 2024 09:07) (91/56 - 107/65)  BP(mean): --  RR: 18 (02 Jan 2024 09:07) (18 - 18)  SpO2: 99% (02 Jan 2024 09:07) (97% - 100%)    Parameters below as of 02 Jan 2024 09:07  Patient On (Oxygen Delivery Method): room air    Physical Exam:  General: Alert, WN  Ophthamology: sclera clear  ENMT: moist mucous membranes, trachea midline  Respiratory: equal chest rise with respirations  Gastrointestinal: soft NT/ND  Neurology: verbal, following commands  Psych: calm, appropriate  Musculoskeletal: no contractures  Vascular: BLE edema equal  Skin:  Sacral/bilateral buttocks with dark reddened discolored flaky skin in and around the gluteal cleft a L 1.5cm X W 1.5cm x D none, no drainage  Upper buttocks/lower back small red patches with intact skin  No odor, erythema, increased warmth, tenderness, induration, fluctuance    LABS:  01-02    132<L>  |  97  |  103<H>  ----------------------------<  142<H>  4.4   |  14<L>  |  5.14<H>    Ca    9.6      02 Jan 2024 07:11  Phos  5.4     01-02  Mg     2.4     01-02    TPro  5.8<L>  /  Alb  3.7  /  TBili  13.4<H>  /  DBili  x   /  AST  101<H>  /  ALT  15  /  AlkPhos  506<H>  01-02                          8.4    12.78 )-----------( 91       ( 02 Jan 2024 07:09 )             25.2     PT/INR - ( 02 Jan 2024 07:08 )   PT: 13.1 sec;   INR: 1.20 ratio         PTT - ( 01 Jan 2024 11:21 )  PTT:34.0 sec  Urinalysis Basic - ( 02 Jan 2024 07:11 )    Color: x / Appearance: x / SG: x / pH: x  Gluc: 142 mg/dL / Ketone: x  / Bili: x / Urobili: x   Blood: x / Protein: x / Nitrite: x   Leuk Esterase: x / RBC: x / WBC x   Sq Epi: x / Non Sq Epi: x / Bacteria: x

## 2024-01-02 NOTE — PROGRESS NOTE ADULT - ATTENDING COMMENTS
58 year old man with h/o type II DM for ~ 6-7 years, was on metformin and Jardiance,  diagnosed with liver cirrhosis 3 weeks ago, complicated by ascites. He underwent paracentesis x 3 in the past 3 weeks. Course complicated by worsening RAHUL and encephalopathy. He was transferred to [facility] for liver transplant evaluation.     RAHUL on CKD - unclear baseline renal function. No significant proteinuria UPCR 0.4    AMS metabolic encephalopathy from liver/kidney dysfunction    Metabolic acidosis in the setting of renal failure    Decompensated liver failure with ascites, no peripheral edema    HD initiated on 12/31/23    No significant change in mental status. Oliguric.    Bilateral renal US done today, kidneys normal size, no hydro, official read pending    Right femoral dialysis catheter +   HD #2  today , will plan for HD tomorrow 58 year old man with h/o type II DM for ~ 6-7 years, was on metformin and Jardiance,  diagnosed with liver cirrhosis 3 weeks ago, complicated by ascites. He underwent paracentesis x 3 in the past 3 weeks. Course complicated by worsening RAHUL and encephalopathy. He was transferred to [facility] for liver transplant evaluation.     RAHUL on CKD - unclear baseline renal function. No significant proteinuria UPCR 0.4    AMS metabolic encephalopathy from liver/kidney dysfunction    Metabolic acidosis in the setting of renal failure    Decompensated liver failure with ascites, no peripheral edema    HD initiated on 12/31/23    No significant change in mental status. Oliguric.    Bilateral renal US done today, kidneys normal size, no hydro, official read pending    Right femoral dialysis catheter +   HD #2  today , will plan for HD tomorrow

## 2024-01-02 NOTE — PROGRESS NOTE ADULT - SUBJECTIVE AND OBJECTIVE BOX
[facility] DIVISION OF KIDNEY DISEASES AND HYPERTENSION   FOLLOW UP NOTE  --------------------------------------------------------------------------------  Chief Complaint: Pt with RAHUL requiring RRT, undergoing Liver transplant evaluation     24 hour events/subjective: Pt. was seen and examined today. Overnight events  noted.       PAST HISTORY  --------------------------------------------------------------------------------  No significant changes to PMH, PSH, FHx, SHx, unless otherwise noted    ALLERGIES & MEDICATIONS  --------------------------------------------------------------------------------  Allergies  No Known Allergies    Intolerances    Standing Inpatient Medications  camphor 0.5%/menthol 0.5% Topical Lotion 1 Application(s) Topical two times a day  chlorhexidine 2% Cloths 1 Application(s) Topical daily  cyanocobalamin 1000 MICROGram(s) Oral daily  dextrose 5%. 1000 milliLiter(s) IV Continuous <Continuous>  dextrose 5%. 1000 milliLiter(s) IV Continuous <Continuous>  dextrose 50% Injectable 12.5 Gram(s) IV Push once  dextrose 50% Injectable 25 Gram(s) IV Push once  dextrose 50% Injectable 25 Gram(s) IV Push once  folic acid 1 milliGRAM(s) Oral daily  glucagon  Injectable 1 milliGRAM(s) IntraMuscular once  insulin glargine Injectable (LANTUS) 5 Unit(s) SubCutaneous at bedtime  insulin lispro (ADMELOG) corrective regimen sliding scale   SubCutaneous three times a day before meals  insulin lispro (ADMELOG) corrective regimen sliding scale   SubCutaneous at bedtime  lactulose Syrup 10 Gram(s) Oral three times a day  melatonin 1 milliGRAM(s) Oral at bedtime  meropenem  IVPB 500 milliGRAM(s) IV Intermittent every 12 hours  midodrine. 10 milliGRAM(s) Oral three times a day  octreotide  Injectable 200 MICROGram(s) SubCutaneous three times a day  pancrelipase  (CREON  6,000 Lipase Units) 1 Capsule(s) Oral three times a day with meals  tamsulosin 0.4 milliGRAM(s) Oral at bedtime  thiamine 100 milliGRAM(s) Oral daily    PRN Inpatient Medications  dextrose Oral Gel 15 Gram(s) Oral once PRN    REVIEW OF SYSTEMS  --------------------------------------------------------------------------------  Limited ROS     VITALS/PHYSICAL EXAM  --------------------------------------------------------------------------------  T(C): 36.3 (01-02-24 @ 09:07), Max: 36.3 (01-01-24 @ 17:00)  HR: 87 (01-02-24 @ 09:07) (69 - 87)  BP: 98/61 (01-02-24 @ 09:07) (91/56 - 107/65)  RR: 18 (01-02-24 @ 09:07) (18 - 18)  SpO2: 99% (01-02-24 @ 09:07) (97% - 100%)  Wt(kg): --  Height (cm): 188 (01-02-24 @ 05:00)  Weight (kg): 95.6 (01-02-24 @ 05:00)  BMI (kg/m2): 27 (01-02-24 @ 05:00)  BSA (m2): 2.22 (01-02-24 @ 05:00)    01-01-24 @ 07:01  -  01-02-24 @ 07:00  --------------------------------------------------------  IN: 1020 mL / OUT: 475 mL / NET: 545 mL    Physical Exam:  	Gen: Chronically ill appearing but in no overt distress  	HEENT: Anicteric  	Pulm: CTA B/L  	CV: S1S2+  	Abd: Distended but soft, +BS   	Ext: RUE swelling  	Neuro: Awake  	Skin: Warm and dry  	Dialysis access: R fem non tunneled HD catheter     LABS/STUDIES  --------------------------------------------------------------------------------              8.4    12.78 >-----------<  91       [01-02-24 @ 07:09]              25.2     132  |  97  |  103  ----------------------------<  142      [01-02-24 @ 07:11]  4.4   |  14  |  5.14        Ca     9.6     [01-02-24 @ 07:11]      Mg     2.4     [01-02-24 @ 07:11]      Phos  5.4     [01-02-24 @ 07:11]    TPro  5.8  /  Alb  3.7  /  TBili  13.4  /  DBili  x   /  AST  101  /  ALT  15  /  AlkPhos  506  [01-02-24 @ 07:11]    PT/INR: PT 13.1 , INR 1.20       [01-02-24 @ 07:08]  PTT: 34.0       [01-01-24 @ 11:21]    Creatinine Trend:  SCr 5.14 [01-02 @ 07:11]  SCr 4.71 [01-01 @ 11:17]  SCr 4.67 [12-31 @ 07:34]  SCr 4.14 [12-30 @ 22:00]    Urine Creatinine 206      [01-01-24 @ 06:49]  Urine Protein 83      [01-01-24 @ 06:49]  Urine Sodium 9      [12-31-23 @ 04:10]  Urine Urea Nitrogen 404      [12-31-23 @ 04:10]    HBsAg Nonreact      [12-31-23 @ 14:46]  HCV 0.19, Nonreact      [12-31-23 @ 14:46]  HIV Nonreact      [12-31-23 @ 14:46]    Free Light Chains: kappa 21.71, lambda 13.12, ratio = 1.65      [12-31 @ 14:46] [facility] DIVISION OF KIDNEY DISEASES AND HYPERTENSION   FOLLOW UP NOTE  --------------------------------------------------------------------------------  Chief Complaint: Pt with RAHUL requiring RRT, undergoing Liver transplant evaluation     24 hour events/subjective: Pt. was seen and examined today. Overnight events  noted.       PAST HISTORY  --------------------------------------------------------------------------------  No significant changes to PMH, PSH, FHx, SHx, unless otherwise noted    ALLERGIES & MEDICATIONS  --------------------------------------------------------------------------------  Allergies  No Known Allergies    Intolerances    Standing Inpatient Medications  camphor 0.5%/menthol 0.5% Topical Lotion 1 Application(s) Topical two times a day  chlorhexidine 2% Cloths 1 Application(s) Topical daily  cyanocobalamin 1000 MICROGram(s) Oral daily  dextrose 5%. 1000 milliLiter(s) IV Continuous <Continuous>  dextrose 5%. 1000 milliLiter(s) IV Continuous <Continuous>  dextrose 50% Injectable 12.5 Gram(s) IV Push once  dextrose 50% Injectable 25 Gram(s) IV Push once  dextrose 50% Injectable 25 Gram(s) IV Push once  folic acid 1 milliGRAM(s) Oral daily  glucagon  Injectable 1 milliGRAM(s) IntraMuscular once  insulin glargine Injectable (LANTUS) 5 Unit(s) SubCutaneous at bedtime  insulin lispro (ADMELOG) corrective regimen sliding scale   SubCutaneous three times a day before meals  insulin lispro (ADMELOG) corrective regimen sliding scale   SubCutaneous at bedtime  lactulose Syrup 10 Gram(s) Oral three times a day  melatonin 1 milliGRAM(s) Oral at bedtime  meropenem  IVPB 500 milliGRAM(s) IV Intermittent every 12 hours  midodrine. 10 milliGRAM(s) Oral three times a day  octreotide  Injectable 200 MICROGram(s) SubCutaneous three times a day  pancrelipase  (CREON  6,000 Lipase Units) 1 Capsule(s) Oral three times a day with meals  tamsulosin 0.4 milliGRAM(s) Oral at bedtime  thiamine 100 milliGRAM(s) Oral daily    PRN Inpatient Medications  dextrose Oral Gel 15 Gram(s) Oral once PRN    REVIEW OF SYSTEMS  --------------------------------------------------------------------------------  Limited ROS     VITALS/PHYSICAL EXAM  --------------------------------------------------------------------------------  T(C): 36.3 (01-02-24 @ 09:07), Max: 36.3 (01-01-24 @ 17:00)  HR: 87 (01-02-24 @ 09:07) (69 - 87)  BP: 98/61 (01-02-24 @ 09:07) (91/56 - 107/65)  RR: 18 (01-02-24 @ 09:07) (18 - 18)  SpO2: 99% (01-02-24 @ 09:07) (97% - 100%)  Wt(kg): --  Height (cm): 188 (01-02-24 @ 05:00)  Weight (kg): 95.6 (01-02-24 @ 05:00)  BMI (kg/m2): 27 (01-02-24 @ 05:00)  BSA (m2): 2.22 (01-02-24 @ 05:00)    01-01-24 @ 07:01  -  01-02-24 @ 07:00  --------------------------------------------------------  IN: 1020 mL / OUT: 475 mL / NET: 545 mL    Physical Exam:  	Gen: Chronically ill appearing but in no overt distress  	HEENT: Anicteric  	Pulm: CTA B/L  	CV: S1S2+  	Abd: Distended but soft, +BS   	Ext: RUE swelling  	Neuro: Awake  	Skin: Warm and dry  	Dialysis access: R fem non tunneled HD catheter     LABS/STUDIES  --------------------------------------------------------------------------------              8.4    12.78 >-----------<  91       [01-02-24 @ 07:09]              25.2     132  |  97  |  103  ----------------------------<  142      [01-02-24 @ 07:11]  4.4   |  14  |  5.14        Ca     9.6     [01-02-24 @ 07:11]      Mg     2.4     [01-02-24 @ 07:11]      Phos  5.4     [01-02-24 @ 07:11]    TPro  5.8  /  Alb  3.7  /  TBili  13.4  /  DBili  x   /  AST  101  /  ALT  15  /  AlkPhos  506  [01-02-24 @ 07:11]    PT/INR: PT 13.1 , INR 1.20       [01-02-24 @ 07:08]  PTT: 34.0       [01-01-24 @ 11:21]    Creatinine Trend:  SCr 5.14 [01-02 @ 07:11]  SCr 4.71 [01-01 @ 11:17]  SCr 4.67 [12-31 @ 07:34]  SCr 4.14 [12-30 @ 22:00]    Urine Creatinine 206      [01-01-24 @ 06:49]  Urine Protein 83      [01-01-24 @ 06:49]  Urine Sodium 9      [12-31-23 @ 04:10]  Urine Urea Nitrogen 404      [12-31-23 @ 04:10]    HBsAg Nonreact      [12-31-23 @ 14:46]  HCV 0.19, Nonreact      [12-31-23 @ 14:46]  HIV Nonreact      [12-31-23 @ 14:46]    Free Light Chains: kappa 21.71, lambda 13.12, ratio = 1.65      [12-31 @ 14:46]

## 2024-01-02 NOTE — CHART NOTE - NSCHARTNOTEFT_GEN_A_CORE
Patient on IR schedule for paracentesis today however, planned for HD and with soft BP at baseline. Recommend performing paracentesis on off HD day. Will plan for paracentesis tomorrow 1/3.     above d/w primary team. per discussion no urgent indication for procedure at this time.

## 2024-01-02 NOTE — PROGRESS NOTE ADULT - PROBLEM SELECTOR PLAN 1
Pt with RAHUL v RAHUL on CKD - unclear baseline renal function. No significant proteinuria with UPCR 0.4. Course complicated by metabolic encephalopathy from liver/kidney dysfunction, metabolic acidosis in the setting of renal failure and decompensated liver failure with ascites, without overt significant peripheral edema. Initiated on HD on 12/31/23 due to worsening renal function with oliguria and metabolic derangements. He remains oliguric. Plan for HD #2 today via R femoral non tunneled HD catheter. Per discussion with primary service, plan to change to L v R IJ non tunneled HD catheter to reduce infection risk (IR to be consulted).     Recommend Renal US. Pt with RAHUL v RAHUL on CKD - unclear baseline renal function. No significant proteinuria with UPCR 0.4. Course complicated by metabolic encephalopathy from liver/kidney dysfunction, metabolic acidosis in the setting of renal failure and decompensated liver failure with ascites, without overt significant peripheral edema. Initiated on HD on 12/31/23 due to worsening renal function with oliguria and metabolic derangements. He remains oliguric but 475cc of UOP. Plan for HD #2 today via R femoral non tunneled HD catheter. Per discussion with primary service, plan to change to L v R IJ non tunneled HD catheter to reduce infection risk (IR to be consulted). Anticipate HD #3 on 1/3/24 and monitoring for any possible renal recovery. Monitor labs and urine output. Avoid NSAIDs, ACEI/ARBS, RCA and nephrotoxins. Dose medications as per eGFR.    Recommend Renal US.

## 2024-01-02 NOTE — PROGRESS NOTE ADULT - ASSESSMENT
59 y/o M PMH DM2, recently diagnosed alcoholic cirrhosis (3 weeks ago) presenting as a transfer from [facility] for transplant eval for decompensated liver cirrhosis. Patient initially presented to [facility] (from assisted living) last week with dizziness and worsening AMS.    #Decompensated cirrhosis likely 2/2 alcohol and MASLD  MELD 3.0 1/02/24 - 33  Blood Type AB+  HE: presented with AMS  EV: unknown (no prior EGD/c-scope)  HCC screening: will need MRI   Ascites: s/p paracentesis 1/02  Infection hx: unclear why hes on meropenem started at OSH  Bleeding hx: no s/s of bleeding   #RAHUL   #Frailty and muscle weakness/pain     Recommendations:   - recommend SW evaluation re social support  - appreciate nephro recs, agree with midodrine/octreotide, HD as per nephro  - plan for para tomorrow 1/03 (non-HD day)  - recommend transplant ID consult, patient with elevated WBC on ruben - no clear inf source  - c/w ABx for now  - obtain CXR, RVP, ensure skin check to complete inf w/u  - please obtain TTE  - continue with lactulose and titrate to 3-5BM daily   - start rifaximin   - f/u AI w/u, EBV, CMV, PETH  - avoid hepatotoxic agents    All recommendations preliminary until note signed by service attending.    Thank you for involving us in the care of this patient. Please contact should any concern or questions arise.    Matt Arredondo MD   Gastroenterology/Hepatology Fellow PGY-5  Available on Microsoft Teams 7am - 5pm  [id number]    NON-URGENT CONSULTS:  Please email:  [email or web address]   OR  [email or web address]    After 5pm, please contact the on-call GI fellow. [phone]    AT NIGHT AND ON WEEKENDS:  Contact on-call GI fellow via answering service ([phone]) from 5pm-8am and on weekends/holidays       59 y/o M PMH DM2, recently diagnosed alcoholic cirrhosis (3 weeks ago) presenting as a transfer from [facility] for transplant eval for decompensated liver cirrhosis. Patient initially presented to [facility] (from assisted living) last week with dizziness and worsening AMS.    #Decompensated cirrhosis likely 2/2 alcohol and MASLD  MELD 3.0 1/02/24 - 33  Blood Type AB+  HE: presented with AMS  EV: unknown (no prior EGD/c-scope)  HCC screening: will need MRI   Ascites: s/p paracentesis 1/02  Infection hx: unclear why hes on meropenem started at OSH  Bleeding hx: no s/s of bleeding   #RAHUL   #Frailty and muscle weakness/pain     Recommendations:   - recommend SW evaluation re social support  - appreciate nephro recs, agree with midodrine/octreotide, HD as per nephro  - plan for para tomorrow 1/03 (non-HD day)  - recommend transplant ID consult, patient with elevated WBC on ruben - no clear inf source  - c/w ABx for now  - obtain CXR, RVP, ensure skin check to complete inf w/u  - please obtain TTE  - continue with lactulose and titrate to 3-5BM daily   - start rifaximin   - f/u AI w/u, EBV, CMV, PETH  - avoid hepatotoxic agents    All recommendations preliminary until note signed by service attending.    Thank you for involving us in the care of this patient. Please contact should any concern or questions arise.    Matt Arredondo MD   Gastroenterology/Hepatology Fellow PGY-5  Available on [employer] Teams 7am - 5pm  [phone]    NON-URGENT CONSULTS:  Please email:  [email or web address]   OR  [email or web address]    After 5pm, please contact the on-call GI fellow. [phone]    AT NIGHT AND ON WEEKENDS:  Contact on-call GI fellow via answering service ([phone]) from 5pm-8am and on weekends/holidays

## 2024-01-03 NOTE — CHART NOTE - NSCHARTNOTEFT_GEN_A_CORE
Right femoral HD shiley removed. Pressure held x 10 mins, hemostatis obtained. Right groin soft to touch, no hematoma noted. + DP/PT palpable to touch. Continue neurovascular checks, q15 mins x4, q30 x2, q1 hr x2 and maintain supine x4 hours.   Discussed with primary team.

## 2024-01-03 NOTE — PROGRESS NOTE ADULT - SUBJECTIVE AND OBJECTIVE BOX
Interval Events:   -patient denies n/v/AP  -s/p para today, 11.2L removed  -HD planned for tomorrow  -PVT on doppler u/s    ROS:   12 point review of systems performed and negative except otherwise noted in HPI.    Hospital Medications:  camphor 0.5%/menthol 0.5% Topical Lotion 1 Application(s) Topical two times a day  chlorhexidine 2% Cloths 1 Application(s) Topical daily  chlorhexidine 4% Liquid 1 Application(s) Topical <User Schedule>  cyanocobalamin 1000 MICROGram(s) Oral daily  dextrose 5%. 1000 milliLiter(s) IV Continuous <Continuous>  dextrose 5%. 1000 milliLiter(s) IV Continuous <Continuous>  dextrose 50% Injectable 25 Gram(s) IV Push once  dextrose 50% Injectable 25 Gram(s) IV Push once  dextrose 50% Injectable 12.5 Gram(s) IV Push once  dextrose Oral Gel 15 Gram(s) Oral once PRN  folic acid 1 milliGRAM(s) Oral daily  glucagon  Injectable 1 milliGRAM(s) IntraMuscular once  insulin glargine Injectable (LANTUS) 5 Unit(s) SubCutaneous at bedtime  insulin lispro (ADMELOG) corrective regimen sliding scale   SubCutaneous three times a day before meals  insulin lispro (ADMELOG) corrective regimen sliding scale   SubCutaneous at bedtime  lactulose Syrup 10 Gram(s) Oral four times a day  melatonin 1 milliGRAM(s) Oral at bedtime  meropenem  IVPB 500 milliGRAM(s) IV Intermittent every 12 hours  midodrine. 10 milliGRAM(s) Oral three times a day  midodrine. 10 milliGRAM(s) Oral once  octreotide  Injectable 200 MICROGram(s) SubCutaneous three times a day  pancrelipase  (CREON  6,000 Lipase Units) 1 Capsule(s) Oral three times a day with meals  sodium chloride 0.9% lock flush 10 milliLiter(s) IV Push every 1 hour PRN  tamsulosin 0.4 milliGRAM(s) Oral at bedtime  thiamine 100 milliGRAM(s) Oral daily      PHYSICAL EXAM:   Vital Signs:  Vital Signs Last 24 Hrs  T(C): 36.3 (2024 14:38), Max: 36.9 (2024 20:44)  T(F): 97.4 (2024 14:38), Max: 98.4 (2024 20:44)  HR: 82 (2024 14:38) (78 - 89)  BP: 93/56 (2024 14:38) (93/56 - 136/70)  BP(mean): --  RR: 18 (2024 14:38) (16 - 20)  SpO2: 97% (2024 14:38) (96% - 98%)    Parameters below as of 2024 14:38  Patient On (Oxygen Delivery Method): room air      Daily     Daily Weight in k (2024 18:28)    GEN: NAD, normocephalic  CVS: normal hr   CHEST: reg resp rate, no increased WOB  ABD: soft, nontender, distended  EXTR: no b/l LE edema. RUE edematous R>L  NEURO: A&OX3    LABS: reviewed                        8.1    11.75 )-----------( 84       ( 2024 06:27 )             23.7     01-03    133<L>  |  98  |  94<H>  ----------------------------<  116<H>  4.3   |  16<L>  |  5.21<H>    Ca    9.3      2024 06:23  Phos  5.4     01-02  Mg     2.4     01-02    TPro  5.7<L>  /  Alb  3.6  /  TBili  13.3<H>  /  DBili  x   /  AST  112<H>  /  ALT  12  /  AlkPhos  560<H>  01-03    LIVER FUNCTIONS - ( 2024 06:23 )  Alb: 3.6 g/dL / Pro: 5.7 g/dL / ALK PHOS: 560 U/L / ALT: 12 U/L / AST: 112 U/L / GGT: x             Interval Diagnostic Studies:   < from: US Doppler Portal/Hepatic Vein (24 @ 11:05) >  ACC: [id number] EXAM:  US KIDNEY(S)   ORDERED BY: DAKOTA SALMERON     ACC: [id number] EXAM:  US DPLX PORTAL HEPATIC VEIN   ORDERED BY:  BERNARD PRADO     ACC: [id number] EXAM:  US ABDOMEN LIMITED   ORDERED BY:  BERNARD PRADO     PROCEDURE DATE:  2024          INTERPRETATION:  CLINICAL INFORMATION: 58-year-old with decompensated   cirrhosis.    COMPARISON: None available.    TECHNIQUE: Sonography of the abdomen. Color and spectral Doppler   evaluation of the hepatic vasculature was performed.    FINDINGS:    Liver: Cirrhotic.  Right kidney: 11.65 cm. No hydronephrosis. A multilobulated vascular mass   is seen arising from the right kidney, extending into the peritoneum.  Left kidney: 11.87 cm.  No hydronephrosis.  Bladder: Within normal limits  Ascites: Marked.   Aorta and IVC: IVC does not demonstrate pulsatile blood   flow on Doppler evaluation.    Main portal vein: Thrombosed    Hepatic artery: Patent. Peak systolic velocity is 294.2 cm/s.    Hepatic veins: Limited by body habitus.    IMPRESSION:  Thrombosis of the main portal vein.    Abnormal nonphasic flow in the IVC.    Multilobulated vascular mass arises from the right kidney extending into   the peritoneum. Findings are concerning for primary renal malignancy   versus metastatic disease. Evaluation with contrast-enhanced CT is   recommended.    --- End of Report ---          ERASMO HARRISON DO; Resident Radiologist  This document has been electronically signed.  MIGUEL RIOJAS MD; Attending Radiologist  This documenthas been electronically signed. 2024  4:42PM    < end of copied text >     Interval Events:   -patient denies n/v/AP  -s/p para today, 11.2L removed  -HD planned for tomorrow  -PVT on doppler u/s    ROS:   12 point review of systems performed and negative except otherwise noted in HPI.    Hospital Medications:  camphor 0.5%/menthol 0.5% Topical Lotion 1 Application(s) Topical two times a day  chlorhexidine 2% Cloths 1 Application(s) Topical daily  chlorhexidine 4% Liquid 1 Application(s) Topical <User Schedule>  cyanocobalamin 1000 MICROGram(s) Oral daily  dextrose 5%. 1000 milliLiter(s) IV Continuous <Continuous>  dextrose 5%. 1000 milliLiter(s) IV Continuous <Continuous>  dextrose 50% Injectable 25 Gram(s) IV Push once  dextrose 50% Injectable 25 Gram(s) IV Push once  dextrose 50% Injectable 12.5 Gram(s) IV Push once  dextrose Oral Gel 15 Gram(s) Oral once PRN  folic acid 1 milliGRAM(s) Oral daily  glucagon  Injectable 1 milliGRAM(s) IntraMuscular once  insulin glargine Injectable (LANTUS) 5 Unit(s) SubCutaneous at bedtime  insulin lispro (ADMELOG) corrective regimen sliding scale   SubCutaneous three times a day before meals  insulin lispro (ADMELOG) corrective regimen sliding scale   SubCutaneous at bedtime  lactulose Syrup 10 Gram(s) Oral four times a day  melatonin 1 milliGRAM(s) Oral at bedtime  meropenem  IVPB 500 milliGRAM(s) IV Intermittent every 12 hours  midodrine. 10 milliGRAM(s) Oral three times a day  midodrine. 10 milliGRAM(s) Oral once  octreotide  Injectable 200 MICROGram(s) SubCutaneous three times a day  pancrelipase  (CREON  6,000 Lipase Units) 1 Capsule(s) Oral three times a day with meals  sodium chloride 0.9% lock flush 10 milliLiter(s) IV Push every 1 hour PRN  tamsulosin 0.4 milliGRAM(s) Oral at bedtime  thiamine 100 milliGRAM(s) Oral daily      PHYSICAL EXAM:   Vital Signs:  Vital Signs Last 24 Hrs  T(C): 36.3 (2024 14:38), Max: 36.9 (2024 20:44)  T(F): 97.4 (2024 14:38), Max: 98.4 (2024 20:44)  HR: 82 (2024 14:38) (78 - 89)  BP: 93/56 (2024 14:38) (93/56 - 136/70)  BP(mean): --  RR: 18 (2024 14:38) (16 - 20)  SpO2: 97% (2024 14:38) (96% - 98%)    Parameters below as of 2024 14:38  Patient On (Oxygen Delivery Method): room air      Daily     Daily Weight in k (2024 18:28)    GEN: NAD, normocephalic  CVS: normal hr   CHEST: reg resp rate, no increased WOB  ABD: soft, nontender, distended  EXTR: no b/l LE edema. RUE edematous R>L  NEURO: A&OX3    LABS: reviewed                        8.1    11.75 )-----------( 84       ( 2024 06:27 )             23.7     01-03    133<L>  |  98  |  94<H>  ----------------------------<  116<H>  4.3   |  16<L>  |  5.21<H>    Ca    9.3      2024 06:23  Phos  5.4     01-02  Mg     2.4     01-02    TPro  5.7<L>  /  Alb  3.6  /  TBili  13.3<H>  /  DBili  x   /  AST  112<H>  /  ALT  12  /  AlkPhos  560<H>  01-03    LIVER FUNCTIONS - ( 2024 06:23 )  Alb: 3.6 g/dL / Pro: 5.7 g/dL / ALK PHOS: 560 U/L / ALT: 12 U/L / AST: 112 U/L / GGT: x             Interval Diagnostic Studies:   < from: US Doppler Portal/Hepatic Vein (24 @ 11:05) >  ACC: [id number] EXAM:  US KIDNEY(S)   ORDERED BY: DAKOTA SALMERON     ACC: [id number] EXAM:  US DPLX PORTAL HEPATIC VEIN   ORDERED BY:  BERNARD PRADO     ACC: [id number] EXAM:  US ABDOMEN LIMITED   ORDERED BY:  BERNARD PRADO     PROCEDURE DATE:  2024          INTERPRETATION:  CLINICAL INFORMATION: 58-year-old with decompensated   cirrhosis.    COMPARISON: None available.    TECHNIQUE: Sonography of the abdomen. Color and spectral Doppler   evaluation of the hepatic vasculature was performed.    FINDINGS:    Liver: Cirrhotic.  Right kidney: 11.65 cm. No hydronephrosis. A multilobulated vascular mass   is seen arising from the right kidney, extending into the peritoneum.  Left kidney: 11.87 cm.  No hydronephrosis.  Bladder: Within normal limits  Ascites: Marked.   Aorta and IVC: IVC does not demonstrate pulsatile blood   flow on Doppler evaluation.    Main portal vein: Thrombosed    Hepatic artery: Patent. Peak systolic velocity is 294.2 cm/s.    Hepatic veins: Limited by body habitus.    IMPRESSION:  Thrombosis of the main portal vein.    Abnormal nonphasic flow in the IVC.    Multilobulated vascular mass arises from the right kidney extending into   the peritoneum. Findings are concerning for primary renal malignancy   versus metastatic disease. Evaluation with contrast-enhanced CT is   recommended.    --- End of Report ---          ERASMO HARRISON DO; Resident Radiologist  This document has been electronically signed.  MIGUEL RIOJAS MD; Attending Radiologist  This documenthas been electronically signed. 2024  4:42PM    < end of copied text >

## 2024-01-03 NOTE — CHART NOTE - NSCHARTNOTEFT_GEN_A_CORE
Spoke with primary team, pt to get another session of HD today and SBP low 100's. Will plan for paracentesis for tomorrow.

## 2024-01-03 NOTE — PROGRESS NOTE ADULT - PROBLEM SELECTOR PLAN 2
New renal mass seen on US rec for CT IV contrast but with RAHUL on HD unclear if will be long term will discuss with renal and hepatology. New renal mass seen on US rec for CT IV contrast but with RAHUL on HD.  Discussed with radiology who say MR w/o contrast will suffice.  -Will order

## 2024-01-03 NOTE — PRE-OP CHECKLIST - TEMPERATURE IN CELSIUS (DEGREES C)
Ms. Michaelle Garcia is a 34 year old year  currently at 33 weeks 3 day gestation.  ong  via phone throughout the entire visit    Her pregnancy is complicated by rheumatic valvular heart disease NYHAII, advanced maternal age status post cell free fetal DNA testing with negative results, severe IUGR, and history of tuberculosis status post treatment.     REVIEW OF SYSTEMS:  At present, she reports no vaginal bleeding, uterine contractions or cramping, or leakage of amniotic fluid. She reports positive fetal movement.   She denies any changes in shortness of breath over the last few weeks.  Denies chest pain.  States she has occasional dizziness with change of position- maybe it happened once last week  She denies any palpitations. She denies any decrease in her functional capacity.     Feels ready at home for the baby.  States she has good support  She plans to have FOB with her for delivery.  Her sister will stay with her other child while she is in the hospital    PHYSICAL EXAM:  VSS  HEART:  Systolic murmur noted . Irregular rate- missed beat approx 3 times in 2 minutes,  minimal peripheral edema. Pulses equal bilatarally  RESPIRATORY:  Nonlabored breathing. Lungs clear to auscultation bilaterally.  ABDOMEN:  Soft, nondistended, nontender. BS x 4 quads,  Gravid, FH-32 cm, FHT per doppler- 144   EXT: PP+2x4.    SSE- no external vaginal lesions noted  GBS and GC/CT obtained           ASSESSMENT/PLAN:  Ms. Michaelle Garcia is a 34 year old year old year old  currently at 33 weeks 3 day gestation.    Her pregnancy is complicated by rheumatic valvular heart disease NYHAII, advanced maternal age status post cell free fetal DNA testing with negative results, severe IUGR, and history of tuberculosis status post treatment.      Prenatal care:  -Pneumococcal vaccination on 2018  -Flu vaccine on 10/16/2018  -Negative CFFDNA  -Tdap administered on 2019.  GBS and GC/CT sent today  HIV, RPR and last CBC done  2        Rheumatic heart disease causing mitral and aortic regurgitation and mild cardiomegaly:  See High Risk Delivery Care plan    -Metoprolol 12.5 mg po daily per Dr. Kang for target HR of 60-70 bpm- increased to BID today per Dr. White due to elevated HR  -Status post care conference with Dr. Kang last week  - EKG ordered today due to audible Arrythmia-   Normals sinus noted- MFM called kenneth's office and no change in treatment plan for now  Provisional recommendation for delivery at 34 weeks post ACS administration gestation if the mitral valve gradient increases by 2 mmHg (>=9 mmHg) on follow up echocardiogram. (Please see care conference note for further management details).  -The patient is tentatively scheduled for delivery via  (due to transverse lie) at 34 weeks gestation 3/19/2019 at 1300.   1st dose of Betamethasone given today- f/u tomorrow for 2nd  -Follow up transthoracic 2-D echocardiogram with Dr. Kang on 3/18/2019.      AMA:  -Negative cell free fetal DNA testing.     Latent TB:   -Quant TB + pos in CareEverywhere  -3/7/17 Chest x-ray: \"Borderline cardiomegaly. Otherwise, no acute disease.\"  -CT chest done 3/2017 which did not show active disease.  -S/p INH, finished 2018.     Severe IUGR:  -Twice weekly  fetal surveillance.  -Weekly Doppler.  -Serial growth ultrasound every 3 weeks until delivery.    Postpartum planning-  Reviewed family planning- pt declines at this time, highly recommended LARC  Plans to breastfeed  Female infant  Ped-CHOW    Warning s/s reviewed and when to call  F/u tomorrow for bethamethasone and NST   teaching today  Kenneth 3/18  Delivery 3/19                36.5

## 2024-01-03 NOTE — PROGRESS NOTE ADULT - PROBLEM SELECTOR PLAN 1
Pt with RAHUL v RAHUL on CKD - unclear baseline renal function. No significant proteinuria with UPCR 0.4. Course complicated by metabolic encephalopathy from liver/kidney dysfunction, metabolic acidosis in the setting of renal failure and decompensated liver failure with ascites, without overt significant peripheral edema. Initiated on HD on 12/31/23 due to worsening renal function with oliguria and metabolic derangements. Underwent HD #1 on 12/31 and #2 on 1/2/24 but treatment stopped early due to low BP. Plan for HD #3 today via R femoral non tunneled HD catheter. Per discussion with primary service, plan to change to L v R IJ non tunneled HD catheter to reduce infection risk (IR to be consulted). Renal US demonstrating normal sized kidneys with no hydronephrosis but   Multilobulated vascular mass arises from the right kidney extending into the peritoneum. Findings are concerning for primary renal malignancy versus metastatic disease. Evaluation with contrast-enhanced CT is recommended.  Monitor labs and urine output. Avoid NSAIDs, ACEI/ARBS, RCA and nephrotoxins. Dose medications as per eGFR.

## 2024-01-03 NOTE — PROGRESS NOTE ADULT - ATTENDING COMMENTS
58 year old man with h/o type II DM for ~ 6-7 years, was on metformin and Jardiance, recently diagnosed with liver cirrhosis complicated by ascites. He underwent paracentesis x 3. Course complicated by worsening RAHUL and encephalopathy. He was transferred to [facility] for liver transplant evaluation. He was initiated on HD on 12/31/23.     Did not tolerated a full session of dialysis yesterday due to low BP.   Oliguric. BP  systolic.   Somnolent, ascites+, no significant LE edema  Labs reviewed, mild hyponatremia 133, metabolic acidosis bicarb 16, BUN/CR slightly better 94/5.2    Renal US with normal sized kidneys measuring ~ 11.6 and 11.8 cm. No hydro. Multilobulated vascular mass arises from the right kidney extending into the peritoneum. Findings are concerning for primary renal malignancy versus metastatic disease.      RAHUL on CKD - unclear baseline renal function. No significant proteinuria UPCR 0.4, normal sized kidneys on US. Initiated HD on 12/31   Planned for large volume paracentesis today - will hold off on HD until after para is complete and he is hemodynamically sable   Recommend albumin 1:1 replacement with paracentesis. Continue midodrine 10mg po q8h. Can get extra 10mg prior to HD   Remove femoral dialysis catheter and place IJ catheter - temporary for now, may need to change to tunneled catheter if RAHUL persists    Additional imaging to evaluate right renal mass - non con CT vs MRI, would avoid iodinated contrast 58 year old man with h/o type II DM for ~ 6-7 years, was on metformin and Jardiance, recently diagnosed with liver cirrhosis complicated by ascites. He underwent paracentesis x 3. Course complicated by worsening RAHUL and encephalopathy. He was transferred to [facility] for liver transplant evaluation. He was initiated on HD on 12/31/23.     Did not tolerated a full session of dialysis yesterday due to low BP.   Oliguric. BP  systolic.   Somnolent, ascites+, no significant LE edema  Labs reviewed, mild hyponatremia 133, metabolic acidosis bicarb 16, BUN/CR slightly better 94/5.2    Renal US with normal sized kidneys measuring ~ 11.6 and 11.8 cm. No hydro. Multilobulated vascular mass arises from the right kidney extending into the peritoneum. Findings are concerning for primary renal malignancy versus metastatic disease.      RAHUL on CKD - unclear baseline renal function. No significant proteinuria UPCR 0.4, normal sized kidneys on US. Initiated HD on 12/31   Planned for large volume paracentesis today - will hold off on HD until after para is complete and he is hemodynamically sable   Recommend albumin 1:1 replacement with paracentesis. Continue midodrine 10mg po q8h. Can get extra 10mg prior to HD   Remove femoral dialysis catheter and place IJ catheter - temporary for now, may need to change to tunneled catheter if RAHUL persists    Additional imaging to evaluate right renal mass - non con CT vs MRI, would avoid iodinated contrast

## 2024-01-03 NOTE — PRE PROCEDURE NOTE - PRE PROCEDURE EVALUATION
Interventional Radiology    HPI: 59 y/o M PMH prior EtOH use with alcoholic cirrhosis (diagnosed 3 weeks ago) and DM2 presenting from OSH for transplant eval and RAHUL requiring new HD initiated. Per HCP, patient first diagnosed with cirrhosis 3 weeks ago after found lethargic. Underwent abdominal paracentesis with large volume fluid removal at that time. Per nephrology and primary team, IR consulted to change femoral shiley to L v R IJ non tunneled HD catheter to reduce infection risk. Patient presenting to IR for non tunneled HD catheter placement in [facility].    Allergies: No Known Allergies    Medications (Abx/Cardiac/Anticoagulation/Blood Products)  meropenem  IVPB: 100 mL/Hr IV Intermittent (01-03 @ 05:30)  midodrine.: 10 milliGRAM(s) Oral (01-03 @ 05:31)    Data:    T(C): 36.5  HR: 84  BP: 105/59  RR: 18  SpO2: 97%    Exam  General: No acute distress  Chest: Non labored breathing  Abdomen: Non-distended  Extremities: No swelling, warm    -WBC 11.75 / HgB 8.1 / Hct 23.7 / Plt 84  -Na 133 / Cl 98 / BUN 94 / Glucose 116  -K 4.3 / CO2 16 / Cr 5.21  -ALT 12 / Alk Phos 560 / T.Bili 13.3  -INR1.34    Imaging: Reviewed    Plan: 59 y/o M PMH prior EtOH use with alcoholic cirrhosis (diagnosed 3 weeks ago) and DM2 presenting from OSH for transplant eval and RAHUL requiring new HD initiated. Per HCP, patient first diagnosed with cirrhosis 3 weeks ago after found lethargic. Underwent abdominal paracentesis with large volume fluid removal at that time. Per nephrology and primary team, IR consulted to change femoral shiley to L v R IJ non tunneled HD catheter to reduce infection risk. Patient presenting to IR for non tunneled HD catheter placement in [facility].    -Risks/Benefits/alternatives explained with the patient and/or healthcare proxy and witnessed informed consent obtained.    Interventional Radiology    HPI: 59 y/o M PMH prior EtOH use with alcoholic cirrhosis (diagnosed 3 weeks ago) and DM2 presenting from OSH for transplant eval and RAHUL requiring new HD initiated. Per HCP, patient first diagnosed with cirrhosis 3 weeks ago after found lethargic. Underwent abdominal paracentesis with large volume fluid removal at that time. Per nephrology and primary team, IR consulted to change femoral shiley to L v R IJ non tunneled HD catheter to reduce infection risk. Patient presenting to IR for non tunneled HD catheter placement in [facility].    Allergies: No Known Allergies    Medications (Abx/Cardiac/Anticoagulation/Blood Products)  meropenem  IVPB: 100 mL/Hr IV Intermittent (01-03 @ 05:30)  midodrine.: 10 milliGRAM(s) Oral (01-03 @ 05:31)    Data:    T(C): 36.5  HR: 84  BP: 105/59  RR: 18  SpO2: 97%    Exam  General: No acute distress  Chest: Non labored breathing  Abdomen: Non-distended  Extremities: No swelling, warm    -WBC 11.75 / HgB 8.1 / Hct 23.7 / Plt 84  -Na 133 / Cl 98 / BUN 94 / Glucose 116  -K 4.3 / CO2 16 / Cr 5.21  -ALT 12 / Alk Phos 560 / T.Bili 13.3  -INR1.34    Imaging: Reviewed    Plan: 59 y/o M PMH prior EtOH use with alcoholic cirrhosis (diagnosed 3 weeks ago) and DM2 presenting from OSH for transplant eval and RAHUL requiring new HD initiated. Per HCP, patient first diagnosed with cirrhosis 3 weeks ago after found lethargic. Underwent abdominal paracentesis with large volume fluid removal at that time. Per nephrology and primary team, IR consulted to change femoral shiley to L v R IJ non tunneled HD catheter to reduce infection risk. Patient presenting to IR for non tunneled HD catheter placement in [facility].    -Risks/Benefits/alternatives explained with the patient and/or healthcare proxy and witnessed informed consent obtained.

## 2024-01-03 NOTE — CHART NOTE - NSCHARTNOTEFT_GEN_A_CORE
Contacted by primary team, pt s/p large volume paracentesis. BP remains low in the 90s systolic. Labs reviewed, no urgent indication for HD. Will plan for HD #3 on 1/4/24. HD unit notified.     If you have any questions, please feel free to contact me  Johan Mejias  Nephrology Fellow  Pager # [phone]  [employer] Teams  (After 5pm or on weekends please page the on-call fellow) Contacted by primary team, pt s/p large volume paracentesis. BP remains low in the 90s systolic. Labs reviewed, no urgent indication for HD. Will plan for HD #3 on 1/4/24. HD unit notified.     If you have any questions, please feel free to contact me  Johan Mejias  Nephrology Fellow  Pager # [phone]  [employer] Teams  (After 5pm or on weekends please page the on-call fellow)

## 2024-01-03 NOTE — PROGRESS NOTE ADULT - ASSESSMENT
57 y/o M PMH DM2, recently diagnosed alcoholic cirrhosis (3 weeks ago) presenting as a transfer from [facility] for transplant eval for decompensated liver cirrhosis. Patient initially presented to [facility] (from assisted living) last week with dizziness and worsening AMS.    #Decompensated cirrhosis likely 2/2 alcohol and MASLD  MELD 3.0 1/03/24 - 33  Blood Type AB+  HE: presented with AMS  EV: unknown (no prior EGD/c-scope)  HCC screening: will need MRI   Ascites: s/p paracentesis 1/02  Infection hx: unclear why hes on meropenem started at OSH  Bleeding hx: no s/s of bleeding   #RAHUL   #Frailty and muscle weakness/pain     Recommendations:   - NPO at midnight for possible EGD tomorrow - cannot start AC for PVT until assess for esophageal varices  - will need Na >/= 135. Will need to coordinate scope timing with HD if planned for 1/04 vs EGD on 1/05  - rec additional imaging, urology & onc consults regarding rt kidney mass lesion extending into peritoneum  - recommend SW evaluation re social support  - f/u fluid analysis s/p para 1/03  - appreciate nephro recs, agree with midodrine/octreotide, HD as per nephro  - recommend transplant ID consult, patient with elevated WBC on ruben - no clear inf source  - c/w ABx for now  - please obtain TTE  - continue with lactulose and titrate to 3-5BM daily   - start rifaximin   - f/u AI w/u, EBV, CMV, PETH  - avoid hepatotoxic agents    All recommendations preliminary until note signed by service attending.    Thank you for involving us in the care of this patient. Please contact should any concern or questions arise.    Matt Arredondo MD   Gastroenterology/Hepatology Fellow PGY-5  Available on [employer] Teams 7am - 5pm  [email or web address]    NON-URGENT CONSULTS:  Please email:  [email or web address]   OR  [email or web address]    After 5pm, please contact the on-call GI fellow. [phone]    AT NIGHT AND ON WEEKENDS:  Contact on-call GI fellow via answering service ([phone]) from 5pm-8am and on weekends/holidays       57 y/o M PMH DM2, recently diagnosed alcoholic cirrhosis (3 weeks ago) presenting as a transfer from [facility] for transplant eval for decompensated liver cirrhosis. Patient initially presented to [facility] (from assisted living) last week with dizziness and worsening AMS.    #Decompensated cirrhosis likely 2/2 alcohol and MASLD  MELD 3.0 1/03/24 - 33  Blood Type AB+  HE: presented with AMS  EV: unknown (no prior EGD/c-scope)  HCC screening: will need MRI   Ascites: s/p paracentesis 1/02  Infection hx: unclear why hes on meropenem started at OSH  Bleeding hx: no s/s of bleeding   #RAHUL   #Frailty and muscle weakness/pain     Recommendations:   - NPO at midnight for possible EGD tomorrow - cannot start AC for PVT until assess for esophageal varices  - will need Na >/= 135. Will need to coordinate scope timing with HD if planned for 1/04 vs EGD on 1/05  - rec additional imaging, urology & onc consults regarding rt kidney mass lesion extending into peritoneum  - recommend SW evaluation re social support  - f/u fluid analysis s/p para 1/03  - appreciate nephro recs, agree with midodrine/octreotide, HD as per nephro  - recommend transplant ID consult, patient with elevated WBC on ruben - no clear inf source  - c/w ABx for now  - please obtain TTE  - continue with lactulose and titrate to 3-5BM daily   - start rifaximin   - f/u AI w/u, EBV, CMV, PETH  - avoid hepatotoxic agents    All recommendations preliminary until note signed by service attending.    Thank you for involving us in the care of this patient. Please contact should any concern or questions arise.    Matt Arredondo MD   Gastroenterology/Hepatology Fellow PGY-5  Available on [employer] Teams 7am - 5pm  [email or web address]    NON-URGENT CONSULTS:  Please email:  [email or web address]   OR  [email or web address]    After 5pm, please contact the on-call GI fellow. [phone]    AT NIGHT AND ON WEEKENDS:  Contact on-call GI fellow via answering service ([phone]) from 5pm-8am and on weekends/holidays

## 2024-01-03 NOTE — CHART NOTE - NSCHARTNOTEFT_GEN_A_CORE
Called to bedside by RN to assess right IJ dressing, c/f bleeding. Noted to have bloody drainage on pillow case and gown. Dressing with scant amount of drainage, dressing removed no active bleeding noted. Thrombi disc and quickclot hemostatic dressing applied.

## 2024-01-03 NOTE — PROCEDURE NOTE - ADDITIONAL PROCEDURE DETAILS
Successful LLQ paracentesis with 11,200 ml dark leonel ascites fluid obtained with 5Fr drainer via ultrasound guidance. Images sent to PACS.  Albumin 25% 50ml x5 given (2:1). Fluid sent for chemistry, Fluid sent for gram stain and culture, Fluid sent for cytology. Full report to follow.

## 2024-01-03 NOTE — CONSULT NOTE ADULT - SUBJECTIVE AND OBJECTIVE BOX
Interventional Radiology    Evaluate for Procedure: replace femoral hd cath to shiley    HPI: 57 y/o M PMH prior EtOH use with alcoholic cirrhosis (diagnosed 3 weeks ago) and DM2 presenting from OSH for transplant eval and RAHUL requiring new HD initiated. Per HCP, patient first diagnosed with cirrhosis 3 weeks ago after found lethargic. Underwent abdominal paracentesis with large volume fluid removal at that time. Per nephrology and primary team, IR consulted to change femoral shiley to L v R IJ non tunneled HD catheter to reduce infection risk.     Allergies: No Known Allergies    Medications (Abx/Cardiac/Anticoagulation/Blood Products)  meropenem  IVPB: 100 mL/Hr IV Intermittent (01-03 @ 05:30)  midodrine.: 10 milliGRAM(s) Oral (01-03 @ 05:31)    Data:    T(C): 36.5  HR: 84  BP: 105/59  RR: 18  SpO2: 97%    -WBC 11.75 / HgB 8.1 / Hct 23.7 / Plt 84  -Na 133 / Cl 98 / BUN 94 / Glucose 116  -K 4.3 / CO2 16 / Cr 5.21  -ALT 12 / Alk Phos 560 / T.Bili 13.3  -INR 1.34 / PTT 35.8    Radiology: reviewed    Assessment/Plan:   57 y/o M PMH prior EtOH use with alcoholic cirrhosis (diagnosed 3 weeks ago) and DM2 presenting from OSH for transplant eval and RAHUL requiring new HD initiated. Per HCP, patient first diagnosed with cirrhosis 3 weeks ago after found lethargic. Underwent abdominal paracentesis with large volume fluid removal at that time. Per nephrology and primary team, IR consulted to change femoral shiley to L v R IJ non tunneled HD catheter to reduce infection risk.     - case reviewed and discussed with MD Almeida and approved for non tunneled HD shiley, Pt currently undergoing paracentesis in IR, if BP remains stable post procedure will plan to place shiley after, otherwise will plan for tomorrow.    - please place IR procedure order under NP Delgado  - STAT labs in AM (cbc,coags, bmp, T&S)  - hold AC x12hrs  - No need to be NPO  - d/w primary team

## 2024-01-03 NOTE — PROGRESS NOTE ADULT - SUBJECTIVE AND OBJECTIVE BOX
PROGRESS NOTE:   Reena Tan, DO  Hospitalist  Teams  After 5pm/weekends or if no answer ext: [phone]      Patient is a 58y old  Male who presents with a chief complaint of Liver Transplant Eval (02 Jan 2024 14:49)      SUBJECTIVE / OVERNIGHT EVENTS: 2 BM in last 24hrs. Had HD yesterday.     ADDITIONAL REVIEW OF SYSTEMS:  no fever or chill sno n/v/d    MEDICATIONS  (STANDING):  camphor 0.5%/menthol 0.5% Topical Lotion 1 Application(s) Topical two times a day  chlorhexidine 2% Cloths 1 Application(s) Topical daily  cyanocobalamin 1000 MICROGram(s) Oral daily  dextrose 5%. 1000 milliLiter(s) (50 mL/Hr) IV Continuous <Continuous>  dextrose 5%. 1000 milliLiter(s) (100 mL/Hr) IV Continuous <Continuous>  dextrose 50% Injectable 25 Gram(s) IV Push once  dextrose 50% Injectable 25 Gram(s) IV Push once  dextrose 50% Injectable 12.5 Gram(s) IV Push once  folic acid 1 milliGRAM(s) Oral daily  glucagon  Injectable 1 milliGRAM(s) IntraMuscular once  insulin glargine Injectable (LANTUS) 5 Unit(s) SubCutaneous at bedtime  insulin lispro (ADMELOG) corrective regimen sliding scale   SubCutaneous three times a day before meals  insulin lispro (ADMELOG) corrective regimen sliding scale   SubCutaneous at bedtime  lactulose Syrup 10 Gram(s) Oral four times a day  melatonin 1 milliGRAM(s) Oral at bedtime  meropenem  IVPB 500 milliGRAM(s) IV Intermittent every 12 hours  midodrine. 10 milliGRAM(s) Oral once  midodrine. 10 milliGRAM(s) Oral three times a day  octreotide  Injectable 200 MICROGram(s) SubCutaneous three times a day  pancrelipase  (CREON  6,000 Lipase Units) 1 Capsule(s) Oral three times a day with meals  tamsulosin 0.4 milliGRAM(s) Oral at bedtime  thiamine 100 milliGRAM(s) Oral daily    MEDICATIONS  (PRN):  dextrose Oral Gel 15 Gram(s) Oral once PRN Blood Glucose LESS THAN 70 milliGRAM(s)/deciliter      CAPILLARY BLOOD GLUCOSE      POCT Blood Glucose.: 120 mg/dL (03 Jan 2024 08:00)  POCT Blood Glucose.: 283 mg/dL (02 Jan 2024 21:52)  POCT Blood Glucose.: 186 mg/dL (02 Jan 2024 17:07)  POCT Blood Glucose.: 242 mg/dL (02 Jan 2024 12:37)    I&O's Summary    02 Jan 2024 07:01  -  03 Jan 2024 07:00  --------------------------------------------------------  IN: 720 mL / OUT: 0 mL / NET: 720 mL        PHYSICAL EXAM:  Vital Signs Last 24 Hrs  T(C): 36.5 (03 Jan 2024 08:52), Max: 36.9 (02 Jan 2024 20:44)  T(F): 97.7 (03 Jan 2024 08:52), Max: 98.4 (02 Jan 2024 20:44)  HR: 84 (03 Jan 2024 08:52) (78 - 89)  BP: 105/59 (03 Jan 2024 08:52) (98/56 - 136/70)  BP(mean): --  RR: 18 (03 Jan 2024 08:52) (18 - 20)  SpO2: 97% (03 Jan 2024 08:52) (96% - 98%)    Parameters below as of 03 Jan 2024 05:35  Patient On (Oxygen Delivery Method): room air        CONSTITUTIONAL: NAD, thin, slowed affect, jaundice   ABDOMEN: distended   MUSCLOSKELETAL: no clubbing or cyanosis of digits; no joint swelling or tenderness to palpation  PSYCH: A+O to person, place, and time; affect slowed    LABS:                        8.1    11.75 )-----------( 84       ( 03 Jan 2024 06:27 )             23.7     01-03    133<L>  |  98  |  94<H>  ----------------------------<  116<H>  4.3   |  16<L>  |  5.21<H>    Ca    9.3      03 Jan 2024 06:23  Phos  5.4     01-02  Mg     2.4     01-02    TPro  5.7<L>  /  Alb  3.6  /  TBili  13.3<H>  /  DBili  x   /  AST  112<H>  /  ALT  12  /  AlkPhos  560<H>  01-03    PT/INR - ( 03 Jan 2024 06:27 )   PT: 14.0 sec;   INR: 1.34 ratio         PTT - ( 03 Jan 2024 06:27 )  PTT:35.8 sec      Urinalysis Basic - ( 03 Jan 2024 06:23 )    Color: x / Appearance: x / SG: x / pH: x  Gluc: 116 mg/dL / Ketone: x  / Bili: x / Urobili: x   Blood: x / Protein: x / Nitrite: x   Leuk Esterase: x / RBC: x / WBC x   Sq Epi: x / Non Sq Epi: x / Bacteria: x        Culture - Urine (collected 01 Jan 2024 10:04)  Source: Clean Catch Clean Catch (Midstream)  Final Report (02 Jan 2024 10:19):    <10,000 CFU/mL Normal Urogenital Fabienne        RADIOLOGY & ADDITIONAL TESTS:  Results Reviewed:   Imaging Personally Reviewed:  Electrocardiogram Personally Reviewed:    COORDINATION OF CARE:  Care Discussed with Consultants/Other Providers [Y/N]:  Prior or Outpatient Records Reviewed [Y/N]:   PROGRESS NOTE:   Reena Tan, DO  Hospitalist  Teams  After 5pm/weekends or if no answer ext: [phone]      Patient is a 58y old  Male who presents with a chief complaint of Liver Transplant Eval (02 Jan 2024 14:49)      SUBJECTIVE / OVERNIGHT EVENTS: 2 BM in last 24hrs. Had HD yesterday.     ADDITIONAL REVIEW OF SYSTEMS:  no fever or chill sno n/v/d    MEDICATIONS  (STANDING):  camphor 0.5%/menthol 0.5% Topical Lotion 1 Application(s) Topical two times a day  chlorhexidine 2% Cloths 1 Application(s) Topical daily  cyanocobalamin 1000 MICROGram(s) Oral daily  dextrose 5%. 1000 milliLiter(s) (50 mL/Hr) IV Continuous <Continuous>  dextrose 5%. 1000 milliLiter(s) (100 mL/Hr) IV Continuous <Continuous>  dextrose 50% Injectable 25 Gram(s) IV Push once  dextrose 50% Injectable 25 Gram(s) IV Push once  dextrose 50% Injectable 12.5 Gram(s) IV Push once  folic acid 1 milliGRAM(s) Oral daily  glucagon  Injectable 1 milliGRAM(s) IntraMuscular once  insulin glargine Injectable (LANTUS) 5 Unit(s) SubCutaneous at bedtime  insulin lispro (ADMELOG) corrective regimen sliding scale   SubCutaneous three times a day before meals  insulin lispro (ADMELOG) corrective regimen sliding scale   SubCutaneous at bedtime  lactulose Syrup 10 Gram(s) Oral four times a day  melatonin 1 milliGRAM(s) Oral at bedtime  meropenem  IVPB 500 milliGRAM(s) IV Intermittent every 12 hours  midodrine. 10 milliGRAM(s) Oral once  midodrine. 10 milliGRAM(s) Oral three times a day  octreotide  Injectable 200 MICROGram(s) SubCutaneous three times a day  pancrelipase  (CREON  6,000 Lipase Units) 1 Capsule(s) Oral three times a day with meals  tamsulosin 0.4 milliGRAM(s) Oral at bedtime  thiamine 100 milliGRAM(s) Oral daily    MEDICATIONS  (PRN):  dextrose Oral Gel 15 Gram(s) Oral once PRN Blood Glucose LESS THAN 70 milliGRAM(s)/deciliter      CAPILLARY BLOOD GLUCOSE      POCT Blood Glucose.: 120 mg/dL (03 Jan 2024 08:00)  POCT Blood Glucose.: 283 mg/dL (02 Jan 2024 21:52)  POCT Blood Glucose.: 186 mg/dL (02 Jan 2024 17:07)  POCT Blood Glucose.: 242 mg/dL (02 Jan 2024 12:37)    I&O's Summary    02 Jan 2024 07:01  -  03 Jan 2024 07:00  --------------------------------------------------------  IN: 720 mL / OUT: 0 mL / NET: 720 mL        PHYSICAL EXAM:  Vital Signs Last 24 Hrs  T(C): 36.5 (03 Jan 2024 08:52), Max: 36.9 (02 Jan 2024 20:44)  T(F): 97.7 (03 Jan 2024 08:52), Max: 98.4 (02 Jan 2024 20:44)  HR: 84 (03 Jan 2024 08:52) (78 - 89)  BP: 105/59 (03 Jan 2024 08:52) (98/56 - 136/70)  BP(mean): --  RR: 18 (03 Jan 2024 08:52) (18 - 20)  SpO2: 97% (03 Jan 2024 08:52) (96% - 98%)    Parameters below as of 03 Jan 2024 05:35  Patient On (Oxygen Delivery Method): room air        CONSTITUTIONAL: NAD, thin, slowed affect, jaundice   ABDOMEN: distended   MUSCLOSKELETAL: no clubbing or cyanosis of digits; no joint swelling or tenderness to palpation  PSYCH: A+O to person, place, and time; affect slowed    LABS:                        8.1    11.75 )-----------( 84       ( 03 Jan 2024 06:27 )             23.7     01-03    133<L>  |  98  |  94<H>  ----------------------------<  116<H>  4.3   |  16<L>  |  5.21<H>    Ca    9.3      03 Jan 2024 06:23  Phos  5.4     01-02  Mg     2.4     01-02    TPro  5.7<L>  /  Alb  3.6  /  TBili  13.3<H>  /  DBili  x   /  AST  112<H>  /  ALT  12  /  AlkPhos  560<H>  01-03    PT/INR - ( 03 Jan 2024 06:27 )   PT: 14.0 sec;   INR: 1.34 ratio         PTT - ( 03 Jan 2024 06:27 )  PTT:35.8 sec      Urinalysis Basic - ( 03 Jan 2024 06:23 )    Color: x / Appearance: x / SG: x / pH: x  Gluc: 116 mg/dL / Ketone: x  / Bili: x / Urobili: x   Blood: x / Protein: x / Nitrite: x   Leuk Esterase: x / RBC: x / WBC x   Sq Epi: x / Non Sq Epi: x / Bacteria: x        Culture - Urine (collected 01 Jan 2024 10:04)  Source: Clean Catch Clean Catch (Midstream)  Final Report (02 Jan 2024 10:19):    <10,000 CFU/mL Normal Urogenital Fabienne        RADIOLOGY & ADDITIONAL TESTS:  Results Reviewed:   Imaging Personally Reviewed:  Electrocardiogram Personally Reviewed:    COORDINATION OF CARE:  Care Discussed with Consultants/Other Providers [Y/N]:  Prior or Outpatient Records Reviewed [Y/N]:

## 2024-01-03 NOTE — PROGRESS NOTE ADULT - PROBLEM SELECTOR PLAN 1
Recently diagnosed alcoholic cirrhosis   last drink several months ago will check PETH  Accepted as transfer to [facility] for transplant eval, appreicate recs  c/w Thiamine, B12, Folic Acid, lactulose   IR consult placed for paracentesis planned for Wednesday as he is gettign HD today    Patient previously on nadolol per outside hospital notes; unclear why held - f/u GI recs  MELD 32  HE: present on exam Lactulose 10 TID ammonia low  EV: unk will need screening EGD at some point  HCC screening: will need MRI- US  done pending result  PV: thrombus in main portal vein- will discuss w/ hepatology   Ascites: seen on exam, plan for para on 1/3  Bleeding hx: no s/s of bleeding Recently diagnosed alcoholic cirrhosis   last drink several months ago will check PETH  Accepted as transfer to [facility] for transplant eval, appreicate recs  c/w Thiamine, B12, Folic Acid, lactulose   IR consult placed for paracentesis planned for Wednesday as he is gettign HD today    Patient previously on nadolol per outside hospital notes; unclear why held - f/u GI recs  MELD 32  HE: present on exam Lactulose 10 TID ammonia low  EV: unk will need screening EGD at some point  HCC screening: will need MRI- US  done pending result  PV: thrombus in main portal vein- will discuss w/ hepatology   Ascites: seen on exam, plan for para on 1/3  Bleeding hx: no s/s of bleeding Recently diagnosed alcoholic cirrhosis   last drink several months ago will check PETH  Accepted as transfer to [facility] for transplant eval, appreicate recs  c/w Thiamine, B12, Folic Acid, lactulose   IR consult placed for paracentesis pending  Patient previously on nadolol per outside hospital notes; unclear why held - f/u GI recs  MELD 32  HE: present on exam Lactulose 10 TID ammonia low  EV: unk will need screening EGD at some point  HCC screening: will need MRI- US  done pending result  PV: thrombus in main portal vein- will discuss w/ hepatology if needs a/c  Ascites: seen on exam, plan for para on 1/3  Bleeding hx: no s/s of bleeding Recently diagnosed alcoholic cirrhosis   last drink several months ago will check PETH  Accepted as transfer to [facility] for transplant eval, appreicate recs  c/w Thiamine, B12, Folic Acid, lactulose   IR consult placed for paracentesis pending  Patient previously on nadolol per outside hospital notes; unclear why held - f/u GI recs  MELD 32  HE: present on exam Lactulose 10 TID ammonia low  EV: unk will need screening EGD at some point  HCC screening: will need MRI- US  done pending result  PV: thrombus in main portal vein- will discuss w/ hepatology if needs a/c  Ascites: seen on exam, plan for para on 1/3  Bleeding hx: no s/s of bleeding

## 2024-01-03 NOTE — PRE PROCEDURE NOTE - PRE PROCEDURE EVALUATION
Interventional Radiology    HPI: 59 y/o M PMH prior EtOH use with alcoholic cirrhosis (diagnosed 3 weeks ago) and DM2 presenting from OSH for transplant eval and RAHUL requiring new HD initiated. Per HCP, patient first diagnosed with cirrhosis 3 weeks ago after found lethargic. Underwent abdominal paracentesis with large volume fluid removal at that time. Initially planned for HD today however renal holidng off until tomorrow in order for patient to undergo paracentesis today with plan for 1:1 albumin replacement.     Allergies: No Known Allergies    Medications (Abx/Cardiac/Anticoagulation/Blood Products)  meropenem  IVPB: 100 mL/Hr IV Intermittent (01-03 @ 05:30)  midodrine.: 10 milliGRAM(s) Oral (01-03 @ 05:31)    Data:    T(C): 36.5  HR: 84  BP: 105/59  RR: 18  SpO2: 97%    Exam  General: No acute distress  Chest: Non labored breathing  Abdomen: distended      -WBC 11.75 / HgB 8.1 / Hct 23.7 / Plt 84  -Na 133 / Cl 98 / BUN 94 / Glucose 116  -K 4.3 / CO2 16 / Cr 5.21  -ALT 12 / Alk Phos 560 / T.Bili 13.3  -INR1.34    Imaging: Reviewed     Plan: 58y Male presents for diagnostic and therapeutic paracentesis with 1:1 albumin replacement.   -Risks/Benefits/alternatives explained with the patient and/or healthcare proxy and witnessed informed consent obtained.    Interventional Radiology    HPI: 57 y/o M PMH prior EtOH use with alcoholic cirrhosis (diagnosed 3 weeks ago) and DM2 presenting from OSH for transplant eval and RAHUL requiring new HD initiated. Per HCP, patient first diagnosed with cirrhosis 3 weeks ago after found lethargic. Underwent abdominal paracentesis with large volume fluid removal at that time. Initially planned for HD today however renal holidng off until tomorrow in order for patient to undergo paracentesis today with plan for 1:1 albumin replacement.     Allergies: No Known Allergies    Medications (Abx/Cardiac/Anticoagulation/Blood Products)  meropenem  IVPB: 100 mL/Hr IV Intermittent (01-03 @ 05:30)  midodrine.: 10 milliGRAM(s) Oral (01-03 @ 05:31)    Data:    T(C): 36.5  HR: 84  BP: 105/59  RR: 18  SpO2: 97%    Exam  General: No acute distress  Chest: Non labored breathing  Abdomen: distended      -WBC 11.75 / HgB 8.1 / Hct 23.7 / Plt 84  -Na 133 / Cl 98 / BUN 94 / Glucose 116  -K 4.3 / CO2 16 / Cr 5.21  -ALT 12 / Alk Phos 560 / T.Bili 13.3  -INR1.34    Imaging: Reviewed     Plan: 58y Male presents for diagnostic and therapeutic paracentesis with 1:1 albumin replacement.   -Risks/Benefits/alternatives explained with the patient and/or healthcare proxy and witnessed informed consent obtained.

## 2024-01-03 NOTE — PROGRESS NOTE ADULT - ASSESSMENT
58 year old man with h/o type II DM for ~ 6-7 years, was on metformin and Jardiance,  diagnosed with liver cirrhosis 3 weeks ago, complicated by ascites. He underwent paracentesis x 3 in the past 3 weeks. Course complicated by worsening RAHUL and encephalopathy. He was transferred to [facility] for liver transplant evaluation.  58 year old man with h/o type II DM for ~ 6-7 years, was on metformin and Jardiance,  diagnosed with liver cirrhosis 3 weeks ago, complicated by ascites. He underwent paracentesis x 3 in the past 3 weeks. Course complicated by worsening RAHUL and encephalopathy. He was transferred to [facility] for liver transplant evaluation.

## 2024-01-03 NOTE — PROGRESS NOTE ADULT - ATTENDING COMMENTS
A 58-year-old male with a past medical history of Type 2 Diabetes Mellitus and recently diagnosed alcoholic cirrhosis (3 weeks ago) was transferred from [facility] for a liver transplant evaluation due to decompensated liver cirrhosis. The patient, initially admitted to [facility] from assisted living, presented with dizziness and worsening altered mental status. Now with RAHUL on CKD. Suspect HRS Type 2. Noted Urine Na < 10. Agree with Octreotide 200 mcg tid, and Midodrine 10 mg tid. Noted Nephrology recommendations. HD # since 1/1/23.  Clinical findings suggest decompensated cirrhosis, likely due to alcohol and MASLD (MetALD).   Hepatic encephalopathy (HE), now resolved.     An infectious workup including blood, and urine has been negative so far.   Diagnostic paracentesis (1/3 by IR), negative for SBP.  Chest X-ray, pending.   Continue lactulose and rifaximin, Meropenem empiric.   Abdominal ultrasound revealed portal vein thrombosis, abnormal nonphasic flow in the IVC, and a vascular mass from the right kidney extending into the peritoneum, concerning for primary renal malignancy or metastatic disease.  Recommend contrast-enhanced MRI.   Urology and oncology consultations are advised.   Recommend ECHO as part of evaluation of ascites.   Lives in an assisted living facility. [place]   We will get SW review his support system.   Sacral decubitus+  Not a candidate for OLT with concerning findings of renal cancer with peritoneal/vascular extension.  Recommend an EGD as he might need AC (after review of MRI)  PT/Nutrition team to help improve his overall frailty/sarcopenia. A 58-year-old male with a past medical history of Type 2 Diabetes Mellitus and recently diagnosed alcoholic cirrhosis (3 weeks ago) was transferred from [facility] for a liver transplant evaluation due to decompensated liver cirrhosis. The patient, initially admitted to [facility] from assisted living, presented with dizziness and worsening altered mental status. Now with RAHUL on CKD. Suspect HRS Type 2. Noted Urine Na < 10. Agree with Octreotide 200 mcg tid, and Midodrine 10 mg tid. Noted Nephrology recommendations. HD # since 1/1/23.  Clinical findings suggest decompensated cirrhosis, likely due to alcohol and MASLD (MetALD).   Hepatic encephalopathy (HE), now resolved.     An infectious workup including blood, and urine has been negative so far.   Diagnostic paracentesis (1/3 by IR), negative for SBP.  Chest X-ray, pending.   Continue lactulose and rifaximin, Meropenem empiric.   Abdominal ultrasound revealed portal vein thrombosis, abnormal nonphasic flow in the IVC, and a vascular mass from the right kidney extending into the peritoneum, concerning for primary renal malignancy or metastatic disease.  Recommend contrast-enhanced MRI.   Urology and oncology consultations are advised.   Recommend ECHO as part of evaluation of ascites.   Lives in an assisted living facility. He has 4 children, [place].   We will get SW review his support system.   Sacral decubitus+  Not a candidate for OLT with concerning findings of renal cancer with peritoneal/vascular extension.  Recommend an EGD as he might need AC (after review of MRI)  PT/Nutrition team to help improve his overall frailty/sarcopenia.

## 2024-01-03 NOTE — PROGRESS NOTE ADULT - SUBJECTIVE AND OBJECTIVE BOX
[facility] DIVISION OF KIDNEY DISEASES AND HYPERTENSION   FOLLOW UP NOTE  --------------------------------------------------------------------------------  Chief Complaint: Pt with RAHUL requiring RRT, undergoing Liver transplant evaluation     24 hour events/subjective: Pt. was seen and examined today. He is somnolent this am but stated he is "feeling okay". Received HD #2 on 1/2/24 but treatment stopped after 1 hour due to low BP. Plan for HD #3 today with additional dose of midodrine prior to HD.     PAST HISTORY  --------------------------------------------------------------------------------  No significant changes to PMH, PSH, FHx, SHx, unless otherwise noted    ALLERGIES & MEDICATIONS  --------------------------------------------------------------------------------  Allergies  No Known Allergies    Intolerances    Standing Inpatient Medications  camphor 0.5%/menthol 0.5% Topical Lotion 1 Application(s) Topical two times a day  chlorhexidine 2% Cloths 1 Application(s) Topical daily  cyanocobalamin 1000 MICROGram(s) Oral daily  dextrose 5%. 1000 milliLiter(s) IV Continuous <Continuous>  dextrose 5%. 1000 milliLiter(s) IV Continuous <Continuous>  dextrose 50% Injectable 12.5 Gram(s) IV Push once  dextrose 50% Injectable 25 Gram(s) IV Push once  dextrose 50% Injectable 25 Gram(s) IV Push once  folic acid 1 milliGRAM(s) Oral daily  glucagon  Injectable 1 milliGRAM(s) IntraMuscular once  insulin glargine Injectable (LANTUS) 5 Unit(s) SubCutaneous at bedtime  insulin lispro (ADMELOG) corrective regimen sliding scale   SubCutaneous three times a day before meals  insulin lispro (ADMELOG) corrective regimen sliding scale   SubCutaneous at bedtime  lactulose Syrup 10 Gram(s) Oral four times a day  melatonin 1 milliGRAM(s) Oral at bedtime  meropenem  IVPB 500 milliGRAM(s) IV Intermittent every 12 hours  midodrine. 10 milliGRAM(s) Oral once  midodrine. 10 milliGRAM(s) Oral three times a day  octreotide  Injectable 200 MICROGram(s) SubCutaneous three times a day  pancrelipase  (CREON  6,000 Lipase Units) 1 Capsule(s) Oral three times a day with meals  tamsulosin 0.4 milliGRAM(s) Oral at bedtime  thiamine 100 milliGRAM(s) Oral daily    PRN Inpatient Medications  dextrose Oral Gel 15 Gram(s) Oral once PRN    REVIEW OF SYSTEMS  --------------------------------------------------------------------------------  Limited ROS     VITALS/PHYSICAL EXAM  --------------------------------------------------------------------------------  T(C): 36.5 (01-03-24 @ 08:52), Max: 36.9 (01-02-24 @ 20:44)  HR: 84 (01-03-24 @ 08:52) (78 - 89)  BP: 105/59 (01-03-24 @ 08:52) (98/56 - 136/70)  RR: 18 (01-03-24 @ 08:52) (18 - 20)  SpO2: 97% (01-03-24 @ 08:52) (96% - 98%)  Wt(kg): --  Height (cm): 188 (01-02-24 @ 05:00)  Weight (kg): 95.6 (01-02-24 @ 05:00)  BMI (kg/m2): 27 (01-02-24 @ 05:00)  BSA (m2): 2.22 (01-02-24 @ 05:00)      01-02-24 @ 07:01  -  01-03-24 @ 07:00  --------------------------------------------------------  IN: 720 mL / OUT: 0 mL / NET: 720 mL    Physical Exam:  	Gen: Chronically ill appearing but in no overt distress  	HEENT: Anicteric  	Pulm: CTA B/L  	CV: S1S2+  	Abd: Distended but soft, +BS   	Ext: RUE swelling  	Neuro: Somnolent   	Skin: Warm and dry  	Dialysis access: R fem non tunneled HD catheter     LABS/STUDIES  --------------------------------------------------------------------------------              8.1    11.75 >-----------<  84       [01-03-24 @ 06:27]              23.7     133  |  98  |  94  ----------------------------<  116      [01-03-24 @ 06:23]  4.3   |  16  |  5.21        Ca     9.3     [01-03-24 @ 06:23]      Mg     2.4     [01-02-24 @ 07:11]      Phos  5.4     [01-02-24 @ 07:11]    TPro  5.7  /  Alb  3.6  /  TBili  13.3  /  DBili  x   /  AST  112  /  ALT  12  /  AlkPhos  560  [01-03-24 @ 06:23]    PT/INR: PT 14.0 , INR 1.34       [01-03-24 @ 06:27]  PTT: 35.8       [01-03-24 @ 06:27]    Creatinine Trend:  SCr 5.21 [01-03 @ 06:23]  SCr 5.14 [01-02 @ 07:11]  SCr 4.71 [01-01 @ 11:17]  SCr 4.67 [12-31 @ 07:34]  SCr 4.14 [12-30 @ 22:00]    Urine Creatinine 206      [01-01-24 @ 06:49]  Urine Protein 83      [01-01-24 @ 06:49]  Urine Sodium 9      [12-31-23 @ 04:10]  Urine Urea Nitrogen 404      [12-31-23 @ 04:10]    HBsAg Nonreact      [12-31-23 @ 14:46]  HCV 0.19, Nonreact      [12-31-23 @ 14:46]  HIV Nonreact      [12-31-23 @ 14:46]    Free Light Chains: kappa 21.71, lambda 13.12, ratio = 1.65      [12-31 @ 14:46] [facility] DIVISION OF KIDNEY DISEASES AND HYPERTENSION   FOLLOW UP NOTE  --------------------------------------------------------------------------------  Chief Complaint: Pt with RAHUL requiring RRT, undergoing Liver transplant evaluation     24 hour events/subjective: Pt. was seen and examined today. He is somnolent this am but stated he is "feeling okay". Received HD #2 on 1/2/24 but treatment stopped after 1 hour due to low BP. Plan for HD #3 today with additional dose of midodrine prior to HD.     PAST HISTORY  --------------------------------------------------------------------------------  No significant changes to PMH, PSH, FHx, SHx, unless otherwise noted    ALLERGIES & MEDICATIONS  --------------------------------------------------------------------------------  Allergies  No Known Allergies    Intolerances    Standing Inpatient Medications  camphor 0.5%/menthol 0.5% Topical Lotion 1 Application(s) Topical two times a day  chlorhexidine 2% Cloths 1 Application(s) Topical daily  cyanocobalamin 1000 MICROGram(s) Oral daily  dextrose 5%. 1000 milliLiter(s) IV Continuous <Continuous>  dextrose 5%. 1000 milliLiter(s) IV Continuous <Continuous>  dextrose 50% Injectable 12.5 Gram(s) IV Push once  dextrose 50% Injectable 25 Gram(s) IV Push once  dextrose 50% Injectable 25 Gram(s) IV Push once  folic acid 1 milliGRAM(s) Oral daily  glucagon  Injectable 1 milliGRAM(s) IntraMuscular once  insulin glargine Injectable (LANTUS) 5 Unit(s) SubCutaneous at bedtime  insulin lispro (ADMELOG) corrective regimen sliding scale   SubCutaneous three times a day before meals  insulin lispro (ADMELOG) corrective regimen sliding scale   SubCutaneous at bedtime  lactulose Syrup 10 Gram(s) Oral four times a day  melatonin 1 milliGRAM(s) Oral at bedtime  meropenem  IVPB 500 milliGRAM(s) IV Intermittent every 12 hours  midodrine. 10 milliGRAM(s) Oral once  midodrine. 10 milliGRAM(s) Oral three times a day  octreotide  Injectable 200 MICROGram(s) SubCutaneous three times a day  pancrelipase  (CREON  6,000 Lipase Units) 1 Capsule(s) Oral three times a day with meals  tamsulosin 0.4 milliGRAM(s) Oral at bedtime  thiamine 100 milliGRAM(s) Oral daily    PRN Inpatient Medications  dextrose Oral Gel 15 Gram(s) Oral once PRN    REVIEW OF SYSTEMS  --------------------------------------------------------------------------------  Limited ROS     VITALS/PHYSICAL EXAM  --------------------------------------------------------------------------------  T(C): 36.5 (01-03-24 @ 08:52), Max: 36.9 (01-02-24 @ 20:44)  HR: 84 (01-03-24 @ 08:52) (78 - 89)  BP: 105/59 (01-03-24 @ 08:52) (98/56 - 136/70)  RR: 18 (01-03-24 @ 08:52) (18 - 20)  SpO2: 97% (01-03-24 @ 08:52) (96% - 98%)  Wt(kg): --  Height (cm): 188 (01-02-24 @ 05:00)  Weight (kg): 95.6 (01-02-24 @ 05:00)  BMI (kg/m2): 27 (01-02-24 @ 05:00)  BSA (m2): 2.22 (01-02-24 @ 05:00)      01-02-24 @ 07:01  -  01-03-24 @ 07:00  --------------------------------------------------------  IN: 720 mL / OUT: 0 mL / NET: 720 mL    Physical Exam:  	Gen: Chronically ill appearing but in no overt distress  	HEENT: Anicteric  	Pulm: CTA B/L  	CV: S1S2+  	Abd: Distended but soft, +BS   	Ext: RUE swelling  	Neuro: Somnolent   	Skin: Warm and dry  	Dialysis access: R fem non tunneled HD catheter     LABS/STUDIES  --------------------------------------------------------------------------------              8.1    11.75 >-----------<  84       [01-03-24 @ 06:27]              23.7     133  |  98  |  94  ----------------------------<  116      [01-03-24 @ 06:23]  4.3   |  16  |  5.21        Ca     9.3     [01-03-24 @ 06:23]      Mg     2.4     [01-02-24 @ 07:11]      Phos  5.4     [01-02-24 @ 07:11]    TPro  5.7  /  Alb  3.6  /  TBili  13.3  /  DBili  x   /  AST  112  /  ALT  12  /  AlkPhos  560  [01-03-24 @ 06:23]    PT/INR: PT 14.0 , INR 1.34       [01-03-24 @ 06:27]  PTT: 35.8       [01-03-24 @ 06:27]    Creatinine Trend:  SCr 5.21 [01-03 @ 06:23]  SCr 5.14 [01-02 @ 07:11]  SCr 4.71 [01-01 @ 11:17]  SCr 4.67 [12-31 @ 07:34]  SCr 4.14 [12-30 @ 22:00]    Urine Creatinine 206      [01-01-24 @ 06:49]  Urine Protein 83      [01-01-24 @ 06:49]  Urine Sodium 9      [12-31-23 @ 04:10]  Urine Urea Nitrogen 404      [12-31-23 @ 04:10]    HBsAg Nonreact      [12-31-23 @ 14:46]  HCV 0.19, Nonreact      [12-31-23 @ 14:46]  HIV Nonreact      [12-31-23 @ 14:46]    Free Light Chains: kappa 21.71, lambda 13.12, ratio = 1.65      [12-31 @ 14:46]

## 2024-01-04 NOTE — DIETITIAN INITIAL EVALUATION ADULT - PROBLEM SELECTOR PLAN 1
Recently diagnosed alcoholic cirrhosis   last drink several months ago   Accepted as transfer to [facility] for transplant eval  GI emailed  c/w Thiamine, B12, Folic Acid, lactulose   IR consult placed for paracentesis   Holding off on SBP PPx at this time  Patient previously on nadolol per outside hospital notes; unclear why held - f/u GI recs  MELD 32    Plan:  >f/u GI recs  >ctm CMP, MELD, coags  >Blood cultures ordered for transplant eval workup  >f/u IR for paracentesis  >f/u Abdominal US with hepatic doppler Recently diagnosed alcoholic cirrhosis   last drink several months ago   Accepted as transfer to [facility] for transplant eval  GI emailed  c/w Thiamine, B12, Folic Acid, lactulose   IR consult placed for paracentesis   Holding off on SBP PPx at this time  Patient previously on nadolol per outside hospital notes; unclear why held - f/u GI recs  MELD 32    Plan:  >f/u GI recs  >ctm CMP, MELD, coags  >Blood cultures ordered for transplant eval workup  >f/u IR for paracentesis  >f/u Abdominal US with hepatic doppler

## 2024-01-04 NOTE — PROGRESS NOTE ADULT - SUBJECTIVE AND OBJECTIVE BOX
[facility] DIVISION OF KIDNEY DISEASES AND HYPERTENSION   FOLLOW UP NOTE  --------------------------------------------------------------------------------  Chief Complaint: Pt with RAHUL requiring RRT, undergoing Liver transplant evaluation     24 hour events/subjective: Pt. was seen and examined today. He remains somnolent this am but stated he is "feeling okay". Received HD #2 on 1/2/24 but treatment stopped after 1 hour due to low BP. Underwent large volume paracentesis on 1/3/23. Anticipated HD #3 today but BP remains low, will hold off on HD today.     PAST HISTORY  --------------------------------------------------------------------------------  No significant changes to PMH, PSH, FHx, SHx, unless otherwise noted    ALLERGIES & MEDICATIONS  --------------------------------------------------------------------------------  Allergies  No Known Allergies    Intolerances    Standing Inpatient Medications  camphor 0.5%/menthol 0.5% Topical Lotion 1 Application(s) Topical two times a day  chlorhexidine 2% Cloths 1 Application(s) Topical daily  chlorhexidine 4% Liquid 1 Application(s) Topical <User Schedule>  cyanocobalamin 1000 MICROGram(s) Oral daily  dextrose 5%. 1000 milliLiter(s) IV Continuous <Continuous>  dextrose 5%. 1000 milliLiter(s) IV Continuous <Continuous>  dextrose 50% Injectable 25 Gram(s) IV Push once  dextrose 50% Injectable 25 Gram(s) IV Push once  dextrose 50% Injectable 12.5 Gram(s) IV Push once  folic acid 1 milliGRAM(s) Oral daily  glucagon  Injectable 1 milliGRAM(s) IntraMuscular once  insulin glargine Injectable (LANTUS) 5 Unit(s) SubCutaneous at bedtime  insulin lispro (ADMELOG) corrective regimen sliding scale   SubCutaneous at bedtime  insulin lispro (ADMELOG) corrective regimen sliding scale   SubCutaneous three times a day before meals  lactulose Syrup 10 Gram(s) Oral four times a day  melatonin 1 milliGRAM(s) Oral at bedtime  midodrine. 10 milliGRAM(s) Oral once  midodrine. 10 milliGRAM(s) Oral three times a day  octreotide  Injectable 200 MICROGram(s) SubCutaneous three times a day  pancrelipase  (CREON  6,000 Lipase Units) 1 Capsule(s) Oral three times a day with meals  tamsulosin 0.4 milliGRAM(s) Oral at bedtime  thiamine 100 milliGRAM(s) Oral daily    PRN Inpatient Medications  dextrose Oral Gel 15 Gram(s) Oral once PRN  sodium chloride 0.9% lock flush 10 milliLiter(s) IV Push every 1 hour PRN    REVIEW OF SYSTEMS  --------------------------------------------------------------------------------  Limited ROS     VITALS/PHYSICAL EXAM  --------------------------------------------------------------------------------  T(C): 36.3 (01-04-24 @ 09:16), Max: 36.6 (01-04-24 @ 05:08)  HR: 81 (01-04-24 @ 14:08) (77 - 83)  BP: 93/54 (01-04-24 @ 14:08) (87/52 - 104/53)  RR: 18 (01-04-24 @ 09:16) (17 - 18)  SpO2: 97% (01-04-24 @ 09:16) (96% - 97%)  Wt(kg): --    01-03-24 @ 07:01  -  01-04-24 @ 07:00  --------------------------------------------------------  IN: 200 mL / OUT: 480 mL / NET: -280 mL    Physical Exam:  	Gen: Chronically ill appearing but in no overt distress  	HEENT: Anicteric  	Pulm: CTA B/L  	CV: S1S2+  	Abd: Distended but soft, +BS   	Ext: RUE swelling  	Neuro: Somnolent   	Skin: Warm and dry  	Dialysis access: R IJ non tunneled HD catheter     LABS/STUDIES  --------------------------------------------------------------------------------              7.9    10.35 >-----------<  84       [01-04-24 @ 06:21]              23.5     133  |  97  |  105  ----------------------------<  118      [01-04-24 @ 06:20]  4.5   |  15  |  5.73        Ca     9.2     [01-04-24 @ 06:20]    TPro  5.3  /  Alb  3.5  /  TBili  12.3  /  DBili  x   /  AST  104  /  ALT  10  /  AlkPhos  503  [01-04-24 @ 06:20]    PT/INR: PT 14.4 , INR 1.32       [01-04-24 @ 06:22]  PTT: 37.7       [01-04-24 @ 06:22]    Creatinine Trend:  SCr 5.73 [01-04 @ 06:20]  SCr 5.21 [01-03 @ 06:23]  SCr 5.14 [01-02 @ 07:11]  SCr 4.71 [01-01 @ 11:17]  SCr 4.67 [12-31 @ 07:34]    Urine Creatinine 206      [01-01-24 @ 06:49]  Urine Protein 83      [01-01-24 @ 06:49]  Urine Sodium 9      [12-31-23 @ 04:10]  Urine Urea Nitrogen 404      [12-31-23 @ 04:10]    HBsAg Nonreact      [12-31-23 @ 14:46]  HCV 0.19, Nonreact      [12-31-23 @ 14:46]  HIV Nonreact      [12-31-23 @ 14:46]    JOHNNY: titer 1:160, pattern Speckled      [12-31-23 @ 14:46]  Free Light Chains: kappa 21.71, lambda 13.12, ratio = 1.65      [12-31 @ 14:46] [facility] DIVISION OF KIDNEY DISEASES AND HYPERTENSION   FOLLOW UP NOTE  --------------------------------------------------------------------------------  Chief Complaint: Pt with RAHUL requiring RRT, undergoing Liver transplant evaluation     24 hour events/subjective: Pt. was seen and examined today. He remains somnolent this am but stated he is "feeling okay". Received HD #2 on 1/2/24 but treatment stopped after 1 hour due to low BP. Underwent large volume paracentesis on 1/3/23. Anticipated HD #3 today but BP remains low, will hold off on HD today.     PAST HISTORY  --------------------------------------------------------------------------------  No significant changes to PMH, PSH, FHx, SHx, unless otherwise noted    ALLERGIES & MEDICATIONS  --------------------------------------------------------------------------------  Allergies  No Known Allergies    Intolerances    Standing Inpatient Medications  camphor 0.5%/menthol 0.5% Topical Lotion 1 Application(s) Topical two times a day  chlorhexidine 2% Cloths 1 Application(s) Topical daily  chlorhexidine 4% Liquid 1 Application(s) Topical <User Schedule>  cyanocobalamin 1000 MICROGram(s) Oral daily  dextrose 5%. 1000 milliLiter(s) IV Continuous <Continuous>  dextrose 5%. 1000 milliLiter(s) IV Continuous <Continuous>  dextrose 50% Injectable 25 Gram(s) IV Push once  dextrose 50% Injectable 25 Gram(s) IV Push once  dextrose 50% Injectable 12.5 Gram(s) IV Push once  folic acid 1 milliGRAM(s) Oral daily  glucagon  Injectable 1 milliGRAM(s) IntraMuscular once  insulin glargine Injectable (LANTUS) 5 Unit(s) SubCutaneous at bedtime  insulin lispro (ADMELOG) corrective regimen sliding scale   SubCutaneous at bedtime  insulin lispro (ADMELOG) corrective regimen sliding scale   SubCutaneous three times a day before meals  lactulose Syrup 10 Gram(s) Oral four times a day  melatonin 1 milliGRAM(s) Oral at bedtime  midodrine. 10 milliGRAM(s) Oral once  midodrine. 10 milliGRAM(s) Oral three times a day  octreotide  Injectable 200 MICROGram(s) SubCutaneous three times a day  pancrelipase  (CREON  6,000 Lipase Units) 1 Capsule(s) Oral three times a day with meals  tamsulosin 0.4 milliGRAM(s) Oral at bedtime  thiamine 100 milliGRAM(s) Oral daily    PRN Inpatient Medications  dextrose Oral Gel 15 Gram(s) Oral once PRN  sodium chloride 0.9% lock flush 10 milliLiter(s) IV Push every 1 hour PRN    REVIEW OF SYSTEMS  --------------------------------------------------------------------------------  Limited ROS     VITALS/PHYSICAL EXAM  --------------------------------------------------------------------------------  T(C): 36.3 (01-04-24 @ 09:16), Max: 36.6 (01-04-24 @ 05:08)  HR: 81 (01-04-24 @ 14:08) (77 - 83)  BP: 93/54 (01-04-24 @ 14:08) (87/52 - 104/53)  RR: 18 (01-04-24 @ 09:16) (17 - 18)  SpO2: 97% (01-04-24 @ 09:16) (96% - 97%)  Wt(kg): --    01-03-24 @ 07:01  -  01-04-24 @ 07:00  --------------------------------------------------------  IN: 200 mL / OUT: 480 mL / NET: -280 mL    Physical Exam:  	Gen: Chronically ill appearing but in no overt distress  	HEENT: Anicteric  	Pulm: CTA B/L  	CV: S1S2+  	Abd: Distended but soft, +BS   	Ext: RUE swelling  	Neuro: Somnolent   	Skin: Warm and dry  	Dialysis access: R IJ non tunneled HD catheter     LABS/STUDIES  --------------------------------------------------------------------------------              7.9    10.35 >-----------<  84       [01-04-24 @ 06:21]              23.5     133  |  97  |  105  ----------------------------<  118      [01-04-24 @ 06:20]  4.5   |  15  |  5.73        Ca     9.2     [01-04-24 @ 06:20]    TPro  5.3  /  Alb  3.5  /  TBili  12.3  /  DBili  x   /  AST  104  /  ALT  10  /  AlkPhos  503  [01-04-24 @ 06:20]    PT/INR: PT 14.4 , INR 1.32       [01-04-24 @ 06:22]  PTT: 37.7       [01-04-24 @ 06:22]    Creatinine Trend:  SCr 5.73 [01-04 @ 06:20]  SCr 5.21 [01-03 @ 06:23]  SCr 5.14 [01-02 @ 07:11]  SCr 4.71 [01-01 @ 11:17]  SCr 4.67 [12-31 @ 07:34]    Urine Creatinine 206      [01-01-24 @ 06:49]  Urine Protein 83      [01-01-24 @ 06:49]  Urine Sodium 9      [12-31-23 @ 04:10]  Urine Urea Nitrogen 404      [12-31-23 @ 04:10]    HBsAg Nonreact      [12-31-23 @ 14:46]  HCV 0.19, Nonreact      [12-31-23 @ 14:46]  HIV Nonreact      [12-31-23 @ 14:46]    JOHNNY: titer 1:160, pattern Speckled      [12-31-23 @ 14:46]  Free Light Chains: kappa 21.71, lambda 13.12, ratio = 1.65      [12-31 @ 14:46]

## 2024-01-04 NOTE — PROGRESS NOTE ADULT - SUBJECTIVE AND OBJECTIVE BOX
Interval Events:   -HD today  -EGD canceled for today, will plan for tomorrow    ROS:   12 point review of systems performed and negative except otherwise noted in HPI.    Hospital Medications:  camphor 0.5%/menthol 0.5% Topical Lotion 1 Application(s) Topical two times a day  chlorhexidine 2% Cloths 1 Application(s) Topical daily  chlorhexidine 4% Liquid 1 Application(s) Topical <User Schedule>  cyanocobalamin 1000 MICROGram(s) Oral daily  dextrose 5%. 1000 milliLiter(s) IV Continuous <Continuous>  dextrose 5%. 1000 milliLiter(s) IV Continuous <Continuous>  dextrose 50% Injectable 12.5 Gram(s) IV Push once  dextrose 50% Injectable 25 Gram(s) IV Push once  dextrose 50% Injectable 25 Gram(s) IV Push once  dextrose Oral Gel 15 Gram(s) Oral once PRN  folic acid 1 milliGRAM(s) Oral daily  glucagon  Injectable 1 milliGRAM(s) IntraMuscular once  insulin glargine Injectable (LANTUS) 5 Unit(s) SubCutaneous at bedtime  insulin lispro (ADMELOG) corrective regimen sliding scale   SubCutaneous three times a day before meals  insulin lispro (ADMELOG) corrective regimen sliding scale   SubCutaneous at bedtime  lactulose Syrup 10 Gram(s) Oral four times a day  melatonin 1 milliGRAM(s) Oral at bedtime  midodrine. 20 milliGRAM(s) Oral three times a day  midodrine. 10 milliGRAM(s) Oral once  octreotide  Injectable 200 MICROGram(s) SubCutaneous three times a day  pancrelipase  (CREON  6,000 Lipase Units) 1 Capsule(s) Oral three times a day with meals  sodium chloride 0.9% lock flush 10 milliLiter(s) IV Push every 1 hour PRN  tamsulosin 0.4 milliGRAM(s) Oral at bedtime  thiamine 100 milliGRAM(s) Oral daily      PHYSICAL EXAM:   Vital Signs:  Vital Signs Last 24 Hrs  T(C): 36.3 (04 Jan 2024 09:16), Max: 36.6 (04 Jan 2024 05:08)  T(F): 97.4 (04 Jan 2024 09:16), Max: 97.9 (04 Jan 2024 05:08)  HR: 81 (04 Jan 2024 14:08) (77 - 83)  BP: 93/54 (04 Jan 2024 14:08) (87/52 - 104/53)  BP(mean): --  RR: 18 (04 Jan 2024 09:16) (17 - 18)  SpO2: 97% (04 Jan 2024 09:16) (96% - 97%)    Parameters below as of 04 Jan 2024 05:08  Patient On (Oxygen Delivery Method): room air      Daily     Daily     GEN: NAD, normocephalic  CVS: normal hr   CHEST: reg resp rate, no increased WOB  ABD: soft, nontender, distended  EXTR: no b/l LE edema. RUE edematous R>L  NEURO: A&Ox3    LABS: reviewed                        7.9    10.35 )-----------( 84       ( 04 Jan 2024 06:21 )             23.5     01-04    133<L>  |  97  |  105<H>  ----------------------------<  118<H>  4.5   |  15<L>  |  5.73<H>    Ca    9.2      04 Jan 2024 06:20    TPro  5.3<L>  /  Alb  3.5  /  TBili  12.3<H>  /  DBili  x   /  AST  104<H>  /  ALT  10  /  AlkPhos  503<H>  01-04    LIVER FUNCTIONS - ( 04 Jan 2024 06:20 )  Alb: 3.5 g/dL / Pro: 5.3 g/dL / ALK PHOS: 503 U/L / ALT: 10 U/L / AST: 104 U/L / GGT: x             Interval Diagnostic Studies: see sunrise for full report

## 2024-01-04 NOTE — DIETITIAN INITIAL EVALUATION ADULT - ADD RECOMMEND
1) Liberalize to regular, low sodium diet   -Monitor need for renal and Consistent carbohydrate diet   2) Add Ensure Max x1/day; monitor need for Nepro   3) Add Nephro-maribel, folic acid and thiamine daily   4) Monitor PO intake, diet tolerance, weight trends, labs, GI function, and skin integrity    Jessica Mast MS, RDN, CDN ([employer])

## 2024-01-04 NOTE — DIETITIAN INITIAL EVALUATION ADULT - PERTINENT LABORATORY DATA
01-04    133<L>  |  97  |  105<H>  ----------------------------<  118<H>  4.5   |  15<L>  |  5.73<H>    Ca    9.2      04 Jan 2024 06:20    TPro  5.3<L>  /  Alb  3.5  /  TBili  12.3<H>  /  DBili  x   /  AST  104<H>  /  ALT  10  /  AlkPhos  503<H>  01-04  POCT Blood Glucose.: 120 mg/dL (01-04-24 @ 11:36)  A1C with Estimated Average Glucose Result: 5.2 % (12-31-23 @ 07:34)

## 2024-01-04 NOTE — PROGRESS NOTE ADULT - ATTENDING COMMENTS
A 58-year-old male with a past medical history of Type 2 Diabetes Mellitus and recently diagnosed alcoholic cirrhosis (3 weeks ago) was transferred from [facility] for a liver transplant evaluation due to decompensated liver cirrhosis. The patient, initially admitted to [facility] from assisted living, presented with dizziness and worsening altered mental status. Now with RAHUL on CKD. Suspect HRS Type 2. Noted Urine Na < 10. Agree with Octreotide 200 mcg tid, and Midodrine 10 mg tid. Noted Nephrology recommendations. HD # since 1/1/23.  Clinical findings suggest decompensated cirrhosis, likely due to alcohol and MASLD (MetALD).   Hepatic encephalopathy (HE), now resolved.     An infectious workup including blood, and urine has been negative so far.   Diagnostic paracentesis (1/3 by IR), negative for SBP.  Continue lactulose and rifaximin, Meropenem empiric.   Abdominal ultrasound revealed portal vein thrombosis, abnormal nonphasic flow in the IVC, and a vascular mass from the right kidney extending into the peritoneum, concerning for primary renal malignancy or metastatic disease.  Recommend contrast-enhanced MRI.   Urology and oncology consultations are advised.   Nephrology recommendation appreciated. Noted, patient started with HD.  TTE- looks OK with ejection fraction of 77 %   Sacral decubitus+  Not a candidate for OLT with concerning findings of renal cancer with peritoneal/vascular extension.  Lacks social support.  EGD pending- scheduled for 1/5. Might need AC (after review of MRI and EGD)  PT/Nutrition team to help improve his overall frailty/sarcopenia. A 58-year-old male with a past medical history of Type 2 Diabetes Mellitus and recently diagnosed alcoholic cirrhosis (3 weeks ago) was transferred from [facility] for a liver transplant evaluation due to decompensated liver cirrhosis. The patient, initially admitted to [facility] from assisted living, presented with dizziness and worsening altered mental status. Now with RAHUL on CKD. Suspect HRS Type 2. Noted Urine Na < 10. Agree with Octreotide 200 mcg tid, and Midodrine 10 mg tid. Noted Nephrology recommendations. HD # since 1/1/23.  Clinical findings suggest decompensated cirrhosis, likely due to alcohol and MASLD (MetALD).   Hepatic encephalopathy (HE), now resolved.     An infectious workup including blood, and urine has been negative so far.   Diagnostic paracentesis (1/3 by IR), negative for SBP.  Continue lactulose and rifaximin, Meropenem empiric.   Abdominal ultrasound revealed portal vein thrombosis, abnormal nonphasic flow in the IVC, and a vascular mass from the right kidney extending into the peritoneum, concerning for primary renal malignancy or metastatic disease.  Recommend contrast-enhanced MRI.   Urology and oncology consultations are advised.   Nephrology recommendation appreciated. Noted, patient started with HD.  TTE- looks OK with ejection fraction of 77 %   Sacral decubitus+  Not a candidate for OLT with concerning findings of renal cancer with peritoneal/vascular extension.  Lacks social support.  EGD pending- scheduled for 1/5. Might need AC (after review of MRI and EGD)  PT/Nutrition team to help improve his overall frailty/sarcopenia.

## 2024-01-04 NOTE — PROGRESS NOTE ADULT - ASSESSMENT
Alert-The patient is alert, awake and responds to voice. The patient is oriented to time, place, and person. The triage nurse is able to obtain subjective information. 57 y/o M PMH DM2, recently diagnosed alcoholic cirrhosis (3 weeks ago) presenting as a transfer from [facility] for transplant eval for decompensated liver cirrhosis. Patient initially presented to [facility] (from assisted living) last week with dizziness and worsening AMS.    #Decompensated cirrhosis likely 2/2 alcohol and MASLD  MELD 3.0 1/04/24 - 33  Blood Type AB+  HE: presented with AMS  EV: unknown (no prior EGD/c-scope)  HCC screening: will need MRI   Ascites: s/p paracentesis 1/02 neg for SBP  Bleeding hx: no s/s of bleeding   #RAHUL   #Frailty and muscle weakness/pain     Recommendations:   - NPO at midnight for EGD tomorrow 1/05- cannot start AC for PVT until assess for esophageal varices  - rec additional imaging, urology & onc consults regarding rt kidney mass lesion extending into peritoneum  - please obtain TTE  - recommend SW evaluation re social support  - appreciate nephro recs, agree with midodrine/octreotide, HD as per nephro  - recommend transplant ID consult, patient with elevated WBC on ruben - no clear inf source  - c/w ABx for now  - continue with lactulose and titrate to 3-5BM daily   - start rifaximin   - f/u AI w/u, EBV, CMV, PETH  - avoid hepatotoxic agents    All recommendations preliminary until note signed by service attending.    Thank you for involving us in the care of this patient. Please contact should any concern or questions arise.    Matt Arredondo MD   Gastroenterology/Hepatology Fellow PGY-5  Available on Microsoft Teams 7am - 5pm  [email or web address]    NON-URGENT CONSULTS:  Please email:  [email or web address]   OR  [email or web address]    After 5pm, please contact the on-call GI fellow. [phone]    AT NIGHT AND ON WEEKENDS:  Contact on-call GI fellow via answering service ([phone]) from 5pm-8am and on weekends/holidays       57 y/o M PMH DM2, recently diagnosed alcoholic cirrhosis (3 weeks ago) presenting as a transfer from [facility] for transplant eval for decompensated liver cirrhosis. Patient initially presented to [facility] (from assisted living) last week with dizziness and worsening AMS.    #Decompensated cirrhosis likely 2/2 alcohol and MASLD  MELD 3.0 1/04/24 - 33  Blood Type AB+  HE: presented with AMS  EV: unknown (no prior EGD/c-scope)  HCC screening: will need MRI   Ascites: s/p paracentesis 1/02 neg for SBP  Bleeding hx: no s/s of bleeding   #RAHUL   #Frailty and muscle weakness/pain     Recommendations:   - NPO at midnight for EGD tomorrow 1/05- cannot start AC for PVT until assess for esophageal varices  - rec additional imaging, urology & onc consults regarding rt kidney mass lesion extending into peritoneum  - please obtain TTE  - recommend SW evaluation re social support  - appreciate nephro recs, agree with midodrine/octreotide, HD as per nephro  - recommend transplant ID consult, patient with elevated WBC on ruben - no clear inf source  - c/w ABx for now  - continue with lactulose and titrate to 3-5BM daily   - start rifaximin   - f/u AI w/u, EBV, CMV, PETH  - avoid hepatotoxic agents    All recommendations preliminary until note signed by service attending.    Thank you for involving us in the care of this patient. Please contact should any concern or questions arise.    Matt Arredondo MD   Gastroenterology/Hepatology Fellow PGY-5  Available on Microsoft Teams 7am - 5pm  [email or web address]    NON-URGENT CONSULTS:  Please email:  [email or web address]   OR  [email or web address]    After 5pm, please contact the on-call GI fellow. [phone]    AT NIGHT AND ON WEEKENDS:  Contact on-call GI fellow via answering service ([phone]) from 5pm-8am and on weekends/holidays

## 2024-01-04 NOTE — PROGRESS NOTE ADULT - ATTENDING COMMENTS
58 year old man with h/o type II DM for ~ 6-7 years, was on metformin and Jardiance, recently diagnosed with liver cirrhosis complicated by ascites. He underwent paracentesis x 3. Course complicated by worsening RAHUL and encephalopathy. He was transferred to [facility] for liver transplant evaluation. He was initiated on HD on 12/31/23.     Renal US with normal sized kidneys measuring ~ 11.6 and 11.8 cm. No hydro. Multilobulated vascular mass arises from the right kidney extending into the peritoneum. Findings are concerning for primary renal malignancy versus metastatic disease.     S/p large volume paracentesis yesterday. Had albumin replacement 1:2. BP soft.   Somnolent, ascites improved, no significant LE edema  Right IJ temporary cathter     RAHUL on CKD - unclear baseline renal function. No significant proteinuria UPCR 0.4, normal sized kidneys on US. Initiated HD on 12/31  Unable to tolerate HD due to low BP. Lytes reviewed. No indication for urgent HD  Increase midodrine and try HD tomorrow with albumin support as needed  MRI to evaluate right renal mass 58 year old man with h/o type II DM for ~ 6-7 years, was on metformin and Jardiance, recently diagnosed with liver cirrhosis complicated by ascites. He underwent paracentesis x 3. Course complicated by worsening RAHUL and encephalopathy. He was transferred to [facility] for liver transplant evaluation. He was initiated on HD on 12/31/23.     Renal US with normal sized kidneys measuring ~ 11.6 and 11.8 cm. No hydro. Multilobulated vascular mass arises from the right kidney extending into the peritoneum. Findings are concerning for primary renal malignancy versus metastatic disease.     S/p large volume paracentesis yesterday. Had albumin replacement 1:2. BP soft.   Somnolent, ascites improved, no significant LE edema  Right IJ temporary cathter     RAHUL on CKD - unclear baseline renal function. No significant proteinuria UPCR 0.4, normal sized kidneys on US. Initiated HD on 12/31  Unable to tolerate HD due to low BP. Lytes reviewed. No indication for urgent HD  Increase midodrine and try HD tomorrow with albumin support as needed  MRI to evaluate right renal mass

## 2024-01-04 NOTE — DIETITIAN INITIAL EVALUATION ADULT - PROBLEM SELECTOR PLAN 7
WBC 13.5 unclear etiology   Patient on meropenem at OSH however no information on reason why at this time     >c/w meropenem until further information obtained

## 2024-01-04 NOTE — PROGRESS NOTE ADULT - PROBLEM SELECTOR PLAN 1
Recently diagnosed alcoholic cirrhosis   last drink several months ago will check PETH  Accepted as transfer to [facility] for transplant eval, appreicate recs  c/w Thiamine, B12, Folic Acid, lactulose   IR consult placed for paracentesis pending  Patient previously on nadolol per outside hospital notes; unclear why held - f/u GI recs  MELD 32  HE: present on exam Lactulose 10 TID ammonia low  EV: unk will need screening EGD after MRI before deciding about A/C for PVT  HCC screening: MRI pending  PV: thrombus in main portal vein- will need EGD for variceal eval prior to starting AC  Ascites: seen on exam, para 1.3 negataive for SBP  Bleeding hx: no s/s of bleeding Recently diagnosed alcoholic cirrhosis   last drink several months ago will check PETH  Accepted as transfer to [facility] for transplant eval, appreicate recs  c/w Thiamine, B12, Folic Acid, lactulose   IR consult placed for paracentesis pending  Patient previously on nadolol per outside hospital notes; unclear why held - f/u GI recs  MELD 32  HE: present on exam Lactulose 10 TID ammonia low  EV: unk will need screening EGD after MRI before deciding about A/C for PVT  HCC screening: MRI pending  PV: thrombus in main portal vein- will need EGD for variceal eval prior to starting AC  Ascites: seen on exam, para 1.3 negataive for SBP  Bleeding hx: no s/s of bleeding

## 2024-01-04 NOTE — DIETITIAN INITIAL EVALUATION ADULT - PROBLEM SELECTOR PLAN 2
Cr 4.14, per HCP Baseline previously ~2   C/f Hepatorenal syndrome   Started albumin, octreotide and midodrine at OSH    Plan:  >nephro consult in AM   >c/w albumin, octreotide and midodrine  >Strict I/Os   >f/u urine studies  >f/u PVR  >Ctm Cr

## 2024-01-04 NOTE — DIETITIAN INITIAL EVALUATION ADULT - PROBLEM SELECTOR PLAN 8
Patient's sister notified.    Diet: DASH with 1.2 L fluid restriction  Dispo: Medically active  DVT:SCDs  Code status: Full code; HCP form in chart    Please call [facility] in AM for further information Diet: DASH with 1.2 L fluid restriction  Dispo: Medically active  DVT:SCDs  Code status: Full code; HCP form in chart    Please call [facility] in AM for further information

## 2024-01-04 NOTE — DIETITIAN INITIAL EVALUATION ADULT - OTHER INFO
GI/Intake:  -Per RN, good PO intake of lunch today consuming a majority of the tray   -Last BM documented ; Lactulose ordered   -Noted on Creon in-house and PTA - unknown etiology   -Presenting with decompensated cirrhosis likely 2/2 alcohol and MASLD  -S/p para --> 11.3L removed (1/3)     Endo:   -Hx of DM; latest A1c: 5.2% - controlled   -No DM medication noted in H&P  -5 units long acting and sliding scale insulin ordered in-house     Renal:  -RAHUL on CKD  -Scheduled for iHD today   -Hyponatremic    Weight Hx:  -Current dosin pounds   -No additional weights noted     LIVER TRANSPLANT EVALUATION:   -Pt resides at assisted living   -BMI: 27.1 - overweight   -HbA1c: 5.2% - controlled DM (hx of DM)  -Frailty: [pending PT evaluation]   -MELD: 33 (1/3)     Unable to educate at this time; will review the following as applicable:     -post-transplant nutrition therapy and food safety guidelines for transplant recipients   -recommendations to avoid grapefruit, pomegranate and star fruit while taking immunosuppressant medication.    -recommendations for moderate intake of sodium and carbohydrates with transplant medications.     Pt with no known nutrition contraindication to transplant.     Nutrition assessment communicated with Transplant Hepatology Team and outpatient Transplant RDs

## 2024-01-04 NOTE — PROGRESS NOTE ADULT - PROBLEM SELECTOR PLAN 2
New renal mass seen on US rec for CT IV contrast but with RAHUL on HD.  Discussed with radiology who say MR w/o contrast will suffice.  -MRI w/ contrast pending today at 4pm  -Urology consult and then ONncology consult if concerning for malignancy on MRI

## 2024-01-04 NOTE — PROGRESS NOTE ADULT - ASSESSMENT
58 year old man with h/o type II DM for ~ 6-7 years, was on metformin and Jardiance,  diagnosed with liver cirrhosis 3 weeks ago, complicated by ascites. He underwent paracentesis x 3 in the past 3 weeks. Course complicated by worsening RAHUL and encephalopathy. He was transferred to [facility] for liver transplant evaluation.  58 year old man with h/o type II DM for ~ 6-7 years, was on metformin and Jardiance,  diagnosed with liver cirrhosis 3 weeks ago, complicated by ascites. He underwent paracentesis x 3 in the past 3 weeks. Course complicated by worsening RAHUL and encephalopathy. He was transferred to [facility] for liver transplant evaluation.

## 2024-01-04 NOTE — DIETITIAN INITIAL EVALUATION ADULT - NS FNS DIET ORDER
Diet, DASH/TLC:   Sodium & Cholesterol Restricted  1200mL Fluid Restriction ([id number]) (12-30-23 @ 19:46) [Active]       Diet, DASH/TLC:   Sodium & Cholesterol Restricted  1200mL Fluid Restriction ([id number]) (12-30-23 @ 19:46) [Active]

## 2024-01-04 NOTE — PROGRESS NOTE ADULT - NSPROGADDITIONALINFOA_GEN_ALL_CORE
updated HCP on mass and the fact that he is getting more confused, and I am concerned about his overall prognosis during this hospitalization.  She understands that we are still gathering information and have no definitive prognosis at this point.  MRI for further eval of mass

## 2024-01-04 NOTE — DIETITIAN INITIAL EVALUATION ADULT - ORAL INTAKE PTA/DIET HISTORY
Unable to obtain subjective information as Pt was noted to be confused and lethargic.    Per chart:   -NKFA

## 2024-01-04 NOTE — DIETITIAN INITIAL EVALUATION ADULT - PERTINENT MEDS FT
MEDICATIONS  (STANDING):  camphor 0.5%/menthol 0.5% Topical Lotion 1 Application(s) Topical two times a day  chlorhexidine 2% Cloths 1 Application(s) Topical daily  chlorhexidine 4% Liquid 1 Application(s) Topical <User Schedule>  cyanocobalamin 1000 MICROGram(s) Oral daily  dextrose 5%. 1000 milliLiter(s) (100 mL/Hr) IV Continuous <Continuous>  dextrose 5%. 1000 milliLiter(s) (50 mL/Hr) IV Continuous <Continuous>  dextrose 50% Injectable 12.5 Gram(s) IV Push once  dextrose 50% Injectable 25 Gram(s) IV Push once  dextrose 50% Injectable 25 Gram(s) IV Push once  folic acid 1 milliGRAM(s) Oral daily  glucagon  Injectable 1 milliGRAM(s) IntraMuscular once  insulin glargine Injectable (LANTUS) 5 Unit(s) SubCutaneous at bedtime  insulin lispro (ADMELOG) corrective regimen sliding scale   SubCutaneous three times a day before meals  insulin lispro (ADMELOG) corrective regimen sliding scale   SubCutaneous at bedtime  lactulose Syrup 10 Gram(s) Oral four times a day  melatonin 1 milliGRAM(s) Oral at bedtime  midodrine. 10 milliGRAM(s) Oral once  midodrine. 10 milliGRAM(s) Oral three times a day  octreotide  Injectable 200 MICROGram(s) SubCutaneous three times a day  pancrelipase  (CREON  6,000 Lipase Units) 1 Capsule(s) Oral three times a day with meals  tamsulosin 0.4 milliGRAM(s) Oral at bedtime  thiamine 100 milliGRAM(s) Oral daily    MEDICATIONS  (PRN):  dextrose Oral Gel 15 Gram(s) Oral once PRN Blood Glucose LESS THAN 70 milliGRAM(s)/deciliter  sodium chloride 0.9% lock flush 10 milliLiter(s) IV Push every 1 hour PRN Pre/post blood products, medications, blood draw, and to maintain line patency

## 2024-01-04 NOTE — DIETITIAN INITIAL EVALUATION ADULT - WEIGHT (LBS)
210

## 2024-01-04 NOTE — PROGRESS NOTE ADULT - PROBLEM SELECTOR PLAN 1
Pt with RAHUL v RAHUL on CKD - unclear baseline renal function. No significant proteinuria with UPCR 0.4. Course complicated by metabolic encephalopathy from liver/kidney dysfunction, metabolic acidosis in the setting of renal failure and decompensated liver failure with ascites, without overt significant peripheral edema. Initiated on HD on 12/31/23 due to worsening renal function with oliguria and metabolic derangements. Underwent HD #1 on 12/31 and #2 on 1/2/24 but treatment stopped early due to low BP. Planned for HD #3 today via R IJ non tunneled HD catheter but BP remains low. Will hold off on HD today (no urgent indications). Recommend increasing Midodrine to 20mg TID and will attempt HD on 1/5/23 if BP improved. Renal US demonstrating normal sized kidneys with no hydronephrosis but Multilobulated vascular mass arises from the right kidney extending into the peritoneum. Findings are concerning for primary renal malignancy versus metastatic disease. Recommend MR abdomen with contrast for further evaluation. Monitor labs and urine output. Avoid NSAIDs, ACEI/ARBS, RCA and nephrotoxins. Dose medications as per eGFR.

## 2024-01-04 NOTE — PROGRESS NOTE ADULT - SUBJECTIVE AND OBJECTIVE BOX
PROGRESS NOTE:   Reena Tan, DO  Hospitalist  Teams  After 5pm/weekends or if no answer ext: [phone]      Patient is a 58y old  Male who presents with a chief complaint of Liver Transplant Eval (03 Jan 2024 18:11)      SUBJECTIVE / OVERNIGHT EVENTS: More lethargic this morning.     ADDITIONAL REVIEW OF SYSTEMS:  NO fever or chills no n/v/d    MEDICATIONS  (STANDING):  camphor 0.5%/menthol 0.5% Topical Lotion 1 Application(s) Topical two times a day  chlorhexidine 2% Cloths 1 Application(s) Topical daily  chlorhexidine 4% Liquid 1 Application(s) Topical <User Schedule>  cyanocobalamin 1000 MICROGram(s) Oral daily  dextrose 5%. 1000 milliLiter(s) (100 mL/Hr) IV Continuous <Continuous>  dextrose 5%. 1000 milliLiter(s) (50 mL/Hr) IV Continuous <Continuous>  dextrose 50% Injectable 25 Gram(s) IV Push once  dextrose 50% Injectable 25 Gram(s) IV Push once  dextrose 50% Injectable 12.5 Gram(s) IV Push once  folic acid 1 milliGRAM(s) Oral daily  glucagon  Injectable 1 milliGRAM(s) IntraMuscular once  insulin glargine Injectable (LANTUS) 5 Unit(s) SubCutaneous at bedtime  insulin lispro (ADMELOG) corrective regimen sliding scale   SubCutaneous three times a day before meals  insulin lispro (ADMELOG) corrective regimen sliding scale   SubCutaneous at bedtime  lactulose Syrup 10 Gram(s) Oral four times a day  melatonin 1 milliGRAM(s) Oral at bedtime  midodrine. 10 milliGRAM(s) Oral once  midodrine. 10 milliGRAM(s) Oral once  midodrine. 10 milliGRAM(s) Oral three times a day  octreotide  Injectable 200 MICROGram(s) SubCutaneous three times a day  pancrelipase  (CREON  6,000 Lipase Units) 1 Capsule(s) Oral three times a day with meals  tamsulosin 0.4 milliGRAM(s) Oral at bedtime  thiamine 100 milliGRAM(s) Oral daily    MEDICATIONS  (PRN):  dextrose Oral Gel 15 Gram(s) Oral once PRN Blood Glucose LESS THAN 70 milliGRAM(s)/deciliter  sodium chloride 0.9% lock flush 10 milliLiter(s) IV Push every 1 hour PRN Pre/post blood products, medications, blood draw, and to maintain line patency      CAPILLARY BLOOD GLUCOSE      POCT Blood Glucose.: 131 mg/dL (04 Jan 2024 06:50)  POCT Blood Glucose.: 195 mg/dL (03 Jan 2024 21:27)  POCT Blood Glucose.: 148 mg/dL (03 Jan 2024 17:02)    I&O's Summary    03 Jan 2024 07:01  -  04 Jan 2024 07:00  --------------------------------------------------------  IN: 200 mL / OUT: 480 mL / NET: -280 mL        PHYSICAL EXAM:  Vital Signs Last 24 Hrs  T(C): 36.3 (04 Jan 2024 09:16), Max: 36.6 (03 Jan 2024 12:01)  T(F): 97.4 (04 Jan 2024 09:16), Max: 97.9 (03 Jan 2024 12:01)  HR: 82 (04 Jan 2024 09:16) (77 - 83)  BP: 88/50 (04 Jan 2024 09:16) (88/50 - 109/75)  BP(mean): --  RR: 18 (04 Jan 2024 09:16) (16 - 18)  SpO2: 97% (04 Jan 2024 09:16) (96% - 98%)    Parameters below as of 04 Jan 2024 05:08  Patient On (Oxygen Delivery Method): room air        CONSTITUTIONAL: NAD, ill appearing, lethargic   ABDOMEN:  Distended   MUSCLOSKELETAL: no clubbing or cyanosis of digits; no joint swelling or tenderness to palpation  PSYCH: A+O to person, place, and time; affect appropriate    LABS:                        7.9    10.35 )-----------( 84       ( 04 Jan 2024 06:21 )             23.5     01-04    133<L>  |  97  |  105<H>  ----------------------------<  118<H>  4.5   |  15<L>  |  5.73<H>    Ca    9.2      04 Jan 2024 06:20    TPro  5.3<L>  /  Alb  3.5  /  TBili  12.3<H>  /  DBili  x   /  AST  104<H>  /  ALT  10  /  AlkPhos  503<H>  01-04    PT/INR - ( 04 Jan 2024 06:22 )   PT: 14.4 sec;   INR: 1.32 ratio         PTT - ( 04 Jan 2024 06:22 )  PTT:37.7 sec      Urinalysis Basic - ( 04 Jan 2024 06:20 )    Color: x / Appearance: x / SG: x / pH: x  Gluc: 118 mg/dL / Ketone: x  / Bili: x / Urobili: x   Blood: x / Protein: x / Nitrite: x   Leuk Esterase: x / RBC: x / WBC x   Sq Epi: x / Non Sq Epi: x / Bacteria: x        Culture - Fungal, Body Fluid (collected 03 Jan 2024 17:41)  Source: Peritoneal Peritoneal Fluid  Preliminary Report (04 Jan 2024 10:32):    Testing in progress    Culture - Body Fluid with Gram Stain (collected 03 Jan 2024 17:41)  Source: Peritoneal Peritoneal Fluid  Gram Stain (04 Jan 2024 05:48):    No polymorphonuclear cells seen    No organisms seen    by cytocentrifuge        RADIOLOGY & ADDITIONAL TESTS:  Results Reviewed:   Imaging Personally Reviewed:  Electrocardiogram Personally Reviewed:    COORDINATION OF CARE:  Care Discussed with Consultants/Other Providers [Y/N]:  Prior or Outpatient Records Reviewed [Y/N]:   PROGRESS NOTE:   Reena Tan, DO  Hospitalist  Teams  After 5pm/weekends or if no answer ext: [phone]      Patient is a 58y old  Male who presents with a chief complaint of Liver Transplant Eval (03 Jan 2024 18:11)      SUBJECTIVE / OVERNIGHT EVENTS: More lethargic this morning.     ADDITIONAL REVIEW OF SYSTEMS:  NO fever or chills no n/v/d    MEDICATIONS  (STANDING):  camphor 0.5%/menthol 0.5% Topical Lotion 1 Application(s) Topical two times a day  chlorhexidine 2% Cloths 1 Application(s) Topical daily  chlorhexidine 4% Liquid 1 Application(s) Topical <User Schedule>  cyanocobalamin 1000 MICROGram(s) Oral daily  dextrose 5%. 1000 milliLiter(s) (100 mL/Hr) IV Continuous <Continuous>  dextrose 5%. 1000 milliLiter(s) (50 mL/Hr) IV Continuous <Continuous>  dextrose 50% Injectable 25 Gram(s) IV Push once  dextrose 50% Injectable 25 Gram(s) IV Push once  dextrose 50% Injectable 12.5 Gram(s) IV Push once  folic acid 1 milliGRAM(s) Oral daily  glucagon  Injectable 1 milliGRAM(s) IntraMuscular once  insulin glargine Injectable (LANTUS) 5 Unit(s) SubCutaneous at bedtime  insulin lispro (ADMELOG) corrective regimen sliding scale   SubCutaneous three times a day before meals  insulin lispro (ADMELOG) corrective regimen sliding scale   SubCutaneous at bedtime  lactulose Syrup 10 Gram(s) Oral four times a day  melatonin 1 milliGRAM(s) Oral at bedtime  midodrine. 10 milliGRAM(s) Oral once  midodrine. 10 milliGRAM(s) Oral once  midodrine. 10 milliGRAM(s) Oral three times a day  octreotide  Injectable 200 MICROGram(s) SubCutaneous three times a day  pancrelipase  (CREON  6,000 Lipase Units) 1 Capsule(s) Oral three times a day with meals  tamsulosin 0.4 milliGRAM(s) Oral at bedtime  thiamine 100 milliGRAM(s) Oral daily    MEDICATIONS  (PRN):  dextrose Oral Gel 15 Gram(s) Oral once PRN Blood Glucose LESS THAN 70 milliGRAM(s)/deciliter  sodium chloride 0.9% lock flush 10 milliLiter(s) IV Push every 1 hour PRN Pre/post blood products, medications, blood draw, and to maintain line patency      CAPILLARY BLOOD GLUCOSE      POCT Blood Glucose.: 131 mg/dL (04 Jan 2024 06:50)  POCT Blood Glucose.: 195 mg/dL (03 Jan 2024 21:27)  POCT Blood Glucose.: 148 mg/dL (03 Jan 2024 17:02)    I&O's Summary    03 Jan 2024 07:01  -  04 Jan 2024 07:00  --------------------------------------------------------  IN: 200 mL / OUT: 480 mL / NET: -280 mL        PHYSICAL EXAM:  Vital Signs Last 24 Hrs  T(C): 36.3 (04 Jan 2024 09:16), Max: 36.6 (03 Jan 2024 12:01)  T(F): 97.4 (04 Jan 2024 09:16), Max: 97.9 (03 Jan 2024 12:01)  HR: 82 (04 Jan 2024 09:16) (77 - 83)  BP: 88/50 (04 Jan 2024 09:16) (88/50 - 109/75)  BP(mean): --  RR: 18 (04 Jan 2024 09:16) (16 - 18)  SpO2: 97% (04 Jan 2024 09:16) (96% - 98%)    Parameters below as of 04 Jan 2024 05:08  Patient On (Oxygen Delivery Method): room air        CONSTITUTIONAL: NAD, ill appearing, lethargic   ABDOMEN:  Distended   MUSCLOSKELETAL: no clubbing or cyanosis of digits; no joint swelling or tenderness to palpation  PSYCH: A+O to person, place, and time; affect appropriate    LABS:                        7.9    10.35 )-----------( 84       ( 04 Jan 2024 06:21 )             23.5     01-04    133<L>  |  97  |  105<H>  ----------------------------<  118<H>  4.5   |  15<L>  |  5.73<H>    Ca    9.2      04 Jan 2024 06:20    TPro  5.3<L>  /  Alb  3.5  /  TBili  12.3<H>  /  DBili  x   /  AST  104<H>  /  ALT  10  /  AlkPhos  503<H>  01-04    PT/INR - ( 04 Jan 2024 06:22 )   PT: 14.4 sec;   INR: 1.32 ratio         PTT - ( 04 Jan 2024 06:22 )  PTT:37.7 sec      Urinalysis Basic - ( 04 Jan 2024 06:20 )    Color: x / Appearance: x / SG: x / pH: x  Gluc: 118 mg/dL / Ketone: x  / Bili: x / Urobili: x   Blood: x / Protein: x / Nitrite: x   Leuk Esterase: x / RBC: x / WBC x   Sq Epi: x / Non Sq Epi: x / Bacteria: x        Culture - Fungal, Body Fluid (collected 03 Jan 2024 17:41)  Source: Peritoneal Peritoneal Fluid  Preliminary Report (04 Jan 2024 10:32):    Testing in progress    Culture - Body Fluid with Gram Stain (collected 03 Jan 2024 17:41)  Source: Peritoneal Peritoneal Fluid  Gram Stain (04 Jan 2024 05:48):    No polymorphonuclear cells seen    No organisms seen    by cytocentrifuge        RADIOLOGY & ADDITIONAL TESTS:  Results Reviewed:   Imaging Personally Reviewed:  Electrocardiogram Personally Reviewed:    COORDINATION OF CARE:  Care Discussed with Consultants/Other Providers [Y/N]:  Prior or Outpatient Records Reviewed [Y/N]:

## 2024-01-04 NOTE — DIETITIAN INITIAL EVALUATION ADULT - REASON FOR ADMISSION
"57 y/o M PMH prior EtOH use with alcoholic cirrhosis (diagnosed 3 weeks ago) and DM2 presenting from OSH for transplant eval and RAHUL"

## 2024-01-05 NOTE — PROGRESS NOTE ADULT - PROBLEM SELECTOR PROBLEM 8
Leukocytosis
Leukocytosis
Need for prophylactic measure
Leukocytosis

## 2024-01-05 NOTE — PROGRESS NOTE ADULT - ASSESSMENT
58 year old man with h/o type II DM for ~ 6-7 years, was on metformin and Jardiance,  diagnosed with liver cirrhosis 3 weeks ago, complicated by ascites. He underwent paracentesis x 3 in the past 3 weeks. Course complicated by worsening RAHUL and encephalopathy. He was transferred to [facility] for liver transplant evaluation.  58 year old man with h/o type II DM for ~ 6-7 years, was on metformin and Jardiance,  diagnosed with liver cirrhosis 3 weeks ago, complicated by ascites. He underwent paracentesis x 3 in the past 3 weeks. Course complicated by worsening RAHUL and encephalopathy. He was transferred to [facility] for liver transplant evaluation.

## 2024-01-05 NOTE — PROGRESS NOTE ADULT - PROBLEM SELECTOR PLAN 8
UTI- on Meropenem from OSH but urine cx negative can stop after 5 days of treatment.
Diet: DASH with 1.2 L fluid restriction  Dispo: Medically active  DVT:SCDs  Code status: Full code; HCP form in chart
UTI- on Meropenem from OSH but urine cx negative completed 5 days of epimeric treatment.  -If develops fever/tachycardia or any other signs would consult transplant ID.  Will monitor off abx and repeat sepsis w/u if decompensates.
UTI- on Meropenem from OSH but urine cx negative completed 5 days of emperic treatment.  -If develops fever/tachycardia or any other signs would consult transplant ID.  Will monitor off abx and repeat sepsis w/u if decompensates.

## 2024-01-05 NOTE — PROGRESS NOTE ADULT - PROBLEM SELECTOR PLAN 9
Diet: DASH with 1.2 L fluid restriction  Dispo: Medically active  DVT:SCDs  Code status: Full code; HCP form in chart
Diet: DASH with 1.2 L fluid restriction  Dispo: Medically active  DVT:SCDs  Code status: Full code; HCP form in chart
Diet: DASH with 1.2 L fluid restriction  Dispo: Medically active  DVT:SCDs  Code status: Full code; HCP form in chart  updated 1/4

## 2024-01-05 NOTE — PROGRESS NOTE ADULT - ASSESSMENT
57 y/o M PMH prior EtOH use with alcoholic cirrhosis (diagnosed 3 weeks ago) and DM2 presenting from OSH for transplant eval and RAHUL new renal mass. 59 y/o M PMH prior EtOH use with alcoholic cirrhosis (diagnosed 3 weeks ago) and DM2 presenting from OSH for transplant eval and RAHUL new renal mass.

## 2024-01-05 NOTE — PROGRESS NOTE ADULT - NSPROGADDITIONALINFOA_GEN_ALL_CORE
updated: MICU consult for hypotension during HD - reviewed BPs w/ HD nurse- low was SPB 40s and max SBP 91 now.  Pt is symptomatic and dizzy.  Increased Midodrine dosing as well. updated: MICU consult for hypotension during HD - reviewed BPs w/ HD nurse- low was SPB 40s and max SBP 91 now.  Pt is symptomatic and dizzy.  Increased Midodrine dosing as well.    updated  Called HCP for update 1/5 left VM 12:33pm

## 2024-01-05 NOTE — PROGRESS NOTE ADULT - PROBLEM SELECTOR PROBLEM 7
Type 2 diabetes mellitus
Leukocytosis
Type 2 diabetes mellitus
Type 2 diabetes mellitus

## 2024-01-05 NOTE — CONSULT NOTE ADULT - ASSESSMENT
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    #Vascular Renal Mass  #Portal vein thrombosis  #GIB  - Unclear etiology of vascular renal mass, differentials include: renal angiosarcoma vs. anastamosing hemangioma of the kidney (a benign tumor) vs. mets  - Currently pending MR abdomen for further evaluation  - If concerning for malignancy on MR, recommend CT Chest, Pelvis for image completeness. Also would recommend CT neck to evaluate asymmetric parotid  - Monitor urine for hematuria  - Monitor CBC closely and maintain Hgb >7 and Plt >50K in setting of GIB  - GI on board, recs appreciated  >> Pt pending endoscopy, will f/u  - Unable to fully anticoagulate in setting of GIB, when stabilized will need to treat portal vein thrombosis      Rest of care as per primary team.  Thank you for the consult and will continue to follow along.    **Please be aware note/recommendations not finalized until attending addendum complete.**    Suzi Chi DO, MPH  Medical Oncology Fellow, PGY-8  *Can be reached via Teams, but please contact the on-call fellow after 5pm-8am on weekdays and on weekends.

## 2024-01-05 NOTE — PROGRESS NOTE ADULT - ATTENDING COMMENTS
58 year old man with h/o type II DM for ~ 6-7 years, was on metformin and Jardiance, recently diagnosed with liver cirrhosis complicated by ascites. He underwent paracentesis x 3. Course complicated by worsening RAHUL and encephalopathy. He was transferred to [facility] for liver transplant evaluation. He was initiated on HD on 12/31/23.     Renal US with normal sized kidneys measuring ~ 11.6 and 11.8 cm. No hydro. Multilobulated vascular mass arises from the right kidney extending into the peritoneum. Findings are concerning for primary renal malignancy versus metastatic disease. MRI pending.       S/p large volume paracentesis 1/3/24 with 11 liter fluid removal.    Unable to tolerate HD yesterday or today due to hypotension unresponsive to increase midodrine dose.   Somnolent, ascites improved, no significant LE edema  Right IJ temporary cathter     RAHUL on CKD - unclear baseline renal function. No significant proteinuria UPCR 0.4, normal sized kidneys on US. Initiated HD on 12/31  Unable to tolerate HD due to low BP. Labs with worsening BUN/Cr and metabolic acidosis. Suggest ICU consult for pressor support and possible CRRT.   MRI to evaluate right renal mass 58 year old man with h/o type II DM for ~ 6-7 years, was on metformin and Jardiance, recently diagnosed with liver cirrhosis complicated by ascites. He underwent paracentesis x 3. Course complicated by worsening RAHUL and encephalopathy. He was transferred to [facility] for liver transplant evaluation. He was initiated on HD on 12/31/23.     Renal US with normal sized kidneys measuring ~ 11.6 and 11.8 cm. No hydro. Multilobulated vascular mass arises from the right kidney extending into the peritoneum. Findings are concerning for primary renal malignancy versus metastatic disease. MRI pending.       S/p large volume paracentesis 1/3/24 with 11 liter fluid removal.    Unable to tolerate HD yesterday or today due to hypotension unresponsive to increase midodrine dose.   Somnolent, ascites improved, no significant LE edema  Right IJ temporary cathter     RAHUL on CKD - unclear baseline renal function. No significant proteinuria UPCR 0.4, normal sized kidneys on US. Initiated HD on 12/31  Unable to tolerate HD due to low BP. Labs with worsening BUN/Cr and metabolic acidosis. Suggest ICU consult for pressor support and possible CRRT.   MRI to evaluate right renal mass

## 2024-01-05 NOTE — PROGRESS NOTE ADULT - PROBLEM SELECTOR PLAN 1
Pt with RAHUL v RAHUL on CKD - unclear baseline renal function. No significant proteinuria with UPCR 0.4. Course complicated by metabolic encephalopathy from liver/kidney dysfunction, metabolic acidosis in the setting of renal failure and decompensated liver failure with ascites, without overt significant peripheral edema. Initiated on HD on 12/31/23 due to worsening renal function with oliguria and metabolic derangements. Underwent HD #1 on 12/31 and #2 on 1/2/24 but treatment stopped early due to low BP. Renal US demonstrating normal sized kidneys with no hydronephrosis but Multilobulated vascular mass arises from the right kidney extending into the peritoneum. Findings are concerning for primary renal malignancy versus metastatic disease. Recommend MR abdomen with contrast for further evaluation. Midodrine increased to 20mg TID and attempted HD #3 today but could not complete due to hypotension. As above, recommend MICU consult as it appears he will need IV vasopressors in order to tolerate HD. Recommend goals of care discussions, overall prognosis appears poor (especially if he is not a liver transplant candidate). Monitor labs and urine output. Avoid NSAIDs, ACEI/ARBS, RCA and nephrotoxins. Dose medications as per eGFR.

## 2024-01-05 NOTE — CHART NOTE - NSCHARTNOTEFT_GEN_A_CORE
MICU ACCEPT NOTE    CHIEF COMPLAINT: Patient is a 58y old  Male who presents with a chief complaint of Liver Transplant Eval (05 Jan 2024 11:06)      HPI:  Patient history limited by patient mental status. Spoke to family member/HCP Anatjononi Grajeda who also had limited information.   59 y/o M PMH DM2, recently diagnosed alcoholic cirrhosis (3 weeks ago) presenting as a transfer from [facility] for transplant eval for decompensated liver cirrhosis (accepted by Dr. Amezquita). Patient initially presented to [facility] (from assisted living) last week with dizziness and worsening AMS. Per HCP, patient first diagnosed with cirrhosis 3 weeks ago after found lethargic. Underwent abdominal paracentesis with large volume fluid removal at that time. Patient discharged to [facility] with hepatology followup. Patient represented to OSH with AMS, dizziness, and increased abdominal distension last week. Noted to have worsening renal function, however still able to produce urine.     At OSH, patient was started on albumin, octreotide, midodrine for concern for HRS. Also started on meropenem per discharge paperwork, no clear indication listed.     Patient directly transferred from [facility] to medicine Floors. Initial EKG demonstrating sinus rhythm with frequent PVCs.  (30 Dec 2023 19:56)      PAST MEDICAL & SURGICAL HISTORY:  Type 2 diabetes mellitus      Decompensated hepatic cirrhosis      No significant past surgical history          FAMILY HISTORY:  No pertinent family history in first degree relatives        SOCIAL HISTORY:  Smoking:   Substance Use:   EtOH Use:   Advance Directives:     MEDICATIONS  (STANDING):  camphor 0.5%/menthol 0.5% Topical Lotion 1 Application(s) Topical two times a day  chlorhexidine 2% Cloths 1 Application(s) Topical daily  chlorhexidine 4% Liquid 1 Application(s) Topical <User Schedule>  cyanocobalamin 1000 MICROGram(s) Oral daily  dextrose 5%. 1000 milliLiter(s) (100 mL/Hr) IV Continuous <Continuous>  dextrose 5%. 1000 milliLiter(s) (50 mL/Hr) IV Continuous <Continuous>  dextrose 50% Injectable 25 Gram(s) IV Push once  dextrose 50% Injectable 25 Gram(s) IV Push once  dextrose 50% Injectable 12.5 Gram(s) IV Push once  folic acid 1 milliGRAM(s) Oral daily  glucagon  Injectable 1 milliGRAM(s) IntraMuscular once  insulin glargine Injectable (LANTUS) 5 Unit(s) SubCutaneous at bedtime  insulin lispro (ADMELOG) corrective regimen sliding scale   SubCutaneous at bedtime  insulin lispro (ADMELOG) corrective regimen sliding scale   SubCutaneous three times a day before meals  lactulose Syrup 10 Gram(s) Oral four times a day  melatonin 1 milliGRAM(s) Oral at bedtime  midodrine. 30 milliGRAM(s) Oral three times a day  octreotide  Injectable 200 MICROGram(s) SubCutaneous three times a day  pancrelipase  (CREON  6,000 Lipase Units) 1 Capsule(s) Oral three times a day with meals  tamsulosin 0.4 milliGRAM(s) Oral at bedtime  thiamine 100 milliGRAM(s) Oral daily    MEDICATIONS  (PRN):  dextrose Oral Gel 15 Gram(s) Oral once PRN Blood Glucose LESS THAN 70 milliGRAM(s)/deciliter  sodium chloride 0.9% lock flush 10 milliLiter(s) IV Push every 1 hour PRN Pre/post blood products, medications, blood draw, and to maintain line patency      Allergies    No Known Allergies    Intolerances        REVIEW OF SYSTEMS:  CONSTITUTIONAL: No weakness, fevers or chills  EYES/ENT: No visual changes;  No vertigo or throat pain   NECK: No pain or stiffness  RESPIRATORY: No cough, wheezing, hemoptysis; No shortness of breath  CARDIOVASCULAR: No chest pain or palpitations  GASTROINTESTINAL: No abdominal or epigastric pain. No nausea, vomiting, or hematemesis; No diarrhea or constipation. No melena or hematochezia.  GENITOURINARY: No dysuria, frequency or hematuria  NEUROLOGICAL: No numbness or weakness  SKIN: No itching, rashes  [ ] All other systems negative  [ ] Unable to assess ROS because ________    OBJECTIVE:  ICU Vital Signs Last 24 Hrs  T(C): 36.2 (05 Jan 2024 10:12), Max: 36.8 (05 Jan 2024 08:48)  T(F): 97.2 (05 Jan 2024 10:12), Max: 98.3 (05 Jan 2024 08:48)  HR: 80 (05 Jan 2024 10:12) (78 - 91)  BP: 91/40 (05 Jan 2024 10:12) (87/52 - 109/75)  BP(mean): --  ABP: --  ABP(mean): --  RR: 17 (05 Jan 2024 10:12) (17 - 19)  SpO2: 100% (05 Jan 2024 10:12) (96% - 100%)    O2 Parameters below as of 05 Jan 2024 10:12  Patient On (Oxygen Delivery Method): room air              01-05 @ 07:01  -  01-05 @ 12:32  --------------------------------------------------------  IN: 800 mL / OUT: 0 mL / NET: 800 mL      CAPILLARY BLOOD GLUCOSE      POCT Blood Glucose.: 127 mg/dL (05 Jan 2024 12:02)      PHYSICAL EXAM:  GENERAL: NAD, lying in bed comfortably  HEAD:  Atraumatic, normocephalic  EYES: EOMI, PERRLA, conjunctiva and sclera clear  ENT: Moist mucous membranes  NECK: Supple, no JVD  HEART: S1, S2, regular rate and rhythm, no murmurs, rubs, or gallops  LUNGS: Unlabored respirations.  Clear to auscultation bilaterally, no crackles, wheezing, or rhonchi  ABDOMEN: Soft, nontender, nondistended, +BS  EXTREMITIES: 2+ peripheral pulses bilaterally. No clubbing, cyanosis, or edema  NERVOUS SYSTEM:  A&Ox3, no focal deficits   SKIN: No rashes or lesions  LINES:     LABS:                        7.9    10.35 )-----------( 84       ( 04 Jan 2024 06:21 )             23.5     Hgb Trend: 7.9<--, 8.1<--, 8.4<--, 8.3<--, 8.2<--  01-04    133<L>  |  97  |  105<H>  ----------------------------<  118<H>  4.5   |  15<L>  |  5.73<H>    Ca    9.2      04 Jan 2024 06:20    TPro  5.3<L>  /  Alb  3.5  /  TBili  12.3<H>  /  DBili  x   /  AST  104<H>  /  ALT  10  /  AlkPhos  503<H>  01-04    Creatinine Trend: 5.73<--, 5.21<--, 5.14<--, 4.71<--, 4.67<--, 4.14<--  PT/INR - ( 04 Jan 2024 06:22 )   PT: 14.4 sec;   INR: 1.32 ratio         PTT - ( 04 Jan 2024 06:22 )  PTT:37.7 sec  Urinalysis Basic - ( 04 Jan 2024 06:20 )    Color: x / Appearance: x / SG: x / pH: x  Gluc: 118 mg/dL / Ketone: x  / Bili: x / Urobili: x   Blood: x / Protein: x / Nitrite: x   Leuk Esterase: x / RBC: x / WBC x   Sq Epi: x / Non Sq Epi: x / Bacteria: x            MICROBIOLOGY:     RADIOLOGY & ADDITIONAL TESTS:    ASSESSMENT  YONATHAN MENDOZA is a 58y male with PMH *** in the hospital for 6d transferred to the MICU for ***.    PLAN  =====Neurologic=====  Patient is AOx4    =====Pulmonary=====  Patient breathing comfortably on room air    =====Cardiovascular=====  Patient does not currently require vasopressors and is normotensive    =====GI=====  #GERD  - Protonix 40mg IV QD    #Diet  - Full diet    =====Renal/=====  #Electrolytes  - Maintain K>4, Phos>3, Mag>2, iCal>1    =====Endocrine=====  #DM2  - While NPO, FSBG and ALEKS q6h    =====Infectious Disease=====  Patient is afebrile and is not currently being treated for any infection.    =====Heme/Onc=====  #DVT Ppx  - Lovenox 40mg SQ qd    =====Ethics=====  FULL CODE MICU ACCEPT NOTE    CHIEF COMPLAINT: Patient is a 58y old  Male who presents with a chief complaint of Liver Transplant Eval (05 Jan 2024 11:06)      HPI:  Patient history limited by patient mental status. Spoke to family member/HCP Anatjononi Grajeda who also had limited information.   59 y/o M PMH DM2, recently diagnosed alcoholic cirrhosis (3 weeks ago) presenting as a transfer from [facility] for transplant eval for decompensated liver cirrhosis (accepted by Dr. Amezquita). Patient initially presented to [facility] (from assisted living) last week with dizziness and worsening AMS. Per HCP, patient first diagnosed with cirrhosis 3 weeks ago after found lethargic. Underwent abdominal paracentesis with large volume fluid removal at that time. Patient discharged to [facility] with hepatology followup. Patient represented to OSH with AMS, dizziness, and increased abdominal distension last week. Noted to have worsening renal function, however still able to produce urine.     At OSH, patient was started on albumin, octreotide, midodrine for concern for HRS. Also started on meropenem per discharge paperwork, no clear indication listed.     Patient directly transferred from [facility] to medicine Floors. Initial EKG demonstrating sinus rhythm with frequent PVCs.  (30 Dec 2023 19:56)      PAST MEDICAL & SURGICAL HISTORY:  Type 2 diabetes mellitus      Decompensated hepatic cirrhosis      No significant past surgical history          FAMILY HISTORY:  No pertinent family history in first degree relatives        SOCIAL HISTORY:  Smoking:   Substance Use:   EtOH Use:   Advance Directives:     MEDICATIONS  (STANDING):  camphor 0.5%/menthol 0.5% Topical Lotion 1 Application(s) Topical two times a day  chlorhexidine 2% Cloths 1 Application(s) Topical daily  chlorhexidine 4% Liquid 1 Application(s) Topical <User Schedule>  cyanocobalamin 1000 MICROGram(s) Oral daily  dextrose 5%. 1000 milliLiter(s) (100 mL/Hr) IV Continuous <Continuous>  dextrose 5%. 1000 milliLiter(s) (50 mL/Hr) IV Continuous <Continuous>  dextrose 50% Injectable 25 Gram(s) IV Push once  dextrose 50% Injectable 25 Gram(s) IV Push once  dextrose 50% Injectable 12.5 Gram(s) IV Push once  folic acid 1 milliGRAM(s) Oral daily  glucagon  Injectable 1 milliGRAM(s) IntraMuscular once  insulin glargine Injectable (LANTUS) 5 Unit(s) SubCutaneous at bedtime  insulin lispro (ADMELOG) corrective regimen sliding scale   SubCutaneous at bedtime  insulin lispro (ADMELOG) corrective regimen sliding scale   SubCutaneous three times a day before meals  lactulose Syrup 10 Gram(s) Oral four times a day  melatonin 1 milliGRAM(s) Oral at bedtime  midodrine. 30 milliGRAM(s) Oral three times a day  octreotide  Injectable 200 MICROGram(s) SubCutaneous three times a day  pancrelipase  (CREON  6,000 Lipase Units) 1 Capsule(s) Oral three times a day with meals  tamsulosin 0.4 milliGRAM(s) Oral at bedtime  thiamine 100 milliGRAM(s) Oral daily    MEDICATIONS  (PRN):  dextrose Oral Gel 15 Gram(s) Oral once PRN Blood Glucose LESS THAN 70 milliGRAM(s)/deciliter  sodium chloride 0.9% lock flush 10 milliLiter(s) IV Push every 1 hour PRN Pre/post blood products, medications, blood draw, and to maintain line patency      Allergies    No Known Allergies    Intolerances        REVIEW OF SYSTEMS:  CONSTITUTIONAL: No weakness, fevers or chills  EYES/ENT: No visual changes;  No vertigo or throat pain   NECK: No pain or stiffness  RESPIRATORY: No cough, wheezing, hemoptysis; No shortness of breath  CARDIOVASCULAR: No chest pain or palpitations  GASTROINTESTINAL: No abdominal or epigastric pain. No nausea, vomiting, or hematemesis; No diarrhea or constipation. No melena or hematochezia.  GENITOURINARY: No dysuria, frequency or hematuria  NEUROLOGICAL: No numbness or weakness  SKIN: No itching, rashes  [ ] All other systems negative  [ ] Unable to assess ROS because ________    OBJECTIVE:  ICU Vital Signs Last 24 Hrs  T(C): 36.2 (05 Jan 2024 10:12), Max: 36.8 (05 Jan 2024 08:48)  T(F): 97.2 (05 Jan 2024 10:12), Max: 98.3 (05 Jan 2024 08:48)  HR: 80 (05 Jan 2024 10:12) (78 - 91)  BP: 91/40 (05 Jan 2024 10:12) (87/52 - 109/75)  BP(mean): --  ABP: --  ABP(mean): --  RR: 17 (05 Jan 2024 10:12) (17 - 19)  SpO2: 100% (05 Jan 2024 10:12) (96% - 100%)    O2 Parameters below as of 05 Jan 2024 10:12  Patient On (Oxygen Delivery Method): room air              01-05 @ 07:01  -  01-05 @ 12:32  --------------------------------------------------------  IN: 800 mL / OUT: 0 mL / NET: 800 mL      CAPILLARY BLOOD GLUCOSE      POCT Blood Glucose.: 127 mg/dL (05 Jan 2024 12:02)      PHYSICAL EXAM:  GENERAL: NAD, lying in bed comfortably  HEAD:  Atraumatic, normocephalic  EYES: EOMI, PERRLA, conjunctiva and sclera clear  ENT: Moist mucous membranes  NECK: Supple, no JVD  HEART: S1, S2, regular rate and rhythm, no murmurs, rubs, or gallops  LUNGS: Unlabored respirations.  Clear to auscultation bilaterally, no crackles, wheezing, or rhonchi  ABDOMEN: Soft, nontender, nondistended, +BS  EXTREMITIES: 2+ peripheral pulses bilaterally. No clubbing, cyanosis, or edema  NERVOUS SYSTEM:  A&Ox3, no focal deficits   SKIN: No rashes or lesions  LINES:     LABS:                        7.9    10.35 )-----------( 84       ( 04 Jan 2024 06:21 )             23.5     Hgb Trend: 7.9<--, 8.1<--, 8.4<--, 8.3<--, 8.2<--  01-04    133<L>  |  97  |  105<H>  ----------------------------<  118<H>  4.5   |  15<L>  |  5.73<H>    Ca    9.2      04 Jan 2024 06:20    TPro  5.3<L>  /  Alb  3.5  /  TBili  12.3<H>  /  DBili  x   /  AST  104<H>  /  ALT  10  /  AlkPhos  503<H>  01-04    Creatinine Trend: 5.73<--, 5.21<--, 5.14<--, 4.71<--, 4.67<--, 4.14<--  PT/INR - ( 04 Jan 2024 06:22 )   PT: 14.4 sec;   INR: 1.32 ratio         PTT - ( 04 Jan 2024 06:22 )  PTT:37.7 sec  Urinalysis Basic - ( 04 Jan 2024 06:20 )    Color: x / Appearance: x / SG: x / pH: x  Gluc: 118 mg/dL / Ketone: x  / Bili: x / Urobili: x   Blood: x / Protein: x / Nitrite: x   Leuk Esterase: x / RBC: x / WBC x   Sq Epi: x / Non Sq Epi: x / Bacteria: x            MICROBIOLOGY:     RADIOLOGY & ADDITIONAL TESTS:    ASSESSMENT  YONATHAN MENDOZA is a 58y male with PMH *** in the hospital for 6d transferred to the MICU for ***.    PLAN  =====Neurologic=====  Patient is AOx4    =====Pulmonary=====  Patient breathing comfortably on room air    =====Cardiovascular=====  Patient does not currently require vasopressors and is normotensive    =====GI=====  #GERD  - Protonix 40mg IV QD    #Diet  - Full diet    =====Renal/=====  #Electrolytes  - Maintain K>4, Phos>3, Mag>2, iCal>1    =====Endocrine=====  #DM2  - While NPO, FSBG and ALEKS q6h    =====Infectious Disease=====  Patient is afebrile and is not currently being treated for any infection.    =====Heme/Onc=====  #DVT Ppx  - Lovenox 40mg SQ qd    =====Ethics=====  FULL CODE MICU ACCEPT NOTE    CHIEF COMPLAINT: Patient is a 58y old  Male who presents with a chief complaint of Liver Transplant Eval (05 Jan 2024 11:06)      HPI:  Patient history limited by patient mental status. Spoke to family member/HCP Anatjononi Grajeda who also had limited information.   57 y/o M PMH DM2, recently diagnosed alcoholic cirrhosis (3 weeks ago) presenting as a transfer from [facility] for transplant eval for decompensated liver cirrhosis (accepted by Dr. Amezquita). Patient initially presented to [facility] (from assisted living) last week with dizziness and worsening AMS. Per HCP, patient first diagnosed with cirrhosis 3 weeks ago after found lethargic. Underwent abdominal paracentesis with large volume fluid removal at that time. Patient discharged to [facility] with hepatology followup. Patient represented to OSH with AMS, dizziness, and increased abdominal distension last week. Noted to have worsening renal function, however still able to produce urine.     At OSH, patient was started on albumin, octreotide, midodrine for concern for HRS. Also started on meropenem per discharge paperwork, no clear indication listed.     Patient directly transferred from [facility] to medicine Floors. Initial EKG demonstrating sinus rhythm with frequent PVCs.  (30 Dec 2023 19:56)    Pt has been admitted to floors for transplant eval and work-up of RAHUL/ new renal mass.  Renal US demonstrating normal sized kidneys with no hydronephrosis but Multilobulated vascular mass arises from the right kidney extending into the peritoneum. Findings are concerning for primary renal malignancy versus metastatic disease. , MRI with contrast pending to eval further. Pt also found to have RAHUL with creatinine of 4, baseline 2, transplant nephrology consulted and HD started. Pt completed 2 rounds of midodrine assisted HD (initiated on 12/31)  however unable to tolerate third session even on max dose of midodrine 20mgTID, MICU consulted for pressor assisted HD.       PAST MEDICAL & SURGICAL HISTORY:  Type 2 diabetes mellitus      Decompensated hepatic cirrhosis      No significant past surgical history          FAMILY HISTORY:  No pertinent family history in first degree relatives        SOCIAL HISTORY:  Smoking:   Substance Use:   EtOH Use:   Advance Directives:     MEDICATIONS  (STANDING):  camphor 0.5%/menthol 0.5% Topical Lotion 1 Application(s) Topical two times a day  chlorhexidine 2% Cloths 1 Application(s) Topical daily  chlorhexidine 4% Liquid 1 Application(s) Topical <User Schedule>  cyanocobalamin 1000 MICROGram(s) Oral daily  dextrose 5%. 1000 milliLiter(s) (100 mL/Hr) IV Continuous <Continuous>  dextrose 5%. 1000 milliLiter(s) (50 mL/Hr) IV Continuous <Continuous>  dextrose 50% Injectable 25 Gram(s) IV Push once  dextrose 50% Injectable 25 Gram(s) IV Push once  dextrose 50% Injectable 12.5 Gram(s) IV Push once  folic acid 1 milliGRAM(s) Oral daily  glucagon  Injectable 1 milliGRAM(s) IntraMuscular once  insulin glargine Injectable (LANTUS) 5 Unit(s) SubCutaneous at bedtime  insulin lispro (ADMELOG) corrective regimen sliding scale   SubCutaneous at bedtime  insulin lispro (ADMELOG) corrective regimen sliding scale   SubCutaneous three times a day before meals  lactulose Syrup 10 Gram(s) Oral four times a day  melatonin 1 milliGRAM(s) Oral at bedtime  midodrine. 30 milliGRAM(s) Oral three times a day  octreotide  Injectable 200 MICROGram(s) SubCutaneous three times a day  pancrelipase  (CREON  6,000 Lipase Units) 1 Capsule(s) Oral three times a day with meals  tamsulosin 0.4 milliGRAM(s) Oral at bedtime  thiamine 100 milliGRAM(s) Oral daily    MEDICATIONS  (PRN):  dextrose Oral Gel 15 Gram(s) Oral once PRN Blood Glucose LESS THAN 70 milliGRAM(s)/deciliter  sodium chloride 0.9% lock flush 10 milliLiter(s) IV Push every 1 hour PRN Pre/post blood products, medications, blood draw, and to maintain line patency      Allergies    No Known Allergies    Intolerances        REVIEW OF SYSTEMS:  CONSTITUTIONAL: No weakness, fevers or chills  EYES/ENT: No visual changes;  No vertigo or throat pain   NECK: No pain or stiffness  RESPIRATORY: No cough, wheezing, hemoptysis; No shortness of breath  CARDIOVASCULAR: No chest pain or palpitations  GASTROINTESTINAL: No abdominal or epigastric pain. No nausea, vomiting, or hematemesis; No diarrhea or constipation. No melena or hematochezia.  GENITOURINARY: No dysuria, frequency or hematuria  NEUROLOGICAL: No numbness or weakness  SKIN: No itching, rashes  [ ] All other systems negative  [ ] Unable to assess ROS because ________    OBJECTIVE:  ICU Vital Signs Last 24 Hrs  T(C): 36.2 (05 Jan 2024 10:12), Max: 36.8 (05 Jan 2024 08:48)  T(F): 97.2 (05 Jan 2024 10:12), Max: 98.3 (05 Jan 2024 08:48)  HR: 80 (05 Jan 2024 10:12) (78 - 91)  BP: 91/40 (05 Jan 2024 10:12) (87/52 - 109/75)  BP(mean): --  ABP: --  ABP(mean): --  RR: 17 (05 Jan 2024 10:12) (17 - 19)  SpO2: 100% (05 Jan 2024 10:12) (96% - 100%)    O2 Parameters below as of 05 Jan 2024 10:12  Patient On (Oxygen Delivery Method): room air              01-05 @ 07:01  -  01-05 @ 12:32  --------------------------------------------------------  IN: 800 mL / OUT: 0 mL / NET: 800 mL      CAPILLARY BLOOD GLUCOSE      POCT Blood Glucose.: 127 mg/dL (05 Jan 2024 12:02)      PHYSICAL EXAM:  GENERAL: NAD, lying in bed comfortably  HEAD:  Atraumatic, normocephalic  EYES: EOMI, PERRLA, conjunctiva and sclera clear  ENT: Moist mucous membranes  NECK: Supple, no JVD  HEART: S1, S2, regular rate and rhythm, no murmurs, rubs, or gallops  LUNGS: Unlabored respirations.  Clear to auscultation bilaterally, no crackles, wheezing, or rhonchi  ABDOMEN: Soft, nontender, nondistended, +BS  EXTREMITIES: 2+ peripheral pulses bilaterally. No clubbing, cyanosis, or edema  NERVOUS SYSTEM:  A&Ox3, no focal deficits   SKIN: No rashes or lesions  LINES:     LABS:                        7.9    10.35 )-----------( 84       ( 04 Jan 2024 06:21 )             23.5     Hgb Trend: 7.9<--, 8.1<--, 8.4<--, 8.3<--, 8.2<--  01-04    133<L>  |  97  |  105<H>  ----------------------------<  118<H>  4.5   |  15<L>  |  5.73<H>    Ca    9.2      04 Jan 2024 06:20    TPro  5.3<L>  /  Alb  3.5  /  TBili  12.3<H>  /  DBili  x   /  AST  104<H>  /  ALT  10  /  AlkPhos  503<H>  01-04    Creatinine Trend: 5.73<--, 5.21<--, 5.14<--, 4.71<--, 4.67<--, 4.14<--  PT/INR - ( 04 Jan 2024 06:22 )   PT: 14.4 sec;   INR: 1.32 ratio         PTT - ( 04 Jan 2024 06:22 )  PTT:37.7 sec  Urinalysis Basic - ( 04 Jan 2024 06:20 )    Color: x / Appearance: x / SG: x / pH: x  Gluc: 118 mg/dL / Ketone: x  / Bili: x / Urobili: x   Blood: x / Protein: x / Nitrite: x   Leuk Esterase: x / RBC: x / WBC x   Sq Epi: x / Non Sq Epi: x / Bacteria: x            MICROBIOLOGY:     RADIOLOGY & ADDITIONAL TESTS:    ASSESSMENT  YONATHAN MENDOZA is a 58y male with PMH of h/o type II DM for ~ 6-7 years, was on metformin and Jardiance,  diagnosed with liver cirrhosis 3 weeks ago, complicated by ascites in outside hospital, now admitted to floors undergoing workup of decompensated liver cirrhosis and RAHUL likely 2/2 HRS presenting for pressor assisted HD.     PLAN  =====Neurologic=====  #Hepatic Encephalopathy   #EtOH use disorder   - pt with acute encephalopathy (A&Ox2, per HCP baseline A&Ox3) likely 2/2 toxic metabolic and hepatic processes   - continue with lactulose 10mg TID, f/u MELD scores   - less likely concern for infectious etiology as completed 5 days of empiric meropenem     =====Pulmonary=====  Patient breathing comfortably on room air sating 100%       =====Cardiovascular=====  #Hypovolemic shock   #HRS   - pt BP in 90's/ 50's, unable to tolerate HD even with midodrine 30mg TID   - currently in MICU for pressor assisted HD  - wean vasopressors as tolerated     =====GI=====  #Decompensated hepatic cirrhosis   - currently undergoing transplant eval, f/u recs  - s/p IR paracentesis on 1/3- fluid analysis unremarkable for infectious etiology (total nucleated cell count 229 with 33 neutrophils) , fluid chemistry wnl, culture no growth to date   - MRI abd pending eval for HCC, hepatology following- unknown if patient has EV- will need screening EGD after MRI before deciding about A/C for possible PVT given thrombus in main protal vein   c/w Thiamine, B12, Folic Acid, lactulose   - no indication for GI prophylaxis at this time  - renal diet     =====Renal/=====  #Renal mass  #HRS   #HD requiring pressors   - new renal mass seen on US concerning for malignancy, pending MRI for further characterization,- onc following f/u recs  - HRS with creatinine >5, 4 on admission and 2 at baseline, s/p 2 rounds of HD unable to tolerate 3rd with midodrine   #Electrolytes  - Maintain K>4, Phos>3, Mag>2, iCal>1    =====Endocrine=====  #DM2  - on 5 units lantus and SSI  - monitor and adjust as necessary     =====Infectious Disease=====  - pt with initial WBC to 13k, now downtrending  - infectious w/u negative and completed 5 days of Epimeric Meropenem  - monitor off abx and trend WBC/ temperature     =====Heme/Onc=====  #DVT Ppx  - Lovenox 40mg SQ qd    =====Ethics=====  FULL CODE MICU ACCEPT NOTE    CHIEF COMPLAINT: Patient is a 58y old  Male who presents with a chief complaint of Liver Transplant Eval (05 Jan 2024 11:06)      HPI:  Patient history limited by patient mental status. Spoke to family member/HCP Anatjononi Grajeda who also had limited information.   57 y/o M PMH DM2, recently diagnosed alcoholic cirrhosis (3 weeks ago) presenting as a transfer from [facility] for transplant eval for decompensated liver cirrhosis (accepted by Dr. Amezquita). Patient initially presented to [facility] (from assisted living) last week with dizziness and worsening AMS. Per HCP, patient first diagnosed with cirrhosis 3 weeks ago after found lethargic. Underwent abdominal paracentesis with large volume fluid removal at that time. Patient discharged to [facility] with hepatology followup. Patient represented to OSH with AMS, dizziness, and increased abdominal distension last week. Noted to have worsening renal function, however still able to produce urine.     At OSH, patient was started on albumin, octreotide, midodrine for concern for HRS. Also started on meropenem per discharge paperwork, no clear indication listed.     Patient directly transferred from [facility] to medicine Floors. Initial EKG demonstrating sinus rhythm with frequent PVCs.  (30 Dec 2023 19:56)    Pt has been admitted to floors for transplant eval and work-up of RAHUL/ new renal mass.  Renal US demonstrating normal sized kidneys with no hydronephrosis but Multilobulated vascular mass arises from the right kidney extending into the peritoneum. Findings are concerning for primary renal malignancy versus metastatic disease. , MRI with contrast pending to eval further. Pt also found to have RAHUL with creatinine of 4, baseline 2, transplant nephrology consulted and HD started. Pt completed 2 rounds of midodrine assisted HD (initiated on 12/31)  however unable to tolerate third session even on max dose of midodrine 20mgTID, MICU consulted for pressor assisted HD.       PAST MEDICAL & SURGICAL HISTORY:  Type 2 diabetes mellitus      Decompensated hepatic cirrhosis      No significant past surgical history          FAMILY HISTORY:  No pertinent family history in first degree relatives        SOCIAL HISTORY:  Smoking:   Substance Use:   EtOH Use:   Advance Directives:     MEDICATIONS  (STANDING):  camphor 0.5%/menthol 0.5% Topical Lotion 1 Application(s) Topical two times a day  chlorhexidine 2% Cloths 1 Application(s) Topical daily  chlorhexidine 4% Liquid 1 Application(s) Topical <User Schedule>  cyanocobalamin 1000 MICROGram(s) Oral daily  dextrose 5%. 1000 milliLiter(s) (100 mL/Hr) IV Continuous <Continuous>  dextrose 5%. 1000 milliLiter(s) (50 mL/Hr) IV Continuous <Continuous>  dextrose 50% Injectable 25 Gram(s) IV Push once  dextrose 50% Injectable 25 Gram(s) IV Push once  dextrose 50% Injectable 12.5 Gram(s) IV Push once  folic acid 1 milliGRAM(s) Oral daily  glucagon  Injectable 1 milliGRAM(s) IntraMuscular once  insulin glargine Injectable (LANTUS) 5 Unit(s) SubCutaneous at bedtime  insulin lispro (ADMELOG) corrective regimen sliding scale   SubCutaneous at bedtime  insulin lispro (ADMELOG) corrective regimen sliding scale   SubCutaneous three times a day before meals  lactulose Syrup 10 Gram(s) Oral four times a day  melatonin 1 milliGRAM(s) Oral at bedtime  midodrine. 30 milliGRAM(s) Oral three times a day  octreotide  Injectable 200 MICROGram(s) SubCutaneous three times a day  pancrelipase  (CREON  6,000 Lipase Units) 1 Capsule(s) Oral three times a day with meals  tamsulosin 0.4 milliGRAM(s) Oral at bedtime  thiamine 100 milliGRAM(s) Oral daily    MEDICATIONS  (PRN):  dextrose Oral Gel 15 Gram(s) Oral once PRN Blood Glucose LESS THAN 70 milliGRAM(s)/deciliter  sodium chloride 0.9% lock flush 10 milliLiter(s) IV Push every 1 hour PRN Pre/post blood products, medications, blood draw, and to maintain line patency      Allergies    No Known Allergies    Intolerances        REVIEW OF SYSTEMS:  CONSTITUTIONAL: No weakness, fevers or chills  EYES/ENT: No visual changes;  No vertigo or throat pain   NECK: No pain or stiffness  RESPIRATORY: No cough, wheezing, hemoptysis; No shortness of breath  CARDIOVASCULAR: No chest pain or palpitations  GASTROINTESTINAL: No abdominal or epigastric pain. No nausea, vomiting, or hematemesis; No diarrhea or constipation. No melena or hematochezia.  GENITOURINARY: No dysuria, frequency or hematuria  NEUROLOGICAL: No numbness or weakness  SKIN: No itching, rashes  [ ] All other systems negative  [ ] Unable to assess ROS because ________    OBJECTIVE:  ICU Vital Signs Last 24 Hrs  T(C): 36.2 (05 Jan 2024 10:12), Max: 36.8 (05 Jan 2024 08:48)  T(F): 97.2 (05 Jan 2024 10:12), Max: 98.3 (05 Jan 2024 08:48)  HR: 80 (05 Jan 2024 10:12) (78 - 91)  BP: 91/40 (05 Jan 2024 10:12) (87/52 - 109/75)  BP(mean): --  ABP: --  ABP(mean): --  RR: 17 (05 Jan 2024 10:12) (17 - 19)  SpO2: 100% (05 Jan 2024 10:12) (96% - 100%)    O2 Parameters below as of 05 Jan 2024 10:12  Patient On (Oxygen Delivery Method): room air              01-05 @ 07:01  -  01-05 @ 12:32  --------------------------------------------------------  IN: 800 mL / OUT: 0 mL / NET: 800 mL      CAPILLARY BLOOD GLUCOSE      POCT Blood Glucose.: 127 mg/dL (05 Jan 2024 12:02)      PHYSICAL EXAM:  GENERAL: NAD, lying in bed comfortably  HEAD:  Atraumatic, normocephalic  EYES: EOMI, PERRLA, conjunctiva and sclera clear  ENT: Moist mucous membranes  NECK: Supple, no JVD  HEART: S1, S2, regular rate and rhythm, no murmurs, rubs, or gallops  LUNGS: Unlabored respirations.  Clear to auscultation bilaterally, no crackles, wheezing, or rhonchi  ABDOMEN: Soft, nontender, nondistended, +BS  EXTREMITIES: 2+ peripheral pulses bilaterally. No clubbing, cyanosis, or edema  NERVOUS SYSTEM:  A&Ox3, no focal deficits   SKIN: No rashes or lesions  LINES:     LABS:                        7.9    10.35 )-----------( 84       ( 04 Jan 2024 06:21 )             23.5     Hgb Trend: 7.9<--, 8.1<--, 8.4<--, 8.3<--, 8.2<--  01-04    133<L>  |  97  |  105<H>  ----------------------------<  118<H>  4.5   |  15<L>  |  5.73<H>    Ca    9.2      04 Jan 2024 06:20    TPro  5.3<L>  /  Alb  3.5  /  TBili  12.3<H>  /  DBili  x   /  AST  104<H>  /  ALT  10  /  AlkPhos  503<H>  01-04    Creatinine Trend: 5.73<--, 5.21<--, 5.14<--, 4.71<--, 4.67<--, 4.14<--  PT/INR - ( 04 Jan 2024 06:22 )   PT: 14.4 sec;   INR: 1.32 ratio         PTT - ( 04 Jan 2024 06:22 )  PTT:37.7 sec  Urinalysis Basic - ( 04 Jan 2024 06:20 )    Color: x / Appearance: x / SG: x / pH: x  Gluc: 118 mg/dL / Ketone: x  / Bili: x / Urobili: x   Blood: x / Protein: x / Nitrite: x   Leuk Esterase: x / RBC: x / WBC x   Sq Epi: x / Non Sq Epi: x / Bacteria: x            MICROBIOLOGY:     RADIOLOGY & ADDITIONAL TESTS:    ASSESSMENT  YONATHAN MENDOZA is a 58y male with PMH of h/o type II DM for ~ 6-7 years, was on metformin and Jardiance,  diagnosed with liver cirrhosis 3 weeks ago, complicated by ascites in outside hospital, now admitted to floors undergoing workup of decompensated liver cirrhosis and RAHUL likely 2/2 HRS presenting for pressor assisted HD.     PLAN  =====Neurologic=====  #Hepatic Encephalopathy   #EtOH use disorder   - pt with acute encephalopathy (A&Ox2, per HCP baseline A&Ox3) likely 2/2 toxic metabolic and hepatic processes   - continue with lactulose 10mg TID, f/u MELD scores   - less likely concern for infectious etiology as completed 5 days of empiric meropenem     =====Pulmonary=====  Patient breathing comfortably on room air sating 100%       =====Cardiovascular=====  #Hypovolemic shock   #HRS   - pt BP in 90's/ 50's, unable to tolerate HD even with midodrine 30mg TID   - currently in MICU for pressor assisted HD  - wean vasopressors as tolerated     =====GI=====  #Decompensated hepatic cirrhosis   - currently undergoing transplant eval, f/u recs  - s/p IR paracentesis on 1/3- fluid analysis unremarkable for infectious etiology (total nucleated cell count 229 with 33 neutrophils) , fluid chemistry wnl, culture no growth to date   - MRI abd pending eval for HCC, hepatology following- unknown if patient has EV- will need screening EGD after MRI before deciding about A/C for possible PVT given thrombus in main protal vein   c/w Thiamine, B12, Folic Acid, lactulose   - no indication for GI prophylaxis at this time  - renal diet     =====Renal/=====  #Renal mass  #HRS   #HD requiring pressors   - new renal mass seen on US concerning for malignancy, pending MRI for further characterization,- onc following f/u recs  - HRS with creatinine >5, 4 on admission and 2 at baseline, s/p 2 rounds of HD unable to tolerate 3rd with midodrine   #Electrolytes  - Maintain K>4, Phos>3, Mag>2, iCal>1    =====Endocrine=====  #DM2  - on 5 units lantus and SSI  - monitor and adjust as necessary     =====Infectious Disease=====  - pt with initial WBC to 13k, now downtrending  - infectious w/u negative and completed 5 days of Epimeric Meropenem  - monitor off abx and trend WBC/ temperature     =====Heme/Onc=====  #DVT Ppx  - Lovenox 40mg SQ qd    =====Ethics=====  FULL CODE MICU ACCEPT NOTE    CHIEF COMPLAINT: Patient is a 58y old  Male who presents with a chief complaint of Liver Transplant Eval (05 Jan 2024 11:06)      HPI:  Patient history limited by patient mental status. Spoke to family member/HCP Anatjononi Grajeda who also had limited information.   59 y/o M PMH DM2, recently diagnosed alcoholic cirrhosis (3 weeks ago) presenting as a transfer from [facility] for transplant eval for decompensated liver cirrhosis (accepted by Dr. Amezquita). Patient initially presented to [facility] (from assisted living) last week with dizziness and worsening AMS. Per HCP, patient first diagnosed with cirrhosis 3 weeks ago after found lethargic. Underwent abdominal paracentesis with large volume fluid removal at that time. Patient discharged to [facility] with hepatology followup. Patient represented to OSH with AMS, dizziness, and increased abdominal distension last week. Noted to have worsening renal function, however still able to produce urine.     At OSH, patient was started on albumin, octreotide, midodrine for concern for HRS. Also started on meropenem per discharge paperwork, no clear indication listed.     Patient directly transferred from [facility] to medicine Floors. Initial EKG demonstrating sinus rhythm with frequent PVCs.  (30 Dec 2023 19:56)    Pt has been admitted to floors for transplant eval and work-up of RAHUL/ new renal mass.  Renal US demonstrating normal sized kidneys with no hydronephrosis but Multilobulated vascular mass arises from the right kidney extending into the peritoneum. Findings are concerning for primary renal malignancy versus metastatic disease. , MRI with contrast pending to eval further. Pt also found to have RAHUL with creatinine of 4, baseline 2, transplant nephrology consulted and HD started. Pt completed 2 rounds of midodrine assisted HD (initiated on 12/31)  however unable to tolerate third session even on max dose of midodrine 20mgTID, MICU consulted for pressor assisted HD.       PAST MEDICAL & SURGICAL HISTORY:  Type 2 diabetes mellitus      Decompensated hepatic cirrhosis      No significant past surgical history          FAMILY HISTORY:  No pertinent family history in first degree relatives        SOCIAL HISTORY:  Smoking:   Substance Use:   EtOH Use:   Advance Directives:     MEDICATIONS  (STANDING):  camphor 0.5%/menthol 0.5% Topical Lotion 1 Application(s) Topical two times a day  chlorhexidine 2% Cloths 1 Application(s) Topical daily  chlorhexidine 4% Liquid 1 Application(s) Topical <User Schedule>  cyanocobalamin 1000 MICROGram(s) Oral daily  dextrose 5%. 1000 milliLiter(s) (100 mL/Hr) IV Continuous <Continuous>  dextrose 5%. 1000 milliLiter(s) (50 mL/Hr) IV Continuous <Continuous>  dextrose 50% Injectable 25 Gram(s) IV Push once  dextrose 50% Injectable 25 Gram(s) IV Push once  dextrose 50% Injectable 12.5 Gram(s) IV Push once  folic acid 1 milliGRAM(s) Oral daily  glucagon  Injectable 1 milliGRAM(s) IntraMuscular once  insulin glargine Injectable (LANTUS) 5 Unit(s) SubCutaneous at bedtime  insulin lispro (ADMELOG) corrective regimen sliding scale   SubCutaneous at bedtime  insulin lispro (ADMELOG) corrective regimen sliding scale   SubCutaneous three times a day before meals  lactulose Syrup 10 Gram(s) Oral four times a day  melatonin 1 milliGRAM(s) Oral at bedtime  midodrine. 30 milliGRAM(s) Oral three times a day  octreotide  Injectable 200 MICROGram(s) SubCutaneous three times a day  pancrelipase  (CREON  6,000 Lipase Units) 1 Capsule(s) Oral three times a day with meals  tamsulosin 0.4 milliGRAM(s) Oral at bedtime  thiamine 100 milliGRAM(s) Oral daily    MEDICATIONS  (PRN):  dextrose Oral Gel 15 Gram(s) Oral once PRN Blood Glucose LESS THAN 70 milliGRAM(s)/deciliter  sodium chloride 0.9% lock flush 10 milliLiter(s) IV Push every 1 hour PRN Pre/post blood products, medications, blood draw, and to maintain line patency      Allergies    No Known Allergies    Intolerances        REVIEW OF SYSTEMS:  CONSTITUTIONAL: No weakness, fevers or chills  EYES/ENT: No visual changes;  No vertigo or throat pain   NECK: No pain or stiffness  RESPIRATORY: No cough, wheezing, hemoptysis; No shortness of breath  CARDIOVASCULAR: No chest pain or palpitations  GASTROINTESTINAL: No abdominal or epigastric pain. No nausea, vomiting, or hematemesis; No diarrhea or constipation. No melena or hematochezia.  GENITOURINARY: No dysuria, frequency or hematuria  NEUROLOGICAL: No numbness or weakness  SKIN: No itching, rashes  [ ] All other systems negative  [ ] Unable to assess ROS because ________    OBJECTIVE:  ICU Vital Signs Last 24 Hrs  T(C): 36.2 (05 Jan 2024 10:12), Max: 36.8 (05 Jan 2024 08:48)  T(F): 97.2 (05 Jan 2024 10:12), Max: 98.3 (05 Jan 2024 08:48)  HR: 80 (05 Jan 2024 10:12) (78 - 91)  BP: 91/40 (05 Jan 2024 10:12) (87/52 - 109/75)  BP(mean): --  ABP: --  ABP(mean): --  RR: 17 (05 Jan 2024 10:12) (17 - 19)  SpO2: 100% (05 Jan 2024 10:12) (96% - 100%)    O2 Parameters below as of 05 Jan 2024 10:12  Patient On (Oxygen Delivery Method): room air              01-05 @ 07:01  -  01-05 @ 12:32  --------------------------------------------------------  IN: 800 mL / OUT: 0 mL / NET: 800 mL      CAPILLARY BLOOD GLUCOSE      POCT Blood Glucose.: 127 mg/dL (05 Jan 2024 12:02)      PHYSICAL EXAM:  GENERAL: NAD, lying in bed comfortably  HEAD:  Atraumatic, normocephalic  EYES: EOMI, PERRLA, conjunctiva and sclera clear  ENT: Moist mucous membranes  NECK: Supple, no JVD  HEART: S1, S2, regular rate and rhythm, no murmurs, rubs, or gallops  LUNGS: Unlabored respirations.  Clear to auscultation bilaterally, no crackles, wheezing, or rhonchi  ABDOMEN: Soft, nontender, nondistended, +BS  EXTREMITIES: 2+ peripheral pulses bilaterally. No clubbing, cyanosis, or edema  NERVOUS SYSTEM:  A&Ox3, no focal deficits   SKIN: No rashes or lesions  LINES:     LABS:                        7.9    10.35 )-----------( 84       ( 04 Jan 2024 06:21 )             23.5     Hgb Trend: 7.9<--, 8.1<--, 8.4<--, 8.3<--, 8.2<--  01-04    133<L>  |  97  |  105<H>  ----------------------------<  118<H>  4.5   |  15<L>  |  5.73<H>    Ca    9.2      04 Jan 2024 06:20    TPro  5.3<L>  /  Alb  3.5  /  TBili  12.3<H>  /  DBili  x   /  AST  104<H>  /  ALT  10  /  AlkPhos  503<H>  01-04    Creatinine Trend: 5.73<--, 5.21<--, 5.14<--, 4.71<--, 4.67<--, 4.14<--  PT/INR - ( 04 Jan 2024 06:22 )   PT: 14.4 sec;   INR: 1.32 ratio         PTT - ( 04 Jan 2024 06:22 )  PTT:37.7 sec  Urinalysis Basic - ( 04 Jan 2024 06:20 )    Color: x / Appearance: x / SG: x / pH: x  Gluc: 118 mg/dL / Ketone: x  / Bili: x / Urobili: x   Blood: x / Protein: x / Nitrite: x   Leuk Esterase: x / RBC: x / WBC x   Sq Epi: x / Non Sq Epi: x / Bacteria: x            MICROBIOLOGY:     RADIOLOGY & ADDITIONAL TESTS:    ASSESSMENT  YONATHAN MENDOZA is a 58y male with PMH of h/o type II DM for ~ 6-7 years, was on metformin and Jardiance,  diagnosed with liver cirrhosis 3 weeks ago, complicated by ascites in outside hospital, now admitted to floors undergoing workup of decompensated liver cirrhosis and RAHUL likely 2/2 HRS presenting for pressor assisted HD.     PLAN  =====Neurologic=====  #Hepatic Encephalopathy   #EtOH use disorder   - pt with acute encephalopathy (A&Ox2, per HCP baseline A&Ox3) likely 2/2 toxic metabolic and hepatic processes   - continue with lactulose 10mg TID, f/u MELD scores   - less likely concern for infectious etiology as completed 5 days of empiric meropenem     =====Pulmonary=====  Patient breathing comfortably on room air sating 100%       =====Cardiovascular=====  #Hypovolemic shock   #HRS   - pt BP in 90's/ 50's, unable to tolerate HD even with midodrine 30mg TID   - currently in MICU for pressor assisted HD  - wean vasopressors as tolerated     =====GI=====  #Decompensated hepatic cirrhosis   - currently undergoing transplant eval, f/u recs  - s/p IR paracentesis on 1/3- fluid analysis unremarkable for infectious etiology (total nucleated cell count 229 with 33 neutrophils) , fluid chemistry wnl, culture no growth to date   - MRI abd pending eval for HCC, hepatology following- unknown if patient has EV- will need screening EGD after MRI before deciding about A/C for possible PVT given thrombus in main protal vein   c/w Thiamine, B12, Folic Acid, lactulose and rifaximin    - pt initially scheduled for EGD on 1/5, f/u hepatology recs   - no indication for GI PPI prophylaxis at this time  - renal diet     =====Renal/=====  #Renal mass  #HRS   #HD requiring pressors   - new renal mass seen on US concerning for malignancy, pending MRI for further characterization,- onc following f/u recs  - urology consulted- f/u recs   - HRS with creatinine >5, 4 on admission and 2 at baseline, cesar gonzalez, s/p 2 rounds of HD unable to tolerate 3rd with midodrine   #Electrolytes  - Maintain K>4, Phos>3, Mag>2, iCal>1    =====Endocrine=====  #DM2  - on 5 units lantus and SSI  - monitor and adjust as necessary     =====Infectious Disease=====  - pt with initial WBC to 13k, now downtrending  - infectious w/u negative and completed 5 days of Epimeric Meropenem  - monitor off abx and trend WBC/ temperature     =====Heme/Onc=====  #DVT Ppx- heparin sub q   #Normocytic anemia on initial CBC   likely 2/2 vitamin deficiencies  and anemia of chronic disease   -normal B12/folate  >VERENA- eventual scope to evaluate       =====Ethics=====  FULL CODE MICU ACCEPT NOTE    CHIEF COMPLAINT: Patient is a 58y old  Male who presents with a chief complaint of Liver Transplant Eval (05 Jan 2024 11:06)      HPI:  Patient history limited by patient mental status. Spoke to family member/HCP Anatjononi Grajeda who also had limited information.   57 y/o M PMH DM2, recently diagnosed alcoholic cirrhosis (3 weeks ago) presenting as a transfer from [facility] for transplant eval for decompensated liver cirrhosis (accepted by Dr. Amezquita). Patient initially presented to [facility] (from assisted living) last week with dizziness and worsening AMS. Per HCP, patient first diagnosed with cirrhosis 3 weeks ago after found lethargic. Underwent abdominal paracentesis with large volume fluid removal at that time. Patient discharged to [facility] with hepatology followup. Patient represented to OSH with AMS, dizziness, and increased abdominal distension last week. Noted to have worsening renal function, however still able to produce urine.     At OSH, patient was started on albumin, octreotide, midodrine for concern for HRS. Also started on meropenem per discharge paperwork, no clear indication listed.     Patient directly transferred from [facility] to medicine Floors. Initial EKG demonstrating sinus rhythm with frequent PVCs.  (30 Dec 2023 19:56)    Pt has been admitted to floors for transplant eval and work-up of RAHUL/ new renal mass.  Renal US demonstrating normal sized kidneys with no hydronephrosis but Multilobulated vascular mass arises from the right kidney extending into the peritoneum. Findings are concerning for primary renal malignancy versus metastatic disease. , MRI with contrast pending to eval further. Pt also found to have RAHUL with creatinine of 4, baseline 2, transplant nephrology consulted and HD started. Pt completed 2 rounds of midodrine assisted HD (initiated on 12/31)  however unable to tolerate third session even on max dose of midodrine 20mgTID, MICU consulted for pressor assisted HD.       PAST MEDICAL & SURGICAL HISTORY:  Type 2 diabetes mellitus      Decompensated hepatic cirrhosis      No significant past surgical history          FAMILY HISTORY:  No pertinent family history in first degree relatives        SOCIAL HISTORY:  Smoking:   Substance Use:   EtOH Use:   Advance Directives:     MEDICATIONS  (STANDING):  camphor 0.5%/menthol 0.5% Topical Lotion 1 Application(s) Topical two times a day  chlorhexidine 2% Cloths 1 Application(s) Topical daily  chlorhexidine 4% Liquid 1 Application(s) Topical <User Schedule>  cyanocobalamin 1000 MICROGram(s) Oral daily  dextrose 5%. 1000 milliLiter(s) (100 mL/Hr) IV Continuous <Continuous>  dextrose 5%. 1000 milliLiter(s) (50 mL/Hr) IV Continuous <Continuous>  dextrose 50% Injectable 25 Gram(s) IV Push once  dextrose 50% Injectable 25 Gram(s) IV Push once  dextrose 50% Injectable 12.5 Gram(s) IV Push once  folic acid 1 milliGRAM(s) Oral daily  glucagon  Injectable 1 milliGRAM(s) IntraMuscular once  insulin glargine Injectable (LANTUS) 5 Unit(s) SubCutaneous at bedtime  insulin lispro (ADMELOG) corrective regimen sliding scale   SubCutaneous at bedtime  insulin lispro (ADMELOG) corrective regimen sliding scale   SubCutaneous three times a day before meals  lactulose Syrup 10 Gram(s) Oral four times a day  melatonin 1 milliGRAM(s) Oral at bedtime  midodrine. 30 milliGRAM(s) Oral three times a day  octreotide  Injectable 200 MICROGram(s) SubCutaneous three times a day  pancrelipase  (CREON  6,000 Lipase Units) 1 Capsule(s) Oral three times a day with meals  tamsulosin 0.4 milliGRAM(s) Oral at bedtime  thiamine 100 milliGRAM(s) Oral daily    MEDICATIONS  (PRN):  dextrose Oral Gel 15 Gram(s) Oral once PRN Blood Glucose LESS THAN 70 milliGRAM(s)/deciliter  sodium chloride 0.9% lock flush 10 milliLiter(s) IV Push every 1 hour PRN Pre/post blood products, medications, blood draw, and to maintain line patency      Allergies    No Known Allergies    Intolerances        REVIEW OF SYSTEMS:  CONSTITUTIONAL: No weakness, fevers or chills  EYES/ENT: No visual changes;  No vertigo or throat pain   NECK: No pain or stiffness  RESPIRATORY: No cough, wheezing, hemoptysis; No shortness of breath  CARDIOVASCULAR: No chest pain or palpitations  GASTROINTESTINAL: No abdominal or epigastric pain. No nausea, vomiting, or hematemesis; No diarrhea or constipation. No melena or hematochezia.  GENITOURINARY: No dysuria, frequency or hematuria  NEUROLOGICAL: No numbness or weakness  SKIN: No itching, rashes  [ ] All other systems negative  [ ] Unable to assess ROS because ________    OBJECTIVE:  ICU Vital Signs Last 24 Hrs  T(C): 36.2 (05 Jan 2024 10:12), Max: 36.8 (05 Jan 2024 08:48)  T(F): 97.2 (05 Jan 2024 10:12), Max: 98.3 (05 Jan 2024 08:48)  HR: 80 (05 Jan 2024 10:12) (78 - 91)  BP: 91/40 (05 Jan 2024 10:12) (87/52 - 109/75)  BP(mean): --  ABP: --  ABP(mean): --  RR: 17 (05 Jan 2024 10:12) (17 - 19)  SpO2: 100% (05 Jan 2024 10:12) (96% - 100%)    O2 Parameters below as of 05 Jan 2024 10:12  Patient On (Oxygen Delivery Method): room air              01-05 @ 07:01  -  01-05 @ 12:32  --------------------------------------------------------  IN: 800 mL / OUT: 0 mL / NET: 800 mL      CAPILLARY BLOOD GLUCOSE      POCT Blood Glucose.: 127 mg/dL (05 Jan 2024 12:02)      PHYSICAL EXAM:  GENERAL: NAD, lying in bed comfortably  HEAD:  Atraumatic, normocephalic  EYES: EOMI, PERRLA, conjunctiva and sclera clear  ENT: Moist mucous membranes  NECK: Supple, no JVD  HEART: S1, S2, regular rate and rhythm, no murmurs, rubs, or gallops  LUNGS: Unlabored respirations.  Clear to auscultation bilaterally, no crackles, wheezing, or rhonchi  ABDOMEN: Soft, nontender, nondistended, +BS  EXTREMITIES: 2+ peripheral pulses bilaterally. No clubbing, cyanosis, or edema  NERVOUS SYSTEM:  A&Ox3, no focal deficits   SKIN: No rashes or lesions  LINES:     LABS:                        7.9    10.35 )-----------( 84       ( 04 Jan 2024 06:21 )             23.5     Hgb Trend: 7.9<--, 8.1<--, 8.4<--, 8.3<--, 8.2<--  01-04    133<L>  |  97  |  105<H>  ----------------------------<  118<H>  4.5   |  15<L>  |  5.73<H>    Ca    9.2      04 Jan 2024 06:20    TPro  5.3<L>  /  Alb  3.5  /  TBili  12.3<H>  /  DBili  x   /  AST  104<H>  /  ALT  10  /  AlkPhos  503<H>  01-04    Creatinine Trend: 5.73<--, 5.21<--, 5.14<--, 4.71<--, 4.67<--, 4.14<--  PT/INR - ( 04 Jan 2024 06:22 )   PT: 14.4 sec;   INR: 1.32 ratio         PTT - ( 04 Jan 2024 06:22 )  PTT:37.7 sec  Urinalysis Basic - ( 04 Jan 2024 06:20 )    Color: x / Appearance: x / SG: x / pH: x  Gluc: 118 mg/dL / Ketone: x  / Bili: x / Urobili: x   Blood: x / Protein: x / Nitrite: x   Leuk Esterase: x / RBC: x / WBC x   Sq Epi: x / Non Sq Epi: x / Bacteria: x            MICROBIOLOGY:     RADIOLOGY & ADDITIONAL TESTS:    ASSESSMENT  YONATHAN MENDOZA is a 58y male with PMH of h/o type II DM for ~ 6-7 years, was on metformin and Jardiance,  diagnosed with liver cirrhosis 3 weeks ago, complicated by ascites in outside hospital, now admitted to floors undergoing workup of decompensated liver cirrhosis and RAHUL likely 2/2 HRS presenting for pressor assisted HD.     PLAN  =====Neurologic=====  #Hepatic Encephalopathy   #EtOH use disorder   - pt with acute encephalopathy (A&Ox2, per HCP baseline A&Ox3) likely 2/2 toxic metabolic and hepatic processes   - continue with lactulose 10mg TID, f/u MELD scores   - less likely concern for infectious etiology as completed 5 days of empiric meropenem     =====Pulmonary=====  Patient breathing comfortably on room air sating 100%       =====Cardiovascular=====  #Hypovolemic shock   #HRS   - pt BP in 90's/ 50's, unable to tolerate HD even with midodrine 30mg TID   - currently in MICU for pressor assisted HD  - wean vasopressors as tolerated     =====GI=====  #Decompensated hepatic cirrhosis   - currently undergoing transplant eval, f/u recs  - s/p IR paracentesis on 1/3- fluid analysis unremarkable for infectious etiology (total nucleated cell count 229 with 33 neutrophils) , fluid chemistry wnl, culture no growth to date   - MRI abd pending eval for HCC, hepatology following- unknown if patient has EV- will need screening EGD after MRI before deciding about A/C for possible PVT given thrombus in main protal vein   c/w Thiamine, B12, Folic Acid, lactulose and rifaximin    - pt initially scheduled for EGD on 1/5, f/u hepatology recs   - no indication for GI PPI prophylaxis at this time  - renal diet     =====Renal/=====  #Renal mass  #HRS   #HD requiring pressors   - new renal mass seen on US concerning for malignancy, pending MRI for further characterization,- onc following f/u recs  - urology consulted- f/u recs   - HRS with creatinine >5, 4 on admission and 2 at baseline, cesar gonzalez, s/p 2 rounds of HD unable to tolerate 3rd with midodrine   #Electrolytes  - Maintain K>4, Phos>3, Mag>2, iCal>1    =====Endocrine=====  #DM2  - on 5 units lantus and SSI  - monitor and adjust as necessary     =====Infectious Disease=====  - pt with initial WBC to 13k, now downtrending  - infectious w/u negative and completed 5 days of Epimeric Meropenem  - monitor off abx and trend WBC/ temperature     =====Heme/Onc=====  #DVT Ppx- heparin sub q   #Normocytic anemia on initial CBC   likely 2/2 vitamin deficiencies  and anemia of chronic disease   -normal B12/folate  >VERENA- eventual scope to evaluate       =====Ethics=====  FULL CODE MICU ACCEPT NOTE    CHIEF COMPLAINT: Patient is a 58y old  Male who presents with a chief complaint of Liver Transplant Eval (05 Jan 2024 11:06)      HPI:  Patient history limited by patient mental status. Spoke to family member/HCP Anatjononi Grajeda who also had limited information.   57 y/o M PMH DM2, recently diagnosed alcoholic cirrhosis (3 weeks ago) presenting as a transfer from [facility] for transplant eval for decompensated liver cirrhosis (accepted by Dr. Amezquita). Patient initially presented to [facility] (from assisted living) last week with dizziness and worsening AMS. Per HCP, patient first diagnosed with cirrhosis 3 weeks ago after found lethargic. Underwent abdominal paracentesis with large volume fluid removal at that time. Patient discharged to [facility] with hepatology followup. Patient represented to OSH with AMS, dizziness, and increased abdominal distension last week. Noted to have worsening renal function, however still able to produce urine.     At OSH, patient was started on albumin, octreotide, midodrine for concern for HRS. Also started on meropenem per discharge paperwork, no clear indication listed.     Patient directly transferred from [facility] to medicine Floors. Initial EKG demonstrating sinus rhythm with frequent PVCs.  (30 Dec 2023 19:56)    Pt has been admitted to floors for transplant eval and work-up of RAHUL/ new renal mass.  Renal US demonstrating normal sized kidneys with no hydronephrosis but Multilobulated vascular mass arises from the right kidney extending into the peritoneum. Findings are concerning for primary renal malignancy versus metastatic disease. , MRI with contrast pending to eval further. Pt also found to have RAHUL with creatinine of 4, baseline 2, transplant nephrology consulted and HD started. Pt completed 2 rounds of midodrine assisted HD (initiated on 12/31)  however unable to tolerate third session even on max dose of midodrine 20mgTID, MICU consulted for pressor assisted HD.       PAST MEDICAL & SURGICAL HISTORY:  Type 2 diabetes mellitus      Decompensated hepatic cirrhosis      No significant past surgical history          FAMILY HISTORY:  No pertinent family history in first degree relatives        SOCIAL HISTORY:  Smoking:   Substance Use:   EtOH Use:   Advance Directives:     MEDICATIONS  (STANDING):  camphor 0.5%/menthol 0.5% Topical Lotion 1 Application(s) Topical two times a day  chlorhexidine 2% Cloths 1 Application(s) Topical daily  chlorhexidine 4% Liquid 1 Application(s) Topical <User Schedule>  cyanocobalamin 1000 MICROGram(s) Oral daily  dextrose 5%. 1000 milliLiter(s) (100 mL/Hr) IV Continuous <Continuous>  dextrose 5%. 1000 milliLiter(s) (50 mL/Hr) IV Continuous <Continuous>  dextrose 50% Injectable 25 Gram(s) IV Push once  dextrose 50% Injectable 25 Gram(s) IV Push once  dextrose 50% Injectable 12.5 Gram(s) IV Push once  folic acid 1 milliGRAM(s) Oral daily  glucagon  Injectable 1 milliGRAM(s) IntraMuscular once  insulin glargine Injectable (LANTUS) 5 Unit(s) SubCutaneous at bedtime  insulin lispro (ADMELOG) corrective regimen sliding scale   SubCutaneous at bedtime  insulin lispro (ADMELOG) corrective regimen sliding scale   SubCutaneous three times a day before meals  lactulose Syrup 10 Gram(s) Oral four times a day  melatonin 1 milliGRAM(s) Oral at bedtime  midodrine. 30 milliGRAM(s) Oral three times a day  octreotide  Injectable 200 MICROGram(s) SubCutaneous three times a day  pancrelipase  (CREON  6,000 Lipase Units) 1 Capsule(s) Oral three times a day with meals  tamsulosin 0.4 milliGRAM(s) Oral at bedtime  thiamine 100 milliGRAM(s) Oral daily    MEDICATIONS  (PRN):  dextrose Oral Gel 15 Gram(s) Oral once PRN Blood Glucose LESS THAN 70 milliGRAM(s)/deciliter  sodium chloride 0.9% lock flush 10 milliLiter(s) IV Push every 1 hour PRN Pre/post blood products, medications, blood draw, and to maintain line patency      Allergies    No Known Allergies    Intolerances        REVIEW OF SYSTEMS:  CONSTITUTIONAL: No weakness, fevers or chills  EYES/ENT: No visual changes;  No vertigo or throat pain   NECK: No pain or stiffness  RESPIRATORY: No cough, wheezing, hemoptysis; No shortness of breath  CARDIOVASCULAR: No chest pain or palpitations  GASTROINTESTINAL: No abdominal or epigastric pain. No nausea, vomiting, or hematemesis; No diarrhea or constipation. No melena or hematochezia.  GENITOURINARY: No dysuria, frequency or hematuria  NEUROLOGICAL: No numbness or weakness  SKIN: No itching, rashes  [ ] All other systems negative  [ ] Unable to assess ROS because ________    OBJECTIVE:  ICU Vital Signs Last 24 Hrs  T(C): 36.2 (05 Jan 2024 10:12), Max: 36.8 (05 Jan 2024 08:48)  T(F): 97.2 (05 Jan 2024 10:12), Max: 98.3 (05 Jan 2024 08:48)  HR: 80 (05 Jan 2024 10:12) (78 - 91)  BP: 91/40 (05 Jan 2024 10:12) (87/52 - 109/75)  BP(mean): --  ABP: --  ABP(mean): --  RR: 17 (05 Jan 2024 10:12) (17 - 19)  SpO2: 100% (05 Jan 2024 10:12) (96% - 100%)    O2 Parameters below as of 05 Jan 2024 10:12  Patient On (Oxygen Delivery Method): room air              01-05 @ 07:01  -  01-05 @ 12:32  --------------------------------------------------------  IN: 800 mL / OUT: 0 mL / NET: 800 mL      CAPILLARY BLOOD GLUCOSE      POCT Blood Glucose.: 127 mg/dL (05 Jan 2024 12:02)      PHYSICAL EXAM:  GENERAL: NAD, lying in bed comfortably  HEAD:  Atraumatic, normocephalic  EYES: EOMI, PERRLA, conjunctiva and sclera clear  ENT: Moist mucous membranes  NECK: Supple, no JVD  HEART: S1, S2, regular rate and rhythm, no murmurs, rubs, or gallops  LUNGS: Unlabored respirations.  Clear to auscultation bilaterally, no crackles, wheezing, or rhonchi  ABDOMEN: Soft, nontender, nondistended, +BS  EXTREMITIES: 2+ peripheral pulses bilaterally. No clubbing, cyanosis, or edema  NERVOUS SYSTEM:  A&Ox3, no focal deficits   SKIN: No rashes or lesions  LINES:     LABS:                        7.9    10.35 )-----------( 84       ( 04 Jan 2024 06:21 )             23.5     Hgb Trend: 7.9<--, 8.1<--, 8.4<--, 8.3<--, 8.2<--  01-04    133<L>  |  97  |  105<H>  ----------------------------<  118<H>  4.5   |  15<L>  |  5.73<H>    Ca    9.2      04 Jan 2024 06:20    TPro  5.3<L>  /  Alb  3.5  /  TBili  12.3<H>  /  DBili  x   /  AST  104<H>  /  ALT  10  /  AlkPhos  503<H>  01-04    Creatinine Trend: 5.73<--, 5.21<--, 5.14<--, 4.71<--, 4.67<--, 4.14<--  PT/INR - ( 04 Jan 2024 06:22 )   PT: 14.4 sec;   INR: 1.32 ratio         PTT - ( 04 Jan 2024 06:22 )  PTT:37.7 sec  Urinalysis Basic - ( 04 Jan 2024 06:20 )    Color: x / Appearance: x / SG: x / pH: x  Gluc: 118 mg/dL / Ketone: x  / Bili: x / Urobili: x   Blood: x / Protein: x / Nitrite: x   Leuk Esterase: x / RBC: x / WBC x   Sq Epi: x / Non Sq Epi: x / Bacteria: x            MICROBIOLOGY:     RADIOLOGY & ADDITIONAL TESTS:    ASSESSMENT  YONATHAN MENDOZA is a 58y male with PMH of h/o type II DM for ~ 6-7 years, was on metformin and Jardiance,  diagnosed with liver cirrhosis 3 weeks ago, complicated by ascites in outside hospital, now admitted to floors undergoing workup of decompensated liver cirrhosis and RAHUL likely 2/2 HRS presenting for pressor assisted HD.     PLAN  =====Neurologic=====  #Hepatic Encephalopathy   #EtOH use disorder   - pt with acute encephalopathy (A&Ox2, per HCP baseline A&Ox3) likely 2/2 toxic metabolic and hepatic processes   - continue with lactulose 10mg TID, f/u MELD scores   - less likely concern for infectious etiology as completed 5 days of empiric meropenem     =====Pulmonary=====  Patient breathing comfortably on room air sating 100%       =====Cardiovascular=====  #Hypovolemic shock   #HRS   - pt BP in 90's/ 50's, unable to tolerate HD even with midodrine 30mg TID   - echo with normal EF 77% hyperdynamic otherwise normal diastolic function   - currently in MICU for pressor assisted HD  - wean vasopressors as tolerated     =====GI=====  #Decompensated hepatic cirrhosis   - currently undergoing transplant eval, f/u recs  - s/p IR paracentesis on 1/3- fluid analysis unremarkable for infectious etiology (total nucleated cell count 229 with 33 neutrophils) , fluid chemistry wnl, culture no growth to date   - MRI abd pending eval for HCC, hepatology following- unknown if patient has EV- will need screening EGD after MRI before deciding about A/C for possible PVT given thrombus in main protal vein   c/w Thiamine, B12, Folic Acid, lactulose and rifaximin    - pt initially scheduled for EGD on 1/5, f/u hepatology recs   - no indication for GI PPI prophylaxis at this time  - renal diet     =====Renal/=====  #Renal mass  #HRS   #HD requiring pressors   - new renal mass seen on US concerning for malignancy, pending MRI for further characterization,- onc following f/u recs  - urology consulted- f/u recs   - HRS with creatinine >5, 4 on admission and 2 at baseline, shiley placed, s/p 2 rounds of HD unable to tolerate 3rd with midodrine   #Electrolytes  - Maintain K>4, Phos>3, Mag>2, iCal>1    =====Endocrine=====  #DM2  - on 5 units lantus and SSI  - monitor and adjust as necessary     =====Infectious Disease=====  - pt with initial WBC to 13k, now downtrending  - infectious w/u negative and completed 5 days of Epimeric Meropenem  - monitor off abx and trend WBC/ temperature     =====Heme/Onc=====  #DVT Ppx- heparin sub q   #Normocytic anemia on initial CBC   likely 2/2 vitamin deficiencies  and anemia of chronic disease   -normal B12/folate  >VERENA- eventual scope to evaluate       =====Ethics=====  FULL CODE MICU ACCEPT NOTE    CHIEF COMPLAINT: Patient is a 58y old  Male who presents with a chief complaint of Liver Transplant Eval (05 Jan 2024 11:06)      HPI:  Patient history limited by patient mental status. Spoke to family member/HCP Anatjononi Grajeda who also had limited information.   59 y/o M PMH DM2, recently diagnosed alcoholic cirrhosis (3 weeks ago) presenting as a transfer from [facility] for transplant eval for decompensated liver cirrhosis (accepted by Dr. Amezquita). Patient initially presented to [facility] (from assisted living) last week with dizziness and worsening AMS. Per HCP, patient first diagnosed with cirrhosis 3 weeks ago after found lethargic. Underwent abdominal paracentesis with large volume fluid removal at that time. Patient discharged to [facility] with hepatology followup. Patient represented to OSH with AMS, dizziness, and increased abdominal distension last week. Noted to have worsening renal function, however still able to produce urine.     At OSH, patient was started on albumin, octreotide, midodrine for concern for HRS. Also started on meropenem per discharge paperwork, no clear indication listed.     Patient directly transferred from [facility] to medicine Floors. Initial EKG demonstrating sinus rhythm with frequent PVCs.  (30 Dec 2023 19:56)    Pt has been admitted to floors for transplant eval and work-up of RAHUL/ new renal mass.  Renal US demonstrating normal sized kidneys with no hydronephrosis but Multilobulated vascular mass arises from the right kidney extending into the peritoneum. Findings are concerning for primary renal malignancy versus metastatic disease. , MRI with contrast pending to eval further. Pt also found to have RAHUL with creatinine of 4, baseline 2, transplant nephrology consulted and HD started. Pt completed 2 rounds of midodrine assisted HD (initiated on 12/31)  however unable to tolerate third session even on max dose of midodrine 20mgTID, MICU consulted for pressor assisted HD.       PAST MEDICAL & SURGICAL HISTORY:  Type 2 diabetes mellitus      Decompensated hepatic cirrhosis      No significant past surgical history          FAMILY HISTORY:  No pertinent family history in first degree relatives        SOCIAL HISTORY:  Smoking:   Substance Use:   EtOH Use:   Advance Directives:     MEDICATIONS  (STANDING):  camphor 0.5%/menthol 0.5% Topical Lotion 1 Application(s) Topical two times a day  chlorhexidine 2% Cloths 1 Application(s) Topical daily  chlorhexidine 4% Liquid 1 Application(s) Topical <User Schedule>  cyanocobalamin 1000 MICROGram(s) Oral daily  dextrose 5%. 1000 milliLiter(s) (100 mL/Hr) IV Continuous <Continuous>  dextrose 5%. 1000 milliLiter(s) (50 mL/Hr) IV Continuous <Continuous>  dextrose 50% Injectable 25 Gram(s) IV Push once  dextrose 50% Injectable 25 Gram(s) IV Push once  dextrose 50% Injectable 12.5 Gram(s) IV Push once  folic acid 1 milliGRAM(s) Oral daily  glucagon  Injectable 1 milliGRAM(s) IntraMuscular once  insulin glargine Injectable (LANTUS) 5 Unit(s) SubCutaneous at bedtime  insulin lispro (ADMELOG) corrective regimen sliding scale   SubCutaneous at bedtime  insulin lispro (ADMELOG) corrective regimen sliding scale   SubCutaneous three times a day before meals  lactulose Syrup 10 Gram(s) Oral four times a day  melatonin 1 milliGRAM(s) Oral at bedtime  midodrine. 30 milliGRAM(s) Oral three times a day  octreotide  Injectable 200 MICROGram(s) SubCutaneous three times a day  pancrelipase  (CREON  6,000 Lipase Units) 1 Capsule(s) Oral three times a day with meals  tamsulosin 0.4 milliGRAM(s) Oral at bedtime  thiamine 100 milliGRAM(s) Oral daily    MEDICATIONS  (PRN):  dextrose Oral Gel 15 Gram(s) Oral once PRN Blood Glucose LESS THAN 70 milliGRAM(s)/deciliter  sodium chloride 0.9% lock flush 10 milliLiter(s) IV Push every 1 hour PRN Pre/post blood products, medications, blood draw, and to maintain line patency      Allergies    No Known Allergies    Intolerances        REVIEW OF SYSTEMS:  CONSTITUTIONAL: No weakness, fevers or chills  EYES/ENT: No visual changes;  No vertigo or throat pain   NECK: No pain or stiffness  RESPIRATORY: No cough, wheezing, hemoptysis; No shortness of breath  CARDIOVASCULAR: No chest pain or palpitations  GASTROINTESTINAL: No abdominal or epigastric pain. No nausea, vomiting, or hematemesis; No diarrhea or constipation. No melena or hematochezia.  GENITOURINARY: No dysuria, frequency or hematuria  NEUROLOGICAL: No numbness or weakness  SKIN: No itching, rashes  [ ] All other systems negative  [ ] Unable to assess ROS because ________    OBJECTIVE:  ICU Vital Signs Last 24 Hrs  T(C): 36.2 (05 Jan 2024 10:12), Max: 36.8 (05 Jan 2024 08:48)  T(F): 97.2 (05 Jan 2024 10:12), Max: 98.3 (05 Jan 2024 08:48)  HR: 80 (05 Jan 2024 10:12) (78 - 91)  BP: 91/40 (05 Jan 2024 10:12) (87/52 - 109/75)  BP(mean): --  ABP: --  ABP(mean): --  RR: 17 (05 Jan 2024 10:12) (17 - 19)  SpO2: 100% (05 Jan 2024 10:12) (96% - 100%)    O2 Parameters below as of 05 Jan 2024 10:12  Patient On (Oxygen Delivery Method): room air              01-05 @ 07:01  -  01-05 @ 12:32  --------------------------------------------------------  IN: 800 mL / OUT: 0 mL / NET: 800 mL      CAPILLARY BLOOD GLUCOSE      POCT Blood Glucose.: 127 mg/dL (05 Jan 2024 12:02)      PHYSICAL EXAM:  GENERAL: NAD, lying in bed comfortably  HEAD:  Atraumatic, normocephalic  EYES: EOMI, PERRLA, conjunctiva and sclera clear  ENT: Moist mucous membranes  NECK: Supple, no JVD  HEART: S1, S2, regular rate and rhythm, no murmurs, rubs, or gallops  LUNGS: Unlabored respirations.  Clear to auscultation bilaterally, no crackles, wheezing, or rhonchi  ABDOMEN: Soft, nontender, nondistended, +BS  EXTREMITIES: 2+ peripheral pulses bilaterally. No clubbing, cyanosis, or edema  NERVOUS SYSTEM:  A&Ox3, no focal deficits   SKIN: No rashes or lesions  LINES:     LABS:                        7.9    10.35 )-----------( 84       ( 04 Jan 2024 06:21 )             23.5     Hgb Trend: 7.9<--, 8.1<--, 8.4<--, 8.3<--, 8.2<--  01-04    133<L>  |  97  |  105<H>  ----------------------------<  118<H>  4.5   |  15<L>  |  5.73<H>    Ca    9.2      04 Jan 2024 06:20    TPro  5.3<L>  /  Alb  3.5  /  TBili  12.3<H>  /  DBili  x   /  AST  104<H>  /  ALT  10  /  AlkPhos  503<H>  01-04    Creatinine Trend: 5.73<--, 5.21<--, 5.14<--, 4.71<--, 4.67<--, 4.14<--  PT/INR - ( 04 Jan 2024 06:22 )   PT: 14.4 sec;   INR: 1.32 ratio         PTT - ( 04 Jan 2024 06:22 )  PTT:37.7 sec  Urinalysis Basic - ( 04 Jan 2024 06:20 )    Color: x / Appearance: x / SG: x / pH: x  Gluc: 118 mg/dL / Ketone: x  / Bili: x / Urobili: x   Blood: x / Protein: x / Nitrite: x   Leuk Esterase: x / RBC: x / WBC x   Sq Epi: x / Non Sq Epi: x / Bacteria: x            MICROBIOLOGY:     RADIOLOGY & ADDITIONAL TESTS:    ASSESSMENT  YONATHAN MENDOZA is a 58y male with PMH of h/o type II DM for ~ 6-7 years, was on metformin and Jardiance,  diagnosed with liver cirrhosis 3 weeks ago, complicated by ascites in outside hospital, now admitted to floors undergoing workup of decompensated liver cirrhosis and RAHUL likely 2/2 HRS presenting for pressor assisted HD.     PLAN  =====Neurologic=====  #Hepatic Encephalopathy   #EtOH use disorder   - pt with acute encephalopathy (A&Ox2, per HCP baseline A&Ox3) likely 2/2 toxic metabolic and hepatic processes   - continue with lactulose 10mg TID, f/u MELD scores   - less likely concern for infectious etiology as completed 5 days of empiric meropenem     =====Pulmonary=====  Patient breathing comfortably on room air sating 100%       =====Cardiovascular=====  #Hypovolemic shock   #HRS   - pt BP in 90's/ 50's, unable to tolerate HD even with midodrine 30mg TID   - echo with normal EF 77% hyperdynamic otherwise normal diastolic function   - currently in MICU for pressor assisted HD  - wean vasopressors as tolerated     =====GI=====  #Decompensated hepatic cirrhosis   - currently undergoing transplant eval, f/u recs  - s/p IR paracentesis on 1/3- fluid analysis unremarkable for infectious etiology (total nucleated cell count 229 with 33 neutrophils) , fluid chemistry wnl, culture no growth to date   - MRI abd pending eval for HCC, hepatology following- unknown if patient has EV- will need screening EGD after MRI before deciding about A/C for possible PVT given thrombus in main protal vein   c/w Thiamine, B12, Folic Acid, lactulose and rifaximin    - pt initially scheduled for EGD on 1/5, f/u hepatology recs   - no indication for GI PPI prophylaxis at this time  - renal diet     =====Renal/=====  #Renal mass  #HRS   #HD requiring pressors   - new renal mass seen on US concerning for malignancy, pending MRI for further characterization,- onc following f/u recs  - urology consulted- f/u recs   - HRS with creatinine >5, 4 on admission and 2 at baseline, shiley placed, s/p 2 rounds of HD unable to tolerate 3rd with midodrine   #Electrolytes  - Maintain K>4, Phos>3, Mag>2, iCal>1    =====Endocrine=====  #DM2  - on 5 units lantus and SSI  - monitor and adjust as necessary     =====Infectious Disease=====  - pt with initial WBC to 13k, now downtrending  - infectious w/u negative and completed 5 days of Epimeric Meropenem  - monitor off abx and trend WBC/ temperature     =====Heme/Onc=====  #DVT Ppx- heparin sub q   #Normocytic anemia on initial CBC   likely 2/2 vitamin deficiencies  and anemia of chronic disease   -normal B12/folate  >VERENA- eventual scope to evaluate       =====Ethics=====  FULL CODE MICU ACCEPT NOTE    CHIEF COMPLAINT: Patient is a 58y old  Male who presents with a chief complaint of Liver Transplant Eval (05 Jan 2024 11:06)      HPI:  Patient history limited by patient mental status. Spoke to family member/HCP Anatjononi Grajeda who also had limited information.   57 y/o M PMH DM2, recently diagnosed alcoholic cirrhosis (3 weeks ago) presenting as a transfer from [facility] for transplant eval for decompensated liver cirrhosis (accepted by Dr. Amezquita). Patient initially presented to [facility] (from assisted living) last week with dizziness and worsening AMS. Per HCP, patient first diagnosed with cirrhosis 3 weeks ago after found lethargic. Underwent abdominal paracentesis with large volume fluid removal at that time. Patient discharged to [facility] with hepatology followup. Patient represented to OSH with AMS, dizziness, and increased abdominal distension last week. Noted to have worsening renal function, however still able to produce urine.     At OSH, patient was started on albumin, octreotide, midodrine for concern for HRS. Also started on meropenem per discharge paperwork, no clear indication listed.     Patient directly transferred from [facility] to medicine Floors. Initial EKG demonstrating sinus rhythm with frequent PVCs.  (30 Dec 2023 19:56)    Pt has been admitted to floors for transplant eval and work-up of RAHUL/ new renal mass.  Renal US demonstrating normal sized kidneys with no hydronephrosis but Multilobulated vascular mass arises from the right kidney extending into the peritoneum. Findings are concerning for primary renal malignancy versus metastatic disease. , MRI with contrast pending to eval further. Pt also found to have RAHUL with creatinine of 4, baseline 2, transplant nephrology consulted and HD started. Pt completed 2 rounds of midodrine assisted HD (initiated on 12/31)  however unable to tolerate third session even on max dose of midodrine 20mgTID, MICU consulted for pressor assisted HD.       PAST MEDICAL & SURGICAL HISTORY:  Type 2 diabetes mellitus      Decompensated hepatic cirrhosis      No significant past surgical history          FAMILY HISTORY:  No pertinent family history in first degree relatives        SOCIAL HISTORY:  Smoking:   Substance Use:   EtOH Use:   Advance Directives:     MEDICATIONS  (STANDING):  camphor 0.5%/menthol 0.5% Topical Lotion 1 Application(s) Topical two times a day  chlorhexidine 2% Cloths 1 Application(s) Topical daily  chlorhexidine 4% Liquid 1 Application(s) Topical <User Schedule>  cyanocobalamin 1000 MICROGram(s) Oral daily  dextrose 5%. 1000 milliLiter(s) (100 mL/Hr) IV Continuous <Continuous>  dextrose 5%. 1000 milliLiter(s) (50 mL/Hr) IV Continuous <Continuous>  dextrose 50% Injectable 25 Gram(s) IV Push once  dextrose 50% Injectable 25 Gram(s) IV Push once  dextrose 50% Injectable 12.5 Gram(s) IV Push once  folic acid 1 milliGRAM(s) Oral daily  glucagon  Injectable 1 milliGRAM(s) IntraMuscular once  insulin glargine Injectable (LANTUS) 5 Unit(s) SubCutaneous at bedtime  insulin lispro (ADMELOG) corrective regimen sliding scale   SubCutaneous at bedtime  insulin lispro (ADMELOG) corrective regimen sliding scale   SubCutaneous three times a day before meals  lactulose Syrup 10 Gram(s) Oral four times a day  melatonin 1 milliGRAM(s) Oral at bedtime  midodrine. 30 milliGRAM(s) Oral three times a day  octreotide  Injectable 200 MICROGram(s) SubCutaneous three times a day  pancrelipase  (CREON  6,000 Lipase Units) 1 Capsule(s) Oral three times a day with meals  tamsulosin 0.4 milliGRAM(s) Oral at bedtime  thiamine 100 milliGRAM(s) Oral daily    MEDICATIONS  (PRN):  dextrose Oral Gel 15 Gram(s) Oral once PRN Blood Glucose LESS THAN 70 milliGRAM(s)/deciliter  sodium chloride 0.9% lock flush 10 milliLiter(s) IV Push every 1 hour PRN Pre/post blood products, medications, blood draw, and to maintain line patency      Allergies    No Known Allergies    Intolerances        REVIEW OF SYSTEMS:  CONSTITUTIONAL: No weakness, fevers or chills  EYES/ENT: No visual changes;  No vertigo or throat pain   NECK: No pain or stiffness  RESPIRATORY: No cough, wheezing, hemoptysis; No shortness of breath  CARDIOVASCULAR: No chest pain or palpitations  GASTROINTESTINAL: No abdominal or epigastric pain. No nausea, vomiting, or hematemesis; No diarrhea or constipation. No melena or hematochezia.  GENITOURINARY: No dysuria, frequency or hematuria  NEUROLOGICAL: No numbness or weakness  SKIN: No itching, rashes  [ ] All other systems negative  [ ] Unable to assess ROS because ________    OBJECTIVE:  ICU Vital Signs Last 24 Hrs  T(C): 36.2 (05 Jan 2024 10:12), Max: 36.8 (05 Jan 2024 08:48)  T(F): 97.2 (05 Jan 2024 10:12), Max: 98.3 (05 Jan 2024 08:48)  HR: 80 (05 Jan 2024 10:12) (78 - 91)  BP: 91/40 (05 Jan 2024 10:12) (87/52 - 109/75)  BP(mean): --  ABP: --  ABP(mean): --  RR: 17 (05 Jan 2024 10:12) (17 - 19)  SpO2: 100% (05 Jan 2024 10:12) (96% - 100%)    O2 Parameters below as of 05 Jan 2024 10:12  Patient On (Oxygen Delivery Method): room air              01-05 @ 07:01  -  01-05 @ 12:32  --------------------------------------------------------  IN: 800 mL / OUT: 0 mL / NET: 800 mL      CAPILLARY BLOOD GLUCOSE      POCT Blood Glucose.: 127 mg/dL (05 Jan 2024 12:02)      PHYSICAL EXAM:  GENERAL: NAD, lying in bed comfortably  HEAD:  Atraumatic, normocephalic  EYES: EOMI, PERRLA, conjunctiva and sclera clear  ENT: Moist mucous membranes  NECK: Supple, no JVD  HEART: S1, S2, regular rate and rhythm, no murmurs, rubs, or gallops  LUNGS: Unlabored respirations.  Clear to auscultation bilaterally, no crackles, wheezing, or rhonchi  ABDOMEN: Soft, nontender, nondistended, +BS  EXTREMITIES: 2+ peripheral pulses bilaterally. No clubbing, cyanosis, or edema  NERVOUS SYSTEM:  A&Ox3, no focal deficits   SKIN: No rashes or lesions  LINES:     LABS:                        7.9    10.35 )-----------( 84       ( 04 Jan 2024 06:21 )             23.5     Hgb Trend: 7.9<--, 8.1<--, 8.4<--, 8.3<--, 8.2<--  01-04    133<L>  |  97  |  105<H>  ----------------------------<  118<H>  4.5   |  15<L>  |  5.73<H>    Ca    9.2      04 Jan 2024 06:20    TPro  5.3<L>  /  Alb  3.5  /  TBili  12.3<H>  /  DBili  x   /  AST  104<H>  /  ALT  10  /  AlkPhos  503<H>  01-04    Creatinine Trend: 5.73<--, 5.21<--, 5.14<--, 4.71<--, 4.67<--, 4.14<--  PT/INR - ( 04 Jan 2024 06:22 )   PT: 14.4 sec;   INR: 1.32 ratio         PTT - ( 04 Jan 2024 06:22 )  PTT:37.7 sec  Urinalysis Basic - ( 04 Jan 2024 06:20 )    Color: x / Appearance: x / SG: x / pH: x  Gluc: 118 mg/dL / Ketone: x  / Bili: x / Urobili: x   Blood: x / Protein: x / Nitrite: x   Leuk Esterase: x / RBC: x / WBC x   Sq Epi: x / Non Sq Epi: x / Bacteria: x            MICROBIOLOGY:     RADIOLOGY & ADDITIONAL TESTS:    ASSESSMENT  YONATHAN MENDOZA is a 58y male with PMH of h/o type II DM for ~ 6-7 years, was on metformin and Jardiance,  diagnosed with liver cirrhosis 3 weeks ago, complicated by ascites in outside hospital, now admitted to floors undergoing workup of decompensated liver cirrhosis and RAHUL likely 2/2 HRS presenting for pressor assisted HD.     PLAN  =====Neurologic=====  #Hepatic Encephalopathy   #EtOH use disorder   - pt with acute encephalopathy (A&Ox2, per HCP baseline A&Ox3) likely 2/2 toxic metabolic and hepatic processes   - continue with lactulose 10mg TID, f/u MELD scores   - less likely concern for infectious etiology as completed 5 days of empiric meropenem     =====Pulmonary=====  Patient breathing comfortably on room air sating 100%       =====Cardiovascular=====  #Hypovolemic shock   #HRS   - pt BP in 90's/ 50's, unable to tolerate HD even with midodrine 30mg TID   - echo with normal EF 77% hyperdynamic otherwise normal diastolic function   - currently in MICU for pressor assisted HD  - wean vasopressors as tolerated     =====GI=====  #Decompensated hepatic cirrhosis   - currently undergoing transplant eval, f/u recs  - s/p IR paracentesis on 1/3- fluid analysis unremarkable for infectious etiology (total nucleated cell count 229 with 33 neutrophils) , fluid chemistry wnl, culture no growth to date   - MRI abd pending eval for HCC, hepatology following- unknown if patient has EV- will need screening EGD after MRI before deciding about A/C for possible PVT given thrombus in main protal vein   c/w Thiamine, B12, Folic Acid, lactulose and rifaximin    - cw octreotide injections 200mg q8hr   - pt initially scheduled for EGD on 1/5, f/u hepatology recs   - no indication for GI PPI prophylaxis at this time  - renal diet     =====Renal/=====  #Renal mass  #HRS   #HD requiring pressors   - new renal mass seen on US concerning for malignancy, pending MRI for further characterization,- onc following f/u recs  - urology consulted- f/u recs   - HRS with creatinine >5, 4 on admission and 2 at baseline, shiley placed, s/p 2 rounds of HD unable to tolerate 3rd with midodrine   #Electrolytes  - Maintain K>4, Phos>3, Mag>2, iCal>1    =====Endocrine=====  #DM2  - on 5 units lantus and SSI  - monitor and adjust as necessary     =====Infectious Disease=====  - pt with initial WBC to 13k, now downtrending  - infectious w/u negative and completed 5 days of Epimeric Meropenem  - monitor off abx and trend WBC/ temperature     =====Heme/Onc=====  #DVT Ppx- heparin sub q   #Normocytic anemia on initial CBC   likely 2/2 vitamin deficiencies  and anemia of chronic disease   -normal B12/folate  >VERENA- eventual scope to evaluate       =====Ethics=====  FULL CODE MICU ACCEPT NOTE    CHIEF COMPLAINT: Patient is a 58y old  Male who presents with a chief complaint of Liver Transplant Eval (05 Jan 2024 11:06)      HPI:  Patient history limited by patient mental status. Spoke to family member/HCP Anatjnooni Grajeda who also had limited information.   57 y/o M PMH DM2, recently diagnosed alcoholic cirrhosis (3 weeks ago) presenting as a transfer from [facility] for transplant eval for decompensated liver cirrhosis (accepted by Dr. Amezquita). Patient initially presented to [facility] (from assisted living) last week with dizziness and worsening AMS. Per HCP, patient first diagnosed with cirrhosis 3 weeks ago after found lethargic. Underwent abdominal paracentesis with large volume fluid removal at that time. Patient discharged to [facility] with hepatology followup. Patient represented to OSH with AMS, dizziness, and increased abdominal distension last week. Noted to have worsening renal function, however still able to produce urine.     At OSH, patient was started on albumin, octreotide, midodrine for concern for HRS. Also started on meropenem per discharge paperwork, no clear indication listed.     Patient directly transferred from [facility] to medicine Floors. Initial EKG demonstrating sinus rhythm with frequent PVCs.  (30 Dec 2023 19:56)    Pt has been admitted to floors for transplant eval and work-up of RAHUL/ new renal mass.  Renal US demonstrating normal sized kidneys with no hydronephrosis but Multilobulated vascular mass arises from the right kidney extending into the peritoneum. Findings are concerning for primary renal malignancy versus metastatic disease. , MRI with contrast pending to eval further. Pt also found to have RAHUL with creatinine of 4, baseline 2, transplant nephrology consulted and HD started. Pt completed 2 rounds of midodrine assisted HD (initiated on 12/31)  however unable to tolerate third session even on max dose of midodrine 20mgTID, MICU consulted for pressor assisted HD.       PAST MEDICAL & SURGICAL HISTORY:  Type 2 diabetes mellitus      Decompensated hepatic cirrhosis      No significant past surgical history          FAMILY HISTORY:  No pertinent family history in first degree relatives        SOCIAL HISTORY:  Smoking:   Substance Use:   EtOH Use:   Advance Directives:     MEDICATIONS  (STANDING):  camphor 0.5%/menthol 0.5% Topical Lotion 1 Application(s) Topical two times a day  chlorhexidine 2% Cloths 1 Application(s) Topical daily  chlorhexidine 4% Liquid 1 Application(s) Topical <User Schedule>  cyanocobalamin 1000 MICROGram(s) Oral daily  dextrose 5%. 1000 milliLiter(s) (100 mL/Hr) IV Continuous <Continuous>  dextrose 5%. 1000 milliLiter(s) (50 mL/Hr) IV Continuous <Continuous>  dextrose 50% Injectable 25 Gram(s) IV Push once  dextrose 50% Injectable 25 Gram(s) IV Push once  dextrose 50% Injectable 12.5 Gram(s) IV Push once  folic acid 1 milliGRAM(s) Oral daily  glucagon  Injectable 1 milliGRAM(s) IntraMuscular once  insulin glargine Injectable (LANTUS) 5 Unit(s) SubCutaneous at bedtime  insulin lispro (ADMELOG) corrective regimen sliding scale   SubCutaneous at bedtime  insulin lispro (ADMELOG) corrective regimen sliding scale   SubCutaneous three times a day before meals  lactulose Syrup 10 Gram(s) Oral four times a day  melatonin 1 milliGRAM(s) Oral at bedtime  midodrine. 30 milliGRAM(s) Oral three times a day  octreotide  Injectable 200 MICROGram(s) SubCutaneous three times a day  pancrelipase  (CREON  6,000 Lipase Units) 1 Capsule(s) Oral three times a day with meals  tamsulosin 0.4 milliGRAM(s) Oral at bedtime  thiamine 100 milliGRAM(s) Oral daily    MEDICATIONS  (PRN):  dextrose Oral Gel 15 Gram(s) Oral once PRN Blood Glucose LESS THAN 70 milliGRAM(s)/deciliter  sodium chloride 0.9% lock flush 10 milliLiter(s) IV Push every 1 hour PRN Pre/post blood products, medications, blood draw, and to maintain line patency      Allergies    No Known Allergies    Intolerances        REVIEW OF SYSTEMS:  CONSTITUTIONAL: No weakness, fevers or chills  EYES/ENT: No visual changes;  No vertigo or throat pain   NECK: No pain or stiffness  RESPIRATORY: No cough, wheezing, hemoptysis; No shortness of breath  CARDIOVASCULAR: No chest pain or palpitations  GASTROINTESTINAL: No abdominal or epigastric pain. No nausea, vomiting, or hematemesis; No diarrhea or constipation. No melena or hematochezia.  GENITOURINARY: No dysuria, frequency or hematuria  NEUROLOGICAL: No numbness or weakness  SKIN: No itching, rashes  [ ] All other systems negative  [ ] Unable to assess ROS because ________    OBJECTIVE:  ICU Vital Signs Last 24 Hrs  T(C): 36.2 (05 Jan 2024 10:12), Max: 36.8 (05 Jan 2024 08:48)  T(F): 97.2 (05 Jan 2024 10:12), Max: 98.3 (05 Jan 2024 08:48)  HR: 80 (05 Jan 2024 10:12) (78 - 91)  BP: 91/40 (05 Jan 2024 10:12) (87/52 - 109/75)  BP(mean): --  ABP: --  ABP(mean): --  RR: 17 (05 Jan 2024 10:12) (17 - 19)  SpO2: 100% (05 Jan 2024 10:12) (96% - 100%)    O2 Parameters below as of 05 Jan 2024 10:12  Patient On (Oxygen Delivery Method): room air              01-05 @ 07:01  -  01-05 @ 12:32  --------------------------------------------------------  IN: 800 mL / OUT: 0 mL / NET: 800 mL      CAPILLARY BLOOD GLUCOSE      POCT Blood Glucose.: 127 mg/dL (05 Jan 2024 12:02)      PHYSICAL EXAM:  GENERAL: NAD, lying in bed comfortably  HEAD:  Atraumatic, normocephalic  EYES: EOMI, PERRLA, conjunctiva and sclera clear  ENT: Moist mucous membranes  NECK: Supple, no JVD  HEART: S1, S2, regular rate and rhythm, no murmurs, rubs, or gallops  LUNGS: Unlabored respirations.  Clear to auscultation bilaterally, no crackles, wheezing, or rhonchi  ABDOMEN: Soft, nontender, nondistended, +BS  EXTREMITIES: 2+ peripheral pulses bilaterally. No clubbing, cyanosis, or edema  NERVOUS SYSTEM:  A&Ox3, no focal deficits   SKIN: No rashes or lesions  LINES:     LABS:                        7.9    10.35 )-----------( 84       ( 04 Jan 2024 06:21 )             23.5     Hgb Trend: 7.9<--, 8.1<--, 8.4<--, 8.3<--, 8.2<--  01-04    133<L>  |  97  |  105<H>  ----------------------------<  118<H>  4.5   |  15<L>  |  5.73<H>    Ca    9.2      04 Jan 2024 06:20    TPro  5.3<L>  /  Alb  3.5  /  TBili  12.3<H>  /  DBili  x   /  AST  104<H>  /  ALT  10  /  AlkPhos  503<H>  01-04    Creatinine Trend: 5.73<--, 5.21<--, 5.14<--, 4.71<--, 4.67<--, 4.14<--  PT/INR - ( 04 Jan 2024 06:22 )   PT: 14.4 sec;   INR: 1.32 ratio         PTT - ( 04 Jan 2024 06:22 )  PTT:37.7 sec  Urinalysis Basic - ( 04 Jan 2024 06:20 )    Color: x / Appearance: x / SG: x / pH: x  Gluc: 118 mg/dL / Ketone: x  / Bili: x / Urobili: x   Blood: x / Protein: x / Nitrite: x   Leuk Esterase: x / RBC: x / WBC x   Sq Epi: x / Non Sq Epi: x / Bacteria: x            MICROBIOLOGY:     RADIOLOGY & ADDITIONAL TESTS:    ASSESSMENT  YONATHAN MENDOZA is a 58y male with PMH of h/o type II DM for ~ 6-7 years, was on metformin and Jardiance,  diagnosed with liver cirrhosis 3 weeks ago, complicated by ascites in outside hospital, now admitted to floors undergoing workup of decompensated liver cirrhosis and RAHUL likely 2/2 HRS presenting for pressor assisted HD.     PLAN  =====Neurologic=====  #Hepatic Encephalopathy   #EtOH use disorder   - pt with acute encephalopathy (A&Ox2, per HCP baseline A&Ox3) likely 2/2 toxic metabolic and hepatic processes   - continue with lactulose 10mg TID, f/u MELD scores   - less likely concern for infectious etiology as completed 5 days of empiric meropenem     =====Pulmonary=====  Patient breathing comfortably on room air sating 100%       =====Cardiovascular=====  #Hypovolemic shock   #HRS   - pt BP in 90's/ 50's, unable to tolerate HD even with midodrine 30mg TID   - echo with normal EF 77% hyperdynamic otherwise normal diastolic function   - currently in MICU for pressor assisted HD  - wean vasopressors as tolerated     =====GI=====  #Decompensated hepatic cirrhosis   - currently undergoing transplant eval, f/u recs  - s/p IR paracentesis on 1/3- fluid analysis unremarkable for infectious etiology (total nucleated cell count 229 with 33 neutrophils) , fluid chemistry wnl, culture no growth to date   - MRI abd pending eval for HCC, hepatology following- unknown if patient has EV- will need screening EGD after MRI before deciding about A/C for possible PVT given thrombus in main protal vein   c/w Thiamine, B12, Folic Acid, lactulose and rifaximin    - cw octreotide injections 200mg q8hr   - pt initially scheduled for EGD on 1/5, f/u hepatology recs   - no indication for GI PPI prophylaxis at this time  - renal diet     =====Renal/=====  #Renal mass  #HRS   #HD requiring pressors   - new renal mass seen on US concerning for malignancy, pending MRI for further characterization,- onc following f/u recs  - urology consulted- f/u recs   - HRS with creatinine >5, 4 on admission and 2 at baseline, shiley placed, s/p 2 rounds of HD unable to tolerate 3rd with midodrine   #Electrolytes  - Maintain K>4, Phos>3, Mag>2, iCal>1    =====Endocrine=====  #DM2  - on 5 units lantus and SSI  - monitor and adjust as necessary     =====Infectious Disease=====  - pt with initial WBC to 13k, now downtrending  - infectious w/u negative and completed 5 days of Epimeric Meropenem  - monitor off abx and trend WBC/ temperature     =====Heme/Onc=====  #DVT Ppx- heparin sub q   #Normocytic anemia on initial CBC   likely 2/2 vitamin deficiencies  and anemia of chronic disease   -normal B12/folate  >VERENA- eventual scope to evaluate       =====Ethics=====  FULL CODE MICU ACCEPT NOTE    CHIEF COMPLAINT: Patient is a 58y old  Male who presents with a chief complaint of Liver Transplant Eval (05 Jan 2024 11:06)      HPI:  Patient history limited by patient mental status. Spoke to family member/HCP Anatjononi Grajeda who also had limited information.   59 y/o M PMH DM2, recently diagnosed alcoholic cirrhosis (3 weeks ago) presenting as a transfer from [facility] for transplant eval for decompensated liver cirrhosis (accepted by Dr. Amezquita). Patient initially presented to [facility] (from assisted living) last week with dizziness and worsening AMS. Per HCP, patient first diagnosed with cirrhosis 3 weeks ago after found lethargic. Underwent abdominal paracentesis with large volume fluid removal at that time. Patient discharged to [facility] with hepatology followup. Patient represented to OSH with AMS, dizziness, and increased abdominal distension last week. Noted to have worsening renal function, however still able to produce urine.     At OSH, patient was started on albumin, octreotide, midodrine for concern for HRS. Also started on meropenem per discharge paperwork, no clear indication listed.     Patient directly transferred from [facility] to medicine Floors. Initial EKG demonstrating sinus rhythm with frequent PVCs.  (30 Dec 2023 19:56)    Pt has been admitted to floors for transplant eval and work-up of RAHUL/ new renal mass.  Renal US demonstrating normal sized kidneys with no hydronephrosis but Multilobulated vascular mass arises from the right kidney extending into the peritoneum. Findings are concerning for primary renal malignancy versus metastatic disease. , MRI with contrast pending to eval further. Pt also found to have RAHUL with creatinine of 4, baseline 2, transplant nephrology consulted and HD started. Pt completed 2 rounds of midodrine assisted HD (initiated on 12/31)  however unable to tolerate third session even on max dose of midodrine 20mgTID, MICU consulted for pressor assisted HD.       PAST MEDICAL & SURGICAL HISTORY:  Type 2 diabetes mellitus      Decompensated hepatic cirrhosis      No significant past surgical history          FAMILY HISTORY:  No pertinent family history in first degree relatives        SOCIAL HISTORY:  Smoking:   Substance Use:   EtOH Use:   Advance Directives:     MEDICATIONS  (STANDING):  camphor 0.5%/menthol 0.5% Topical Lotion 1 Application(s) Topical two times a day  chlorhexidine 2% Cloths 1 Application(s) Topical daily  chlorhexidine 4% Liquid 1 Application(s) Topical <User Schedule>  cyanocobalamin 1000 MICROGram(s) Oral daily  dextrose 5%. 1000 milliLiter(s) (100 mL/Hr) IV Continuous <Continuous>  dextrose 5%. 1000 milliLiter(s) (50 mL/Hr) IV Continuous <Continuous>  dextrose 50% Injectable 25 Gram(s) IV Push once  dextrose 50% Injectable 25 Gram(s) IV Push once  dextrose 50% Injectable 12.5 Gram(s) IV Push once  folic acid 1 milliGRAM(s) Oral daily  glucagon  Injectable 1 milliGRAM(s) IntraMuscular once  insulin glargine Injectable (LANTUS) 5 Unit(s) SubCutaneous at bedtime  insulin lispro (ADMELOG) corrective regimen sliding scale   SubCutaneous at bedtime  insulin lispro (ADMELOG) corrective regimen sliding scale   SubCutaneous three times a day before meals  lactulose Syrup 10 Gram(s) Oral four times a day  melatonin 1 milliGRAM(s) Oral at bedtime  midodrine. 30 milliGRAM(s) Oral three times a day  octreotide  Injectable 200 MICROGram(s) SubCutaneous three times a day  pancrelipase  (CREON  6,000 Lipase Units) 1 Capsule(s) Oral three times a day with meals  tamsulosin 0.4 milliGRAM(s) Oral at bedtime  thiamine 100 milliGRAM(s) Oral daily    MEDICATIONS  (PRN):  dextrose Oral Gel 15 Gram(s) Oral once PRN Blood Glucose LESS THAN 70 milliGRAM(s)/deciliter  sodium chloride 0.9% lock flush 10 milliLiter(s) IV Push every 1 hour PRN Pre/post blood products, medications, blood draw, and to maintain line patency      Allergies    No Known Allergies    Intolerances        REVIEW OF SYSTEMS:  CONSTITUTIONAL: No weakness, fevers or chills  EYES/ENT: No visual changes;  No vertigo or throat pain   NECK: No pain or stiffness  RESPIRATORY: No cough, wheezing, hemoptysis; No shortness of breath  CARDIOVASCULAR: No chest pain or palpitations  GASTROINTESTINAL: No abdominal or epigastric pain. No nausea, vomiting, or hematemesis; No diarrhea or constipation. No melena or hematochezia.  GENITOURINARY: No dysuria, frequency or hematuria  NEUROLOGICAL: No numbness or weakness  SKIN: No itching, rashes  [ ] All other systems negative  [ ] Unable to assess ROS because ________    OBJECTIVE:  ICU Vital Signs Last 24 Hrs  T(C): 36.2 (05 Jan 2024 10:12), Max: 36.8 (05 Jan 2024 08:48)  T(F): 97.2 (05 Jan 2024 10:12), Max: 98.3 (05 Jan 2024 08:48)  HR: 80 (05 Jan 2024 10:12) (78 - 91)  BP: 91/40 (05 Jan 2024 10:12) (87/52 - 109/75)  BP(mean): --  ABP: --  ABP(mean): --  RR: 17 (05 Jan 2024 10:12) (17 - 19)  SpO2: 100% (05 Jan 2024 10:12) (96% - 100%)    O2 Parameters below as of 05 Jan 2024 10:12  Patient On (Oxygen Delivery Method): room air              01-05 @ 07:01  -  01-05 @ 12:32  --------------------------------------------------------  IN: 800 mL / OUT: 0 mL / NET: 800 mL      CAPILLARY BLOOD GLUCOSE      POCT Blood Glucose.: 127 mg/dL (05 Jan 2024 12:02)      PHYSICAL EXAM:  GENERAL: NAD, lying in bed comfortably  HEAD:  Atraumatic, normocephalic  EYES: EOMI, PERRLA, conjunctiva and sclera clear  ENT: Moist mucous membranes  NECK: Supple, no JVD  HEART: S1, S2, regular rate and rhythm, no murmurs, rubs, or gallops  LUNGS: Unlabored respirations.  Clear to auscultation bilaterally, no crackles, wheezing, or rhonchi  ABDOMEN: Soft, nontender, nondistended, +BS  EXTREMITIES: 2+ peripheral pulses bilaterally. No clubbing, cyanosis, or edema  NERVOUS SYSTEM:  A&Ox3, no focal deficits   SKIN: No rashes or lesions  LINES:     LABS:                        7.9    10.35 )-----------( 84       ( 04 Jan 2024 06:21 )             23.5     Hgb Trend: 7.9<--, 8.1<--, 8.4<--, 8.3<--, 8.2<--  01-04    133<L>  |  97  |  105<H>  ----------------------------<  118<H>  4.5   |  15<L>  |  5.73<H>    Ca    9.2      04 Jan 2024 06:20    TPro  5.3<L>  /  Alb  3.5  /  TBili  12.3<H>  /  DBili  x   /  AST  104<H>  /  ALT  10  /  AlkPhos  503<H>  01-04    Creatinine Trend: 5.73<--, 5.21<--, 5.14<--, 4.71<--, 4.67<--, 4.14<--  PT/INR - ( 04 Jan 2024 06:22 )   PT: 14.4 sec;   INR: 1.32 ratio         PTT - ( 04 Jan 2024 06:22 )  PTT:37.7 sec  Urinalysis Basic - ( 04 Jan 2024 06:20 )    Color: x / Appearance: x / SG: x / pH: x  Gluc: 118 mg/dL / Ketone: x  / Bili: x / Urobili: x   Blood: x / Protein: x / Nitrite: x   Leuk Esterase: x / RBC: x / WBC x   Sq Epi: x / Non Sq Epi: x / Bacteria: x            MICROBIOLOGY:     RADIOLOGY & ADDITIONAL TESTS:    ASSESSMENT  YONATHAN MENDOZA is a 58y male with PMH of h/o type II DM for ~ 6-7 years, was on metformin and Jardiance,  diagnosed with liver cirrhosis 3 weeks ago, complicated by ascites in outside hospital, now admitted to floors undergoing workup of decompensated liver cirrhosis and RAHUL likely 2/2 HRS presenting for pressor assisted HD.     PLAN  =====Neurologic=====  #Hepatic Encephalopathy   #EtOH use disorder   - pt with acute encephalopathy (A&Ox2, per HCP baseline A&Ox3) likely 2/2 toxic metabolic and hepatic processes   - continue with lactulose 10mg TID and rifaximin, f/u MELD scores   - less likely concern for infectious etiology as completed 5 days of empiric meropenem     =====Pulmonary=====  Patient breathing comfortably on room air sating 100%       =====Cardiovascular=====  #Hypovolemic shock   #HRS   - pt BP in 90's/ 50's, unable to tolerate HD even with midodrine 30mg TID   - echo with normal EF 77% hyperdynamic otherwise normal diastolic function   - currently in MICU for pressor assisted HD  - wean vasopressors as tolerated     =====GI=====  #Decompensated hepatic cirrhosis   - currently undergoing transplant eval, f/u recs  - s/p IR paracentesis on 1/3- fluid analysis unremarkable for infectious etiology (total nucleated cell count 229 with 33 neutrophils) , fluid chemistry wnl, culture no growth to date   - MRI abd pending eval for HCC, hepatology following- unknown if patient has EV- will need screening EGD after MRI before deciding about A/C for possible PVT given thrombus in main protal vein   c/w Thiamine, B12, Folic Acid, lactulose and rifaximin    - cw octreotide injections 200mg q8hr   - pt initially scheduled for EGD on 1/5, f/u hepatology recs   - continue home famotidine 20mg   - renal diet     =====Renal/=====  #Renal mass  #HRS   #HD requiring pressors   - new renal mass seen on US concerning for malignancy, pending MRI for further characterization,- onc following f/u recs  - urology consulted- f/u recs   - HRS with creatinine >5, 4 on admission and 2 at baseline, shiley placed, s/p 2 rounds of HD unable to tolerate 3rd with midodrine   #Electrolytes  - Maintain K>4, Phos>3, Mag>2, iCal>1    =====Endocrine=====  #DM2  - on 5 units lantus and SSI  - monitor and adjust as necessary     =====Infectious Disease=====  - pt with initial WBC to 13k, now downtrending  - infectious w/u negative and completed 5 days of Epimeric Meropenem 12/30- 1/4   - monitor off abx and trend WBC/ temperature     =====Heme/Onc=====  #DVT Ppx- heparin sub q   #Normocytic anemia on initial CBC   likely 2/2 vitamin deficiencies  and anemia of chronic disease   -normal B12/folate  >VERENA- eventual scope to evaluate   - monitor platlet count in setting of thrombocytopenia 2/2 cirrhosis       =====Ethics=====  FULL CODE MICU ACCEPT NOTE    CHIEF COMPLAINT: Patient is a 58y old  Male who presents with a chief complaint of Liver Transplant Eval (05 Jan 2024 11:06)      HPI:  Patient history limited by patient mental status. Spoke to family member/HCP Anatjononi Grajeda who also had limited information.   59 y/o M PMH DM2, recently diagnosed alcoholic cirrhosis (3 weeks ago) presenting as a transfer from [facility] for transplant eval for decompensated liver cirrhosis (accepted by Dr. Amezquita). Patient initially presented to [facility] (from assisted living) last week with dizziness and worsening AMS. Per HCP, patient first diagnosed with cirrhosis 3 weeks ago after found lethargic. Underwent abdominal paracentesis with large volume fluid removal at that time. Patient discharged to [facility] with hepatology followup. Patient represented to OSH with AMS, dizziness, and increased abdominal distension last week. Noted to have worsening renal function, however still able to produce urine.     At OSH, patient was started on albumin, octreotide, midodrine for concern for HRS. Also started on meropenem per discharge paperwork, no clear indication listed.     Patient directly transferred from [facility] to medicine Floors. Initial EKG demonstrating sinus rhythm with frequent PVCs.  (30 Dec 2023 19:56)    Pt has been admitted to floors for transplant eval and work-up of RAHUL/ new renal mass.  Renal US demonstrating normal sized kidneys with no hydronephrosis but Multilobulated vascular mass arises from the right kidney extending into the peritoneum. Findings are concerning for primary renal malignancy versus metastatic disease. , MRI with contrast pending to eval further. Pt also found to have RAHUL with creatinine of 4, baseline 2, transplant nephrology consulted and HD started. Pt completed 2 rounds of midodrine assisted HD (initiated on 12/31)  however unable to tolerate third session even on max dose of midodrine 20mgTID, MICU consulted for pressor assisted HD.       PAST MEDICAL & SURGICAL HISTORY:  Type 2 diabetes mellitus      Decompensated hepatic cirrhosis      No significant past surgical history          FAMILY HISTORY:  No pertinent family history in first degree relatives        SOCIAL HISTORY:  Smoking:   Substance Use:   EtOH Use:   Advance Directives:     MEDICATIONS  (STANDING):  camphor 0.5%/menthol 0.5% Topical Lotion 1 Application(s) Topical two times a day  chlorhexidine 2% Cloths 1 Application(s) Topical daily  chlorhexidine 4% Liquid 1 Application(s) Topical <User Schedule>  cyanocobalamin 1000 MICROGram(s) Oral daily  dextrose 5%. 1000 milliLiter(s) (100 mL/Hr) IV Continuous <Continuous>  dextrose 5%. 1000 milliLiter(s) (50 mL/Hr) IV Continuous <Continuous>  dextrose 50% Injectable 25 Gram(s) IV Push once  dextrose 50% Injectable 25 Gram(s) IV Push once  dextrose 50% Injectable 12.5 Gram(s) IV Push once  folic acid 1 milliGRAM(s) Oral daily  glucagon  Injectable 1 milliGRAM(s) IntraMuscular once  insulin glargine Injectable (LANTUS) 5 Unit(s) SubCutaneous at bedtime  insulin lispro (ADMELOG) corrective regimen sliding scale   SubCutaneous at bedtime  insulin lispro (ADMELOG) corrective regimen sliding scale   SubCutaneous three times a day before meals  lactulose Syrup 10 Gram(s) Oral four times a day  melatonin 1 milliGRAM(s) Oral at bedtime  midodrine. 30 milliGRAM(s) Oral three times a day  octreotide  Injectable 200 MICROGram(s) SubCutaneous three times a day  pancrelipase  (CREON  6,000 Lipase Units) 1 Capsule(s) Oral three times a day with meals  tamsulosin 0.4 milliGRAM(s) Oral at bedtime  thiamine 100 milliGRAM(s) Oral daily    MEDICATIONS  (PRN):  dextrose Oral Gel 15 Gram(s) Oral once PRN Blood Glucose LESS THAN 70 milliGRAM(s)/deciliter  sodium chloride 0.9% lock flush 10 milliLiter(s) IV Push every 1 hour PRN Pre/post blood products, medications, blood draw, and to maintain line patency      Allergies    No Known Allergies    Intolerances        REVIEW OF SYSTEMS:  CONSTITUTIONAL: No weakness, fevers or chills  EYES/ENT: No visual changes;  No vertigo or throat pain   NECK: No pain or stiffness  RESPIRATORY: No cough, wheezing, hemoptysis; No shortness of breath  CARDIOVASCULAR: No chest pain or palpitations  GASTROINTESTINAL: No abdominal or epigastric pain. No nausea, vomiting, or hematemesis; No diarrhea or constipation. No melena or hematochezia.  GENITOURINARY: No dysuria, frequency or hematuria  NEUROLOGICAL: No numbness or weakness  SKIN: No itching, rashes  [ ] All other systems negative  [ ] Unable to assess ROS because ________    OBJECTIVE:  ICU Vital Signs Last 24 Hrs  T(C): 36.2 (05 Jan 2024 10:12), Max: 36.8 (05 Jan 2024 08:48)  T(F): 97.2 (05 Jan 2024 10:12), Max: 98.3 (05 Jan 2024 08:48)  HR: 80 (05 Jan 2024 10:12) (78 - 91)  BP: 91/40 (05 Jan 2024 10:12) (87/52 - 109/75)  BP(mean): --  ABP: --  ABP(mean): --  RR: 17 (05 Jan 2024 10:12) (17 - 19)  SpO2: 100% (05 Jan 2024 10:12) (96% - 100%)    O2 Parameters below as of 05 Jan 2024 10:12  Patient On (Oxygen Delivery Method): room air              01-05 @ 07:01  -  01-05 @ 12:32  --------------------------------------------------------  IN: 800 mL / OUT: 0 mL / NET: 800 mL      CAPILLARY BLOOD GLUCOSE      POCT Blood Glucose.: 127 mg/dL (05 Jan 2024 12:02)      PHYSICAL EXAM:  GENERAL: NAD, lying in bed comfortably  HEAD:  Atraumatic, normocephalic  EYES: EOMI, PERRLA, conjunctiva and sclera clear  ENT: Moist mucous membranes  NECK: Supple, no JVD  HEART: S1, S2, regular rate and rhythm, no murmurs, rubs, or gallops  LUNGS: Unlabored respirations.  Clear to auscultation bilaterally, no crackles, wheezing, or rhonchi  ABDOMEN: Soft, nontender, nondistended, +BS  EXTREMITIES: 2+ peripheral pulses bilaterally. No clubbing, cyanosis, or edema  NERVOUS SYSTEM:  A&Ox3, no focal deficits   SKIN: No rashes or lesions  LINES:     LABS:                        7.9    10.35 )-----------( 84       ( 04 Jan 2024 06:21 )             23.5     Hgb Trend: 7.9<--, 8.1<--, 8.4<--, 8.3<--, 8.2<--  01-04    133<L>  |  97  |  105<H>  ----------------------------<  118<H>  4.5   |  15<L>  |  5.73<H>    Ca    9.2      04 Jan 2024 06:20    TPro  5.3<L>  /  Alb  3.5  /  TBili  12.3<H>  /  DBili  x   /  AST  104<H>  /  ALT  10  /  AlkPhos  503<H>  01-04    Creatinine Trend: 5.73<--, 5.21<--, 5.14<--, 4.71<--, 4.67<--, 4.14<--  PT/INR - ( 04 Jan 2024 06:22 )   PT: 14.4 sec;   INR: 1.32 ratio         PTT - ( 04 Jan 2024 06:22 )  PTT:37.7 sec  Urinalysis Basic - ( 04 Jan 2024 06:20 )    Color: x / Appearance: x / SG: x / pH: x  Gluc: 118 mg/dL / Ketone: x  / Bili: x / Urobili: x   Blood: x / Protein: x / Nitrite: x   Leuk Esterase: x / RBC: x / WBC x   Sq Epi: x / Non Sq Epi: x / Bacteria: x            MICROBIOLOGY:     RADIOLOGY & ADDITIONAL TESTS:    ASSESSMENT  YONATHAN MENDOZA is a 58y male with PMH of h/o type II DM for ~ 6-7 years, was on metformin and Jardiance,  diagnosed with liver cirrhosis 3 weeks ago, complicated by ascites in outside hospital, now admitted to floors undergoing workup of decompensated liver cirrhosis and RAHUL likely 2/2 HRS presenting for pressor assisted HD.     PLAN  =====Neurologic=====  #Hepatic Encephalopathy   #EtOH use disorder   - pt with acute encephalopathy (A&Ox2, per HCP baseline A&Ox3) likely 2/2 toxic metabolic and hepatic processes   - continue with lactulose 10mg TID and rifaximin, f/u MELD scores   - less likely concern for infectious etiology as completed 5 days of empiric meropenem     =====Pulmonary=====  Patient breathing comfortably on room air sating 100%       =====Cardiovascular=====  #Hypovolemic shock   #HRS   - pt BP in 90's/ 50's, unable to tolerate HD even with midodrine 30mg TID   - echo with normal EF 77% hyperdynamic otherwise normal diastolic function   - currently in MICU for pressor assisted HD  - wean vasopressors as tolerated     =====GI=====  #Decompensated hepatic cirrhosis   - currently undergoing transplant eval, f/u recs  - s/p IR paracentesis on 1/3- fluid analysis unremarkable for infectious etiology (total nucleated cell count 229 with 33 neutrophils) , fluid chemistry wnl, culture no growth to date   - MRI abd pending eval for HCC, hepatology following- unknown if patient has EV- will need screening EGD after MRI before deciding about A/C for possible PVT given thrombus in main protal vein   c/w Thiamine, B12, Folic Acid, lactulose and rifaximin    - cw octreotide injections 200mg q8hr   - pt initially scheduled for EGD on 1/5, f/u hepatology recs   - continue home famotidine 20mg   - renal diet     =====Renal/=====  #Renal mass  #HRS   #HD requiring pressors   - new renal mass seen on US concerning for malignancy, pending MRI for further characterization,- onc following f/u recs  - urology consulted- f/u recs   - HRS with creatinine >5, 4 on admission and 2 at baseline, shiley placed, s/p 2 rounds of HD unable to tolerate 3rd with midodrine   #Electrolytes  - Maintain K>4, Phos>3, Mag>2, iCal>1    =====Endocrine=====  #DM2  - on 5 units lantus and SSI  - monitor and adjust as necessary     =====Infectious Disease=====  - pt with initial WBC to 13k, now downtrending  - infectious w/u negative and completed 5 days of Epimeric Meropenem 12/30- 1/4   - monitor off abx and trend WBC/ temperature     =====Heme/Onc=====  #DVT Ppx- heparin sub q   #Normocytic anemia on initial CBC   likely 2/2 vitamin deficiencies  and anemia of chronic disease   -normal B12/folate  >VERENA- eventual scope to evaluate   - monitor platlet count in setting of thrombocytopenia 2/2 cirrhosis       =====Ethics=====  FULL CODE MICU ACCEPT NOTE    CHIEF COMPLAINT: Patient is a 58y old  Male who presents with a chief complaint of Liver Transplant Eval (05 Jan 2024 11:06)      HPI:  Patient history limited by patient mental status. Spoke to family member/HCP Anatjononi Grajeda who also had limited information.   59 y/o M PMH DM2, recently diagnosed alcoholic cirrhosis (3 weeks ago) presenting as a transfer from [facility] for transplant eval for decompensated liver cirrhosis (accepted by Dr. Amezquita). Patient initially presented to [facility] (from assisted living) last week with dizziness and worsening AMS. Per HCP, patient first diagnosed with cirrhosis 3 weeks ago after found lethargic. Underwent abdominal paracentesis with large volume fluid removal at that time. Patient discharged to [facility] with hepatology followup. Patient represented to OSH with AMS, dizziness, and increased abdominal distension last week. Noted to have worsening renal function, however still able to produce urine.     At OSH, patient was started on albumin, octreotide, midodrine for concern for HRS. Also started on meropenem per discharge paperwork, no clear indication listed.     Patient directly transferred from [facility] to medicine Floors. Initial EKG demonstrating sinus rhythm with frequent PVCs.  (30 Dec 2023 19:56)    Pt has been admitted to floors for transplant eval and work-up of RAHUL/ new renal mass.  Renal US demonstrating normal sized kidneys with no hydronephrosis but Multilobulated vascular mass arises from the right kidney extending into the peritoneum. Findings are concerning for primary renal malignancy versus metastatic disease. , MRI with contrast pending to eval further. Pt also found to have RAHUL with creatinine of 4, baseline 2, transplant nephrology consulted and HD started. Pt completed 2 rounds of midodrine assisted HD (initiated on 12/31)  however unable to tolerate third session even on max dose of midodrine 20mgTID, MICU consulted for pressor assisted HD.       PAST MEDICAL & SURGICAL HISTORY:  Type 2 diabetes mellitus      Decompensated hepatic cirrhosis      No significant past surgical history          FAMILY HISTORY:  No pertinent family history in first degree relatives        SOCIAL HISTORY:  Smoking:   Substance Use:   EtOH Use:   Advance Directives:     MEDICATIONS  (STANDING):  camphor 0.5%/menthol 0.5% Topical Lotion 1 Application(s) Topical two times a day  chlorhexidine 2% Cloths 1 Application(s) Topical daily  chlorhexidine 4% Liquid 1 Application(s) Topical <User Schedule>  cyanocobalamin 1000 MICROGram(s) Oral daily  dextrose 5%. 1000 milliLiter(s) (100 mL/Hr) IV Continuous <Continuous>  dextrose 5%. 1000 milliLiter(s) (50 mL/Hr) IV Continuous <Continuous>  dextrose 50% Injectable 25 Gram(s) IV Push once  dextrose 50% Injectable 25 Gram(s) IV Push once  dextrose 50% Injectable 12.5 Gram(s) IV Push once  folic acid 1 milliGRAM(s) Oral daily  glucagon  Injectable 1 milliGRAM(s) IntraMuscular once  insulin glargine Injectable (LANTUS) 5 Unit(s) SubCutaneous at bedtime  insulin lispro (ADMELOG) corrective regimen sliding scale   SubCutaneous at bedtime  insulin lispro (ADMELOG) corrective regimen sliding scale   SubCutaneous three times a day before meals  lactulose Syrup 10 Gram(s) Oral four times a day  melatonin 1 milliGRAM(s) Oral at bedtime  midodrine. 30 milliGRAM(s) Oral three times a day  octreotide  Injectable 200 MICROGram(s) SubCutaneous three times a day  pancrelipase  (CREON  6,000 Lipase Units) 1 Capsule(s) Oral three times a day with meals  tamsulosin 0.4 milliGRAM(s) Oral at bedtime  thiamine 100 milliGRAM(s) Oral daily    MEDICATIONS  (PRN):  dextrose Oral Gel 15 Gram(s) Oral once PRN Blood Glucose LESS THAN 70 milliGRAM(s)/deciliter  sodium chloride 0.9% lock flush 10 milliLiter(s) IV Push every 1 hour PRN Pre/post blood products, medications, blood draw, and to maintain line patency      Allergies    No Known Allergies    Intolerances        REVIEW OF SYSTEMS:  CONSTITUTIONAL: No weakness, fevers or chills  EYES/ENT: No visual changes;  No vertigo or throat pain   NECK: No pain or stiffness  RESPIRATORY: No cough, wheezing, hemoptysis; No shortness of breath  CARDIOVASCULAR: No chest pain or palpitations  GASTROINTESTINAL: No abdominal or epigastric pain. No nausea, vomiting, or hematemesis; No diarrhea or constipation. No melena or hematochezia.  GENITOURINARY: No dysuria, frequency or hematuria  NEUROLOGICAL: No numbness or weakness  SKIN: No itching, rashes  [ ] All other systems negative  [ ] Unable to assess ROS because ________    OBJECTIVE:  ICU Vital Signs Last 24 Hrs  T(C): 36.2 (05 Jan 2024 10:12), Max: 36.8 (05 Jan 2024 08:48)  T(F): 97.2 (05 Jan 2024 10:12), Max: 98.3 (05 Jan 2024 08:48)  HR: 80 (05 Jan 2024 10:12) (78 - 91)  BP: 91/40 (05 Jan 2024 10:12) (87/52 - 109/75)  BP(mean): --  ABP: --  ABP(mean): --  RR: 17 (05 Jan 2024 10:12) (17 - 19)  SpO2: 100% (05 Jan 2024 10:12) (96% - 100%)    O2 Parameters below as of 05 Jan 2024 10:12  Patient On (Oxygen Delivery Method): room air              01-05 @ 07:01  -  01-05 @ 12:32  --------------------------------------------------------  IN: 800 mL / OUT: 0 mL / NET: 800 mL      CAPILLARY BLOOD GLUCOSE      POCT Blood Glucose.: 127 mg/dL (05 Jan 2024 12:02)      PHYSICAL EXAM:  GENERAL: NAD, lying in bed comfortably  HEAD:  Atraumatic, normocephalic  EYES: EOMI, PERRLA, conjunctiva and sclera clear  ENT: Moist mucous membranes  NECK: Supple, no JVD  HEART: S1, S2, regular rate and rhythm, no murmurs, rubs, or gallops  LUNGS: Unlabored respirations.  Clear to auscultation bilaterally, no crackles, wheezing, or rhonchi  ABDOMEN: Soft, nontender, nondistended, +BS  EXTREMITIES: 2+ peripheral pulses bilaterally. No clubbing, cyanosis, or edema  NERVOUS SYSTEM:  A&Ox3, no focal deficits   SKIN: No rashes or lesions  LINES:     LABS:                        7.9    10.35 )-----------( 84       ( 04 Jan 2024 06:21 )             23.5     Hgb Trend: 7.9<--, 8.1<--, 8.4<--, 8.3<--, 8.2<--  01-04    133<L>  |  97  |  105<H>  ----------------------------<  118<H>  4.5   |  15<L>  |  5.73<H>    Ca    9.2      04 Jan 2024 06:20    TPro  5.3<L>  /  Alb  3.5  /  TBili  12.3<H>  /  DBili  x   /  AST  104<H>  /  ALT  10  /  AlkPhos  503<H>  01-04    Creatinine Trend: 5.73<--, 5.21<--, 5.14<--, 4.71<--, 4.67<--, 4.14<--  PT/INR - ( 04 Jan 2024 06:22 )   PT: 14.4 sec;   INR: 1.32 ratio         PTT - ( 04 Jan 2024 06:22 )  PTT:37.7 sec  Urinalysis Basic - ( 04 Jan 2024 06:20 )    Color: x / Appearance: x / SG: x / pH: x  Gluc: 118 mg/dL / Ketone: x  / Bili: x / Urobili: x   Blood: x / Protein: x / Nitrite: x   Leuk Esterase: x / RBC: x / WBC x   Sq Epi: x / Non Sq Epi: x / Bacteria: x            MICROBIOLOGY:     RADIOLOGY & ADDITIONAL TESTS:    ASSESSMENT  YONATHAN MENDOZA is a 58y male with PMH of h/o type II DM for ~ 6-7 years, was on metformin and Jardiance,  diagnosed with liver cirrhosis 3 weeks ago, complicated by ascites in outside hospital, now admitted to floors undergoing workup of decompensated liver cirrhosis and RAHUL likely 2/2 HRS presenting for pressor assisted HD.     PLAN  =====Neurologic=====  #Hepatic Encephalopathy   #EtOH use disorder   #Uremic Encephalopathy   - pt with acute encephalopathy (A&Ox2, per HCP baseline A&Ox3) likely 2/2 toxic metabolic and hepatic processes   - PETH negative   -   - continue with lactulose 10mg TID and rifaximin, f/u MELD scores   - less likely concern for infectious etiology as completed 5 days of empiric meropenem     =====Pulmonary=====  Patient breathing comfortably on room air sating 100%       =====Cardiovascular=====  #Intravascular Depletion   #HRS   - pt BP in 90's/ 50's, unable to tolerate HD even with midodrine 30mg TID   - echo with normal EF 77% hyperdynamic otherwise normal diastolic function   - currently in MICU for CRRT pressor assisted (levophed), off midodrine   - wean vasopressors as tolerated     =====GI=====  #Decompensated hepatic cirrhosis   - currently undergoing transplant eval, f/u recs  - s/p IR paracentesis on 1/3- fluid analysis unremarkable for infectious etiology (total nucleated cell count 229 with 33 neutrophils) , fluid chemistry wnl, culture no growth to date   - MRI abd pending eval for HCC, hepatology following- unknown if patient has EV- will need screening EGD after MRI before deciding about A/C for possible PVT given thrombus in main protal vein   c/w Thiamine, B12, Folic Acid, lactulose and rifaximin    - cw octreotide drip   - pt initially scheduled for EGD on 1/5, f/u hepatology recs   - continue home famotidine 20mg   - renal diet     =====Renal/=====  #Renal mass  #HRS   #HD requiring pressors   - new renal mass seen on US concerning for malignancy, pending MRI for further characterization,- onc following f/u recs  - urology consulted- f/u recs   - HRS with creatinine >5, 4 on admission and 2 at baseline, shiley placed, s/p 2 rounds of HD unable to tolerate 3rd with midodrine   #Electrolytes  - Maintain K>4, Phos>3, Mag>2, iCal>1    =====Endocrine=====  #DM2  - on 5 units lantus and SSI  - monitor and adjust as necessary     =====Infectious Disease=====  - pt with initial WBC to 13k, now downtrending  - infectious w/u negative and completed 5 days of Epimeric Meropenem 12/30- 1/4   - monitor off abx and trend WBC/ temperature     =====Heme/Onc=====  #DVT Ppx- heparin sub q   #Normocytic anemia on initial CBC   likely 2/2 vitamin deficiencies  and anemia of chronic disease   -normal B12/folate  >VERENA- eventual scope to evaluate   - monitor platlet count in setting of thrombocytopenia 2/2 cirrhosis       =====Ethics=====  FULL CODE MICU ACCEPT NOTE    CHIEF COMPLAINT: Patient is a 58y old  Male who presents with a chief complaint of Liver Transplant Eval (05 Jan 2024 11:06)      HPI:  Patient history limited by patient mental status. Spoke to family member/HCP Anatjononi Grajeda who also had limited information.   59 y/o M PMH DM2, recently diagnosed alcoholic cirrhosis (3 weeks ago) presenting as a transfer from [facility] for transplant eval for decompensated liver cirrhosis (accepted by Dr. Amezquita). Patient initially presented to [facility] (from assisted living) last week with dizziness and worsening AMS. Per HCP, patient first diagnosed with cirrhosis 3 weeks ago after found lethargic. Underwent abdominal paracentesis with large volume fluid removal at that time. Patient discharged to [facility] with hepatology followup. Patient represented to OSH with AMS, dizziness, and increased abdominal distension last week. Noted to have worsening renal function, however still able to produce urine.     At OSH, patient was started on albumin, octreotide, midodrine for concern for HRS. Also started on meropenem per discharge paperwork, no clear indication listed.     Patient directly transferred from [facility] to medicine Floors. Initial EKG demonstrating sinus rhythm with frequent PVCs.  (30 Dec 2023 19:56)    Pt has been admitted to floors for transplant eval and work-up of RAHUL/ new renal mass.  Renal US demonstrating normal sized kidneys with no hydronephrosis but Multilobulated vascular mass arises from the right kidney extending into the peritoneum. Findings are concerning for primary renal malignancy versus metastatic disease. , MRI with contrast pending to eval further. Pt also found to have RAHUL with creatinine of 4, baseline 2, transplant nephrology consulted and HD started. Pt completed 2 rounds of midodrine assisted HD (initiated on 12/31)  however unable to tolerate third session even on max dose of midodrine 20mgTID, MICU consulted for pressor assisted HD.       PAST MEDICAL & SURGICAL HISTORY:  Type 2 diabetes mellitus      Decompensated hepatic cirrhosis      No significant past surgical history          FAMILY HISTORY:  No pertinent family history in first degree relatives        SOCIAL HISTORY:  Smoking:   Substance Use:   EtOH Use:   Advance Directives:     MEDICATIONS  (STANDING):  camphor 0.5%/menthol 0.5% Topical Lotion 1 Application(s) Topical two times a day  chlorhexidine 2% Cloths 1 Application(s) Topical daily  chlorhexidine 4% Liquid 1 Application(s) Topical <User Schedule>  cyanocobalamin 1000 MICROGram(s) Oral daily  dextrose 5%. 1000 milliLiter(s) (100 mL/Hr) IV Continuous <Continuous>  dextrose 5%. 1000 milliLiter(s) (50 mL/Hr) IV Continuous <Continuous>  dextrose 50% Injectable 25 Gram(s) IV Push once  dextrose 50% Injectable 25 Gram(s) IV Push once  dextrose 50% Injectable 12.5 Gram(s) IV Push once  folic acid 1 milliGRAM(s) Oral daily  glucagon  Injectable 1 milliGRAM(s) IntraMuscular once  insulin glargine Injectable (LANTUS) 5 Unit(s) SubCutaneous at bedtime  insulin lispro (ADMELOG) corrective regimen sliding scale   SubCutaneous at bedtime  insulin lispro (ADMELOG) corrective regimen sliding scale   SubCutaneous three times a day before meals  lactulose Syrup 10 Gram(s) Oral four times a day  melatonin 1 milliGRAM(s) Oral at bedtime  midodrine. 30 milliGRAM(s) Oral three times a day  octreotide  Injectable 200 MICROGram(s) SubCutaneous three times a day  pancrelipase  (CREON  6,000 Lipase Units) 1 Capsule(s) Oral three times a day with meals  tamsulosin 0.4 milliGRAM(s) Oral at bedtime  thiamine 100 milliGRAM(s) Oral daily    MEDICATIONS  (PRN):  dextrose Oral Gel 15 Gram(s) Oral once PRN Blood Glucose LESS THAN 70 milliGRAM(s)/deciliter  sodium chloride 0.9% lock flush 10 milliLiter(s) IV Push every 1 hour PRN Pre/post blood products, medications, blood draw, and to maintain line patency      Allergies    No Known Allergies    Intolerances        REVIEW OF SYSTEMS:  CONSTITUTIONAL: No weakness, fevers or chills  EYES/ENT: No visual changes;  No vertigo or throat pain   NECK: No pain or stiffness  RESPIRATORY: No cough, wheezing, hemoptysis; No shortness of breath  CARDIOVASCULAR: No chest pain or palpitations  GASTROINTESTINAL: No abdominal or epigastric pain. No nausea, vomiting, or hematemesis; No diarrhea or constipation. No melena or hematochezia.  GENITOURINARY: No dysuria, frequency or hematuria  NEUROLOGICAL: No numbness or weakness  SKIN: No itching, rashes  [ ] All other systems negative  [ ] Unable to assess ROS because ________    OBJECTIVE:  ICU Vital Signs Last 24 Hrs  T(C): 36.2 (05 Jan 2024 10:12), Max: 36.8 (05 Jan 2024 08:48)  T(F): 97.2 (05 Jan 2024 10:12), Max: 98.3 (05 Jan 2024 08:48)  HR: 80 (05 Jan 2024 10:12) (78 - 91)  BP: 91/40 (05 Jan 2024 10:12) (87/52 - 109/75)  BP(mean): --  ABP: --  ABP(mean): --  RR: 17 (05 Jan 2024 10:12) (17 - 19)  SpO2: 100% (05 Jan 2024 10:12) (96% - 100%)    O2 Parameters below as of 05 Jan 2024 10:12  Patient On (Oxygen Delivery Method): room air              01-05 @ 07:01  -  01-05 @ 12:32  --------------------------------------------------------  IN: 800 mL / OUT: 0 mL / NET: 800 mL      CAPILLARY BLOOD GLUCOSE      POCT Blood Glucose.: 127 mg/dL (05 Jan 2024 12:02)      PHYSICAL EXAM:  GENERAL: NAD, lying in bed comfortably  HEAD:  Atraumatic, normocephalic  EYES: EOMI, PERRLA, conjunctiva and sclera clear  ENT: Moist mucous membranes  NECK: Supple, no JVD  HEART: S1, S2, regular rate and rhythm, no murmurs, rubs, or gallops  LUNGS: Unlabored respirations.  Clear to auscultation bilaterally, no crackles, wheezing, or rhonchi  ABDOMEN: Soft, nontender, nondistended, +BS  EXTREMITIES: 2+ peripheral pulses bilaterally. No clubbing, cyanosis, or edema  NERVOUS SYSTEM:  A&Ox3, no focal deficits   SKIN: No rashes or lesions  LINES:     LABS:                        7.9    10.35 )-----------( 84       ( 04 Jan 2024 06:21 )             23.5     Hgb Trend: 7.9<--, 8.1<--, 8.4<--, 8.3<--, 8.2<--  01-04    133<L>  |  97  |  105<H>  ----------------------------<  118<H>  4.5   |  15<L>  |  5.73<H>    Ca    9.2      04 Jan 2024 06:20    TPro  5.3<L>  /  Alb  3.5  /  TBili  12.3<H>  /  DBili  x   /  AST  104<H>  /  ALT  10  /  AlkPhos  503<H>  01-04    Creatinine Trend: 5.73<--, 5.21<--, 5.14<--, 4.71<--, 4.67<--, 4.14<--  PT/INR - ( 04 Jan 2024 06:22 )   PT: 14.4 sec;   INR: 1.32 ratio         PTT - ( 04 Jan 2024 06:22 )  PTT:37.7 sec  Urinalysis Basic - ( 04 Jan 2024 06:20 )    Color: x / Appearance: x / SG: x / pH: x  Gluc: 118 mg/dL / Ketone: x  / Bili: x / Urobili: x   Blood: x / Protein: x / Nitrite: x   Leuk Esterase: x / RBC: x / WBC x   Sq Epi: x / Non Sq Epi: x / Bacteria: x            MICROBIOLOGY:     RADIOLOGY & ADDITIONAL TESTS:    ASSESSMENT  YONATHAN MENDOZA is a 58y male with PMH of h/o type II DM for ~ 6-7 years, was on metformin and Jardiance,  diagnosed with liver cirrhosis 3 weeks ago, complicated by ascites in outside hospital, now admitted to floors undergoing workup of decompensated liver cirrhosis and RAHUL likely 2/2 HRS presenting for pressor assisted HD.     PLAN  =====Neurologic=====  #Hepatic Encephalopathy   #EtOH use disorder   #Uremic Encephalopathy   - pt with acute encephalopathy (A&Ox2, per HCP baseline A&Ox3) likely 2/2 toxic metabolic and hepatic processes   - PETH negative   -   - continue with lactulose 10mg TID and rifaximin, f/u MELD scores   - less likely concern for infectious etiology as completed 5 days of empiric meropenem     =====Pulmonary=====  Patient breathing comfortably on room air sating 100%       =====Cardiovascular=====  #Intravascular Depletion   #HRS   - pt BP in 90's/ 50's, unable to tolerate HD even with midodrine 30mg TID   - echo with normal EF 77% hyperdynamic otherwise normal diastolic function   - currently in MICU for CRRT pressor assisted (levophed), off midodrine   - wean vasopressors as tolerated     =====GI=====  #Decompensated hepatic cirrhosis   - currently undergoing transplant eval, f/u recs  - s/p IR paracentesis on 1/3- fluid analysis unremarkable for infectious etiology (total nucleated cell count 229 with 33 neutrophils) , fluid chemistry wnl, culture no growth to date   - MRI abd pending eval for HCC, hepatology following- unknown if patient has EV- will need screening EGD after MRI before deciding about A/C for possible PVT given thrombus in main protal vein   c/w Thiamine, B12, Folic Acid, lactulose and rifaximin    - cw octreotide drip   - pt initially scheduled for EGD on 1/5, f/u hepatology recs   - continue home famotidine 20mg   - renal diet     =====Renal/=====  #Renal mass  #HRS   #HD requiring pressors   - new renal mass seen on US concerning for malignancy, pending MRI for further characterization,- onc following f/u recs  - urology consulted- f/u recs   - HRS with creatinine >5, 4 on admission and 2 at baseline, shiley placed, s/p 2 rounds of HD unable to tolerate 3rd with midodrine   #Electrolytes  - Maintain K>4, Phos>3, Mag>2, iCal>1    =====Endocrine=====  #DM2  - on 5 units lantus and SSI  - monitor and adjust as necessary     =====Infectious Disease=====  - pt with initial WBC to 13k, now downtrending  - infectious w/u negative and completed 5 days of Epimeric Meropenem 12/30- 1/4   - monitor off abx and trend WBC/ temperature     =====Heme/Onc=====  #DVT Ppx- heparin sub q   #Normocytic anemia on initial CBC   likely 2/2 vitamin deficiencies  and anemia of chronic disease   -normal B12/folate  >VERENA- eventual scope to evaluate   - monitor platlet count in setting of thrombocytopenia 2/2 cirrhosis       =====Ethics=====  FULL CODE MICU ACCEPT NOTE    CHIEF COMPLAINT: Patient is a 58y old  Male who presents with a chief complaint of Liver Transplant Eval (05 Jan 2024 11:06)      HPI:  Patient history limited by patient mental status. Spoke to family member/HCP Anatjononi Grajeda who also had limited information.   57 y/o M PMH DM2, recently diagnosed alcoholic cirrhosis (3 weeks ago) presenting as a transfer from [facility] for transplant eval for decompensated liver cirrhosis (accepted by Dr. Amezquita). Patient initially presented to [facility] (from assisted living) last week with dizziness and worsening AMS. Per HCP, patient first diagnosed with cirrhosis 3 weeks ago after found lethargic. Underwent abdominal paracentesis with large volume fluid removal at that time. Patient discharged to [facility] with hepatology followup. Patient represented to OSH with AMS, dizziness, and increased abdominal distension last week. Noted to have worsening renal function, however still able to produce urine.     At OSH, patient was started on albumin, octreotide, midodrine for concern for HRS. Also started on meropenem per discharge paperwork, no clear indication listed.     Patient directly transferred from [facility] to medicine Floors. Initial EKG demonstrating sinus rhythm with frequent PVCs.  (30 Dec 2023 19:56)    Pt has been admitted to floors for transplant eval and work-up of RAHUL/ new renal mass.  Renal US demonstrating normal sized kidneys with no hydronephrosis but Multilobulated vascular mass arises from the right kidney extending into the peritoneum. Findings are concerning for primary renal malignancy versus metastatic disease. , MRI with contrast pending to eval further. Pt also found to have RAHUL with creatinine of 4, baseline 2, transplant nephrology consulted and HD started. Pt completed 2 rounds of midodrine assisted HD (initiated on 12/31)  however unable to tolerate third session even on max dose of midodrine 20mgTID, MICU consulted for pressor assisted HD.       PAST MEDICAL & SURGICAL HISTORY:  Type 2 diabetes mellitus      Decompensated hepatic cirrhosis      No significant past surgical history          FAMILY HISTORY:  No pertinent family history in first degree relatives        SOCIAL HISTORY:  Smoking:   Substance Use:   EtOH Use:   Advance Directives:     MEDICATIONS  (STANDING):  camphor 0.5%/menthol 0.5% Topical Lotion 1 Application(s) Topical two times a day  chlorhexidine 2% Cloths 1 Application(s) Topical daily  chlorhexidine 4% Liquid 1 Application(s) Topical <User Schedule>  cyanocobalamin 1000 MICROGram(s) Oral daily  dextrose 5%. 1000 milliLiter(s) (100 mL/Hr) IV Continuous <Continuous>  dextrose 5%. 1000 milliLiter(s) (50 mL/Hr) IV Continuous <Continuous>  dextrose 50% Injectable 25 Gram(s) IV Push once  dextrose 50% Injectable 25 Gram(s) IV Push once  dextrose 50% Injectable 12.5 Gram(s) IV Push once  folic acid 1 milliGRAM(s) Oral daily  glucagon  Injectable 1 milliGRAM(s) IntraMuscular once  insulin glargine Injectable (LANTUS) 5 Unit(s) SubCutaneous at bedtime  insulin lispro (ADMELOG) corrective regimen sliding scale   SubCutaneous at bedtime  insulin lispro (ADMELOG) corrective regimen sliding scale   SubCutaneous three times a day before meals  lactulose Syrup 10 Gram(s) Oral four times a day  melatonin 1 milliGRAM(s) Oral at bedtime  midodrine. 30 milliGRAM(s) Oral three times a day  octreotide  Injectable 200 MICROGram(s) SubCutaneous three times a day  pancrelipase  (CREON  6,000 Lipase Units) 1 Capsule(s) Oral three times a day with meals  tamsulosin 0.4 milliGRAM(s) Oral at bedtime  thiamine 100 milliGRAM(s) Oral daily    MEDICATIONS  (PRN):  dextrose Oral Gel 15 Gram(s) Oral once PRN Blood Glucose LESS THAN 70 milliGRAM(s)/deciliter  sodium chloride 0.9% lock flush 10 milliLiter(s) IV Push every 1 hour PRN Pre/post blood products, medications, blood draw, and to maintain line patency      Allergies    No Known Allergies    Intolerances        REVIEW OF SYSTEMS:  CONSTITUTIONAL: No weakness, fevers or chills  EYES/ENT: No visual changes;  No vertigo or throat pain   NECK: No pain or stiffness  RESPIRATORY: No cough, wheezing, hemoptysis; No shortness of breath  CARDIOVASCULAR: No chest pain or palpitations  GASTROINTESTINAL: No abdominal or epigastric pain. No nausea, vomiting, or hematemesis; No diarrhea or constipation. No melena or hematochezia.  GENITOURINARY: No dysuria, frequency or hematuria  NEUROLOGICAL: No numbness or weakness  SKIN: No itching, rashes  [ ] All other systems negative  [ ] Unable to assess ROS because ________    OBJECTIVE:  ICU Vital Signs Last 24 Hrs  T(C): 36.2 (05 Jan 2024 10:12), Max: 36.8 (05 Jan 2024 08:48)  T(F): 97.2 (05 Jan 2024 10:12), Max: 98.3 (05 Jan 2024 08:48)  HR: 80 (05 Jan 2024 10:12) (78 - 91)  BP: 91/40 (05 Jan 2024 10:12) (87/52 - 109/75)  BP(mean): --  ABP: --  ABP(mean): --  RR: 17 (05 Jan 2024 10:12) (17 - 19)  SpO2: 100% (05 Jan 2024 10:12) (96% - 100%)    O2 Parameters below as of 05 Jan 2024 10:12  Patient On (Oxygen Delivery Method): room air              01-05 @ 07:01  -  01-05 @ 12:32  --------------------------------------------------------  IN: 800 mL / OUT: 0 mL / NET: 800 mL      CAPILLARY BLOOD GLUCOSE      POCT Blood Glucose.: 127 mg/dL (05 Jan 2024 12:02)      PHYSICAL EXAM:  GENERAL: NAD, lying in bed comfortably  HEAD:  Atraumatic, normocephalic  EYES: EOMI, PERRLA, conjunctiva and sclera clear  ENT: Moist mucous membranes  NECK: Supple, no JVD  HEART: S1, S2, regular rate and rhythm, no murmurs, rubs, or gallops  LUNGS: Unlabored respirations.  Clear to auscultation bilaterally, no crackles, wheezing, or rhonchi  ABDOMEN: Soft, nontender, nondistended, +BS  EXTREMITIES: 2+ peripheral pulses bilaterally. No clubbing, cyanosis, or edema  NERVOUS SYSTEM:  A&Ox3, no focal deficits   SKIN: No rashes or lesions  LINES:     LABS:                        7.9    10.35 )-----------( 84       ( 04 Jan 2024 06:21 )             23.5     Hgb Trend: 7.9<--, 8.1<--, 8.4<--, 8.3<--, 8.2<--  01-04    133<L>  |  97  |  105<H>  ----------------------------<  118<H>  4.5   |  15<L>  |  5.73<H>    Ca    9.2      04 Jan 2024 06:20    TPro  5.3<L>  /  Alb  3.5  /  TBili  12.3<H>  /  DBili  x   /  AST  104<H>  /  ALT  10  /  AlkPhos  503<H>  01-04    Creatinine Trend: 5.73<--, 5.21<--, 5.14<--, 4.71<--, 4.67<--, 4.14<--  PT/INR - ( 04 Jan 2024 06:22 )   PT: 14.4 sec;   INR: 1.32 ratio         PTT - ( 04 Jan 2024 06:22 )  PTT:37.7 sec  Urinalysis Basic - ( 04 Jan 2024 06:20 )    Color: x / Appearance: x / SG: x / pH: x  Gluc: 118 mg/dL / Ketone: x  / Bili: x / Urobili: x   Blood: x / Protein: x / Nitrite: x   Leuk Esterase: x / RBC: x / WBC x   Sq Epi: x / Non Sq Epi: x / Bacteria: x            MICROBIOLOGY:     RADIOLOGY & ADDITIONAL TESTS:    ASSESSMENT  YONATHAN MENDOZA is a 58y male with PMH of h/o type II DM for ~ 6-7 years, was on metformin and Jardiance,  diagnosed with liver cirrhosis 3 weeks ago, complicated by ascites in outside hospital, now admitted to floors undergoing workup of decompensated liver cirrhosis and RAHUL likely 2/2 HRS presenting for pressor assisted HD.     PLAN  =====Neurologic=====  #Hepatic Encephalopathy   #EtOH use disorder   #Uremic Encephalopathy   - pt with acute encephalopathy (A&Ox2, per HCP baseline A&Ox3) likely 2/2 toxic metabolic and hepatic processes   - PETH negative   -   - continue with lactulose 10mg TID and rifaximin, f/u MELD scores   - less likely concern for infectious etiology as completed 5 days of empiric meropenem     =====Pulmonary=====  Patient breathing comfortably on room air sating 100%       =====Cardiovascular=====  #Intravascular Depletion   #HRS   - pt BP in 90's/ 50's, unable to tolerate HD even with midodrine 30mg TID   - echo with normal EF 77% hyperdynamic otherwise normal diastolic function   - currently in MICU for CRRT pressor assisted (levophed), off midodrine   - wean vasopressors as tolerated     =====GI=====  #Decompensated hepatic cirrhosis   - currently undergoing transplant eval, f/u recs  - s/p IR paracentesis on 1/3- fluid analysis unremarkable for infectious etiology (total nucleated cell count 229 with 33 neutrophils) , fluid chemistry wnl, culture no growth to date   - MRI abd pending eval for HCC, hepatology following- unknown if patient has EV- will need screening EGD after MRI before deciding about A/C for possible PVT given thrombus in main protal vein   c/w Thiamine, B12, Folic Acid, lactulose and rifaximin    - cw octreotide drip   - pt initially scheduled for EGD on 1/5, f/u hepatology recs   - continue home famotidine 20mg   - renal diet     =====Renal/=====  #Renal mass  #HRS   #HD requiring pressors   - new renal mass seen on US concerning for malignancy, pending MRI for further characterization,- onc following f/u recs  - urology consulted- f/u recs   - HRS with creatinine >5, 4 on admission and 2 at baseline, shiley placed, s/p 2 rounds of HD unable to tolerate 3rd with midodrine   #Electrolytes  - Maintain K>4, Phos>3, Mag>2, iCal>1    =====Endocrine=====  #DM2  - on 5 units lantus and SSI  - monitor and adjust as necessary     =====Infectious Disease=====  - pt with initial WBC to 13k, now downtrending  - infectious w/u negative and completed 5 days of Epimeric Meropenem 12/30- 1/4   - monitor off abx and trend WBC/ temperature     =====Heme/Onc=====  #DVT Ppx- heparin sub q   #Normocytic anemia on initial CBC   likely 2/2 vitamin deficiencies  and anemia of chronic disease   -normal B12/folate  >VERENA- eventual scope to evaluate   - monitor platlet count in setting of thrombocytopenia 2/2 cirrhosis       =====Ethics=====  FULL CODE    Attending Attestation:    Patient seen and examined with resident/fellow.  Agree with above except as noted.  59 yo male with decompensated ETOH cirrhosis with ascites now with hepatorenal syndrome.  Pt has not  been able to tolerate HD last 2 sessions due to hypotension.    A/P 59 yo male with decompensated ETOH cirrhosis with HRS.    1. HRS. unable to tolerate HD due to hypotension despite midodrine. Will start CVVHD.  Change octreotide to IV. Stop midodrine and start Levophed for MAP >70    2.Metabolic encephelopathy. Ammonia 35. Likely uremic with BUN >100 . For CVVHD.    3. Decompensated liver cirrhosis.  Continue lactulose. Start Rifaximin.  Work up for possible transplant candidate in progress.  4. Possible portal vein thrombosis. For EGD before starting AC.  5.Renal mass seen on ultrasound. MRI to further define mass  6. DVT prophylaxis;SQ heparin.  7. GOC: Fullcode       This patient is critically ill and required frequent bedside visits with therapy change.  Total critical care time spent was 35 minutes.  Not including teaching or procedures.      Jovani Conti MD MICU ACCEPT NOTE    CHIEF COMPLAINT: Patient is a 58y old  Male who presents with a chief complaint of Liver Transplant Eval (05 Jan 2024 11:06)      HPI:  Patient history limited by patient mental status. Spoke to family member/HCP Anatjononi Grajeda who also had limited information.   57 y/o M PMH DM2, recently diagnosed alcoholic cirrhosis (3 weeks ago) presenting as a transfer from [facility] for transplant eval for decompensated liver cirrhosis (accepted by Dr. Amezquita). Patient initially presented to [facility] (from assisted living) last week with dizziness and worsening AMS. Per HCP, patient first diagnosed with cirrhosis 3 weeks ago after found lethargic. Underwent abdominal paracentesis with large volume fluid removal at that time. Patient discharged to [facility] with hepatology followup. Patient represented to OSH with AMS, dizziness, and increased abdominal distension last week. Noted to have worsening renal function, however still able to produce urine.     At OSH, patient was started on albumin, octreotide, midodrine for concern for HRS. Also started on meropenem per discharge paperwork, no clear indication listed.     Patient directly transferred from [facility] to medicine Floors. Initial EKG demonstrating sinus rhythm with frequent PVCs.  (30 Dec 2023 19:56)    Pt has been admitted to floors for transplant eval and work-up of RAHUL/ new renal mass.  Renal US demonstrating normal sized kidneys with no hydronephrosis but Multilobulated vascular mass arises from the right kidney extending into the peritoneum. Findings are concerning for primary renal malignancy versus metastatic disease. , MRI with contrast pending to eval further. Pt also found to have RAHUL with creatinine of 4, baseline 2, transplant nephrology consulted and HD started. Pt completed 2 rounds of midodrine assisted HD (initiated on 12/31)  however unable to tolerate third session even on max dose of midodrine 20mgTID, MICU consulted for pressor assisted HD.       PAST MEDICAL & SURGICAL HISTORY:  Type 2 diabetes mellitus      Decompensated hepatic cirrhosis      No significant past surgical history          FAMILY HISTORY:  No pertinent family history in first degree relatives        SOCIAL HISTORY:  Smoking:   Substance Use:   EtOH Use:   Advance Directives:     MEDICATIONS  (STANDING):  camphor 0.5%/menthol 0.5% Topical Lotion 1 Application(s) Topical two times a day  chlorhexidine 2% Cloths 1 Application(s) Topical daily  chlorhexidine 4% Liquid 1 Application(s) Topical <User Schedule>  cyanocobalamin 1000 MICROGram(s) Oral daily  dextrose 5%. 1000 milliLiter(s) (100 mL/Hr) IV Continuous <Continuous>  dextrose 5%. 1000 milliLiter(s) (50 mL/Hr) IV Continuous <Continuous>  dextrose 50% Injectable 25 Gram(s) IV Push once  dextrose 50% Injectable 25 Gram(s) IV Push once  dextrose 50% Injectable 12.5 Gram(s) IV Push once  folic acid 1 milliGRAM(s) Oral daily  glucagon  Injectable 1 milliGRAM(s) IntraMuscular once  insulin glargine Injectable (LANTUS) 5 Unit(s) SubCutaneous at bedtime  insulin lispro (ADMELOG) corrective regimen sliding scale   SubCutaneous at bedtime  insulin lispro (ADMELOG) corrective regimen sliding scale   SubCutaneous three times a day before meals  lactulose Syrup 10 Gram(s) Oral four times a day  melatonin 1 milliGRAM(s) Oral at bedtime  midodrine. 30 milliGRAM(s) Oral three times a day  octreotide  Injectable 200 MICROGram(s) SubCutaneous three times a day  pancrelipase  (CREON  6,000 Lipase Units) 1 Capsule(s) Oral three times a day with meals  tamsulosin 0.4 milliGRAM(s) Oral at bedtime  thiamine 100 milliGRAM(s) Oral daily    MEDICATIONS  (PRN):  dextrose Oral Gel 15 Gram(s) Oral once PRN Blood Glucose LESS THAN 70 milliGRAM(s)/deciliter  sodium chloride 0.9% lock flush 10 milliLiter(s) IV Push every 1 hour PRN Pre/post blood products, medications, blood draw, and to maintain line patency      Allergies    No Known Allergies    Intolerances        REVIEW OF SYSTEMS:  CONSTITUTIONAL: No weakness, fevers or chills  EYES/ENT: No visual changes;  No vertigo or throat pain   NECK: No pain or stiffness  RESPIRATORY: No cough, wheezing, hemoptysis; No shortness of breath  CARDIOVASCULAR: No chest pain or palpitations  GASTROINTESTINAL: No abdominal or epigastric pain. No nausea, vomiting, or hematemesis; No diarrhea or constipation. No melena or hematochezia.  GENITOURINARY: No dysuria, frequency or hematuria  NEUROLOGICAL: No numbness or weakness  SKIN: No itching, rashes  [ ] All other systems negative  [ ] Unable to assess ROS because ________    OBJECTIVE:  ICU Vital Signs Last 24 Hrs  T(C): 36.2 (05 Jan 2024 10:12), Max: 36.8 (05 Jan 2024 08:48)  T(F): 97.2 (05 Jan 2024 10:12), Max: 98.3 (05 Jan 2024 08:48)  HR: 80 (05 Jan 2024 10:12) (78 - 91)  BP: 91/40 (05 Jan 2024 10:12) (87/52 - 109/75)  BP(mean): --  ABP: --  ABP(mean): --  RR: 17 (05 Jan 2024 10:12) (17 - 19)  SpO2: 100% (05 Jan 2024 10:12) (96% - 100%)    O2 Parameters below as of 05 Jan 2024 10:12  Patient On (Oxygen Delivery Method): room air              01-05 @ 07:01  -  01-05 @ 12:32  --------------------------------------------------------  IN: 800 mL / OUT: 0 mL / NET: 800 mL      CAPILLARY BLOOD GLUCOSE      POCT Blood Glucose.: 127 mg/dL (05 Jan 2024 12:02)      PHYSICAL EXAM:  GENERAL: NAD, lying in bed comfortably  HEAD:  Atraumatic, normocephalic  EYES: EOMI, PERRLA, conjunctiva and sclera clear  ENT: Moist mucous membranes  NECK: Supple, no JVD  HEART: S1, S2, regular rate and rhythm, no murmurs, rubs, or gallops  LUNGS: Unlabored respirations.  Clear to auscultation bilaterally, no crackles, wheezing, or rhonchi  ABDOMEN: Soft, nontender, nondistended, +BS  EXTREMITIES: 2+ peripheral pulses bilaterally. No clubbing, cyanosis, or edema  NERVOUS SYSTEM:  A&Ox3, no focal deficits   SKIN: No rashes or lesions  LINES:     LABS:                        7.9    10.35 )-----------( 84       ( 04 Jan 2024 06:21 )             23.5     Hgb Trend: 7.9<--, 8.1<--, 8.4<--, 8.3<--, 8.2<--  01-04    133<L>  |  97  |  105<H>  ----------------------------<  118<H>  4.5   |  15<L>  |  5.73<H>    Ca    9.2      04 Jan 2024 06:20    TPro  5.3<L>  /  Alb  3.5  /  TBili  12.3<H>  /  DBili  x   /  AST  104<H>  /  ALT  10  /  AlkPhos  503<H>  01-04    Creatinine Trend: 5.73<--, 5.21<--, 5.14<--, 4.71<--, 4.67<--, 4.14<--  PT/INR - ( 04 Jan 2024 06:22 )   PT: 14.4 sec;   INR: 1.32 ratio         PTT - ( 04 Jan 2024 06:22 )  PTT:37.7 sec  Urinalysis Basic - ( 04 Jan 2024 06:20 )    Color: x / Appearance: x / SG: x / pH: x  Gluc: 118 mg/dL / Ketone: x  / Bili: x / Urobili: x   Blood: x / Protein: x / Nitrite: x   Leuk Esterase: x / RBC: x / WBC x   Sq Epi: x / Non Sq Epi: x / Bacteria: x            MICROBIOLOGY:     RADIOLOGY & ADDITIONAL TESTS:    ASSESSMENT  YONATHAN MENDOZA is a 58y male with PMH of h/o type II DM for ~ 6-7 years, was on metformin and Jardiance,  diagnosed with liver cirrhosis 3 weeks ago, complicated by ascites in outside hospital, now admitted to floors undergoing workup of decompensated liver cirrhosis and RAHUL likely 2/2 HRS presenting for pressor assisted HD.     PLAN  =====Neurologic=====  #Hepatic Encephalopathy   #EtOH use disorder   #Uremic Encephalopathy   - pt with acute encephalopathy (A&Ox2, per HCP baseline A&Ox3) likely 2/2 toxic metabolic and hepatic processes   - PETH negative   -   - continue with lactulose 10mg TID and rifaximin, f/u MELD scores   - less likely concern for infectious etiology as completed 5 days of empiric meropenem     =====Pulmonary=====  Patient breathing comfortably on room air sating 100%       =====Cardiovascular=====  #Intravascular Depletion   #HRS   - pt BP in 90's/ 50's, unable to tolerate HD even with midodrine 30mg TID   - echo with normal EF 77% hyperdynamic otherwise normal diastolic function   - currently in MICU for CRRT pressor assisted (levophed), off midodrine   - wean vasopressors as tolerated     =====GI=====  #Decompensated hepatic cirrhosis   - currently undergoing transplant eval, f/u recs  - s/p IR paracentesis on 1/3- fluid analysis unremarkable for infectious etiology (total nucleated cell count 229 with 33 neutrophils) , fluid chemistry wnl, culture no growth to date   - MRI abd pending eval for HCC, hepatology following- unknown if patient has EV- will need screening EGD after MRI before deciding about A/C for possible PVT given thrombus in main protal vein   c/w Thiamine, B12, Folic Acid, lactulose and rifaximin    - cw octreotide drip   - pt initially scheduled for EGD on 1/5, f/u hepatology recs   - continue home famotidine 20mg   - renal diet     =====Renal/=====  #Renal mass  #HRS   #HD requiring pressors   - new renal mass seen on US concerning for malignancy, pending MRI for further characterization,- onc following f/u recs  - urology consulted- f/u recs   - HRS with creatinine >5, 4 on admission and 2 at baseline, shiley placed, s/p 2 rounds of HD unable to tolerate 3rd with midodrine   #Electrolytes  - Maintain K>4, Phos>3, Mag>2, iCal>1    =====Endocrine=====  #DM2  - on 5 units lantus and SSI  - monitor and adjust as necessary     =====Infectious Disease=====  - pt with initial WBC to 13k, now downtrending  - infectious w/u negative and completed 5 days of Epimeric Meropenem 12/30- 1/4   - monitor off abx and trend WBC/ temperature     =====Heme/Onc=====  #DVT Ppx- heparin sub q   #Normocytic anemia on initial CBC   likely 2/2 vitamin deficiencies  and anemia of chronic disease   -normal B12/folate  >VERENA- eventual scope to evaluate   - monitor platlet count in setting of thrombocytopenia 2/2 cirrhosis       =====Ethics=====  FULL CODE    Attending Attestation:    Patient seen and examined with resident/fellow.  Agree with above except as noted.  59 yo male with decompensated ETOH cirrhosis with ascites now with hepatorenal syndrome.  Pt has not  been able to tolerate HD last 2 sessions due to hypotension.    A/P 59 yo male with decompensated ETOH cirrhosis with HRS.    1. HRS. unable to tolerate HD due to hypotension despite midodrine. Will start CVVHD.  Change octreotide to IV. Stop midodrine and start Levophed for MAP >70    2.Metabolic encephelopathy. Ammonia 35. Likely uremic with BUN >100 . For CVVHD.    3. Decompensated liver cirrhosis.  Continue lactulose. Start Rifaximin.  Work up for possible transplant candidate in progress.  4. Possible portal vein thrombosis. For EGD before starting AC.  5.Renal mass seen on ultrasound. MRI to further define mass  6. DVT prophylaxis;SQ heparin.  7. GOC: Fullcode       This patient is critically ill and required frequent bedside visits with therapy change.  Total critical care time spent was 35 minutes.  Not including teaching or procedures.      Jovani Conti MD

## 2024-01-05 NOTE — PROGRESS NOTE ADULT - PROBLEM SELECTOR PLAN 5
Likely in the setting of liver dysfunction     >ctm daily CBC   >hold chemical DVT ppx given platelets ~75   >transfuse <50 if bleeding, < 20 if febrile, < 10
Normocytic anemia on initial CBC   likely 2/2 vitamin deficiencies  and anemia of chronic disease     >normal B12/folate  >VERENA- scope pending  >transfuse Hb < 7
Normocytic anemia on initial CBC   likely 2/2 vitamin deficiencies  and anemia of chronic disease     >f/u B12, Folate levels  >f/u Iron studies  >transfuse Hb < 7
Normocytic anemia on initial CBC   likely 2/2 vitamin deficiencies  and anemia of chronic disease     >f/u B12, Folate levels  >f/u Iron studies  >transfuse Hb < 7
Likely in the setting of liver dysfunction     >ctm daily CBC   >hold chemical DVT ppx given platelets ~75   >transfuse <50 if bleeding, < 20 if febrile, < 10
Likely in the setting of liver dysfunction     >ctm daily CBC   >hold chemical DVT ppx given platelets ~75   >transfuse <50 if bleeding, < 20 if febrile, < 10

## 2024-01-05 NOTE — PROGRESS NOTE ADULT - PROBLEM SELECTOR PLAN 7
History of T2DM previously on metformin however held due to kidney dysfunction    Plan:  >ALEKS at meals and bedtime, uptitrate prn used 4units over last 24hrs  >f/u HbA1c  > Added Lantus 5uints qhs   and would go slow with fluctuating renal function
UTI- on Meropenem from OSH but urine cx negative can stop after 5 days of treatment.
History of T2DM previously on metformin however held due to kidney dysfunction    Plan:  >ALEKS at meals and bedtime, uptitrate prn used 4units over last 24hrs  >f/u HbA1c  >  Lantus 5uints qhs   and would go slow with fluctuating renal function- optimize for now
History of T2DM previously on metformin however held due to kidney dysfunction    Plan:  >ALEKS at meals and bedtime, uptitrate prn used 4units over last 24hrs  >f/u HbA1c  > Added Lantus 5uints qhs   and would go slow with fluctuating renal function
UTI- on Meropenem from OSH will f/u urine cx
UTI- on Meropenem from OSH will f/u urine cx

## 2024-01-05 NOTE — PROGRESS NOTE ADULT - PROBLEM SELECTOR PLAN 4
Normocytic anemia on initial CBC   likely 2/2 vitamin deficiencies  and anemia of chronic disease     >f/u B12, Folate levels  >f/u Iron studies  >transfuse Hb < 7
Patient with acute encephalopathy (A&Ox2, per HCP baseline A&Ox3) toxic metabolic     Plan:    >electrolytes  >c/w lactulose 10 TID for 3-4 BM per day   >  ammonia is 35  > infectious w/u negative and completed 5 days of Epimeric Meropenem.
Normocytic anemia on initial CBC   likely 2/2 vitamin deficiencies  and anemia of chronic disease     >f/u B12, Folate levels  >f/u Iron studies  >transfuse Hb < 7
Patient with acute encephalopathy (A&Ox2, per HCP baseline A&Ox3)      Plan:    >electrolytes  >c/w lactulose 10 TID for 3-4 BM per day   >  ammonia is 35  > infectious w/u pending UA + on meropenm- cultures negative.
Normocytic anemia on initial CBC   likely 2/2 vitamin deficiencies  and anemia of chronic disease     >f/u B12, Folate levels  >f/u Iron studies  >transfuse Hb < 7
Patient with acute encephalopathy (A&Ox2, per HCP baseline A&Ox3)      Plan:    >electrolytes  >c/w lactulose 10 TID for 3-4 BM per day   >  ammonia is 35  > infectious w/u negative and completed 5 days of Emperic Meropenem.

## 2024-01-05 NOTE — PROGRESS NOTE ADULT - SUBJECTIVE AND OBJECTIVE BOX
[facility] DIVISION OF KIDNEY DISEASES AND HYPERTENSION   FOLLOW UP NOTE  --------------------------------------------------------------------------------  Chief Complaint: Pt with RAHUL requiring RRT, undergoing Liver transplant evaluation     24 hour events/subjective: Pt. was seen and examined today in the HD unit. Attempted HD #3 today but pt could not tolerate due to hypotension despite increased doses of TID Midodrine. He states he is "feeling okay". MRI abdomen pending. Recommend MICU consult for persistent hypotension and recommend goals of care evaluation given CT abd findings, liver failure, renal failure.     PAST HISTORY  --------------------------------------------------------------------------------  No significant changes to PMH, PSH, FHx, SHx, unless otherwise noted    ALLERGIES & MEDICATIONS  --------------------------------------------------------------------------------  Allergies  No Known Allergies    Intolerances    Standing Inpatient Medications  camphor 0.5%/menthol 0.5% Topical Lotion 1 Application(s) Topical two times a day  chlorhexidine 2% Cloths 1 Application(s) Topical daily  chlorhexidine 4% Liquid 1 Application(s) Topical <User Schedule>  cyanocobalamin 1000 MICROGram(s) Oral daily  dextrose 5%. 1000 milliLiter(s) IV Continuous <Continuous>  dextrose 5%. 1000 milliLiter(s) IV Continuous <Continuous>  dextrose 50% Injectable 12.5 Gram(s) IV Push once  dextrose 50% Injectable 25 Gram(s) IV Push once  dextrose 50% Injectable 25 Gram(s) IV Push once  folic acid 1 milliGRAM(s) Oral daily  glucagon  Injectable 1 milliGRAM(s) IntraMuscular once  insulin glargine Injectable (LANTUS) 5 Unit(s) SubCutaneous at bedtime  insulin lispro (ADMELOG) corrective regimen sliding scale   SubCutaneous three times a day before meals  insulin lispro (ADMELOG) corrective regimen sliding scale   SubCutaneous at bedtime  lactulose Syrup 10 Gram(s) Oral four times a day  melatonin 1 milliGRAM(s) Oral at bedtime  midodrine. 30 milliGRAM(s) Oral three times a day  octreotide  Injectable 200 MICROGram(s) SubCutaneous three times a day  pancrelipase  (CREON  6,000 Lipase Units) 1 Capsule(s) Oral three times a day with meals  tamsulosin 0.4 milliGRAM(s) Oral at bedtime  thiamine 100 milliGRAM(s) Oral daily    PRN Inpatient Medications  dextrose Oral Gel 15 Gram(s) Oral once PRN  sodium chloride 0.9% lock flush 10 milliLiter(s) IV Push every 1 hour PRN    REVIEW OF SYSTEMS  --------------------------------------------------------------------------------  All other systems were reviewed and are negative, except as noted.    VITALS/PHYSICAL EXAM  --------------------------------------------------------------------------------  T(C): 36.2 (01-05-24 @ 10:12), Max: 36.8 (01-05-24 @ 08:48)  HR: 80 (01-05-24 @ 10:12) (78 - 91)  BP: 91/40 (01-05-24 @ 10:12) (87/52 - 109/75)  RR: 17 (01-05-24 @ 10:12) (17 - 19)  SpO2: 100% (01-05-24 @ 10:12) (96% - 100%)  Wt(kg): --    01-05-24 @ 07:01  -  01-05-24 @ 11:07  --------------------------------------------------------  IN: 800 mL / OUT: 0 mL / NET: 800 mL    Physical Exam:  	Gen: Chronically ill appearing but in no overt distress  	HEENT: Anicteric  	Pulm: CTA B/L  	CV: S1S2+  	Abd: Distended but soft, +BS   	Ext: RUE swelling  	Neuro: Awake   	Skin: Warm and dry  	Dialysis access: R IJ non tunneled HD catheter     LABS/STUDIES  --------------------------------------------------------------------------------              7.9    10.35 >-----------<  84       [01-04-24 @ 06:21]              23.5     133  |  97  |  105  ----------------------------<  118      [01-04-24 @ 06:20]  4.5   |  15  |  5.73        Ca     9.2     [01-04-24 @ 06:20]    TPro  5.3  /  Alb  3.5  /  TBili  12.3  /  DBili  x   /  AST  104  /  ALT  10  /  AlkPhos  503  [01-04-24 @ 06:20]    PT/INR: PT 14.4 , INR 1.32       [01-04-24 @ 06:22]  PTT: 37.7       [01-04-24 @ 06:22]    Creatinine Trend:  SCr 5.73 [01-04 @ 06:20]  SCr 5.21 [01-03 @ 06:23]  SCr 5.14 [01-02 @ 07:11]  SCr 4.71 [01-01 @ 11:17]  SCr 4.67 [12-31 @ 07:34]    Urine Creatinine 206      [01-01-24 @ 06:49]  Urine Protein 83      [01-01-24 @ 06:49]  Urine Sodium 9      [12-31-23 @ 04:10]  Urine Urea Nitrogen 404      [12-31-23 @ 04:10]    HBsAg Nonreact      [12-31-23 @ 14:46]  HCV 0.19, Nonreact      [12-31-23 @ 14:46]  HIV Nonreact      [12-31-23 @ 14:46]    JOHNNY: titer 1:160, pattern Speckled      [12-31-23 @ 14:46]  Free Light Chains: kappa 21.71, lambda 13.12, ratio = 1.65      [12-31 @ 14:46] [facility] DIVISION OF KIDNEY DISEASES AND HYPERTENSION   FOLLOW UP NOTE  --------------------------------------------------------------------------------  Chief Complaint: Pt with RAHUL requiring RRT, undergoing Liver transplant evaluation     24 hour events/subjective: Pt. was seen and examined today in the HD unit. Attempted HD #3 today but pt could not tolerate due to hypotension despite increased doses of TID Midodrine. He states he is "feeling okay". MRI abdomen pending. Recommend MICU consult for persistent hypotension and recommend goals of care evaluation given CT abd findings, liver failure, renal failure.     PAST HISTORY  --------------------------------------------------------------------------------  No significant changes to PMH, PSH, FHx, SHx, unless otherwise noted    ALLERGIES & MEDICATIONS  --------------------------------------------------------------------------------  Allergies  No Known Allergies    Intolerances    Standing Inpatient Medications  camphor 0.5%/menthol 0.5% Topical Lotion 1 Application(s) Topical two times a day  chlorhexidine 2% Cloths 1 Application(s) Topical daily  chlorhexidine 4% Liquid 1 Application(s) Topical <User Schedule>  cyanocobalamin 1000 MICROGram(s) Oral daily  dextrose 5%. 1000 milliLiter(s) IV Continuous <Continuous>  dextrose 5%. 1000 milliLiter(s) IV Continuous <Continuous>  dextrose 50% Injectable 12.5 Gram(s) IV Push once  dextrose 50% Injectable 25 Gram(s) IV Push once  dextrose 50% Injectable 25 Gram(s) IV Push once  folic acid 1 milliGRAM(s) Oral daily  glucagon  Injectable 1 milliGRAM(s) IntraMuscular once  insulin glargine Injectable (LANTUS) 5 Unit(s) SubCutaneous at bedtime  insulin lispro (ADMELOG) corrective regimen sliding scale   SubCutaneous three times a day before meals  insulin lispro (ADMELOG) corrective regimen sliding scale   SubCutaneous at bedtime  lactulose Syrup 10 Gram(s) Oral four times a day  melatonin 1 milliGRAM(s) Oral at bedtime  midodrine. 30 milliGRAM(s) Oral three times a day  octreotide  Injectable 200 MICROGram(s) SubCutaneous three times a day  pancrelipase  (CREON  6,000 Lipase Units) 1 Capsule(s) Oral three times a day with meals  tamsulosin 0.4 milliGRAM(s) Oral at bedtime  thiamine 100 milliGRAM(s) Oral daily    PRN Inpatient Medications  dextrose Oral Gel 15 Gram(s) Oral once PRN  sodium chloride 0.9% lock flush 10 milliLiter(s) IV Push every 1 hour PRN    REVIEW OF SYSTEMS  --------------------------------------------------------------------------------  All other systems were reviewed and are negative, except as noted.    VITALS/PHYSICAL EXAM  --------------------------------------------------------------------------------  T(C): 36.2 (01-05-24 @ 10:12), Max: 36.8 (01-05-24 @ 08:48)  HR: 80 (01-05-24 @ 10:12) (78 - 91)  BP: 91/40 (01-05-24 @ 10:12) (87/52 - 109/75)  RR: 17 (01-05-24 @ 10:12) (17 - 19)  SpO2: 100% (01-05-24 @ 10:12) (96% - 100%)  Wt(kg): --    01-05-24 @ 07:01  -  01-05-24 @ 11:07  --------------------------------------------------------  IN: 800 mL / OUT: 0 mL / NET: 800 mL    Physical Exam:  	Gen: Chronically ill appearing but in no overt distress  	HEENT: Anicteric  	Pulm: CTA B/L  	CV: S1S2+  	Abd: Distended but soft, +BS   	Ext: RUE swelling  	Neuro: Awake   	Skin: Warm and dry  	Dialysis access: R IJ non tunneled HD catheter     LABS/STUDIES  --------------------------------------------------------------------------------              7.9    10.35 >-----------<  84       [01-04-24 @ 06:21]              23.5     133  |  97  |  105  ----------------------------<  118      [01-04-24 @ 06:20]  4.5   |  15  |  5.73        Ca     9.2     [01-04-24 @ 06:20]    TPro  5.3  /  Alb  3.5  /  TBili  12.3  /  DBili  x   /  AST  104  /  ALT  10  /  AlkPhos  503  [01-04-24 @ 06:20]    PT/INR: PT 14.4 , INR 1.32       [01-04-24 @ 06:22]  PTT: 37.7       [01-04-24 @ 06:22]    Creatinine Trend:  SCr 5.73 [01-04 @ 06:20]  SCr 5.21 [01-03 @ 06:23]  SCr 5.14 [01-02 @ 07:11]  SCr 4.71 [01-01 @ 11:17]  SCr 4.67 [12-31 @ 07:34]    Urine Creatinine 206      [01-01-24 @ 06:49]  Urine Protein 83      [01-01-24 @ 06:49]  Urine Sodium 9      [12-31-23 @ 04:10]  Urine Urea Nitrogen 404      [12-31-23 @ 04:10]    HBsAg Nonreact      [12-31-23 @ 14:46]  HCV 0.19, Nonreact      [12-31-23 @ 14:46]  HIV Nonreact      [12-31-23 @ 14:46]    JOHNNY: titer 1:160, pattern Speckled      [12-31-23 @ 14:46]  Free Light Chains: kappa 21.71, lambda 13.12, ratio = 1.65      [12-31 @ 14:46]

## 2024-01-05 NOTE — PROGRESS NOTE ADULT - SUBJECTIVE AND OBJECTIVE BOX
PROGRESS NOTE:   Reena Tan, DO  Hospitalist  Teams  After 5pm/weekends or if no answer ext: [phone]      Patient is a 58y old  Male who presents with a chief complaint of Liver Transplant Eval (04 Jan 2024 16:47)      SUBJECTIVE / OVERNIGHT EVENTS:  JIMMY still hypotensive    ADDITIONAL REVIEW OF SYSTEMS:  no fever or chills no n /V  + diarrhea    MEDICATIONS  (STANDING):  camphor 0.5%/menthol 0.5% Topical Lotion 1 Application(s) Topical two times a day  chlorhexidine 2% Cloths 1 Application(s) Topical daily  chlorhexidine 4% Liquid 1 Application(s) Topical <User Schedule>  cyanocobalamin 1000 MICROGram(s) Oral daily  dextrose 5%. 1000 milliLiter(s) (50 mL/Hr) IV Continuous <Continuous>  dextrose 5%. 1000 milliLiter(s) (100 mL/Hr) IV Continuous <Continuous>  dextrose 50% Injectable 25 Gram(s) IV Push once  dextrose 50% Injectable 25 Gram(s) IV Push once  dextrose 50% Injectable 12.5 Gram(s) IV Push once  folic acid 1 milliGRAM(s) Oral daily  glucagon  Injectable 1 milliGRAM(s) IntraMuscular once  insulin glargine Injectable (LANTUS) 5 Unit(s) SubCutaneous at bedtime  insulin lispro (ADMELOG) corrective regimen sliding scale   SubCutaneous three times a day before meals  insulin lispro (ADMELOG) corrective regimen sliding scale   SubCutaneous at bedtime  lactulose Syrup 10 Gram(s) Oral four times a day  melatonin 1 milliGRAM(s) Oral at bedtime  midodrine. 20 milliGRAM(s) Oral three times a day  octreotide  Injectable 200 MICROGram(s) SubCutaneous three times a day  pancrelipase  (CREON  6,000 Lipase Units) 1 Capsule(s) Oral three times a day with meals  tamsulosin 0.4 milliGRAM(s) Oral at bedtime  thiamine 100 milliGRAM(s) Oral daily    MEDICATIONS  (PRN):  dextrose Oral Gel 15 Gram(s) Oral once PRN Blood Glucose LESS THAN 70 milliGRAM(s)/deciliter  sodium chloride 0.9% lock flush 10 milliLiter(s) IV Push every 1 hour PRN Pre/post blood products, medications, blood draw, and to maintain line patency      CAPILLARY BLOOD GLUCOSE      POCT Blood Glucose.: 135 mg/dL (05 Jan 2024 07:41)  POCT Blood Glucose.: 137 mg/dL (05 Jan 2024 06:03)  POCT Blood Glucose.: 184 mg/dL (04 Jan 2024 21:11)  POCT Blood Glucose.: 154 mg/dL (04 Jan 2024 16:44)  POCT Blood Glucose.: 120 mg/dL (04 Jan 2024 11:36)    I&O's Summary      PHYSICAL EXAM:  Vital Signs Last 24 Hrs  T(C): 36.8 (05 Jan 2024 08:48), Max: 36.8 (05 Jan 2024 08:48)  T(F): 98.3 (05 Jan 2024 08:48), Max: 98.3 (05 Jan 2024 08:48)  HR: 79 (05 Jan 2024 08:48) (79 - 91)  BP: 97/61 (05 Jan 2024 08:48) (87/52 - 109/75)  BP(mean): --  RR: 19 (05 Jan 2024 08:48) (18 - 19)  SpO2: 96% (05 Jan 2024 08:48) (96% - 98%)    Parameters below as of 05 Jan 2024 08:48  Patient On (Oxygen Delivery Method): room air        CONSTITUTIONAL: NAD, well-developed  ABDOMEN: distended   MUSCLOSKELETAL: no clubbing or cyanosis of digits; no joint swelling or tenderness to palpation  PSYCH: A+O to person, place, and time; affect appropriate    LABS:                        7.9    10.35 )-----------( 84       ( 04 Jan 2024 06:21 )             23.5     01-04    133<L>  |  97  |  105<H>  ----------------------------<  118<H>  4.5   |  15<L>  |  5.73<H>    Ca    9.2      04 Jan 2024 06:20    TPro  5.3<L>  /  Alb  3.5  /  TBili  12.3<H>  /  DBili  x   /  AST  104<H>  /  ALT  10  /  AlkPhos  503<H>  01-04    PT/INR - ( 04 Jan 2024 06:22 )   PT: 14.4 sec;   INR: 1.32 ratio         PTT - ( 04 Jan 2024 06:22 )  PTT:37.7 sec      Urinalysis Basic - ( 04 Jan 2024 06:20 )    Color: x / Appearance: x / SG: x / pH: x  Gluc: 118 mg/dL / Ketone: x  / Bili: x / Urobili: x   Blood: x / Protein: x / Nitrite: x   Leuk Esterase: x / RBC: x / WBC x   Sq Epi: x / Non Sq Epi: x / Bacteria: x        Culture - Fungal, Body Fluid (collected 03 Jan 2024 17:41)  Source: Peritoneal Peritoneal Fluid  Preliminary Report (04 Jan 2024 10:32):    Testing in progress    Culture - Body Fluid with Gram Stain (collected 03 Jan 2024 17:41)  Source: Peritoneal Peritoneal Fluid  Gram Stain (04 Jan 2024 05:48):    No polymorphonuclear cells seen    No organisms seen    by cytocentrifuge  Preliminary Report (04 Jan 2024 18:43):    No growth to date.        RADIOLOGY & ADDITIONAL TESTS:  Results Reviewed:   Imaging Personally Reviewed:  Electrocardiogram Personally Reviewed:    COORDINATION OF CARE:  Care Discussed with Consultants/Other Providers [Y/N]:  Prior or Outpatient Records Reviewed [Y/N]:   PROGRESS NOTE:   Reena Tan, DO  Hospitalist  Teams  After 5pm/weekends or if no answer ext: [phone]      Patient is a 58y old  Male who presents with a chief complaint of Liver Transplant Eval (04 Jan 2024 16:47)      SUBJECTIVE / OVERNIGHT EVENTS:  JIMMY still hypotensive    ADDITIONAL REVIEW OF SYSTEMS:  no fever or chills no n /V  + diarrhea    MEDICATIONS  (STANDING):  camphor 0.5%/menthol 0.5% Topical Lotion 1 Application(s) Topical two times a day  chlorhexidine 2% Cloths 1 Application(s) Topical daily  chlorhexidine 4% Liquid 1 Application(s) Topical <User Schedule>  cyanocobalamin 1000 MICROGram(s) Oral daily  dextrose 5%. 1000 milliLiter(s) (50 mL/Hr) IV Continuous <Continuous>  dextrose 5%. 1000 milliLiter(s) (100 mL/Hr) IV Continuous <Continuous>  dextrose 50% Injectable 25 Gram(s) IV Push once  dextrose 50% Injectable 25 Gram(s) IV Push once  dextrose 50% Injectable 12.5 Gram(s) IV Push once  folic acid 1 milliGRAM(s) Oral daily  glucagon  Injectable 1 milliGRAM(s) IntraMuscular once  insulin glargine Injectable (LANTUS) 5 Unit(s) SubCutaneous at bedtime  insulin lispro (ADMELOG) corrective regimen sliding scale   SubCutaneous three times a day before meals  insulin lispro (ADMELOG) corrective regimen sliding scale   SubCutaneous at bedtime  lactulose Syrup 10 Gram(s) Oral four times a day  melatonin 1 milliGRAM(s) Oral at bedtime  midodrine. 20 milliGRAM(s) Oral three times a day  octreotide  Injectable 200 MICROGram(s) SubCutaneous three times a day  pancrelipase  (CREON  6,000 Lipase Units) 1 Capsule(s) Oral three times a day with meals  tamsulosin 0.4 milliGRAM(s) Oral at bedtime  thiamine 100 milliGRAM(s) Oral daily    MEDICATIONS  (PRN):  dextrose Oral Gel 15 Gram(s) Oral once PRN Blood Glucose LESS THAN 70 milliGRAM(s)/deciliter  sodium chloride 0.9% lock flush 10 milliLiter(s) IV Push every 1 hour PRN Pre/post blood products, medications, blood draw, and to maintain line patency      CAPILLARY BLOOD GLUCOSE      POCT Blood Glucose.: 135 mg/dL (05 Jan 2024 07:41)  POCT Blood Glucose.: 137 mg/dL (05 Jan 2024 06:03)  POCT Blood Glucose.: 184 mg/dL (04 Jan 2024 21:11)  POCT Blood Glucose.: 154 mg/dL (04 Jan 2024 16:44)  POCT Blood Glucose.: 120 mg/dL (04 Jan 2024 11:36)    I&O's Summary      PHYSICAL EXAM:  Vital Signs Last 24 Hrs  T(C): 36.8 (05 Jan 2024 08:48), Max: 36.8 (05 Jan 2024 08:48)  T(F): 98.3 (05 Jan 2024 08:48), Max: 98.3 (05 Jan 2024 08:48)  HR: 79 (05 Jan 2024 08:48) (79 - 91)  BP: 97/61 (05 Jan 2024 08:48) (87/52 - 109/75)  BP(mean): --  RR: 19 (05 Jan 2024 08:48) (18 - 19)  SpO2: 96% (05 Jan 2024 08:48) (96% - 98%)    Parameters below as of 05 Jan 2024 08:48  Patient On (Oxygen Delivery Method): room air        CONSTITUTIONAL: NAD, well-developed  ABDOMEN: distended   MUSCLOSKELETAL: no clubbing or cyanosis of digits; no joint swelling or tenderness to palpation  PSYCH: A+O to person, place, and time; affect appropriate    LABS:                        7.9    10.35 )-----------( 84       ( 04 Jan 2024 06:21 )             23.5     01-04    133<L>  |  97  |  105<H>  ----------------------------<  118<H>  4.5   |  15<L>  |  5.73<H>    Ca    9.2      04 Jan 2024 06:20    TPro  5.3<L>  /  Alb  3.5  /  TBili  12.3<H>  /  DBili  x   /  AST  104<H>  /  ALT  10  /  AlkPhos  503<H>  01-04    PT/INR - ( 04 Jan 2024 06:22 )   PT: 14.4 sec;   INR: 1.32 ratio         PTT - ( 04 Jan 2024 06:22 )  PTT:37.7 sec      Urinalysis Basic - ( 04 Jan 2024 06:20 )    Color: x / Appearance: x / SG: x / pH: x  Gluc: 118 mg/dL / Ketone: x  / Bili: x / Urobili: x   Blood: x / Protein: x / Nitrite: x   Leuk Esterase: x / RBC: x / WBC x   Sq Epi: x / Non Sq Epi: x / Bacteria: x        Culture - Fungal, Body Fluid (collected 03 Jan 2024 17:41)  Source: Peritoneal Peritoneal Fluid  Preliminary Report (04 Jan 2024 10:32):    Testing in progress    Culture - Body Fluid with Gram Stain (collected 03 Jan 2024 17:41)  Source: Peritoneal Peritoneal Fluid  Gram Stain (04 Jan 2024 05:48):    No polymorphonuclear cells seen    No organisms seen    by cytocentrifuge  Preliminary Report (04 Jan 2024 18:43):    No growth to date.        RADIOLOGY & ADDITIONAL TESTS:  Results Reviewed:   Imaging Personally Reviewed:  Electrocardiogram Personally Reviewed:    COORDINATION OF CARE:  Care Discussed with Consultants/Other Providers [Y/N]:  Prior or Outpatient Records Reviewed [Y/N]:

## 2024-01-05 NOTE — CONSULT NOTE ADULT - SUBJECTIVE AND OBJECTIVE BOX
CC: Patient is a 58y old  Male who presents with a chief complaint of Liver Transplant Eval (05 Jan 2024 09:40)    HPI:  Patient history limited by patient mental status. Spoke to family member/HCP Juan Grajeda who also had limited information.   57 y/o M PMH DM2, recently diagnosed alcoholic cirrhosis (3 weeks ago) presenting as a transfer from [facility] for transplant eval for decompensated liver cirrhosis (accepted by Dr. Amezquita). Patient initially presented to [facility] (from assisted living) last week with dizziness and worsening AMS. Per HCP, patient first diagnosed with cirrhosis 3 weeks ago after found lethargic. Underwent abdominal paracentesis with large volume fluid removal at that time. Patient discharged to [facility] with hepatology followup. Patient represented to OSH with AMS, dizziness, and increased abdominal distension last week. Noted to have worsening renal function, however still able to produce urine.     At OSH, patient was started on albumin, octreotide, midodrine for concern for HRS. Also started on meropenem per discharge paperwork, no clear indication listed.     Patient directly transferred from [facility] to medicine Floors. Initial EKG demonstrating sinus rhythm with frequent PVCs.  (30 Dec 2023 19:56)    PAST MEDICAL & SURGICAL HISTORY:  Type 2 diabetes mellitus      Decompensated hepatic cirrhosis      No significant past surgical history        SOCIAL HISTORY:  Tobacco Usage:  (   ) never smoked   (   ) former smoker   (   ) current smoker  (     ) pack years    Tobacco Quit Date:  Substance Use (Street drugs): (  ) never used  (  ) other:  Alcohol Usage:    Family history reviewed and otherwise non-contributory  ALLERGIES:  No Known Allergies    MEDICATIONS:  camphor 0.5%/menthol 0.5% Topical Lotion 1 Application(s) Topical two times a day  chlorhexidine 2% Cloths 1 Application(s) Topical daily  chlorhexidine 4% Liquid 1 Application(s) Topical <User Schedule>  cyanocobalamin 1000 MICROGram(s) Oral daily  dextrose 5%. 1000 milliLiter(s) IV Continuous <Continuous>  dextrose 5%. 1000 milliLiter(s) IV Continuous <Continuous>  dextrose 50% Injectable 25 Gram(s) IV Push once  dextrose 50% Injectable 25 Gram(s) IV Push once  dextrose 50% Injectable 12.5 Gram(s) IV Push once  dextrose Oral Gel 15 Gram(s) Oral once PRN  folic acid 1 milliGRAM(s) Oral daily  glucagon  Injectable 1 milliGRAM(s) IntraMuscular once  insulin glargine Injectable (LANTUS) 5 Unit(s) SubCutaneous at bedtime  insulin lispro (ADMELOG) corrective regimen sliding scale   SubCutaneous three times a day before meals  insulin lispro (ADMELOG) corrective regimen sliding scale   SubCutaneous at bedtime  lactulose Syrup 10 Gram(s) Oral four times a day  melatonin 1 milliGRAM(s) Oral at bedtime  midodrine. 30 milliGRAM(s) Oral three times a day  octreotide  Injectable 200 MICROGram(s) SubCutaneous three times a day  pancrelipase  (CREON  6,000 Lipase Units) 1 Capsule(s) Oral three times a day with meals  sodium chloride 0.9% lock flush 10 milliLiter(s) IV Push every 1 hour PRN  tamsulosin 0.4 milliGRAM(s) Oral at bedtime  thiamine 100 milliGRAM(s) Oral daily      REVIEW OF SYSTEMS:  Negative except as above in HPI.    Vital Signs Last 24 Hrs  T(F): 97.2 (05 Jan 2024 09:30), Max: 98.3 (05 Jan 2024 08:48)  HR: 78 (05 Jan 2024 09:30) (78 - 91)  BP: 94/42 (05 Jan 2024 09:30) (87/52 - 109/75)  RR: 18 (05 Jan 2024 09:30) (18 - 19)  SpO2: 99% (05 Jan 2024 09:30) (96% - 99%)  I&O's Summary    PHYSICAL EXAM:  GENERAL: NAD, well-groomed  HEAD:  Atraumatic, Normocephalic  EYES: PERRLA, normal conjunctiva, anicteric  ENMT: Moist mucous membranes, no mucositis  NECK: Supple, No JVD  CHEST/LUNG: Clear to auscultation bilaterally; No rales, rhonchi, wheezing, or rubs  HEART: Regular rate and rhythm; S1/S2, No murmurs, rubs, or gallops  ABDOMEN: Soft, Nontender, Nondistended; Bowel sounds present  VASCULAR: Normal pulses, Normal capillary refill  EXTREMITIES:  2+ Peripheral Pulses, No cyanosis, No edema  LYMPH: No lymphadenopathy noted  SKIN: Warm, Intact  PSYCH: Normal mood and affect  NERVOUS SYSTEM:  A/O x3, CN 2-12 intact, No focal deficits    LABS:                        7.9    10.35 )-----------( 84       ( 04 Jan 2024 06:21 )             23.5     01-04    133  |  97  |  105  ----------------------------<  118  4.5   |  15  |  5.73    Ca    9.2      04 Jan 2024 06:20    TPro  5.3  /  Alb  3.5  /  TBili  12.3  /  DBili  x   /  AST  104  /  ALT  10  /  AlkPhos  503  01-04      PT/INR - ( 04 Jan 2024 06:22 )   PT: 14.4 sec;   INR: 1.32 ratio         PTT - ( 04 Jan 2024 06:22 )  PTT:37.7 sec                    POCT Blood Glucose.: 135 mg/dL (05 Jan 2024 07:41)  POCT Blood Glucose.: 137 mg/dL (05 Jan 2024 06:03)  POCT Blood Glucose.: 184 mg/dL (04 Jan 2024 21:11)  POCT Blood Glucose.: 154 mg/dL (04 Jan 2024 16:44)  POCT Blood Glucose.: 120 mg/dL (04 Jan 2024 11:36)      Urinalysis Basic - ( 04 Jan 2024 06:20 )    Color: x / Appearance: x / SG: x / pH: x  Gluc: 118 mg/dL / Ketone: x  / Bili: x / Urobili: x   Blood: x / Protein: x / Nitrite: x   Leuk Esterase: x / RBC: x / WBC x   Sq Epi: x / Non Sq Epi: x / Bacteria: x        Culture - Fungal, Body Fluid (collected 03 Jan 2024 17:41)  Source: Peritoneal Peritoneal Fluid  Preliminary Report (04 Jan 2024 10:32):    Testing in progress    Culture - Body Fluid with Gram Stain (collected 03 Jan 2024 17:41)  Source: Peritoneal Peritoneal Fluid  Gram Stain (04 Jan 2024 05:48):    No polymorphonuclear cells seen    No organisms seen    by cytocentrifuge  Preliminary Report (04 Jan 2024 18:43):    No growth to date.    Culture - Urine (collected 01 Jan 2024 10:04)  Source: Clean Catch Clean Catch (Midstream)  Final Report (02 Jan 2024 10:19):    <10,000 CFU/mL Normal Urogenital Fabienne    Culture - Blood (collected 31 Dec 2023 07:46)  Source: .Blood Blood-Peripheral  Final Report (05 Jan 2024 09:00):    No growth at 5 days    Culture - Blood (collected 31 Dec 2023 07:37)  Source: .Blood Blood-Peripheral  Final Report (05 Jan 2024 09:00):    No growth at 5 days          RADIOLOGY & ADDITIONAL TESTS:    Care Discussed with Consultants/Other Providers: CC: Patient is a 58y old  Male who presents with a chief complaint of Liver Transplant Eval (05 Jan 2024 09:40)    HPI:  Patient history limited by patient mental status. Spoke to family member/HCP Juan Grajeda who also had limited information.   57 y/o M PMH DM2, recently diagnosed alcoholic cirrhosis (3 weeks ago) presenting as a transfer from [facility] for transplant eval for decompensated liver cirrhosis (accepted by Dr. Amezquita). Patient initially presented to [facility] (from assisted living) last week with dizziness and worsening AMS. Per HCP, patient first diagnosed with cirrhosis 3 weeks ago after found lethargic. Underwent abdominal paracentesis with large volume fluid removal at that time. Patient discharged to [facility] with hepatology followup. Patient represented to OSH with AMS, dizziness, and increased abdominal distension last week. Noted to have worsening renal function, however still able to produce urine.     At OSH, patient was started on albumin, octreotide, midodrine for concern for HRS. Also started on meropenem per discharge paperwork, no clear indication listed.     Patient directly transferred from [facility] to medicine Floors. Initial EKG demonstrating sinus rhythm with frequent PVCs.  (30 Dec 2023 19:56)    PAST MEDICAL & SURGICAL HISTORY:  Type 2 diabetes mellitus      Decompensated hepatic cirrhosis      No significant past surgical history        SOCIAL HISTORY:  Tobacco Usage:  (   ) never smoked   (   ) former smoker   (   ) current smoker  (     ) pack years    Tobacco Quit Date:  Substance Use (Street drugs): (  ) never used  (  ) other:  Alcohol Usage:    Family history reviewed and otherwise non-contributory  ALLERGIES:  No Known Allergies    MEDICATIONS:  camphor 0.5%/menthol 0.5% Topical Lotion 1 Application(s) Topical two times a day  chlorhexidine 2% Cloths 1 Application(s) Topical daily  chlorhexidine 4% Liquid 1 Application(s) Topical <User Schedule>  cyanocobalamin 1000 MICROGram(s) Oral daily  dextrose 5%. 1000 milliLiter(s) IV Continuous <Continuous>  dextrose 5%. 1000 milliLiter(s) IV Continuous <Continuous>  dextrose 50% Injectable 25 Gram(s) IV Push once  dextrose 50% Injectable 25 Gram(s) IV Push once  dextrose 50% Injectable 12.5 Gram(s) IV Push once  dextrose Oral Gel 15 Gram(s) Oral once PRN  folic acid 1 milliGRAM(s) Oral daily  glucagon  Injectable 1 milliGRAM(s) IntraMuscular once  insulin glargine Injectable (LANTUS) 5 Unit(s) SubCutaneous at bedtime  insulin lispro (ADMELOG) corrective regimen sliding scale   SubCutaneous three times a day before meals  insulin lispro (ADMELOG) corrective regimen sliding scale   SubCutaneous at bedtime  lactulose Syrup 10 Gram(s) Oral four times a day  melatonin 1 milliGRAM(s) Oral at bedtime  midodrine. 30 milliGRAM(s) Oral three times a day  octreotide  Injectable 200 MICROGram(s) SubCutaneous three times a day  pancrelipase  (CREON  6,000 Lipase Units) 1 Capsule(s) Oral three times a day with meals  sodium chloride 0.9% lock flush 10 milliLiter(s) IV Push every 1 hour PRN  tamsulosin 0.4 milliGRAM(s) Oral at bedtime  thiamine 100 milliGRAM(s) Oral daily      REVIEW OF SYSTEMS:  Negative except as above in HPI.    Vital Signs Last 24 Hrs  T(F): 97.2 (05 Jan 2024 09:30), Max: 98.3 (05 Jan 2024 08:48)  HR: 78 (05 Jan 2024 09:30) (78 - 91)  BP: 94/42 (05 Jan 2024 09:30) (87/52 - 109/75)  RR: 18 (05 Jan 2024 09:30) (18 - 19)  SpO2: 99% (05 Jan 2024 09:30) (96% - 99%)  I&O's Summary    PHYSICAL EXAM:  GENERAL: NAD, well-groomed  HEAD:  Atraumatic, Normocephalic  EYES: PERRLA, normal conjunctiva, anicteric  ENMT: Moist mucous membranes, no mucositis  NECK: Supple, No JVD  CHEST/LUNG: Clear to auscultation bilaterally; No rales, rhonchi, wheezing, or rubs  HEART: Regular rate and rhythm; S1/S2, No murmurs, rubs, or gallops  ABDOMEN: Soft, Nontender, Nondistended; Bowel sounds present  VASCULAR: Normal pulses, Normal capillary refill  EXTREMITIES:  2+ Peripheral Pulses, No cyanosis, No edema  LYMPH: No lymphadenopathy noted  SKIN: Warm, Intact  PSYCH: Normal mood and affect  NERVOUS SYSTEM:  A/O x3, CN 2-12 intact, No focal deficits    LABS:                        7.9    10.35 )-----------( 84       ( 04 Jan 2024 06:21 )             23.5     01-04    133  |  97  |  105  ----------------------------<  118  4.5   |  15  |  5.73    Ca    9.2      04 Jan 2024 06:20    TPro  5.3  /  Alb  3.5  /  TBili  12.3  /  DBili  x   /  AST  104  /  ALT  10  /  AlkPhos  503  01-04      PT/INR - ( 04 Jan 2024 06:22 )   PT: 14.4 sec;   INR: 1.32 ratio         PTT - ( 04 Jan 2024 06:22 )  PTT:37.7 sec                    POCT Blood Glucose.: 135 mg/dL (05 Jan 2024 07:41)  POCT Blood Glucose.: 137 mg/dL (05 Jan 2024 06:03)  POCT Blood Glucose.: 184 mg/dL (04 Jan 2024 21:11)  POCT Blood Glucose.: 154 mg/dL (04 Jan 2024 16:44)  POCT Blood Glucose.: 120 mg/dL (04 Jan 2024 11:36)      Urinalysis Basic - ( 04 Jan 2024 06:20 )    Color: x / Appearance: x / SG: x / pH: x  Gluc: 118 mg/dL / Ketone: x  / Bili: x / Urobili: x   Blood: x / Protein: x / Nitrite: x   Leuk Esterase: x / RBC: x / WBC x   Sq Epi: x / Non Sq Epi: x / Bacteria: x        Culture - Fungal, Body Fluid (collected 03 Jan 2024 17:41)  Source: Peritoneal Peritoneal Fluid  Preliminary Report (04 Jan 2024 10:32):    Testing in progress    Culture - Body Fluid with Gram Stain (collected 03 Jan 2024 17:41)  Source: Peritoneal Peritoneal Fluid  Gram Stain (04 Jan 2024 05:48):    No polymorphonuclear cells seen    No organisms seen    by cytocentrifuge  Preliminary Report (04 Jan 2024 18:43):    No growth to date.    Culture - Urine (collected 01 Jan 2024 10:04)  Source: Clean Catch Clean Catch (Midstream)  Final Report (02 Jan 2024 10:19):    <10,000 CFU/mL Normal Urogenital Fabienne    Culture - Blood (collected 31 Dec 2023 07:46)  Source: .Blood Blood-Peripheral  Final Report (05 Jan 2024 09:00):    No growth at 5 days    Culture - Blood (collected 31 Dec 2023 07:37)  Source: .Blood Blood-Peripheral  Final Report (05 Jan 2024 09:00):    No growth at 5 days          RADIOLOGY & ADDITIONAL TESTS:    Care Discussed with Consultants/Other Providers: History limited as patient poor historian. HPI as per record review.    As per initial HPI: Patient history limited by patient mental status. Spoke to family member/HCP Anatjononi Grajeda who also had limited information.   59 y/o M PMH DM2, recently diagnosed alcoholic cirrhosis (3 weeks ago) presenting as a transfer from [facility] for transplant eval for decompensated liver cirrhosis (accepted by Dr. Amezquita). Patient initially presented to [facility] (from assisted living) last week with dizziness and worsening AMS. Per HCP, patient first diagnosed with cirrhosis 3 weeks ago after found lethargic. Underwent abdominal paracentesis with large volume fluid removal at that time. Patient discharged to [facility] with hepatology followup. Patient represented to OSH with AMS, dizziness, and increased abdominal distension last week. Noted to have worsening renal function, however still able to produce urine.     At OSH, patient was started on albumin, octreotide, midodrine for concern for HRS. Also started on meropenem per discharge paperwork, no clear indication listed.     Patient directly transferred from [facility] to medicine floors, however unable to tolerate intermittent HD due to hypotension, thus MICU consulted for pressor-supported HD and patient transferred to MICU.    Oncology consulted for work up of renal mass. Noted to have portal vein thrombosis. MICU team reports black stools, concerning for GIB.    PAST MEDICAL & SURGICAL HISTORY:  Type 2 diabetes mellitus  Decompensated hepatic cirrhosis  No significant past surgical history    SOCIAL HISTORY:  Patient reports former EtOH and tobacco, denies substance use  Unable to obtain further history    Unable to obtain FHx    ALLERGIES:  No Known Allergies    MEDICATIONS:  camphor 0.5%/menthol 0.5% Topical Lotion 1 Application(s) Topical two times a day  chlorhexidine 2% Cloths 1 Application(s) Topical daily  chlorhexidine 4% Liquid 1 Application(s) Topical <User Schedule>  cyanocobalamin 1000 MICROGram(s) Oral daily  dextrose 5%. 1000 milliLiter(s) IV Continuous <Continuous>  dextrose 5%. 1000 milliLiter(s) IV Continuous <Continuous>  dextrose 50% Injectable 25 Gram(s) IV Push once  dextrose 50% Injectable 25 Gram(s) IV Push once  dextrose 50% Injectable 12.5 Gram(s) IV Push once  dextrose Oral Gel 15 Gram(s) Oral once PRN  folic acid 1 milliGRAM(s) Oral daily  glucagon  Injectable 1 milliGRAM(s) IntraMuscular once  insulin glargine Injectable (LANTUS) 5 Unit(s) SubCutaneous at bedtime  insulin lispro (ADMELOG) corrective regimen sliding scale   SubCutaneous three times a day before meals  insulin lispro (ADMELOG) corrective regimen sliding scale   SubCutaneous at bedtime  lactulose Syrup 10 Gram(s) Oral four times a day  melatonin 1 milliGRAM(s) Oral at bedtime  midodrine. 30 milliGRAM(s) Oral three times a day  octreotide  Injectable 200 MICROGram(s) SubCutaneous three times a day  pancrelipase  (CREON  6,000 Lipase Units) 1 Capsule(s) Oral three times a day with meals  sodium chloride 0.9% lock flush 10 milliLiter(s) IV Push every 1 hour PRN  tamsulosin 0.4 milliGRAM(s) Oral at bedtime  thiamine 100 milliGRAM(s) Oral daily    REVIEW OF SYSTEMS:  Unable to obtain due to mental status.    Vital Signs Last 24 Hrs  T(F): 97.2 (05 Jan 2024 09:30), Max: 98.3 (05 Jan 2024 08:48)  HR: 78 (05 Jan 2024 09:30) (78 - 91)  BP: 94/42 (05 Jan 2024 09:30) (87/52 - 109/75)  RR: 18 (05 Jan 2024 09:30) (18 - 19)  SpO2: 99% (05 Jan 2024 09:30) (96% - 99%)  I&O's Summary    PHYSICAL EXAM:  GENERAL: NAD, poor hygiene  HEAD:  Atraumatic, Normocephalic  EYES: b/l scleral icterus  ENMT: Moist mucous membranes; fullness of L parotid  NECK: Supple  CHEST/LUNG: Clear to auscultation bilaterally; No rales, rhonchi, wheezing, or rubs  HEART: Regular rate and rhythm; S1/S2, No murmurs, rubs, or gallops  ABDOMEN: Soft, Nontender, Nondistended; Bowel sounds present  EXTREMITIES:  No cyanosis, No edema  SKIN: Warm, Intact; notably jaundice  NERVOUS SYSTEM:  AOx3 but nonsensical speech and repeating words    LABS:                        7.9    10.35 )-----------( 84       ( 04 Jan 2024 06:21 )             23.5     01-04    133  |  97  |  105  ----------------------------<  118  4.5   |  15  |  5.73    Ca    9.2      04 Jan 2024 06:20    TPro  5.3  /  Alb  3.5  /  TBili  12.3  /  DBili  x   /  AST  104  /  ALT  10  /  AlkPhos  503  01-04    PT/INR - ( 04 Jan 2024 06:22 )   PT: 14.4 sec;   INR: 1.32 ratio    PTT - ( 04 Jan 2024 06:22 )  PTT:37.7 sec    Urinalysis Basic - ( 04 Jan 2024 06:20 )    Color: x / Appearance: x / SG: x / pH: x  Gluc: 118 mg/dL / Ketone: x  / Bili: x / Urobili: x   Blood: x / Protein: x / Nitrite: x   Leuk Esterase: x / RBC: x / WBC x   Sq Epi: x / Non Sq Epi: x / Bacteria: x        Culture - Fungal, Body Fluid (collected 03 Jan 2024 17:41)  Source: Peritoneal Peritoneal Fluid  Preliminary Report (04 Jan 2024 10:32):    Testing in progress    Culture - Body Fluid with Gram Stain (collected 03 Jan 2024 17:41)  Source: Peritoneal Peritoneal Fluid  Gram Stain (04 Jan 2024 05:48):    No polymorphonuclear cells seen    No organisms seen    by cytocentrifuge  Preliminary Report (04 Jan 2024 18:43):    No growth to date.    Culture - Urine (collected 01 Jan 2024 10:04)  Source: Clean Catch Clean Catch (Midstream)  Final Report (02 Jan 2024 10:19):    <10,000 CFU/mL Normal Urogenital Fabienne    Culture - Blood (collected 31 Dec 2023 07:46)  Source: .Blood Blood-Peripheral  Final Report (05 Jan 2024 09:00):    No growth at 5 days    Culture - Blood (collected 31 Dec 2023 07:37)  Source: .Blood Blood-Peripheral  Final Report (05 Jan 2024 09:00):    No growth at 5 days    RADIOLOGY & ADDITIONAL TESTS:  < from: US Renal (01.02.24 @ 10:58) >  FINDINGS:    Liver: Cirrhotic.  Right kidney: 11.65 cm. No hydronephrosis. A multilobulated vascular mass   is seen arising from the right kidney, extending into the peritoneum.  Left kidney: 11.87 cm.  No hydronephrosis.  Bladder: Within normal limits  Ascites: Marked. Aorta and IVC: IVC does not demonstrate pulsatile blood   flow on Doppler evaluation.    Main portal vein: Thrombosed    Hepatic artery: Patent. Peak systolic velocity is 294.2 cm/s.    Hepatic veins: Limited by body habitus.    IMPRESSION:  Thrombosis of the main portal vein.    Abnormal nonphasic flow in the IVC.    Multilobulated vascular mass arises from the right kidney extending into   the peritoneum. Findings are concerning for primary renal malignancy   versus metastatic disease. Evaluation with contrast-enhanced CT is   recommended.    < end of copied text >   History limited as patient poor historian. HPI as per record review.    As per initial HPI: Patient history limited by patient mental status. Spoke to family member/HCP Anatjononi Grajeda who also had limited information.   59 y/o M PMH DM2, recently diagnosed alcoholic cirrhosis (3 weeks ago) presenting as a transfer from [facility] for transplant eval for decompensated liver cirrhosis (accepted by Dr. Amezquita). Patient initially presented to [facility] (from assisted living) last week with dizziness and worsening AMS. Per HCP, patient first diagnosed with cirrhosis 3 weeks ago after found lethargic. Underwent abdominal paracentesis with large volume fluid removal at that time. Patient discharged to [facility] with hepatology followup. Patient represented to OSH with AMS, dizziness, and increased abdominal distension last week. Noted to have worsening renal function, however still able to produce urine.     At OSH, patient was started on albumin, octreotide, midodrine for concern for HRS. Also started on meropenem per discharge paperwork, no clear indication listed.     Patient directly transferred from [facility] to medicine floors, however unable to tolerate intermittent HD due to hypotension, thus MICU consulted for pressor-supported HD and patient transferred to MICU.    Oncology consulted for work up of renal mass. Noted to have portal vein thrombosis. MICU team reports black stools, concerning for GIB.    PAST MEDICAL & SURGICAL HISTORY:  Type 2 diabetes mellitus  Decompensated hepatic cirrhosis  No significant past surgical history    SOCIAL HISTORY:  Patient reports former EtOH and tobacco, denies substance use  Unable to obtain further history    Unable to obtain FHx    ALLERGIES:  No Known Allergies    MEDICATIONS:  camphor 0.5%/menthol 0.5% Topical Lotion 1 Application(s) Topical two times a day  chlorhexidine 2% Cloths 1 Application(s) Topical daily  chlorhexidine 4% Liquid 1 Application(s) Topical <User Schedule>  cyanocobalamin 1000 MICROGram(s) Oral daily  dextrose 5%. 1000 milliLiter(s) IV Continuous <Continuous>  dextrose 5%. 1000 milliLiter(s) IV Continuous <Continuous>  dextrose 50% Injectable 25 Gram(s) IV Push once  dextrose 50% Injectable 25 Gram(s) IV Push once  dextrose 50% Injectable 12.5 Gram(s) IV Push once  dextrose Oral Gel 15 Gram(s) Oral once PRN  folic acid 1 milliGRAM(s) Oral daily  glucagon  Injectable 1 milliGRAM(s) IntraMuscular once  insulin glargine Injectable (LANTUS) 5 Unit(s) SubCutaneous at bedtime  insulin lispro (ADMELOG) corrective regimen sliding scale   SubCutaneous three times a day before meals  insulin lispro (ADMELOG) corrective regimen sliding scale   SubCutaneous at bedtime  lactulose Syrup 10 Gram(s) Oral four times a day  melatonin 1 milliGRAM(s) Oral at bedtime  midodrine. 30 milliGRAM(s) Oral three times a day  octreotide  Injectable 200 MICROGram(s) SubCutaneous three times a day  pancrelipase  (CREON  6,000 Lipase Units) 1 Capsule(s) Oral three times a day with meals  sodium chloride 0.9% lock flush 10 milliLiter(s) IV Push every 1 hour PRN  tamsulosin 0.4 milliGRAM(s) Oral at bedtime  thiamine 100 milliGRAM(s) Oral daily    REVIEW OF SYSTEMS:  Unable to obtain due to mental status.    Vital Signs Last 24 Hrs  T(F): 97.2 (05 Jan 2024 09:30), Max: 98.3 (05 Jan 2024 08:48)  HR: 78 (05 Jan 2024 09:30) (78 - 91)  BP: 94/42 (05 Jan 2024 09:30) (87/52 - 109/75)  RR: 18 (05 Jan 2024 09:30) (18 - 19)  SpO2: 99% (05 Jan 2024 09:30) (96% - 99%)  I&O's Summary    PHYSICAL EXAM:  GENERAL: NAD, poor hygiene  HEAD:  Atraumatic, Normocephalic  EYES: b/l scleral icterus  ENMT: Moist mucous membranes; fullness of L parotid  NECK: Supple  CHEST/LUNG: Clear to auscultation bilaterally; No rales, rhonchi, wheezing, or rubs  HEART: Regular rate and rhythm; S1/S2, No murmurs, rubs, or gallops  ABDOMEN: Soft, Nontender, Nondistended; Bowel sounds present  EXTREMITIES:  No cyanosis, No edema  SKIN: Warm, Intact; notably jaundice  NERVOUS SYSTEM:  AOx3 but nonsensical speech and repeating words    LABS:                        7.9    10.35 )-----------( 84       ( 04 Jan 2024 06:21 )             23.5     01-04    133  |  97  |  105  ----------------------------<  118  4.5   |  15  |  5.73    Ca    9.2      04 Jan 2024 06:20    TPro  5.3  /  Alb  3.5  /  TBili  12.3  /  DBili  x   /  AST  104  /  ALT  10  /  AlkPhos  503  01-04    PT/INR - ( 04 Jan 2024 06:22 )   PT: 14.4 sec;   INR: 1.32 ratio    PTT - ( 04 Jan 2024 06:22 )  PTT:37.7 sec    Urinalysis Basic - ( 04 Jan 2024 06:20 )    Color: x / Appearance: x / SG: x / pH: x  Gluc: 118 mg/dL / Ketone: x  / Bili: x / Urobili: x   Blood: x / Protein: x / Nitrite: x   Leuk Esterase: x / RBC: x / WBC x   Sq Epi: x / Non Sq Epi: x / Bacteria: x        Culture - Fungal, Body Fluid (collected 03 Jan 2024 17:41)  Source: Peritoneal Peritoneal Fluid  Preliminary Report (04 Jan 2024 10:32):    Testing in progress    Culture - Body Fluid with Gram Stain (collected 03 Jan 2024 17:41)  Source: Peritoneal Peritoneal Fluid  Gram Stain (04 Jan 2024 05:48):    No polymorphonuclear cells seen    No organisms seen    by cytocentrifuge  Preliminary Report (04 Jan 2024 18:43):    No growth to date.    Culture - Urine (collected 01 Jan 2024 10:04)  Source: Clean Catch Clean Catch (Midstream)  Final Report (02 Jan 2024 10:19):    <10,000 CFU/mL Normal Urogenital Fabienne    Culture - Blood (collected 31 Dec 2023 07:46)  Source: .Blood Blood-Peripheral  Final Report (05 Jan 2024 09:00):    No growth at 5 days    Culture - Blood (collected 31 Dec 2023 07:37)  Source: .Blood Blood-Peripheral  Final Report (05 Jan 2024 09:00):    No growth at 5 days    RADIOLOGY & ADDITIONAL TESTS:  < from: US Renal (01.02.24 @ 10:58) >  FINDINGS:    Liver: Cirrhotic.  Right kidney: 11.65 cm. No hydronephrosis. A multilobulated vascular mass   is seen arising from the right kidney, extending into the peritoneum.  Left kidney: 11.87 cm.  No hydronephrosis.  Bladder: Within normal limits  Ascites: Marked. Aorta and IVC: IVC does not demonstrate pulsatile blood   flow on Doppler evaluation.    Main portal vein: Thrombosed    Hepatic artery: Patent. Peak systolic velocity is 294.2 cm/s.    Hepatic veins: Limited by body habitus.    IMPRESSION:  Thrombosis of the main portal vein.    Abnormal nonphasic flow in the IVC.    Multilobulated vascular mass arises from the right kidney extending into   the peritoneum. Findings are concerning for primary renal malignancy   versus metastatic disease. Evaluation with contrast-enhanced CT is   recommended.    < end of copied text >

## 2024-01-05 NOTE — PROGRESS NOTE ADULT - PROBLEM SELECTOR PLAN 1
Recently diagnosed alcoholic cirrhosis   last drink several months ago will check PETH  Accepted as transfer to [facility] for transplant eval, appreciate recs  c/w Thiamine, B12, Folic Acid, lactulose   IR consult placed for paracentesis pending  Patient previously on nadolol per outside hospital notes; unclear why held - f/u GI recs  MELD 32  HE: present on exam Lactulose 10 TID ammonia low  EV: unk will need screening EGD after MRI before deciding about A/C for PVT  HCC screening: MRI pending  PV: thrombus in main portal vein- will need EGD for variceal eval prior to starting AC  Ascites: seen on exam, para 1.3 negataive for SBP  Bleeding hx: no s/s of bleeding Recently diagnosed alcoholic cirrhosis   last drink several months ago will check PETH  Accepted as transfer to [facility] for transplant eval, appreciate recs  c/w Thiamine, B12, Folic Acid, lactulose   IR consult placed for paracentesis pending  Patient previously on nadolol per outside hospital notes; unclear why held - f/u GI recs  MELD 32  HE: present on exam Lactulose 10 TID ammonia low  EV: unk will need screening EGD after MRI before deciding about A/C for PVT  HCC screening: MRI pending  PV: thrombus in main portal vein- will need EGD for variceal eval prior to starting AC  Ascites: seen on exam, para 1.3 negataive for SBP  Bleeding hx: no s/s of bleeding

## 2024-01-05 NOTE — PROGRESS NOTE ADULT - PROBLEM SELECTOR PLAN 2
New renal mass seen on US rec for CT IV contrast but with RAHUL on HD.  Discussed with radiology who say MR w/o contrast will suffice.  -MRI w/ contrast pending discussed with radiology concern given contrast but given its needed for transplant candidacy and then GOC and risk is low and nephrology agrees we will proceed.  -Urology consult and then ONncology consult if concerning for malignancy on MRI

## 2024-01-06 NOTE — PROGRESS NOTE ADULT - ASSESSMENT
58 year old man with h/o type II DM for ~ 6-7 years, was on metformin and Jardiance, recently diagnosed with liver cirrhosis complicated by ascites. He underwent paracentesis x 3. Course complicated by worsening RAHUL and encephalopathy. He was transferred to [facility] for liver transplant evaluation. He was initiated on HD on 12/31/23.     Renal US with normal sized kidneys measuring ~ 11.6 and 11.8 cm. No hydro. Multilobulated vascular mass arises from the right kidney extending into the peritoneum. Findings are concerning for primary renal malignancy versus metastatic disease. MRI pending.     S/p large volume paracentesis 1/3/24 with 11 liter fluid removal.    RAHUL on CKD - unclear baseline renal function. No significant proteinuria UPCR 0.4, normal sized kidneys on US. Initiated HD on 12/31, but Unable to tolerate HD due to hypotension unresponsive to increase midodrine dose.    Labs with worsening BUN/Cr and metabolic acidosis and therefore patient was moved to ICU consult for pressor support and started on CRRT. Has a right IJ temporary cathter   continue CRRT for now. labs reviewed.      58 year old man with h/o type II DM for ~ 6-7 years, was on metformin and Jardiance, recently diagnosed with liver cirrhosis complicated by ascites. He underwent paracentesis x 3. Course complicated by worsening RAHUL and encephalopathy. He was transferred to [facility] for liver transplant evaluation. He was initiated on HD on 12/31/23.     Renal US with normal sized kidneys measuring ~ 11.6 and 11.8 cm. No hydro. Multilobulated vascular mass arises from the right kidney extending into the peritoneum. Findings are concerning for primary renal malignancy versus metastatic disease. MRI pending.     S/p large volume paracentesis 1/3/24 with 11 liter fluid removal.    RAHUL on CKD - unclear baseline renal function. No significant proteinuria UPCR 0.4, normal sized kidneys on US. Initiated HD on 12/31, but Unable to tolerate HD due to hypotension unresponsive to increase midodrine dose.    Labs with worsening BUN/Cr and metabolic acidosis and therefore patient was moved to ICU consult for pressor support and started on CRRT. Has a right IJ temporary cathter   continue CRRT for now. labs reviewed.

## 2024-01-06 NOTE — PROGRESS NOTE ADULT - SUBJECTIVE AND OBJECTIVE BOX
PROGRESS NOTE:   Authored by Dr. Abdirashid Valentino MD (PGY-[id number]).     Patient is a 58y old  Male who presents with a chief complaint of Liver Transplant Eval (06 Jan 2024 09:42)      SUBJECTIVE / OVERNIGHT EVENTS:  No acute events overnight. Patient has no acute complaints this morning.     MEDICATIONS  (STANDING):  camphor 0.5%/menthol 0.5% Topical Lotion 1 Application(s) Topical two times a day  cefTRIAXone   IVPB 1000 milliGRAM(s) IV Intermittent every 24 hours  chlorhexidine 2% Cloths 1 Application(s) Topical daily  chlorhexidine 4% Liquid 1 Application(s) Topical <User Schedule>  CRRT Treatment    <Continuous>  cyanocobalamin 1000 MICROGram(s) Oral daily  dextrose 5%. 1000 milliLiter(s) (100 mL/Hr) IV Continuous <Continuous>  dextrose 5%. 1000 milliLiter(s) (50 mL/Hr) IV Continuous <Continuous>  dextrose 50% Injectable 25 Gram(s) IV Push once  dextrose 50% Injectable 25 Gram(s) IV Push once  dextrose 50% Injectable 12.5 Gram(s) IV Push once  folic acid 1 milliGRAM(s) Oral daily  glucagon  Injectable 1 milliGRAM(s) IntraMuscular once  insulin glargine Injectable (LANTUS) 5 Unit(s) SubCutaneous at bedtime  insulin lispro (ADMELOG) corrective regimen sliding scale   SubCutaneous three times a day before meals  insulin lispro (ADMELOG) corrective regimen sliding scale   SubCutaneous at bedtime  lactulose Syrup 10 Gram(s) Oral four times a day  melatonin 1 milliGRAM(s) Oral at bedtime  norepinephrine Infusion 0.05 MICROgram(s)/kG/Min (8.96 mL/Hr) IV Continuous <Continuous>  octreotide  Infusion 50 MICROgram(s)/Hr (10 mL/Hr) IV Continuous <Continuous>  pancrelipase  (CREON  6,000 Lipase Units) 1 Capsule(s) Oral three times a day with meals  pantoprazole  Injectable 40 milliGRAM(s) IV Push every 12 hours  PrismaSATE Dialysate BGK 4 / 2.5 5000 milliLiter(s) (1500 mL/Hr) CRRT <Continuous>  PrismaSOL Filtration BGK 4 / 2.5 5000 milliLiter(s) (200 mL/Hr) CRRT <Continuous>  PrismaSOL Filtration BGK 4 / 2.5 5000 milliLiter(s) (800 mL/Hr) CRRT <Continuous>  QUEtiapine 25 milliGRAM(s) Oral daily  rifAXIMin 550 milliGRAM(s) Oral two times a day  tamsulosin 0.4 milliGRAM(s) Oral at bedtime  thiamine 100 milliGRAM(s) Oral daily    MEDICATIONS  (PRN):  dextrose Oral Gel 15 Gram(s) Oral once PRN Blood Glucose LESS THAN 70 milliGRAM(s)/deciliter  sodium chloride 0.9% lock flush 10 milliLiter(s) IV Push every 1 hour PRN Pre/post blood products, medications, blood draw, and to maintain line patency      CAPILLARY BLOOD GLUCOSE      POCT Blood Glucose.: 131 mg/dL (06 Jan 2024 08:27)  POCT Blood Glucose.: 168 mg/dL (05 Jan 2024 21:44)  POCT Blood Glucose.: 127 mg/dL (05 Jan 2024 12:02)    I&O's Summary    05 Jan 2024 07:01  -  06 Jan 2024 07:00  --------------------------------------------------------  IN: 1200 mL / OUT: 150 mL / NET: 1050 mL    06 Jan 2024 07:01  -  06 Jan 2024 10:15  --------------------------------------------------------  IN: 273.8 mL / OUT: 0 mL / NET: 273.8 mL        PHYSICAL EXAM:  Vital Signs Last 24 Hrs  T(C): 36.5 (06 Jan 2024 08:00), Max: 37 (05 Jan 2024 20:00)  T(F): 97.7 (06 Jan 2024 08:00), Max: 98.6 (05 Jan 2024 20:00)  HR: 91 (06 Jan 2024 09:30) (80 - 101)  BP: 99/49 (06 Jan 2024 09:30) (74/35 - 132/61)  BP(mean): 71 (06 Jan 2024 09:30) (50 - 88)  RR: 21 (06 Jan 2024 09:30) (11 - 30)  SpO2: 97% (06 Jan 2024 09:30) (94% - 100%)    Parameters below as of 06 Jan 2024 08:00  Patient On (Oxygen Delivery Method): room air        CONSTITUTIONAL: NAD, well-developed, jaundice appearing   HEET: MMM, EOMI, PERRLA  NECK: supple  RESPIRATORY: Normal respiratory effort; lungs are clear to auscultation bilaterally  CARDIOVASCULAR: Regular rate and rhythm, normal S1 and S2, no murmur/rub/gallop; No lower extremity edema; Peripheral pulses are 2+ bilaterally  ABDOMEN: Distended and ttp diffusely   MUSCULOSKELETAL: no clubbing or cyanosis of digits; no joint swelling or tenderness to palpation  NEURO: Moving all four extremities, sensation grossly intact  PSYCH: A+O to person only  SKIN: No rash, jaundiced     LABS:                        7.9    16.98 )-----------( 96       ( 06 Jan 2024 06:42 )             22.6     01-06    131<L>  |  99  |  71<H>  ----------------------------<  101<H>  4.5   |  15<L>  |  4.04<H>    Ca    8.7      06 Jan 2024 06:58  Phos  3.8     01-06  Mg     2.4     01-06    TPro  5.6<L>  /  Alb  3.5  /  TBili  14.6<H>  /  DBili  x   /  AST  128<H>  /  ALT  9<L>  /  AlkPhos  811<H>  01-06    PT/INR - ( 06 Jan 2024 06:42 )   PT: 15.2 sec;   INR: 1.40 ratio         PTT - ( 06 Jan 2024 06:42 )  PTT:35.1 sec      Urinalysis Basic - ( 06 Jan 2024 06:58 )    Color: x / Appearance: x / SG: x / pH: x  Gluc: 101 mg/dL / Ketone: x  / Bili: x / Urobili: x   Blood: x / Protein: x / Nitrite: x   Leuk Esterase: x / RBC: x / WBC x   Sq Epi: x / Non Sq Epi: x / Bacteria: x        Culture - Fungal, Body Fluid (collected 03 Jan 2024 17:41)  Source: Peritoneal Peritoneal Fluid  Preliminary Report (04 Jan 2024 10:32):    Testing in progress    Culture - Body Fluid with Gram Stain (collected 03 Jan 2024 17:41)  Source: Peritoneal Peritoneal Fluid  Gram Stain (04 Jan 2024 05:48):    No polymorphonuclear cells seen    No organisms seen    by cytocentrifuge  Preliminary Report (04 Jan 2024 18:43):    No growth to date.        Tele Reviewed:    RADIOLOGY & ADDITIONAL TESTS:  Results Reviewed:   Imaging Personally Reviewed:  Electrocardiogram Personally Reviewed:

## 2024-01-06 NOTE — PROGRESS NOTE ADULT - SUBJECTIVE AND OBJECTIVE BOX
[facility] DIVISION OF KIDNEY DISEASES AND HYPERTENSION -- FOLLOW UP NOTE  --------------------------------------------------------------------------------  Chief Complaint:    24 hour events/subjective:  Patient was seen and examined at bedside  on CRRT     PAST HISTORY  --------------------------------------------------------------------------------  No significant changes to PMH, PSH, FHx, SHx, unless otherwise noted    ALLERGIES & MEDICATIONS  --------------------------------------------------------------------------------  Allergies    No Known Allergies    Intolerances      Standing Inpatient Medications  camphor 0.5%/menthol 0.5% Topical Lotion 1 Application(s) Topical two times a day  cefTRIAXone   IVPB 1000 milliGRAM(s) IV Intermittent every 24 hours  chlorhexidine 2% Cloths 1 Application(s) Topical daily  chlorhexidine 4% Liquid 1 Application(s) Topical <User Schedule>  CRRT Treatment    <Continuous>  cyanocobalamin 1000 MICROGram(s) Oral daily  dextrose 5%. 1000 milliLiter(s) IV Continuous <Continuous>  dextrose 5%. 1000 milliLiter(s) IV Continuous <Continuous>  dextrose 50% Injectable 12.5 Gram(s) IV Push once  dextrose 50% Injectable 25 Gram(s) IV Push once  dextrose 50% Injectable 25 Gram(s) IV Push once  folic acid 1 milliGRAM(s) Oral daily  glucagon  Injectable 1 milliGRAM(s) IntraMuscular once  insulin glargine Injectable (LANTUS) 5 Unit(s) SubCutaneous at bedtime  insulin lispro (ADMELOG) corrective regimen sliding scale   SubCutaneous three times a day before meals  insulin lispro (ADMELOG) corrective regimen sliding scale   SubCutaneous at bedtime  lactulose Syrup 10 Gram(s) Oral four times a day  melatonin 1 milliGRAM(s) Oral at bedtime  norepinephrine Infusion 0.05 MICROgram(s)/kG/Min IV Continuous <Continuous>  octreotide  Infusion 50 MICROgram(s)/Hr IV Continuous <Continuous>  pancrelipase  (CREON  6,000 Lipase Units) 1 Capsule(s) Oral three times a day with meals  pantoprazole  Injectable 40 milliGRAM(s) IV Push every 12 hours  PrismaSATE Dialysate BGK 4 / 2.5 5000 milliLiter(s) CRRT <Continuous>  PrismaSOL Filtration BGK 4 / 2.5 5000 milliLiter(s) CRRT <Continuous>  PrismaSOL Filtration BGK 4 / 2.5 5000 milliLiter(s) CRRT <Continuous>  QUEtiapine 25 milliGRAM(s) Oral daily  rifAXIMin 550 milliGRAM(s) Oral two times a day  tamsulosin 0.4 milliGRAM(s) Oral at bedtime  thiamine 100 milliGRAM(s) Oral daily    PRN Inpatient Medications  dextrose Oral Gel 15 Gram(s) Oral once PRN  sodium chloride 0.9% lock flush 10 milliLiter(s) IV Push every 1 hour PRN      REVIEW OF SYSTEMS  --------------------------------------------------------------------------------  Gen: + fatigue, no fevers/chills, weakness  Skin: No rashes  Head/Eyes/Ears/Mouth: No headache;No sore throat  Respiratory: No dyspnea, cough,   CV: No chest pain, PND, orthopnea  GI: No abdominal pain, diarrhea, constipation, nausea, vomiting  Neuro: No dizziness/lightheadedness, weakness, seizures, numbness, tingling  Psych: No significant nervousness, anxiety, stress, depression    All other systems were reviewed and are negative, except as noted.    VITALS/PHYSICAL EXAM  --------------------------------------------------------------------------------  T(C): 36.5 (01-06-24 @ 08:00), Max: 37 (01-05-24 @ 20:00)  HR: 105 (01-06-24 @ 10:30) (80 - 105)  BP: 82/47 (01-06-24 @ 10:30) (74/35 - 132/61)  RR: 27 (01-06-24 @ 10:30) (11 - 30)  SpO2: 94% (01-06-24 @ 10:30) (94% - 100%)  Wt(kg): --        01-05-24 @ 07:01  -  01-06-24 @ 07:00  --------------------------------------------------------  IN: 1200 mL / OUT: 150 mL / NET: 1050 mL    01-06-24 @ 07:01  -  01-06-24 @ 10:46  --------------------------------------------------------  IN: 310.7 mL / OUT: 0 mL / NET: 310.7 mL      Physical Exam:  	Gen: Chronically ill appearing but in no overt distress  	HEENT: Anicteric  	Pulm: CTA B/L  	CV: S1S2+  	Abd: Distended but soft, +BS   	Ext: RUE swelling  	Neuro: Awake   	Skin: Warm and dry  	Dialysis access: R IJ non tunneled HD catheter       LABS/STUDIES  --------------------------------------------------------------------------------              7.9    16.98 >-----------<  96       [01-06-24 @ 06:42]              22.6     131  |  99  |  71  ----------------------------<  101      [01-06-24 @ 06:58]  4.5   |  15  |  4.04        Ca     8.7     [01-06-24 @ 06:58]      Mg     2.4     [01-06-24 @ 06:58]      Phos  3.8     [01-06-24 @ 06:58]    TPro  5.6  /  Alb  3.5  /  TBili  14.6  /  DBili  x   /  AST  128  /  ALT  9   /  AlkPhos  811  [01-06-24 @ 06:58]    PT/INR: PT 15.2 , INR 1.40       [01-06-24 @ 06:42]  PTT: 35.1       [01-06-24 @ 06:42]      Creatinine Trend:  SCr 4.04 [01-06 @ 06:58]  SCr 4.60 [01-05 @ 23:45]  SCr 6.09 [01-05 @ 17:13]  SCr 5.73 [01-04 @ 06:20]  SCr 5.21 [01-03 @ 06:23]            Urinalysis - [01-06-24 @ 06:58]      Color  / Appearance  / SG  / pH       Gluc 101 / Ketone   / Bili  / Urobili        Blood  / Protein  / Leuk Est  / Nitrite       RBC  / WBC  / Hyaline  / Gran  / Sq Epi  / Non Sq Epi  / Bacteria     Urine Creatinine 206      [01-01-24 @ 06:49]  Urine Protein 83      [01-01-24 @ 06:49]  Urine Sodium 9      [12-31-23 @ 04:10]  Urine Urea Nitrogen 404      [12-31-23 @ 04:10]    Iron 35, TIBC 114, %sat 31      [12-31-23 @ 07:34]  Ferritin 285      [12-31-23 @ 07:34]    HBsAg Nonreact      [12-31-23 @ 14:46]  HCV 0.19, Nonreact      [12-31-23 @ 14:46]  HIV Nonreact      [12-31-23 @ 14:46]    JOHNNY: titer 1:160, pattern Speckled      [12-31-23 @ 14:46]  Free Light Chains: kappa 21.71, lambda 13.12, ratio = 1.65      [12-31 @ 14:46]     [facility] DIVISION OF KIDNEY DISEASES AND HYPERTENSION -- FOLLOW UP NOTE  --------------------------------------------------------------------------------  Chief Complaint:    24 hour events/subjective:  Patient was seen and examined at bedside  on CRRT     PAST HISTORY  --------------------------------------------------------------------------------  No significant changes to PMH, PSH, FHx, SHx, unless otherwise noted    ALLERGIES & MEDICATIONS  --------------------------------------------------------------------------------  Allergies    No Known Allergies    Intolerances      Standing Inpatient Medications  camphor 0.5%/menthol 0.5% Topical Lotion 1 Application(s) Topical two times a day  cefTRIAXone   IVPB 1000 milliGRAM(s) IV Intermittent every 24 hours  chlorhexidine 2% Cloths 1 Application(s) Topical daily  chlorhexidine 4% Liquid 1 Application(s) Topical <User Schedule>  CRRT Treatment    <Continuous>  cyanocobalamin 1000 MICROGram(s) Oral daily  dextrose 5%. 1000 milliLiter(s) IV Continuous <Continuous>  dextrose 5%. 1000 milliLiter(s) IV Continuous <Continuous>  dextrose 50% Injectable 12.5 Gram(s) IV Push once  dextrose 50% Injectable 25 Gram(s) IV Push once  dextrose 50% Injectable 25 Gram(s) IV Push once  folic acid 1 milliGRAM(s) Oral daily  glucagon  Injectable 1 milliGRAM(s) IntraMuscular once  insulin glargine Injectable (LANTUS) 5 Unit(s) SubCutaneous at bedtime  insulin lispro (ADMELOG) corrective regimen sliding scale   SubCutaneous three times a day before meals  insulin lispro (ADMELOG) corrective regimen sliding scale   SubCutaneous at bedtime  lactulose Syrup 10 Gram(s) Oral four times a day  melatonin 1 milliGRAM(s) Oral at bedtime  norepinephrine Infusion 0.05 MICROgram(s)/kG/Min IV Continuous <Continuous>  octreotide  Infusion 50 MICROgram(s)/Hr IV Continuous <Continuous>  pancrelipase  (CREON  6,000 Lipase Units) 1 Capsule(s) Oral three times a day with meals  pantoprazole  Injectable 40 milliGRAM(s) IV Push every 12 hours  PrismaSATE Dialysate BGK 4 / 2.5 5000 milliLiter(s) CRRT <Continuous>  PrismaSOL Filtration BGK 4 / 2.5 5000 milliLiter(s) CRRT <Continuous>  PrismaSOL Filtration BGK 4 / 2.5 5000 milliLiter(s) CRRT <Continuous>  QUEtiapine 25 milliGRAM(s) Oral daily  rifAXIMin 550 milliGRAM(s) Oral two times a day  tamsulosin 0.4 milliGRAM(s) Oral at bedtime  thiamine 100 milliGRAM(s) Oral daily    PRN Inpatient Medications  dextrose Oral Gel 15 Gram(s) Oral once PRN  sodium chloride 0.9% lock flush 10 milliLiter(s) IV Push every 1 hour PRN      REVIEW OF SYSTEMS  --------------------------------------------------------------------------------  Gen: + fatigue, no fevers/chills, weakness  Skin: No rashes  Head/Eyes/Ears/Mouth: No headache;No sore throat  Respiratory: No dyspnea, cough,   CV: No chest pain, PND, orthopnea  GI: No abdominal pain, diarrhea, constipation, nausea, vomiting  Neuro: No dizziness/lightheadedness, weakness, seizures, numbness, tingling  Psych: No significant nervousness, anxiety, stress, depression    All other systems were reviewed and are negative, except as noted.    VITALS/PHYSICAL EXAM  --------------------------------------------------------------------------------  T(C): 36.5 (01-06-24 @ 08:00), Max: 37 (01-05-24 @ 20:00)  HR: 105 (01-06-24 @ 10:30) (80 - 105)  BP: 82/47 (01-06-24 @ 10:30) (74/35 - 132/61)  RR: 27 (01-06-24 @ 10:30) (11 - 30)  SpO2: 94% (01-06-24 @ 10:30) (94% - 100%)  Wt(kg): --        01-05-24 @ 07:01  -  01-06-24 @ 07:00  --------------------------------------------------------  IN: 1200 mL / OUT: 150 mL / NET: 1050 mL    01-06-24 @ 07:01  -  01-06-24 @ 10:46  --------------------------------------------------------  IN: 310.7 mL / OUT: 0 mL / NET: 310.7 mL      Physical Exam:  	Gen: Chronically ill appearing but in no overt distress  	HEENT: Anicteric  	Pulm: CTA B/L  	CV: S1S2+  	Abd: Distended but soft, +BS   	Ext: RUE swelling  	Neuro: Awake   	Skin: Warm and dry  	Dialysis access: R IJ non tunneled HD catheter       LABS/STUDIES  --------------------------------------------------------------------------------              7.9    16.98 >-----------<  96       [01-06-24 @ 06:42]              22.6     131  |  99  |  71  ----------------------------<  101      [01-06-24 @ 06:58]  4.5   |  15  |  4.04        Ca     8.7     [01-06-24 @ 06:58]      Mg     2.4     [01-06-24 @ 06:58]      Phos  3.8     [01-06-24 @ 06:58]    TPro  5.6  /  Alb  3.5  /  TBili  14.6  /  DBili  x   /  AST  128  /  ALT  9   /  AlkPhos  811  [01-06-24 @ 06:58]    PT/INR: PT 15.2 , INR 1.40       [01-06-24 @ 06:42]  PTT: 35.1       [01-06-24 @ 06:42]      Creatinine Trend:  SCr 4.04 [01-06 @ 06:58]  SCr 4.60 [01-05 @ 23:45]  SCr 6.09 [01-05 @ 17:13]  SCr 5.73 [01-04 @ 06:20]  SCr 5.21 [01-03 @ 06:23]            Urinalysis - [01-06-24 @ 06:58]      Color  / Appearance  / SG  / pH       Gluc 101 / Ketone   / Bili  / Urobili        Blood  / Protein  / Leuk Est  / Nitrite       RBC  / WBC  / Hyaline  / Gran  / Sq Epi  / Non Sq Epi  / Bacteria     Urine Creatinine 206      [01-01-24 @ 06:49]  Urine Protein 83      [01-01-24 @ 06:49]  Urine Sodium 9      [12-31-23 @ 04:10]  Urine Urea Nitrogen 404      [12-31-23 @ 04:10]    Iron 35, TIBC 114, %sat 31      [12-31-23 @ 07:34]  Ferritin 285      [12-31-23 @ 07:34]    HBsAg Nonreact      [12-31-23 @ 14:46]  HCV 0.19, Nonreact      [12-31-23 @ 14:46]  HIV Nonreact      [12-31-23 @ 14:46]    JOHNNY: titer 1:160, pattern Speckled      [12-31-23 @ 14:46]  Free Light Chains: kappa 21.71, lambda 13.12, ratio = 1.65      [12-31 @ 14:46]

## 2024-01-06 NOTE — PROGRESS NOTE ADULT - ASSESSMENT
YONATHAN MENDOZA is a 58y male with PMH of h/o type II DM for ~ 6-7 years, was on metformin and Jardiance,  diagnosed with liver cirrhosis 3 weeks ago, complicated by ascites in outside hospital, now admitted to floors undergoing workup of decompensated liver cirrhosis and RAHUL likely 2/2 HRS presenting for pressor assisted HD.     PLAN  =====Neurologic=====  #Hepatic Encephalopathy   #EtOH use disorder   #Uremic Encephalopathy   - pt with acute encephalopathy (A&Ox1, per HCP baseline A&Ox3) likely 2/2 toxic metabolic and hepatic processes   - PETH negative   -   - continue with lactulose 10mg TID and rifaximin, MELD 35 1/6/24  - start seroquel 25mg QHS  - less likely concern for infectious etiology as completed 5 days of empiric meropenem     =====Pulmonary=====  Patient breathing comfortably on room air sating 100%       =====Cardiovascular=====  #Intravascular Depletion   #HRS   - pt BP in 90's/ 50's, unable to tolerate HD even with midodrine 30mg TID   - echo with normal EF 77% hyperdynamic otherwise normal diastolic function   - currently in MICU for CRRT pressor assisted (levophed), off midodrine   - wean vasopressors as tolerated     =====GI=====  #Decompensated hepatic cirrhosis   - currently undergoing transplant eval, f/u recs  - s/p IR paracentesis on 1/3- fluid analysis unremarkable for infectious etiology (total nucleated cell count 229 with 33 neutrophils) , fluid chemistry wnl, culture no growth to date   - MRI abd pending eval for HCC, hepatology following- unknown if patient has EV- will need screening EGD after MRI before deciding about A/C for possible PVT given thrombus in main protal vein   c/w Thiamine, B12, Folic Acid, lactulose and rifaximin    - cw octreotide drip   - pt initially scheduled for EGD on 1/5, f/u hepatology recs   - continue home famotidine 20mg   - renal diet   - start ceftriaxone for today    =====Renal/=====  #Renal mass  #HRS   #HD requiring pressors   - new renal mass seen on US concerning for malignancy, pending MRI for further characterization,- onc following f/u recs  - urology consulted- f/u recs   - HRS with creatinine >5, 4 on admission and 2 at baseline, shiley placed, s/p 2 rounds of HD unable to tolerate 3rd with midodrine   - CRRT 1/6/24 with levophed support   - porras placed 1/6/24 to monitor I/Os  #Electrolytes  - Maintain K>4, Phos>3, Mag>2, iCal>1    =====Endocrine=====  #DM2  - on 5 units lantus and SSI  - monitor and adjust as necessary     =====Infectious Disease=====  - pt with initial WBC to 13k, now downtrending  - infectious w/u negative and completed 5 days of Epimeric Meropenem 12/30- 1/4   - monitor off abx and trend WBC/ temperature     =====Heme/Onc=====  #DVT Ppx- heparin sub q   #Normocytic anemia on initial CBC   likely 2/2 vitamin deficiencies  and anemia of chronic disease   -normal B12/folate  >VERENA- eventual scope to evaluate   - monitor platlet count in setting of thrombocytopenia 2/2 cirrhosis       =====Ethics=====  FULL CODE   YONATHAN MENDOZA is a 58y male with PMH of h/o type II DM for ~ 6-7 years, was on metformin and Jardiance,  diagnosed with liver cirrhosis 3 weeks ago, complicated by ascites in outside hospital, now admitted to floors undergoing workup of decompensated liver cirrhosis and RAHUL likely 2/2 HRS presenting for pressor assisted HD.     PLAN  =====Neurologic=====  #Hepatic Encephalopathy   #EtOH use disorder   #Uremic Encephalopathy   - pt with acute encephalopathy (A&Ox1, per HCP baseline A&Ox3) likely 2/2 toxic metabolic and hepatic processes   - PETH negative   -   - continue with lactulose 10mg TID and rifaximin, MELD 35 1/6/24  - start seroquel 25mg QHS    =====Pulmonary=====  Patient breathing comfortably on room air sating 100%       =====Cardiovascular=====  #Intravascular Depletion   #HRS   - pt BP in 90's/ 50's, unable to tolerate HD even with midodrine 30mg TID   - echo with normal EF 77% hyperdynamic otherwise normal diastolic function   - currently in MICU for CRRT pressor assisted (levophed), off midodrine   - wean vasopressors as tolerated     =====GI=====  #Decompensated hepatic cirrhosis   - currently undergoing transplant eval, f/u recs  - s/p IR paracentesis on 1/3- fluid analysis unremarkable for infectious etiology (total nucleated cell count 229 with 33 neutrophils) , fluid chemistry wnl, culture no growth to date   - MRI abd pending eval for HCC, hepatology following- unknown if patient has EV- will need screening EGD after MRI before deciding about A/C for possible PVT given thrombus in main protal vein   c/w Thiamine, B12, Folic Acid, lactulose and rifaximin    - cw octreotide drip   - pt initially scheduled for EGD on 1/5, f/u hepatology recs   - continue home famotidine 20mg   - renal diet   - start ceftriaxone 1/6/24 due to AMS and noted ascites     =====Renal/=====  #Renal mass  #HRS   #HD requiring pressors   - new renal mass seen on US concerning for malignancy, pending MRI for further characterization,- onc following f/u recs  - urology consulted- f/u recs   - HRS with creatinine >5, 4 on admission and 2 at baseline, shiley placed, s/p 2 rounds of HD unable to tolerate 3rd with midodrine   - CRRT 1/6/24 with levophed support   - porras placed 1/6/24 to monitor I/Os  #Electrolytes  - Maintain K>4, Phos>3, Mag>2, iCal>1    =====Endocrine=====  #DM2  - on 5 units lantus and SSI  - monitor and adjust as necessary     =====Infectious Disease=====  - pt with initial WBC to 13k, now downtrending  - infectious w/u negative and completed 5 days of Epimeric Meropenem 12/30- 1/4   - start ceftriaxone 1/6/24 due to AMS and noted ascites     =====Heme/Onc=====  #DVT Ppx- heparin sub q   #Normocytic anemia on initial CBC   likely 2/2 vitamin deficiencies  and anemia of chronic disease   -normal B12/folate  >VERENA- eventual scope to evaluate   - monitor platelet count in setting of thrombocytopenia 2/2 cirrhosis       =====Ethics=====  FULL CODE

## 2024-01-06 NOTE — PROGRESS NOTE ADULT - ATTENDING COMMENTS
Agree with above  58M PMH DM2 with recent diagnosis of cirrhosis ~3 weeks prior c/b ascitis now getting w/u for worsening renal failure, possible HRS-RAHUL  - CVVHD for renal failure requiring Levophed  - Getting transplant evaluation  - A&Ox1 jaundiced lethargic but protecting airway, on room air  - c/w lactulose for hepatic encephalopathy  - ceftriaxone for SBP prophylaxis; s/p diagnostic tap 1/3 that was negative  - place porras to monitor urine output  - Patient will remain in ICU on CVVHD and pressors  - Seroquel QHS

## 2024-01-07 NOTE — PROGRESS NOTE ADULT - ATTENDING COMMENTS
59 yo M with DM2 and AUD, transferred from [facility] to [facility] on 12/30/23 due to recently diagnosed decompensated ALD cirrhosis complicated by ascites and hepatic encephalopathy and currently in MICU due to shock necessitating norepinephrine for pressor support, acute hypoxic respiratory failure (on 3L NC), and HRS-RAHUL (on CRRT). He is altered today and recommend to continue rifaximin and lactulose; can also consider polyethylene glycol bowel purge via NGT if worsening. Infectious work-up negative thus far but given worsening hypotension recommend repeating infectious studies including diagnostic paracentesis and broadening antimicrobial coverage from ceftriaxone to Zosyn or a carbapenem and with consideration of antifungal coverage as well. Recommend ID consultation. Additionally, recommend non-contrast CT chest and multiphase (liver protocol) CT abdomen/pelvis today for further evaluation including to clarify whether he may be a potential liver candidate. Given the US findings concerning for main PVT as well as right kidney mass with possible peritoneal extension, we need cross-sectional imaging to assess the extent and chronicity of portomesenteric venous thrombosis and to further evaluation for malignancy that, if present, would preclude transplantation. ABO AB with current MELD 3.0 of 35 and grim prognosis in the absence of transplantation, especially in light of his current multiorgan failure.    Please don't hesitate to call with any questions or concerns.    Liliana Mitchell M.D., Ph.D.  Transplant Hepatology 59 yo M with DM2 and AUD, transferred from [facility] to [facility] on 12/30/23 due to recently diagnosed decompensated ALD cirrhosis complicated by ascites and hepatic encephalopathy and currently in MICU due to shock necessitating norepinephrine for pressor support, acute hypoxic respiratory failure (on 3L NC), and HRS-RAHUL (on CRRT). He is altered today and recommend to continue rifaximin and lactulose; can also consider polyethylene glycol bowel purge via NGT if worsening. Infectious work-up negative thus far but given worsening hypotension recommend repeating infectious studies including diagnostic paracentesis and broadening antimicrobial coverage from ceftriaxone to Zosyn or a carbapenem and with consideration of antifungal coverage as well. Recommend ID consultation. Additionally, recommend non-contrast CT chest and multiphase (liver protocol) CT abdomen/pelvis today for further evaluation including to clarify whether he may be a potential liver candidate. Given the US findings concerning for main PVT as well as right kidney mass with possible peritoneal extension, we need cross-sectional imaging to assess the extent and chronicity of portomesenteric venous thrombosis and to further evaluation for malignancy that, if present, would preclude transplantation. ABO AB with current MELD 3.0 of 35 and grim prognosis in the absence of transplantation, especially in light of his current multiorgan failure.    Please don't hesitate to call with any questions or concerns.    Liliana Mitchell M.D., Ph.D.  Transplant Hepatology

## 2024-01-07 NOTE — PROGRESS NOTE ADULT - SUBJECTIVE AND OBJECTIVE BOX
[facility] DIVISION OF KIDNEY DISEASES AND HYPERTENSION -- FOLLOW UP NOTE  --------------------------------------------------------------------------------  Chief Complaint:    24 hour events/subjective:  Patient was seen and examined at bedside  on CRRT     PAST HISTORY  --------------------------------------------------------------------------------  No significant changes to PMH, PSH, FHx, SHx, unless otherwise noted    ALLERGIES & MEDICATIONS  --------------------------------------------------------------------------------  Allergies    No Known Allergies    Intolerances      Standing Inpatient Medications  camphor 0.5%/menthol 0.5% Topical Lotion 1 Application(s) Topical two times a day  cefTRIAXone   IVPB 1000 milliGRAM(s) IV Intermittent every 24 hours  chlorhexidine 2% Cloths 1 Application(s) Topical daily  chlorhexidine 4% Liquid 1 Application(s) Topical <User Schedule>  CRRT Treatment    <Continuous>  cyanocobalamin 1000 MICROGram(s) Oral daily  dexMEDEtomidine Infusion 0.598 MICROgram(s)/kG/Hr IV Continuous <Continuous>  dextrose 5%. 1000 milliLiter(s) IV Continuous <Continuous>  dextrose 5%. 1000 milliLiter(s) IV Continuous <Continuous>  dextrose 50% Injectable 25 Gram(s) IV Push once  dextrose 50% Injectable 25 Gram(s) IV Push once  dextrose 50% Injectable 12.5 Gram(s) IV Push once  folic acid 1 milliGRAM(s) Oral daily  glucagon  Injectable 1 milliGRAM(s) IntraMuscular once  insulin glargine Injectable (LANTUS) 5 Unit(s) SubCutaneous at bedtime  insulin lispro (ADMELOG) corrective regimen sliding scale   SubCutaneous at bedtime  insulin lispro (ADMELOG) corrective regimen sliding scale   SubCutaneous three times a day before meals  lactulose Syrup 10 Gram(s) Oral four times a day  melatonin 1 milliGRAM(s) Oral at bedtime  norepinephrine Infusion 0.6 MICROgram(s)/kG/Min IV Continuous <Continuous>  octreotide  Infusion 50 MICROgram(s)/Hr IV Continuous <Continuous>  pancrelipase  (CREON  6,000 Lipase Units) 1 Capsule(s) Oral three times a day with meals  pantoprazole  Injectable 40 milliGRAM(s) IV Push every 12 hours  PrismaSATE Dialysate BGK 4 / 2.5 5000 milliLiter(s) CRRT <Continuous>  PrismaSOL Filtration BGK 0 / 2.5 5000 milliLiter(s) CRRT <Continuous>  PrismaSOL Filtration BGK 0 / 2.5 5000 milliLiter(s) CRRT <Continuous>  QUEtiapine 25 milliGRAM(s) Oral daily  rifAXIMin 550 milliGRAM(s) Oral two times a day  tamsulosin 0.4 milliGRAM(s) Oral at bedtime  thiamine 100 milliGRAM(s) Oral daily  ursodiol Tablet 250 milliGRAM(s) Oral every 12 hours  vasopressin Infusion 0.04 Unit(s)/Min IV Continuous <Continuous>    PRN Inpatient Medications  dextrose Oral Gel 15 Gram(s) Oral once PRN  sodium chloride 0.9% lock flush 10 milliLiter(s) IV Push every 1 hour PRN      REVIEW OF SYSTEMS - unable to obtain due to AMS      VITALS/PHYSICAL EXAM  --------------------------------------------------------------------------------  T(C): 36.8 (01-08-24 @ 04:00), Max: 36.9 (01-08-24 @ 00:00)  HR: 99 (01-08-24 @ 06:45) (79 - 120)  BP: 126/58 (01-08-24 @ 06:45) (66/36 - 159/68)  RR: 26 (01-08-24 @ 06:45) (11 - 34)  SpO2: 93% (01-08-24 @ 06:45) (92% - 100%)  Wt(kg): --        01-07-24 @ 07:01  -  01-08-24 @ 07:00  --------------------------------------------------------  IN: 3214.6 mL / OUT: 1257 mL / NET: 1957.6 mL      Physical Exam:  GEN: NAD,   CVS: S1, S2 heard  CHEST: mildly tachypneic, no increased WOB, on 3L NC  ABD: soft, nontender, distended, +BS  EXTR: mild b/l LE edema. RUE edematous R>L  NEURO: Somnolent, awakens to verbal stimuli, oriented x2, +asterixis  SKIN: Jaundiced    LABS/STUDIES  --------------------------------------------------------------------------------              7.7    16.80 >-----------<  66       [01-08-24 @ 06:26]              23.8     135  |  101  |  32  ----------------------------<  155      [01-08-24 @ 06:26]  4.8   |  18  |  2.12        Ca     9.0     [01-08-24 @ 06:26]      Mg     2.4     [01-08-24 @ 06:26]      Phos  3.3     [01-08-24 @ 06:26]    TPro  5.4  /  Alb  3.4  /  TBili  18.7  /  DBili  x   /  AST  118  /  ALT  12  /  AlkPhos  696  [01-08-24 @ 06:26]    PT/INR: PT 20.3 , INR 1.88       [01-08-24 @ 00:41]  PTT: 41.0       [01-08-24 @ 00:41]      Creatinine Trend:  SCr 2.12 [01-08 @ 06:26]  SCr 2.47 [01-08 @ 00:39]  SCr 2.17 [01-07 @ 17:45]  SCr 2.03 [01-07 @ 12:38]  SCr 2.36 [01-07 @ 06:32]          Urinalysis - [01-08-24 @ 06:26]      Color  / Appearance  / SG  / pH       Gluc 155 / Ketone   / Bili  / Urobili        Blood  / Protein  / Leuk Est  / Nitrite       RBC  / WBC  / Hyaline  / Gran  / Sq Epi  / Non Sq Epi  / Bacteria       Iron 35, TIBC 114, %sat 31      [12-31-23 @ 07:34]  Ferritin 285      [12-31-23 @ 07:34]    HBsAg Nonreact      [12-31-23 @ 14:46]  HCV 0.19, Nonreact      [12-31-23 @ 14:46]  HIV Nonreact      [12-31-23 @ 14:46]    JOHNNY: titer 1:160, pattern Speckled      [12-31-23 @ 14:46]  Free Light Chains: kappa 21.71, lambda 13.12, ratio = 1.65      [12-31 @ 14:46]     [facility] DIVISION OF KIDNEY DISEASES AND HYPERTENSION -- FOLLOW UP NOTE  --------------------------------------------------------------------------------  Chief Complaint:    24 hour events/subjective:  Patient was seen and examined at bedside  on CRRT     PAST HISTORY  --------------------------------------------------------------------------------  No significant changes to PMH, PSH, FHx, SHx, unless otherwise noted    ALLERGIES & MEDICATIONS  --------------------------------------------------------------------------------  Allergies    No Known Allergies    Intolerances      Standing Inpatient Medications  camphor 0.5%/menthol 0.5% Topical Lotion 1 Application(s) Topical two times a day  cefTRIAXone   IVPB 1000 milliGRAM(s) IV Intermittent every 24 hours  chlorhexidine 2% Cloths 1 Application(s) Topical daily  chlorhexidine 4% Liquid 1 Application(s) Topical <User Schedule>  CRRT Treatment    <Continuous>  cyanocobalamin 1000 MICROGram(s) Oral daily  dexMEDEtomidine Infusion 0.598 MICROgram(s)/kG/Hr IV Continuous <Continuous>  dextrose 5%. 1000 milliLiter(s) IV Continuous <Continuous>  dextrose 5%. 1000 milliLiter(s) IV Continuous <Continuous>  dextrose 50% Injectable 25 Gram(s) IV Push once  dextrose 50% Injectable 25 Gram(s) IV Push once  dextrose 50% Injectable 12.5 Gram(s) IV Push once  folic acid 1 milliGRAM(s) Oral daily  glucagon  Injectable 1 milliGRAM(s) IntraMuscular once  insulin glargine Injectable (LANTUS) 5 Unit(s) SubCutaneous at bedtime  insulin lispro (ADMELOG) corrective regimen sliding scale   SubCutaneous at bedtime  insulin lispro (ADMELOG) corrective regimen sliding scale   SubCutaneous three times a day before meals  lactulose Syrup 10 Gram(s) Oral four times a day  melatonin 1 milliGRAM(s) Oral at bedtime  norepinephrine Infusion 0.6 MICROgram(s)/kG/Min IV Continuous <Continuous>  octreotide  Infusion 50 MICROgram(s)/Hr IV Continuous <Continuous>  pancrelipase  (CREON  6,000 Lipase Units) 1 Capsule(s) Oral three times a day with meals  pantoprazole  Injectable 40 milliGRAM(s) IV Push every 12 hours  PrismaSATE Dialysate BGK 4 / 2.5 5000 milliLiter(s) CRRT <Continuous>  PrismaSOL Filtration BGK 0 / 2.5 5000 milliLiter(s) CRRT <Continuous>  PrismaSOL Filtration BGK 0 / 2.5 5000 milliLiter(s) CRRT <Continuous>  QUEtiapine 25 milliGRAM(s) Oral daily  rifAXIMin 550 milliGRAM(s) Oral two times a day  tamsulosin 0.4 milliGRAM(s) Oral at bedtime  thiamine 100 milliGRAM(s) Oral daily  ursodiol Tablet 250 milliGRAM(s) Oral every 12 hours  vasopressin Infusion 0.04 Unit(s)/Min IV Continuous <Continuous>    PRN Inpatient Medications  dextrose Oral Gel 15 Gram(s) Oral once PRN  sodium chloride 0.9% lock flush 10 milliLiter(s) IV Push every 1 hour PRN      REVIEW OF SYSTEMS - unable to obtain due to AMS      VITALS/PHYSICAL EXAM  --------------------------------------------------------------------------------  T(C): 36.8 (01-08-24 @ 04:00), Max: 36.9 (01-08-24 @ 00:00)  HR: 99 (01-08-24 @ 06:45) (79 - 120)  BP: 126/58 (01-08-24 @ 06:45) (66/36 - 159/68)  RR: 26 (01-08-24 @ 06:45) (11 - 34)  SpO2: 93% (01-08-24 @ 06:45) (92% - 100%)  Wt(kg): --        01-07-24 @ 07:01  -  01-08-24 @ 07:00  --------------------------------------------------------  IN: 3214.6 mL / OUT: 1257 mL / NET: 1957.6 mL      Physical Exam:  GEN: NAD,   CVS: S1, S2 heard  CHEST: mildly tachypneic, no increased WOB, on 3L NC  ABD: soft, nontender, distended, +BS  EXTR: mild b/l LE edema. RUE edematous R>L  NEURO: Somnolent, awakens to verbal stimuli, oriented x2, +asterixis  SKIN: Jaundiced    LABS/STUDIES  --------------------------------------------------------------------------------              7.7    16.80 >-----------<  66       [01-08-24 @ 06:26]              23.8     135  |  101  |  32  ----------------------------<  155      [01-08-24 @ 06:26]  4.8   |  18  |  2.12        Ca     9.0     [01-08-24 @ 06:26]      Mg     2.4     [01-08-24 @ 06:26]      Phos  3.3     [01-08-24 @ 06:26]    TPro  5.4  /  Alb  3.4  /  TBili  18.7  /  DBili  x   /  AST  118  /  ALT  12  /  AlkPhos  696  [01-08-24 @ 06:26]    PT/INR: PT 20.3 , INR 1.88       [01-08-24 @ 00:41]  PTT: 41.0       [01-08-24 @ 00:41]      Creatinine Trend:  SCr 2.12 [01-08 @ 06:26]  SCr 2.47 [01-08 @ 00:39]  SCr 2.17 [01-07 @ 17:45]  SCr 2.03 [01-07 @ 12:38]  SCr 2.36 [01-07 @ 06:32]          Urinalysis - [01-08-24 @ 06:26]      Color  / Appearance  / SG  / pH       Gluc 155 / Ketone   / Bili  / Urobili        Blood  / Protein  / Leuk Est  / Nitrite       RBC  / WBC  / Hyaline  / Gran  / Sq Epi  / Non Sq Epi  / Bacteria       Iron 35, TIBC 114, %sat 31      [12-31-23 @ 07:34]  Ferritin 285      [12-31-23 @ 07:34]    HBsAg Nonreact      [12-31-23 @ 14:46]  HCV 0.19, Nonreact      [12-31-23 @ 14:46]  HIV Nonreact      [12-31-23 @ 14:46]    JOHNNY: titer 1:160, pattern Speckled      [12-31-23 @ 14:46]  Free Light Chains: kappa 21.71, lambda 13.12, ratio = 1.65      [12-31 @ 14:46]

## 2024-01-07 NOTE — PROGRESS NOTE ADULT - ASSESSMENT
58 year old man with h/o type II DM for ~ 6-7 years, was on metformin and Jardiance, recently diagnosed with liver cirrhosis complicated by ascites. He underwent paracentesis x 3. Course complicated by worsening RAHUL and encephalopathy. He was transferred to [facility] for liver transplant evaluation. He was initiated on HD on 12/31/23.     Renal US with normal sized kidneys measuring ~ 11.6 and 11.8 cm. No hydro. Multilobulated vascular mass arises from the right kidney extending into the peritoneum. Findings are concerning for primary renal malignancy versus metastatic disease. MRI pending.     S/p large volume paracentesis 1/3/24 with 11 liter fluid removal.    RAHUL on CKD - unclear baseline renal function. No significant proteinuria UPCR 0.4, normal sized kidneys on US. Initiated HD on 12/31, but Unable to tolerate HD due to hypotension unresponsive to increase midodrine dose.    Labs with worsening BUN/Cr and metabolic acidosis and therefore patient was moved to ICU consult for pressor support and started on CRRT. Has a right IJ temporary cathter   continue CRRT for now. labs reviewed.      58 year old man with h/o type II DM for ~ 6-7 years, was on metformin and Jardiance, recently diagnosed with liver cirrhosis complicated by ascites. He underwent paracentesis x 3. Course complicated by worsening RAHUL and encephalopathy. He was transferred to [facility] for liver transplant evaluation. He was initiated on HD on 12/31/23.     Renal US with normal sized kidneys measuring ~ 11.6 and 11.8 cm. No hydro. Multilobulated vascular mass arises from the right kidney extending into the peritoneum. Findings are concerning for primary renal malignancy versus metastatic disease. MRI pending.     S/p large volume paracentesis 1/3/24 with 11 liter fluid removal.    RAHUL on CKD - unclear baseline renal function. No significant proteinuria UPCR 0.4, normal sized kidneys on US. Initiated HD on 12/31, but Unable to tolerate HD due to hypotension unresponsive to increase midodrine dose.    Labs with worsening BUN/Cr and metabolic acidosis and therefore patient was moved to ICU consult for pressor support and started on CRRT. Has a right IJ temporary cathter   continue CRRT for now. labs reviewed.

## 2024-01-07 NOTE — PROGRESS NOTE ADULT - ASSESSMENT
57 y/o M PMH DM2, recently diagnosed alcoholic cirrhosis (3 weeks ago) presenting as a transfer from [facility] for transplant eval for decompensated liver cirrhosis. Patient initially presented to [facility] (from assisted living) last week with dizziness and worsening AMS.    #Decompensated cirrhosis likely 2/2 alcohol and MASLD  MELD 3.0 1/04/24 - 33  Blood Type AB+  HE: presented with AMS  EV: unknown (no prior EGD/c-scope)  HCC screening: will need MRI   Ascites: s/p paracentesis 1/02 neg for SBP  Bleeding hx: no s/s of bleeding   #RAHUL   #Frailty and muscle weakness/pain     Recommendations:   - please obtain triple phase liver protocol CTAP and CT chest  - continue with lactulose and titrate to 3-5BM daily   - c.w rifaximin   - avoid hepatotoxic agents    All recommendations preliminary until note signed by service attending.    Thank you for involving us in the care of this patient. Please contact should any concern or questions arise.    Matt Arredondo MD   Gastroenterology/Hepatology Fellow PGY-5  Available on Microsoft Teams 7am - 5pm  [email or web address]    NON-URGENT CONSULTS:  Please email:  [email or web address]   OR  [email or web address]    After 5pm, please contact the on-call GI fellow. [phone]    AT NIGHT AND ON WEEKENDS:  Contact on-call GI fellow via answering service ([phone]) from 5pm-8am and on weekends/holidays       57 y/o M PMH DM2, recently diagnosed alcoholic cirrhosis (3 weeks ago) presenting as a transfer from [facility] for transplant eval for decompensated liver cirrhosis. Patient initially presented to [facility] (from assisted living) last week with dizziness and worsening AMS.    #Decompensated cirrhosis likely 2/2 alcohol and MASLD  MELD 3.0 1/04/24 - 33  Blood Type AB+  HE: presented with AMS  EV: unknown (no prior EGD/c-scope)  HCC screening: will need MRI   Ascites: s/p paracentesis 1/02 neg for SBP  Bleeding hx: no s/s of bleeding   #RAHUL   #Frailty and muscle weakness/pain     Recommendations:   - please obtain triple phase liver protocol CTAP and CT chest  - continue with lactulose and titrate to 3-5BM daily   - c.w rifaximin   - avoid hepatotoxic agents    All recommendations preliminary until note signed by service attending.    Thank you for involving us in the care of this patient. Please contact should any concern or questions arise.    Matt Arredondo MD   Gastroenterology/Hepatology Fellow PGY-5  Available on Microsoft Teams 7am - 5pm  [email or web address]    NON-URGENT CONSULTS:  Please email:  [email or web address]   [email or web address]  [email or web address]    After 5pm, please contact the on-call GI fellow. [phone]    AT NIGHT AND ON WEEKENDS:  Contact on-call GI fellow via answering service ([phone]) from 5pm-8am and on weekends/holidays

## 2024-01-07 NOTE — PROGRESS NOTE ADULT - ATTENDING COMMENTS
Agree with above  58M PMH DM2 with recent diagnosis of cirrhosis ~3 weeks prior c/b ascitis now getting w/u for worsening renal failure, possible HRS-RAHUL  - CVVHD for renal failure requiring Levophed resumed after clotting. Required 1U pRBCs for Hgb 7, repeat 7.9.  - c/w pressors to maintain MAP =65, currently on Levophed.  - Getting transplant evaluation: transplant recommending CT abd/pelvis with triple phase contrast to evaluate ?mass seen on kidney. Will do the next time patient needs to be taken off CVVHD for any reason, as patient needs to be placed back on CRT afterwards. Also recommending screening CT of chest.  - A&Ox1 jaundiced lethargic but protecting airway, on room air  - c/w lactulose and rifaximin for hepatic encephalopathy  - ceftriaxone for SBP prophylaxis; s/p diagnostic tap 1/3 that was negative  - minimal urine output via porras (0–5cc every 2–3 hours). Continue to monitor urine output.  - Patient will remain in ICU on CVVHD and pressors  - Seroquel QHS  - Overall prognosis is very poor.

## 2024-01-07 NOTE — PROGRESS NOTE ADULT - SUBJECTIVE AND OBJECTIVE BOX
INTERVAL HPI/OVERNIGHT EVENTS:    SUBJECTIVE: Patient seen and examined at bedside.       VITAL SIGNS:  ICU Vital Signs Last 24 Hrs  T(C): 36.5 (07 Jan 2024 00:00), Max: 36.6 (06 Jan 2024 20:00)  T(F): 97.7 (07 Jan 2024 00:00), Max: 97.9 (06 Jan 2024 20:00)  HR: 96 (07 Jan 2024 06:00) (85 - 105)  BP: 89/54 (07 Jan 2024 06:00) (66/30 - 118/56)  BP(mean): 69 (07 Jan 2024 06:00) (41 - 84)  ABP: --  ABP(mean): --  RR: 13 (07 Jan 2024 06:00) (11 - 39)  SpO2: 100% (07 Jan 2024 06:00) (94% - 100%)    O2 Parameters below as of 06 Jan 2024 20:00  Patient On (Oxygen Delivery Method): nasal cannula  O2 Flow (L/min): 2          Plateau pressure:   P/F ratio:     01-06 @ 07:01  -  01-07 @ 07:00  --------------------------------------------------------  IN: 1468.6 mL / OUT: 888 mL / NET: 580.6 mL      CAPILLARY BLOOD GLUCOSE      POCT Blood Glucose.: 170 mg/dL (06 Jan 2024 21:49)    ECG:    PHYSICAL EXAM:     VITALS:   T(C): 36.5 (01-07-24 @ 00:00), Max: 36.6 (01-06-24 @ 20:00)  HR: 96 (01-07-24 @ 06:00) (85 - 105)  BP: 89/54 (01-07-24 @ 06:00) (66/30 - 118/56)  RR: 13 (01-07-24 @ 06:00) (11 - 39)  SpO2: 100% (01-07-24 @ 06:00) (94% - 100%)    GENERAL: NAD, lying in bed comfortably  HEAD:  Atraumatic, Normocephalic  EYES: EOMI, PERRLA, conjunctiva and sclera clear  ENT: Moist mucous membranes  NECK: Supple, No JVD  CHEST/LUNG: CTABL; No rales, rhonchi, wheezing, or rubs. Unlabored respirations  HEART: RRR. No M/R/G  ABDOMEN: Soft, nontender, non-distended, normoactive BS. No hepatomegaly  EXTREMITIES:  2+ Peripheral Pulses, brisk capillary refill. No clubbing, cyanosis, or edema  NERVOUS SYSTEM:  Alert & Oriented X3, speech clear. No deficits, CN II-XII intact. Normal sensation   MSK: FROM all 4 extremities, full and equal strength  PSYCH: Normal affect, normal speech, normal behavior  SKIN: No rashes or lesions      MEDICATIONS:  MEDICATIONS  (STANDING):  camphor 0.5%/menthol 0.5% Topical Lotion 1 Application(s) Topical two times a day  cefTRIAXone   IVPB 1000 milliGRAM(s) IV Intermittent every 24 hours  chlorhexidine 2% Cloths 1 Application(s) Topical daily  chlorhexidine 4% Liquid 1 Application(s) Topical <User Schedule>  CRRT Treatment    <Continuous>  cyanocobalamin 1000 MICROGram(s) Oral daily  dextrose 5%. 1000 milliLiter(s) (50 mL/Hr) IV Continuous <Continuous>  dextrose 5%. 1000 milliLiter(s) (100 mL/Hr) IV Continuous <Continuous>  dextrose 50% Injectable 25 Gram(s) IV Push once  dextrose 50% Injectable 25 Gram(s) IV Push once  dextrose 50% Injectable 12.5 Gram(s) IV Push once  folic acid 1 milliGRAM(s) Oral daily  glucagon  Injectable 1 milliGRAM(s) IntraMuscular once  insulin glargine Injectable (LANTUS) 5 Unit(s) SubCutaneous at bedtime  insulin lispro (ADMELOG) corrective regimen sliding scale   SubCutaneous three times a day before meals  insulin lispro (ADMELOG) corrective regimen sliding scale   SubCutaneous at bedtime  lactulose Syrup 10 Gram(s) Oral four times a day  melatonin 1 milliGRAM(s) Oral at bedtime  norepinephrine Infusion 0.05 MICROgram(s)/kG/Min (8.96 mL/Hr) IV Continuous <Continuous>  octreotide  Infusion 50 MICROgram(s)/Hr (10 mL/Hr) IV Continuous <Continuous>  pancrelipase  (CREON  6,000 Lipase Units) 1 Capsule(s) Oral three times a day with meals  pantoprazole  Injectable 40 milliGRAM(s) IV Push every 12 hours  PrismaSATE Dialysate BGK 4 / 2.5 5000 milliLiter(s) (1500 mL/Hr) CRRT <Continuous>  PrismaSOL Filtration BGK 4 / 2.5 5000 milliLiter(s) (200 mL/Hr) CRRT <Continuous>  PrismaSOL Filtration BGK 4 / 2.5 5000 milliLiter(s) (800 mL/Hr) CRRT <Continuous>  QUEtiapine 25 milliGRAM(s) Oral daily  rifAXIMin 550 milliGRAM(s) Oral two times a day  tamsulosin 0.4 milliGRAM(s) Oral at bedtime  thiamine 100 milliGRAM(s) Oral daily  ursodiol Tablet 250 milliGRAM(s) Oral every 12 hours    MEDICATIONS  (PRN):  dextrose Oral Gel 15 Gram(s) Oral once PRN Blood Glucose LESS THAN 70 milliGRAM(s)/deciliter  sodium chloride 0.9% lock flush 10 milliLiter(s) IV Push every 1 hour PRN Pre/post blood products, medications, blood draw, and to maintain line patency      ALLERGIES:  Allergies    No Known Allergies    Intolerances        LABS:                        7.0    12.28 )-----------( 51       ( 07 Jan 2024 00:13 )             20.7     01-07    136  |  102  |  35<H>  ----------------------------<  145<H>  4.5   |  21<L>  |  2.36<H>    Ca    8.3<L>      07 Jan 2024 06:32  Phos  3.0     01-07  Mg     2.4     01-07    TPro  5.0<L>  /  Alb  3.2<L>  /  TBili  14.2<H>  /  DBili  x   /  AST  107<H>  /  ALT  12  /  AlkPhos  673<H>  01-07    PT/INR - ( 07 Jan 2024 00:13 )   PT: 17.0 sec;   INR: 1.64 ratio         PTT - ( 07 Jan 2024 00:13 )  PTT:40.7 sec  Urinalysis Basic - ( 07 Jan 2024 06:32 )    Color: x / Appearance: x / SG: x / pH: x  Gluc: 145 mg/dL / Ketone: x  / Bili: x / Urobili: x   Blood: x / Protein: x / Nitrite: x   Leuk Esterase: x / RBC: x / WBC x   Sq Epi: x / Non Sq Epi: x / Bacteria: x        RADIOLOGY & ADDITIONAL TESTS: Reviewed.   INTERVAL HPI/OVERNIGHT EVENTS:  -Hgb drop to 7    SUBJECTIVE: Patient seen and examined at bedside. Resting in bed. Lethargic. ROS negative for fevers, chills, CP, SOB, abdominal pain, urinary symptoms, changes in BMs.       VITAL SIGNS:  ICU Vital Signs Last 24 Hrs  T(C): 36.5 (07 Jan 2024 00:00), Max: 36.6 (06 Jan 2024 20:00)  T(F): 97.7 (07 Jan 2024 00:00), Max: 97.9 (06 Jan 2024 20:00)  HR: 96 (07 Jan 2024 06:00) (85 - 105)  BP: 89/54 (07 Jan 2024 06:00) (66/30 - 118/56)  BP(mean): 69 (07 Jan 2024 06:00) (41 - 84)  ABP: --  ABP(mean): --  RR: 13 (07 Jan 2024 06:00) (11 - 39)  SpO2: 100% (07 Jan 2024 06:00) (94% - 100%)    O2 Parameters below as of 06 Jan 2024 20:00  Patient On (Oxygen Delivery Method): nasal cannula  O2 Flow (L/min): 2          Plateau pressure:   P/F ratio:     01-06 @ 07:01  -  01-07 @ 07:00  --------------------------------------------------------  IN: 1468.6 mL / OUT: 888 mL / NET: 580.6 mL      CAPILLARY BLOOD GLUCOSE      POCT Blood Glucose.: 170 mg/dL (06 Jan 2024 21:49)    ECG:    PHYSICAL EXAM:     VITALS:   T(C): 36.5 (01-07-24 @ 00:00), Max: 36.6 (01-06-24 @ 20:00)  HR: 96 (01-07-24 @ 06:00) (85 - 105)  BP: 89/54 (01-07-24 @ 06:00) (66/30 - 118/56)  RR: 13 (01-07-24 @ 06:00) (11 - 39)  SpO2: 100% (01-07-24 @ 06:00) (94% - 100%)    GENERAL: NAD, lying in bed comfortably  HEAD:  Atraumatic, Normocephalic  EYES: EOMI, PERRLA, conjunctiva and sclera clear  ENT: Moist mucous membranes  NECK: Supple, No JVD  CHEST/LUNG: CTABL; No rales, rhonchi, wheezing, or rubs. Unlabored respirations  HEART: RRR. No M/R/G  ABDOMEN: Soft, nontender, non-distended, normoactive BS. No hepatomegaly  EXTREMITIES:  2+ Peripheral Pulses, brisk capillary refill. No clubbing, cyanosis, or edema  NERVOUS SYSTEM:  Alert & Oriented X3, speech clear. No deficits, CN II-XII intact. Normal sensation   MSK: FROM all 4 extremities, full and equal strength  PSYCH: Normal affect, normal speech, normal behavior  SKIN: No rashes or lesions      MEDICATIONS:  MEDICATIONS  (STANDING):  camphor 0.5%/menthol 0.5% Topical Lotion 1 Application(s) Topical two times a day  cefTRIAXone   IVPB 1000 milliGRAM(s) IV Intermittent every 24 hours  chlorhexidine 2% Cloths 1 Application(s) Topical daily  chlorhexidine 4% Liquid 1 Application(s) Topical <User Schedule>  CRRT Treatment    <Continuous>  cyanocobalamin 1000 MICROGram(s) Oral daily  dextrose 5%. 1000 milliLiter(s) (50 mL/Hr) IV Continuous <Continuous>  dextrose 5%. 1000 milliLiter(s) (100 mL/Hr) IV Continuous <Continuous>  dextrose 50% Injectable 25 Gram(s) IV Push once  dextrose 50% Injectable 25 Gram(s) IV Push once  dextrose 50% Injectable 12.5 Gram(s) IV Push once  folic acid 1 milliGRAM(s) Oral daily  glucagon  Injectable 1 milliGRAM(s) IntraMuscular once  insulin glargine Injectable (LANTUS) 5 Unit(s) SubCutaneous at bedtime  insulin lispro (ADMELOG) corrective regimen sliding scale   SubCutaneous three times a day before meals  insulin lispro (ADMELOG) corrective regimen sliding scale   SubCutaneous at bedtime  lactulose Syrup 10 Gram(s) Oral four times a day  melatonin 1 milliGRAM(s) Oral at bedtime  norepinephrine Infusion 0.05 MICROgram(s)/kG/Min (8.96 mL/Hr) IV Continuous <Continuous>  octreotide  Infusion 50 MICROgram(s)/Hr (10 mL/Hr) IV Continuous <Continuous>  pancrelipase  (CREON  6,000 Lipase Units) 1 Capsule(s) Oral three times a day with meals  pantoprazole  Injectable 40 milliGRAM(s) IV Push every 12 hours  PrismaSATE Dialysate BGK 4 / 2.5 5000 milliLiter(s) (1500 mL/Hr) CRRT <Continuous>  PrismaSOL Filtration BGK 4 / 2.5 5000 milliLiter(s) (200 mL/Hr) CRRT <Continuous>  PrismaSOL Filtration BGK 4 / 2.5 5000 milliLiter(s) (800 mL/Hr) CRRT <Continuous>  QUEtiapine 25 milliGRAM(s) Oral daily  rifAXIMin 550 milliGRAM(s) Oral two times a day  tamsulosin 0.4 milliGRAM(s) Oral at bedtime  thiamine 100 milliGRAM(s) Oral daily  ursodiol Tablet 250 milliGRAM(s) Oral every 12 hours    MEDICATIONS  (PRN):  dextrose Oral Gel 15 Gram(s) Oral once PRN Blood Glucose LESS THAN 70 milliGRAM(s)/deciliter  sodium chloride 0.9% lock flush 10 milliLiter(s) IV Push every 1 hour PRN Pre/post blood products, medications, blood draw, and to maintain line patency      ALLERGIES:  Allergies    No Known Allergies    Intolerances        LABS:                        7.0    12.28 )-----------( 51       ( 07 Jan 2024 00:13 )             20.7     01-07    136  |  102  |  35<H>  ----------------------------<  145<H>  4.5   |  21<L>  |  2.36<H>    Ca    8.3<L>      07 Jan 2024 06:32  Phos  3.0     01-07  Mg     2.4     01-07    TPro  5.0<L>  /  Alb  3.2<L>  /  TBili  14.2<H>  /  DBili  x   /  AST  107<H>  /  ALT  12  /  AlkPhos  673<H>  01-07    PT/INR - ( 07 Jan 2024 00:13 )   PT: 17.0 sec;   INR: 1.64 ratio         PTT - ( 07 Jan 2024 00:13 )  PTT:40.7 sec  Urinalysis Basic - ( 07 Jan 2024 06:32 )    Color: x / Appearance: x / SG: x / pH: x  Gluc: 145 mg/dL / Ketone: x  / Bili: x / Urobili: x   Blood: x / Protein: x / Nitrite: x   Leuk Esterase: x / RBC: x / WBC x   Sq Epi: x / Non Sq Epi: x / Bacteria: x        RADIOLOGY & ADDITIONAL TESTS: Reviewed.

## 2024-01-07 NOTE — PROGRESS NOTE ADULT - SUBJECTIVE AND OBJECTIVE BOX
Interval Events:   -transferred to MICU for HoTN, now on levophed  -started on CVVH  -patient AOx1-2 today, interactive, but tired-appearing    ROS:   12 point review of systems performed and negative except otherwise noted in HPI.    Hospital Medications:  camphor 0.5%/menthol 0.5% Topical Lotion 1 Application(s) Topical two times a day  cefTRIAXone   IVPB 1000 milliGRAM(s) IV Intermittent every 24 hours  chlorhexidine 2% Cloths 1 Application(s) Topical daily  chlorhexidine 4% Liquid 1 Application(s) Topical <User Schedule>  cyanocobalamin 1000 MICROGram(s) Oral daily  dextrose 5%. 1000 milliLiter(s) IV Continuous <Continuous>  dextrose 5%. 1000 milliLiter(s) IV Continuous <Continuous>  dextrose 50% Injectable 25 Gram(s) IV Push once  dextrose 50% Injectable 25 Gram(s) IV Push once  dextrose 50% Injectable 12.5 Gram(s) IV Push once  dextrose Oral Gel 15 Gram(s) Oral once PRN  folic acid 1 milliGRAM(s) Oral daily  glucagon  Injectable 1 milliGRAM(s) IntraMuscular once  insulin glargine Injectable (LANTUS) 5 Unit(s) SubCutaneous at bedtime  insulin lispro (ADMELOG) corrective regimen sliding scale   SubCutaneous three times a day before meals  insulin lispro (ADMELOG) corrective regimen sliding scale   SubCutaneous at bedtime  lactulose Syrup 10 Gram(s) Oral four times a day  melatonin 1 milliGRAM(s) Oral at bedtime  norepinephrine Infusion 0.6 MICROgram(s)/kG/Min IV Continuous <Continuous>  octreotide  Infusion 50 MICROgram(s)/Hr IV Continuous <Continuous>  pancrelipase  (CREON  6,000 Lipase Units) 1 Capsule(s) Oral three times a day with meals  pantoprazole  Injectable 40 milliGRAM(s) IV Push every 12 hours  QUEtiapine 25 milliGRAM(s) Oral daily  rifAXIMin 550 milliGRAM(s) Oral two times a day  sodium chloride 0.9% lock flush 10 milliLiter(s) IV Push every 1 hour PRN  tamsulosin 0.4 milliGRAM(s) Oral at bedtime  thiamine 100 milliGRAM(s) Oral daily  ursodiol Tablet 250 milliGRAM(s) Oral every 12 hours      PHYSICAL EXAM:   Vital Signs:  Vital Signs Last 24 Hrs  T(C): 36.7 (07 Jan 2024 16:00), Max: 36.7 (07 Jan 2024 16:00)  T(F): 98.1 (07 Jan 2024 16:00), Max: 98.1 (07 Jan 2024 16:00)  HR: 104 (07 Jan 2024 18:00) (86 - 120)  BP: 111/54 (07 Jan 2024 18:00) (62/32 - 131/60)  BP(mean): 77 (07 Jan 2024 18:00) (42 - 88)  RR: 24 (07 Jan 2024 18:00) (11 - 39)  SpO2: 95% (07 Jan 2024 18:00) (92% - 100%)    Parameters below as of 07 Jan 2024 08:00  Patient On (Oxygen Delivery Method): nasal cannula  O2 Flow (L/min): 2    Daily     Daily       GEN: NAD, normocephalic  CVS: normal hr   CHEST: reg resp rate, no increased WOB  ABD: soft, nontender, distended  EXTR: no b/l LE edema. RUE edematous R>L  NEURO: A&Ox3    LABS: reviewed                        8.0    17.75 )-----------( 73       ( 07 Jan 2024 15:26 )             24.4     01-07    136  |  103  |  31<H>  ----------------------------<  165<H>  4.9   |  19<L>  |  2.17<H>    Ca    8.6      07 Jan 2024 17:45  Phos  3.5     01-07  Mg     2.4     01-07    TPro  5.2<L>  /  Alb  3.1<L>  /  TBili  16.2<H>  /  DBili  x   /  AST  118<H>  /  ALT  10  /  AlkPhos  694<H>  01-07    LIVER FUNCTIONS - ( 07 Jan 2024 17:45 )  Alb: 3.1 g/dL / Pro: 5.2 g/dL / ALK PHOS: 694 U/L / ALT: 10 U/L / AST: 118 U/L / GGT: x             Interval Diagnostic Studies: see sunrise for full report   Interval Events:   -transferred to MICU for HoTN, now on levophed  -started on CVVH  -patient AOx1-2 today, interactive, but tired-appearing    ROS:   Unable to be obtained due to [facility]    Hospital Medications:  camphor 0.5%/menthol 0.5% Topical Lotion 1 Application(s) Topical two times a day  cefTRIAXone   IVPB 1000 milliGRAM(s) IV Intermittent every 24 hours  chlorhexidine 2% Cloths 1 Application(s) Topical daily  chlorhexidine 4% Liquid 1 Application(s) Topical <User Schedule>  cyanocobalamin 1000 MICROGram(s) Oral daily  dextrose 5%. 1000 milliLiter(s) IV Continuous <Continuous>  dextrose 5%. 1000 milliLiter(s) IV Continuous <Continuous>  dextrose 50% Injectable 25 Gram(s) IV Push once  dextrose 50% Injectable 25 Gram(s) IV Push once  dextrose 50% Injectable 12.5 Gram(s) IV Push once  dextrose Oral Gel 15 Gram(s) Oral once PRN  folic acid 1 milliGRAM(s) Oral daily  glucagon  Injectable 1 milliGRAM(s) IntraMuscular once  insulin glargine Injectable (LANTUS) 5 Unit(s) SubCutaneous at bedtime  insulin lispro (ADMELOG) corrective regimen sliding scale   SubCutaneous three times a day before meals  insulin lispro (ADMELOG) corrective regimen sliding scale   SubCutaneous at bedtime  lactulose Syrup 10 Gram(s) Oral four times a day  melatonin 1 milliGRAM(s) Oral at bedtime  norepinephrine Infusion 0.6 MICROgram(s)/kG/Min IV Continuous <Continuous>  octreotide  Infusion 50 MICROgram(s)/Hr IV Continuous <Continuous>  pancrelipase  (CREON  6,000 Lipase Units) 1 Capsule(s) Oral three times a day with meals  pantoprazole  Injectable 40 milliGRAM(s) IV Push every 12 hours  QUEtiapine 25 milliGRAM(s) Oral daily  rifAXIMin 550 milliGRAM(s) Oral two times a day  sodium chloride 0.9% lock flush 10 milliLiter(s) IV Push every 1 hour PRN  tamsulosin 0.4 milliGRAM(s) Oral at bedtime  thiamine 100 milliGRAM(s) Oral daily  ursodiol Tablet 250 milliGRAM(s) Oral every 12 hours      PHYSICAL EXAM:   Vital Signs:  Vital Signs Last 24 Hrs  T(C): 36.7 (07 Jan 2024 16:00), Max: 36.7 (07 Jan 2024 16:00)  T(F): 98.1 (07 Jan 2024 16:00), Max: 98.1 (07 Jan 2024 16:00)  HR: 104 (07 Jan 2024 18:00) (86 - 120)  BP: 111/54 (07 Jan 2024 18:00) (62/32 - 131/60)  BP(mean): 77 (07 Jan 2024 18:00) (42 - 88)  RR: 24 (07 Jan 2024 18:00) (11 - 39)  SpO2: 95% (07 Jan 2024 18:00) (92% - 100%)    Parameters below as of 07 Jan 2024 08:00  Patient On (Oxygen Delivery Method): nasal cannula  O2 Flow (L/min): 2    Daily     Daily       GEN: NAD, normocephalic  CVS: tachycardic, on pressor  CHEST: mildly tachypneic, no increased WOB, on 3L NC  ABD: soft, nontender, distended, +BS  EXTR: mild b/l LE edema. RUE edematous R>L  NEURO: Somnolent, awakens to verbal stimuli, oriented x2, +asterixis  SKIN: Jaundiced    LABS: reviewed                        8.0    17.75 )-----------( 73       ( 07 Jan 2024 15:26 )             24.4     01-07    136  |  103  |  31<H>  ----------------------------<  165<H>  4.9   |  19<L>  |  2.17<H>    Ca    8.6      07 Jan 2024 17:45  Phos  3.5     01-07  Mg     2.4     01-07    TPro  5.2<L>  /  Alb  3.1<L>  /  TBili  16.2<H>  /  DBili  x   /  AST  118<H>  /  ALT  10  /  AlkPhos  694<H>  01-07    LIVER FUNCTIONS - ( 07 Jan 2024 17:45 )  Alb: 3.1 g/dL / Pro: 5.2 g/dL / ALK PHOS: 694 U/L / ALT: 10 U/L / AST: 118 U/L / GGT: x             Interval Diagnostic Studies: see sunrise for full report   Interval Events:   -transferred to MICU for HoTN, now on levophed  -started on CVVH  -patient AOx1-2 today, interactive, but tired-appearing    ROS:   Unable to be obtained due to [facility]    Hospital Medications:  camphor 0.5%/menthol 0.5% Topical Lotion 1 Application(s) Topical two times a day  cefTRIAXone   IVPB 1000 milliGRAM(s) IV Intermittent every 24 hours  chlorhexidine 2% Cloths 1 Application(s) Topical daily  chlorhexidine 4% Liquid 1 Application(s) Topical <User Schedule>  cyanocobalamin 1000 MICROGram(s) Oral daily  dextrose 5%. 1000 milliLiter(s) IV Continuous <Continuous>  dextrose 5%. 1000 milliLiter(s) IV Continuous <Continuous>  dextrose 50% Injectable 25 Gram(s) IV Push once  dextrose 50% Injectable 25 Gram(s) IV Push once  dextrose 50% Injectable 12.5 Gram(s) IV Push once  dextrose Oral Gel 15 Gram(s) Oral once PRN  folic acid 1 milliGRAM(s) Oral daily  glucagon  Injectable 1 milliGRAM(s) IntraMuscular once  insulin glargine Injectable (LANTUS) 5 Unit(s) SubCutaneous at bedtime  insulin lispro (ADMELOG) corrective regimen sliding scale   SubCutaneous three times a day before meals  insulin lispro (ADMELOG) corrective regimen sliding scale   SubCutaneous at bedtime  lactulose Syrup 10 Gram(s) Oral four times a day  melatonin 1 milliGRAM(s) Oral at bedtime  norepinephrine Infusion 0.6 MICROgram(s)/kG/Min IV Continuous <Continuous>  octreotide  Infusion 50 MICROgram(s)/Hr IV Continuous <Continuous>  pancrelipase  (CREON  6,000 Lipase Units) 1 Capsule(s) Oral three times a day with meals  pantoprazole  Injectable 40 milliGRAM(s) IV Push every 12 hours  QUEtiapine 25 milliGRAM(s) Oral daily  rifAXIMin 550 milliGRAM(s) Oral two times a day  sodium chloride 0.9% lock flush 10 milliLiter(s) IV Push every 1 hour PRN  tamsulosin 0.4 milliGRAM(s) Oral at bedtime  thiamine 100 milliGRAM(s) Oral daily  ursodiol Tablet 250 milliGRAM(s) Oral every 12 hours      PHYSICAL EXAM:   Vital Signs:  Vital Signs Last 24 Hrs  T(C): 36.7 (07 Jan 2024 16:00), Max: 36.7 (07 Jan 2024 16:00)  T(F): 98.1 (07 Jan 2024 16:00), Max: 98.1 (07 Jan 2024 16:00)  HR: 104 (07 Jan 2024 18:00) (86 - 120)  BP: 111/54 (07 Jan 2024 18:00) (62/32 - 131/60)  BP(mean): 77 (07 Jan 2024 18:00) (42 - 88)  RR: 24 (07 Jan 2024 18:00) (11 - 39)  SpO2: 95% (07 Jan 2024 18:00) (92% - 100%)    Parameters below as of 07 Jan 2024 08:00  Patient On (Oxygen Delivery Method): nasal cannula  O2 Flow (L/min): 2    Daily     Daily       GEN: NAD, normocephalic  CVS: tachycardic, on pressor  CHEST: mildly tachypneic, no increased WOB, on 3L NC  ABD: soft, nontender, distended, +BS  EXTR: mild b/l LE edema. RUE edematous R>L  NEURO: Somnolent, awakens to verbal stimuli, oriented x2, +asterixis  SKIN: Jaundiced    LABS: reviewed                        8.0    17.75 )-----------( 73       ( 07 Jan 2024 15:26 )             24.4     01-07    136  |  103  |  31<H>  ----------------------------<  165<H>  4.9   |  19<L>  |  2.17<H>    Ca    8.6      07 Jan 2024 17:45  Phos  3.5     01-07  Mg     2.4     01-07    TPro  5.2<L>  /  Alb  3.1<L>  /  TBili  16.2<H>  /  DBili  x   /  AST  118<H>  /  ALT  10  /  AlkPhos  694<H>  01-07    LIVER FUNCTIONS - ( 07 Jan 2024 17:45 )  Alb: 3.1 g/dL / Pro: 5.2 g/dL / ALK PHOS: 694 U/L / ALT: 10 U/L / AST: 118 U/L / GGT: x             Interval Diagnostic Studies: see sunrise for full report

## 2024-01-07 NOTE — PROGRESS NOTE ADULT - TIME BILLING
clinical exam, review of labs, review of recent imaging and discussion of assessment and plan with other team members and ICU team.
- Ordering, reviewing, and interpreting labs, testing, and imaging.  - Independently obtaining a review of systems and performing a physical exam  - Reviewing consultant documentation/recommendations in addition to discussing plan of care with consultants.  - Counselling and educating patient and family regarding interpretation of aforementioned items and plan of care.
clinical exam, review of labs, review of recent imaging and discussion of assessment and plan with other team members and ICU team.
- Ordering, reviewing, and interpreting labs, testing, and imaging.  - Independently obtaining a review of systems and performing a physical exam  - Reviewing consultant documentation/recommendations in addition to discussing plan of care with consultants.  - Counselling and educating patient and family regarding interpretation of aforementioned items and plan of care.

## 2024-01-07 NOTE — PROGRESS NOTE ADULT - ASSESSMENT
YONATHAN MENDOZA is a 58y male with PMH of h/o type II DM for ~ 6-7 years, was on metformin and Jardiance,  diagnosed with liver cirrhosis 3 weeks ago, complicated by ascites in outside hospital, now admitted to floors undergoing workup of decompensated liver cirrhosis and RAHUL likely 2/2 HRS presenting for pressor assisted HD.     PLAN  =====Neurologic=====  #Hepatic Encephalopathy   #EtOH use disorder   #Uremic Encephalopathy   - pt with acute encephalopathy (A&Ox1, per HCP baseline A&Ox3) likely 2/2 toxic metabolic and hepatic processes   - PETH negative   -   - continue with lactulose 10mg TID and rifaximin, MELD 35 1/6/24  - start seroquel 25mg QHS    =====Pulmonary=====  Patient breathing comfortably on room air sating 100%       =====Cardiovascular=====  #Intravascular Depletion   #HRS   - pt BP in 90's/ 50's, unable to tolerate HD even with midodrine 30mg TID   - echo with normal EF 77% hyperdynamic otherwise normal diastolic function   - currently in MICU for CRRT pressor assisted (levophed), off midodrine   - wean vasopressors as tolerated     =====GI=====  #Decompensated hepatic cirrhosis   - currently undergoing transplant eval, f/u recs  - s/p IR paracentesis on 1/3- fluid analysis unremarkable for infectious etiology (total nucleated cell count 229 with 33 neutrophils) , fluid chemistry wnl, culture no growth to date   - MRI abd pending eval for HCC, hepatology following- unknown if patient has EV- will need screening EGD after MRI before deciding about A/C for possible PVT given thrombus in main protal vein   c/w Thiamine, B12, Folic Acid, lactulose and rifaximin    - cw octreotide drip   - pt initially scheduled for EGD on 1/5, f/u hepatology recs   - continue home famotidine 20mg   - renal diet   - start ceftriaxone 1/6/24 due to AMS and noted ascites     =====Renal/=====  #Renal mass  #HRS   #HD requiring pressors   - new renal mass seen on US concerning for malignancy, pending MRI for further characterization,- onc following f/u recs  - urology consulted- f/u recs   - HRS with creatinine >5, 4 on admission and 2 at baseline, shiley placed, s/p 2 rounds of HD unable to tolerate 3rd with midodrine   - CRRT 1/6/24 with levophed support   - porras placed 1/6/24 to monitor I/Os  #Electrolytes  - Maintain K>4, Phos>3, Mag>2, iCal>1    =====Endocrine=====  #DM2  - on 5 units lantus and SSI  - monitor and adjust as necessary     =====Infectious Disease=====  - pt with initial WBC to 13k, now downtrending  - infectious w/u negative and completed 5 days of Epimeric Meropenem 12/30- 1/4   - start ceftriaxone 1/6/24 due to AMS and noted ascites     =====Heme/Onc=====  #DVT Ppx- heparin sub q   #Normocytic anemia on initial CBC   likely 2/2 vitamin deficiencies  and anemia of chronic disease   -normal B12/folate  >VERENA- eventual scope to evaluate   - monitor platelet count in setting of thrombocytopenia 2/2 cirrhosis       =====Ethics=====  FULL CODE   YONATHAN MENDOZA is a 58y male with PMH of h/o type II DM for ~ 6-7 years, was on metformin and Jardiance,  diagnosed with liver cirrhosis 3 weeks ago, complicated by ascites in outside hospital, now admitted to floors undergoing workup of decompensated liver cirrhosis and RAHUL likely 2/2 HRS presenting for pressor assisted HD.     PLAN  =====Neurologic=====  #Hepatic Encephalopathy   #EtOH use disorder   #Uremic Encephalopathy   - pt with acute encephalopathy (A&Ox1, per HCP baseline A&Ox3) likely 2/2 toxic metabolic and hepatic processes   - PETH negative   -   - continue with lactulose 10mg TID and rifaximin, MELD 35 1/6/24  - start seroquel 25mg QHS    =====Pulmonary=====  Patient breathing comfortably on room air sating 100%       =====Cardiovascular=====  #Intravascular Depletion   #HRS   - pt BP in 90's/ 50's, unable to tolerate HD even with midodrine 30mg TID   - echo with normal EF 77% hyperdynamic otherwise normal diastolic function   - currently in MICU for CRRT pressor assisted (levophed), off midodrine   - wean vasopressors as tolerated     =====GI=====  #Decompensated hepatic cirrhosis   - currently undergoing transplant eval, f/u recs  - s/p IR paracentesis on 1/3- fluid analysis unremarkable for infectious etiology (total nucleated cell count 229 with 33 neutrophils) , fluid chemistry wnl, culture no growth to date   - MRI abd pending eval for HCC, hepatology following- unknown if patient has EV- will need screening EGD after MRI before deciding about A/C for possible PVT given thrombus in main protal vein   c/w Thiamine, B12, Folic Acid, lactulose and rifaximin    - cw octreotide drip   - pt initially scheduled for EGD on 1/5, f/u hepatology recs   - continue home famotidine 20mg   - renal diet   - start ceftriaxone 1/6/24 due to AMS and noted ascites     =====Renal/=====  #Renal mass  #HRS   #HD requiring pressors   - new renal mass seen on US concerning for malignancy, pending MRI for further characterization,- onc following f/u recs  - urology consulted- f/u recs   - HRS with creatinine >5, 4 on admission and 2 at baseline, shiley placed, s/p 2 rounds of HD unable to tolerate 3rd with midodrine   - CRRT 1/6/24 with levophed support   - porras placed 1/6/24 to monitor I/Os  #Electrolytes  - Maintain K>4, Phos>3, Mag>2, iCal>1    =====Endocrine=====  #DM2  - on 5 units lantus and SSI  - monitor and adjust as necessary     =====Infectious Disease=====  - pt with initial WBC to 13k, now downtrending  - infectious w/u negative and completed 5 days of Epimeric Meropenem 12/30- 1/4   - start ceftriaxone 1/6/24 due to AMS and noted ascites     =====Heme/Onc=====  #DVT Ppx- heparin sub q   #Normocytic anemia on initial CBC   likely 2/2 vitamin deficiencies  and anemia of chronic disease   -normal B12/folate  >VERENA- eventual scope to evaluate   - monitor platelet count in setting of thrombocytopenia 2/2 cirrhosis   - 1U pRBC 1/07      =====Ethics=====  FULL CODE

## 2024-01-08 NOTE — PROGRESS NOTE ADULT - ATTENDING COMMENTS
58 year old man with h/o type II DM for ~ 6-7 years, was on metformin and Jardiance, recently diagnosed with liver cirrhosis complicated by ascites. He underwent paracentesis x 3. Course complicated by worsening RAHUL and encephalopathy. He was transferred to [facility] for liver transplant evaluation. He was initiated on HD on 12/31/23.     Renal US with normal sized kidneys measuring ~ 11.6 and 11.8 cm. No hydro. Multilobulated vascular mass arises from the right kidney extending into the peritoneum. Findings are concerning for primary renal malignancy versus metastatic disease. MRI pending.     S/p large volume paracentesis 1/3/24 with 11 liter fluid removal.    RAHUL on CKD - unclear baseline renal function. No significant proteinuria UPCR 0.4, normal sized kidneys on US. Initiated HD on 12/31, but Unable to tolerate HD due to hypotension unresponsive to increase midodrine dose.    Labs with worsening BUN/Cr and metabolic acidosis and therefore patient was moved to ICU consult for pressor support and started on CRRT.   Has a right IJ temporary cathter   continue CRRT for now. labs reviewed. 58 year old man with h/o type II DM for ~ 6-7 years, was on metformin and Jardiance, recently diagnosed with liver cirrhosis complicated by ascites. He underwent paracentesis x 3. Course complicated by worsening RAHUL and encephalopathy. He was transferred to [facility] for liver transplant evaluation. He was initiated on HD on 12/31/23.     Renal US with normal sized kidneys measuring ~ 11.6 and 11.8 cm. No hydro. Multilobulated vascular mass arises from the right kidney extending into the peritoneum. Findings are concerning for primary renal malignancy versus metastatic disease. MRI pending.     S/p large volume paracentesis 1/3/24 with 11 liter fluid removal.    RAHUL on CKD - unclear baseline renal function. No significant proteinuria UPCR 0.4, normal sized kidneys on US. Initiated HD on 12/31, but Unable to tolerate HD due to hypotension unresponsive to increase midodrine dose.    Labs with worsening BUN/Cr and metabolic acidosis and therefore patient was moved to ICU consult for pressor support and started on CRRT.   Has a right IJ temporary cathter   continue CRRT for now. labs reviewed.

## 2024-01-08 NOTE — PROGRESS NOTE ADULT - ATTENDING COMMENTS
57 y/o M PMH DM2, recently diagnosed alcoholic cirrhosis (3 weeks ago) presenting as a transfer from [facility] for transplant eval for decompensated liver cirrhosis. Patient initially presented to [facility] (from assisted living) last week with dizziness and worsening AMS.  1 episode of BRBPR overnight. Etiology unclear- likely LGIB w/ lower concern for UGIB at this time given stable labs. Though patient is on pressors, suspect source of shock more likely infectious. EGD at bedside, supportive care, TEG 57 y/o M PMH DM2, recently diagnosed alcoholic cirrhosis (3 weeks ago) presenting as a transfer from [facility] for transplant eval for decompensated liver cirrhosis. Patient initially presented to [facility] (from assisted living) last week with dizziness and worsening AMS.  1 episode of BRBPR overnight. Etiology unclear- likely LGIB w/ lower concern for UGIB at this time given stable labs. Though patient is on pressors, suspect source of shock more likely infectious. EGD at bedside, supportive care, TEG

## 2024-01-08 NOTE — PROGRESS NOTE ADULT - SUBJECTIVE AND OBJECTIVE BOX
[facility] DIVISION OF KIDNEY DISEASES AND HYPERTENSION   FOLLOW UP NOTE  --------------------------------------------------------------------------------  Chief Complaint: Pt with acute renal failure requiring RRT in setting of liver failure. Undergoing liver transplant eval.     24 hour events/subjective: Pt. was seen and examined today. CT abd/pel done today, results pending. Remains on CRRT. Remains on IV vasopressors.     PAST HISTORY  --------------------------------------------------------------------------------  No significant changes to PMH, PSH, FHx, SHx, unless otherwise noted    ALLERGIES & MEDICATIONS  --------------------------------------------------------------------------------  Allergies  No Known Allergies    Intolerances    Standing Inpatient Medications  camphor 0.5%/menthol 0.5% Topical Lotion 1 Application(s) Topical two times a day  cefTRIAXone   IVPB 1000 milliGRAM(s) IV Intermittent every 24 hours  chlorhexidine 2% Cloths 1 Application(s) Topical daily  chlorhexidine 4% Liquid 1 Application(s) Topical <User Schedule>  CRRT Treatment    <Continuous>  cyanocobalamin 1000 MICROGram(s) Oral daily  dexMEDEtomidine Infusion 0.598 MICROgram(s)/kG/Hr IV Continuous <Continuous>  dextrose 5%. 1000 milliLiter(s) IV Continuous <Continuous>  dextrose 5%. 1000 milliLiter(s) IV Continuous <Continuous>  dextrose 50% Injectable 25 Gram(s) IV Push once  dextrose 50% Injectable 25 Gram(s) IV Push once  dextrose 50% Injectable 12.5 Gram(s) IV Push once  folic acid 1 milliGRAM(s) Oral daily  glucagon  Injectable 1 milliGRAM(s) IntraMuscular once  insulin glargine Injectable (LANTUS) 5 Unit(s) SubCutaneous at bedtime  insulin lispro (ADMELOG) corrective regimen sliding scale   SubCutaneous at bedtime  insulin lispro (ADMELOG) corrective regimen sliding scale   SubCutaneous three times a day before meals  lactulose Syrup 10 Gram(s) Oral four times a day  melatonin 1 milliGRAM(s) Oral at bedtime  norepinephrine Infusion 0.6 MICROgram(s)/kG/Min IV Continuous <Continuous>  octreotide  Infusion 50 MICROgram(s)/Hr IV Continuous <Continuous>  pancrelipase  (CREON  6,000 Lipase Units) 1 Capsule(s) Oral three times a day with meals  pantoprazole  Injectable 40 milliGRAM(s) IV Push every 12 hours  PrismaSATE Dialysate BGK 4 / 2.5 5000 milliLiter(s) CRRT <Continuous>  PrismaSOL Filtration BGK 0 / 2.5 5000 milliLiter(s) CRRT <Continuous>  PrismaSOL Filtration BGK 0 / 2.5 5000 milliLiter(s) CRRT <Continuous>  QUEtiapine 25 milliGRAM(s) Oral daily  rifAXIMin 550 milliGRAM(s) Oral two times a day  tamsulosin 0.4 milliGRAM(s) Oral at bedtime  thiamine 100 milliGRAM(s) Oral daily  ursodiol Tablet 250 milliGRAM(s) Oral every 12 hours  vasopressin Infusion 0.04 Unit(s)/Min IV Continuous <Continuous>    PRN Inpatient Medications  dextrose Oral Gel 15 Gram(s) Oral once PRN  sodium chloride 0.9% lock flush 10 milliLiter(s) IV Push every 1 hour PRN    REVIEW OF SYSTEMS  --------------------------------------------------------------------------------  Limited ROS     VITALS/PHYSICAL EXAM  --------------------------------------------------------------------------------  T(C): 36.4 (01-08-24 @ 07:00), Max: 36.9 (01-08-24 @ 00:00)  HR: 92 (01-08-24 @ 12:00) (79 - 120)  BP: 121/56 (01-08-24 @ 12:00) (66/36 - 159/68)  RR: 21 (01-08-24 @ 12:00) (11 - 34)  SpO2: 96% (01-08-24 @ 12:00) (92% - 100%)  Wt(kg): --    01-07-24 @ 07:01  -  01-08-24 @ 07:00  --------------------------------------------------------  IN: 3214.6 mL / OUT: 1257 mL / NET: 1957.6 mL    01-08-24 @ 07:01  -  01-08-24 @ 12:09  --------------------------------------------------------  IN: 208.5 mL / OUT: 56 mL / NET: 152.5 mL    Physical Exam:  GEN: Ill appearing   CVS: S1, S2 heard  CHEST: Diminished B/L  ABD: soft, nontender, distended, +BS  EXTR: mild b/l LE edema. RUE edematous R>L  NEURO: Awake, occasionally confused +asterixis  SKIN: Jaundiced    LABS/STUDIES  --------------------------------------------------------------------------------              7.7    16.80 >-----------<  66       [01-08-24 @ 06:26]              23.8     135  |  101  |  32  ----------------------------<  155      [01-08-24 @ 06:26]  4.8   |  18  |  2.12        Ca     9.0     [01-08-24 @ 06:26]      Mg     2.4     [01-08-24 @ 06:26]      Phos  3.3     [01-08-24 @ 06:26]    TPro  5.4  /  Alb  3.4  /  TBili  18.7  /  DBili  x   /  AST  118  /  ALT  12  /  AlkPhos  696  [01-08-24 @ 06:26]    PT/INR: PT 20.3 , INR 1.88       [01-08-24 @ 00:41]  PTT: 41.0       [01-08-24 @ 00:41]    Creatinine Trend:  SCr 2.12 [01-08 @ 06:26]  SCr 2.47 [01-08 @ 00:39]  SCr 2.17 [01-07 @ 17:45]  SCr 2.03 [01-07 @ 12:38]  SCr 2.36 [01-07 @ 06:32]    HBsAg Nonreact      [12-31-23 @ 14:46]  HCV 0.19, Nonreact      [12-31-23 @ 14:46]  HIV Nonreact      [12-31-23 @ 14:46]    JOHNNY: titer 1:160, pattern Speckled      [12-31-23 @ 14:46]  Free Light Chains: kappa 21.71, lambda 13.12, ratio = 1.65      [12-31 @ 14:46] [facility] DIVISION OF KIDNEY DISEASES AND HYPERTENSION   FOLLOW UP NOTE  --------------------------------------------------------------------------------  Chief Complaint: Pt with acute renal failure requiring RRT in setting of liver failure. Undergoing liver transplant eval.     24 hour events/subjective: Pt. was seen and examined today. CT abd/pel done today, results pending. Remains on CRRT. Remains on IV vasopressors.     PAST HISTORY  --------------------------------------------------------------------------------  No significant changes to PMH, PSH, FHx, SHx, unless otherwise noted    ALLERGIES & MEDICATIONS  --------------------------------------------------------------------------------  Allergies  No Known Allergies    Intolerances    Standing Inpatient Medications  camphor 0.5%/menthol 0.5% Topical Lotion 1 Application(s) Topical two times a day  cefTRIAXone   IVPB 1000 milliGRAM(s) IV Intermittent every 24 hours  chlorhexidine 2% Cloths 1 Application(s) Topical daily  chlorhexidine 4% Liquid 1 Application(s) Topical <User Schedule>  CRRT Treatment    <Continuous>  cyanocobalamin 1000 MICROGram(s) Oral daily  dexMEDEtomidine Infusion 0.598 MICROgram(s)/kG/Hr IV Continuous <Continuous>  dextrose 5%. 1000 milliLiter(s) IV Continuous <Continuous>  dextrose 5%. 1000 milliLiter(s) IV Continuous <Continuous>  dextrose 50% Injectable 25 Gram(s) IV Push once  dextrose 50% Injectable 25 Gram(s) IV Push once  dextrose 50% Injectable 12.5 Gram(s) IV Push once  folic acid 1 milliGRAM(s) Oral daily  glucagon  Injectable 1 milliGRAM(s) IntraMuscular once  insulin glargine Injectable (LANTUS) 5 Unit(s) SubCutaneous at bedtime  insulin lispro (ADMELOG) corrective regimen sliding scale   SubCutaneous at bedtime  insulin lispro (ADMELOG) corrective regimen sliding scale   SubCutaneous three times a day before meals  lactulose Syrup 10 Gram(s) Oral four times a day  melatonin 1 milliGRAM(s) Oral at bedtime  norepinephrine Infusion 0.6 MICROgram(s)/kG/Min IV Continuous <Continuous>  octreotide  Infusion 50 MICROgram(s)/Hr IV Continuous <Continuous>  pancrelipase  (CREON  6,000 Lipase Units) 1 Capsule(s) Oral three times a day with meals  pantoprazole  Injectable 40 milliGRAM(s) IV Push every 12 hours  PrismaSATE Dialysate BGK 4 / 2.5 5000 milliLiter(s) CRRT <Continuous>  PrismaSOL Filtration BGK 0 / 2.5 5000 milliLiter(s) CRRT <Continuous>  PrismaSOL Filtration BGK 0 / 2.5 5000 milliLiter(s) CRRT <Continuous>  QUEtiapine 25 milliGRAM(s) Oral daily  rifAXIMin 550 milliGRAM(s) Oral two times a day  tamsulosin 0.4 milliGRAM(s) Oral at bedtime  thiamine 100 milliGRAM(s) Oral daily  ursodiol Tablet 250 milliGRAM(s) Oral every 12 hours  vasopressin Infusion 0.04 Unit(s)/Min IV Continuous <Continuous>    PRN Inpatient Medications  dextrose Oral Gel 15 Gram(s) Oral once PRN  sodium chloride 0.9% lock flush 10 milliLiter(s) IV Push every 1 hour PRN    REVIEW OF SYSTEMS  --------------------------------------------------------------------------------  Limited ROS     VITALS/PHYSICAL EXAM  --------------------------------------------------------------------------------  T(C): 36.4 (01-08-24 @ 07:00), Max: 36.9 (01-08-24 @ 00:00)  HR: 92 (01-08-24 @ 12:00) (79 - 120)  BP: 121/56 (01-08-24 @ 12:00) (66/36 - 159/68)  RR: 21 (01-08-24 @ 12:00) (11 - 34)  SpO2: 96% (01-08-24 @ 12:00) (92% - 100%)  Wt(kg): --    01-07-24 @ 07:01  -  01-08-24 @ 07:00  --------------------------------------------------------  IN: 3214.6 mL / OUT: 1257 mL / NET: 1957.6 mL    01-08-24 @ 07:01  -  01-08-24 @ 12:09  --------------------------------------------------------  IN: 208.5 mL / OUT: 56 mL / NET: 152.5 mL    Physical Exam:  GEN: Ill appearing   CVS: S1, S2 heard  CHEST: Diminished B/L  ABD: soft, nontender, distended, +BS  EXTR: mild b/l LE edema. RUE edematous R>L  NEURO: Awake, occasionally confused +asterixis  SKIN: Jaundiced    LABS/STUDIES  --------------------------------------------------------------------------------              7.7    16.80 >-----------<  66       [01-08-24 @ 06:26]              23.8     135  |  101  |  32  ----------------------------<  155      [01-08-24 @ 06:26]  4.8   |  18  |  2.12        Ca     9.0     [01-08-24 @ 06:26]      Mg     2.4     [01-08-24 @ 06:26]      Phos  3.3     [01-08-24 @ 06:26]    TPro  5.4  /  Alb  3.4  /  TBili  18.7  /  DBili  x   /  AST  118  /  ALT  12  /  AlkPhos  696  [01-08-24 @ 06:26]    PT/INR: PT 20.3 , INR 1.88       [01-08-24 @ 00:41]  PTT: 41.0       [01-08-24 @ 00:41]    Creatinine Trend:  SCr 2.12 [01-08 @ 06:26]  SCr 2.47 [01-08 @ 00:39]  SCr 2.17 [01-07 @ 17:45]  SCr 2.03 [01-07 @ 12:38]  SCr 2.36 [01-07 @ 06:32]    HBsAg Nonreact      [12-31-23 @ 14:46]  HCV 0.19, Nonreact      [12-31-23 @ 14:46]  HIV Nonreact      [12-31-23 @ 14:46]    JOHNNY: titer 1:160, pattern Speckled      [12-31-23 @ 14:46]  Free Light Chains: kappa 21.71, lambda 13.12, ratio = 1.65      [12-31 @ 14:46]

## 2024-01-08 NOTE — PROCEDURE NOTE - SUPERVISORY STATEMENT
No immediate complications. Intubated for EGD.   - BP supported with Levophed. Increased to 0.7 to maintain SBP > 100  - No desaturation events. O2 sat maintained > 97% during entire intubation procedure.

## 2024-01-08 NOTE — PROGRESS NOTE ADULT - ASSESSMENT
58 year old man with h/o type II DM for ~ 6-7 years, was on metformin and Jardiance, recently diagnosed with liver cirrhosis complicated by ascites. He underwent paracentesis x 3. Course complicated by worsening RAHUL and encephalopathy. He was transferred to [facility] for liver transplant evaluation. He was initiated on HD on 12/31/23.     Renal US with normal sized kidneys measuring ~ 11.6 and 11.8 cm. No hydro. Multilobulated vascular mass arises from the right kidney extending into the peritoneum. Findings are concerning for primary renal malignancy versus metastatic disease. MRI pending.     S/p large volume paracentesis 1/3/24 with 11 liter fluid removal.    RAHUL on CKD - unclear baseline renal function. No significant proteinuria UPCR 0.4, normal sized kidneys on US. Initiated HD on 12/31, but Unable to tolerate HD due to hypotension unresponsive to increase midodrine dose.    Labs with worsening BUN/Cr and metabolic acidosis and therefore patient was moved to ICU consult for pressor support and started on CRRT. Has a right IJ temporary cathter   continue CRRT for now. labs reviewed.    58 year old man with h/o type II DM for ~ 6-7 years, was on metformin and Jardiance, recently diagnosed with liver cirrhosis complicated by ascites. He underwent paracentesis x 3. Course complicated by worsening RAHUL and encephalopathy. He was transferred to [facility] for liver transplant evaluation. He was initiated on HD on 12/31/23.     Renal US with normal sized kidneys measuring ~ 11.6 and 11.8 cm. No hydro. Multilobulated vascular mass arises from the right kidney extending into the peritoneum. Findings are concerning for primary renal malignancy versus metastatic disease. MRI pending.     S/p large volume paracentesis 1/3/24 with 11 liter fluid removal.    RAHUL on CKD - unclear baseline renal function. No significant proteinuria UPCR 0.4, normal sized kidneys on US. Initiated HD on 12/31, but Unable to tolerate HD due to hypotension unresponsive to increase midodrine dose.    Labs with worsening BUN/Cr and metabolic acidosis and therefore patient was moved to ICU consult for pressor support and started on CRRT. Has a right IJ temporary cathter   continue CRRT for now. labs reviewed.

## 2024-01-08 NOTE — PROGRESS NOTE ADULT - SUBJECTIVE AND OBJECTIVE BOX
INTERVAL HPI/OVERNIGHT EVENTS:    SUBJECTIVE: Patient seen and examined at bedside.     CONSTITUTIONAL: No weakness, fevers or chills  EYES/ENT: No visual changes;  No vertigo or throat pain   NECK: No pain or stiffness  RESPIRATORY: No cough, wheezing, hemoptysis; No shortness of breath  CARDIOVASCULAR: No chest pain or palpitations  GASTROINTESTINAL: No abdominal or epigastric pain. No nausea, vomiting, or hematemesis; No diarrhea or constipation. No melena or hematochezia.  GENITOURINARY: No dysuria, frequency or hematuria  NEUROLOGICAL: No numbness or weakness  SKIN: No itching, rashes    OBJECTIVE:    VITAL SIGNS:  ICU Vital Signs Last 24 Hrs  T(C): 36.8 (08 Jan 2024 04:00), Max: 36.9 (08 Jan 2024 00:00)  T(F): 98.2 (08 Jan 2024 04:00), Max: 98.4 (08 Jan 2024 00:00)  HR: 99 (08 Jan 2024 06:45) (79 - 120)  BP: 126/58 (08 Jan 2024 06:45) (66/36 - 159/68)  BP(mean): 83 (08 Jan 2024 06:45) (46 - 98)  ABP: --  ABP(mean): --  RR: 26 (08 Jan 2024 06:45) (11 - 34)  SpO2: 93% (08 Jan 2024 06:45) (92% - 100%)    O2 Parameters below as of 07 Jan 2024 19:00  Patient On (Oxygen Delivery Method): nasal cannula    O2 Concentration (%): 2          01-07 @ 07:01  -  01-08 @ 07:00  --------------------------------------------------------  IN: 3214.6 mL / OUT: 1257 mL / NET: 1957.6 mL      CAPILLARY BLOOD GLUCOSE      POCT Blood Glucose.: 192 mg/dL (07 Jan 2024 22:12)      PHYSICAL EXAM:    General: NAD  HEENT: NC/AT; PERRL, clear conjunctiva  Neck: supple  Respiratory: CTA b/l  Cardiovascular: +S1/S2; RRR  Abdomen: soft, NT/ND; +BS x4  Extremities: WWP, 2+ peripheral pulses b/l; no LE edema  Skin: normal color and turgor; no rash  Neurological:    MEDICATIONS:  MEDICATIONS  (STANDING):  camphor 0.5%/menthol 0.5% Topical Lotion 1 Application(s) Topical two times a day  cefTRIAXone   IVPB 1000 milliGRAM(s) IV Intermittent every 24 hours  chlorhexidine 2% Cloths 1 Application(s) Topical daily  chlorhexidine 4% Liquid 1 Application(s) Topical <User Schedule>  CRRT Treatment    <Continuous>  cyanocobalamin 1000 MICROGram(s) Oral daily  dexMEDEtomidine Infusion 0.598 MICROgram(s)/kG/Hr (14.3 mL/Hr) IV Continuous <Continuous>  dextrose 5%. 1000 milliLiter(s) (100 mL/Hr) IV Continuous <Continuous>  dextrose 5%. 1000 milliLiter(s) (50 mL/Hr) IV Continuous <Continuous>  dextrose 50% Injectable 25 Gram(s) IV Push once  dextrose 50% Injectable 25 Gram(s) IV Push once  dextrose 50% Injectable 12.5 Gram(s) IV Push once  folic acid 1 milliGRAM(s) Oral daily  glucagon  Injectable 1 milliGRAM(s) IntraMuscular once  insulin glargine Injectable (LANTUS) 5 Unit(s) SubCutaneous at bedtime  insulin lispro (ADMELOG) corrective regimen sliding scale   SubCutaneous three times a day before meals  insulin lispro (ADMELOG) corrective regimen sliding scale   SubCutaneous at bedtime  lactulose Syrup 10 Gram(s) Oral four times a day  melatonin 1 milliGRAM(s) Oral at bedtime  norepinephrine Infusion 0.6 MICROgram(s)/kG/Min (53.8 mL/Hr) IV Continuous <Continuous>  octreotide  Infusion 50 MICROgram(s)/Hr (10 mL/Hr) IV Continuous <Continuous>  pancrelipase  (CREON  6,000 Lipase Units) 1 Capsule(s) Oral three times a day with meals  pantoprazole  Injectable 40 milliGRAM(s) IV Push every 12 hours  PrismaSATE Dialysate BGK 4 / 2.5 5000 milliLiter(s) (1500 mL/Hr) CRRT <Continuous>  PrismaSOL Filtration BGK 0 / 2.5 5000 milliLiter(s) (200 mL/Hr) CRRT <Continuous>  PrismaSOL Filtration BGK 0 / 2.5 5000 milliLiter(s) (800 mL/Hr) CRRT <Continuous>  QUEtiapine 25 milliGRAM(s) Oral daily  rifAXIMin 550 milliGRAM(s) Oral two times a day  tamsulosin 0.4 milliGRAM(s) Oral at bedtime  thiamine 100 milliGRAM(s) Oral daily  ursodiol Tablet 250 milliGRAM(s) Oral every 12 hours  vasopressin Infusion 0.04 Unit(s)/Min (6 mL/Hr) IV Continuous <Continuous>    MEDICATIONS  (PRN):  dextrose Oral Gel 15 Gram(s) Oral once PRN Blood Glucose LESS THAN 70 milliGRAM(s)/deciliter  sodium chloride 0.9% lock flush 10 milliLiter(s) IV Push every 1 hour PRN Pre/post blood products, medications, blood draw, and to maintain line patency      ALLERGIES:  Allergies    No Known Allergies    Intolerances        LABS:                        7.7    16.80 )-----------( 66       ( 08 Jan 2024 06:26 )             23.8     01-08    135  |  101  |  32<H>  ----------------------------<  155<H>  4.8   |  18<L>  |  2.12<H>    Ca    9.0      08 Jan 2024 06:26  Phos  3.3     01-08  Mg     2.4     01-08    TPro  5.4<L>  /  Alb  3.4  /  TBili  18.7<H>  /  DBili  x   /  AST  118<H>  /  ALT  12  /  AlkPhos  696<H>  01-08    PT/INR - ( 08 Jan 2024 00:41 )   PT: 20.3 sec;   INR: 1.88 ratio         PTT - ( 08 Jan 2024 00:41 )  PTT:41.0 sec  Urinalysis Basic - ( 08 Jan 2024 06:26 )    Color: x / Appearance: x / SG: x / pH: x  Gluc: 155 mg/dL / Ketone: x  / Bili: x / Urobili: x   Blood: x / Protein: x / Nitrite: x   Leuk Esterase: x / RBC: x / WBC x   Sq Epi: x / Non Sq Epi: x / Bacteria: x        RADIOLOGY & ADDITIONAL TESTS: Reviewed. INTERVAL HPI/OVERNIGHT EVENTS:    SUBJECTIVE: Multiple bloody BM overnight, AM Hgb stable and mild increase in pressor requirements. Continues to be AOx1    OBJECTIVE:    VITAL SIGNS:  ICU Vital Signs Last 24 Hrs  T(C): 36.8 (08 Jan 2024 04:00), Max: 36.9 (08 Jan 2024 00:00)  T(F): 98.2 (08 Jan 2024 04:00), Max: 98.4 (08 Jan 2024 00:00)  HR: 99 (08 Jan 2024 06:45) (79 - 120)  BP: 126/58 (08 Jan 2024 06:45) (66/36 - 159/68)  BP(mean): 83 (08 Jan 2024 06:45) (46 - 98)  ABP: --  ABP(mean): --  RR: 26 (08 Jan 2024 06:45) (11 - 34)  SpO2: 93% (08 Jan 2024 06:45) (92% - 100%)    O2 Parameters below as of 07 Jan 2024 19:00  Patient On (Oxygen Delivery Method): nasal cannula    O2 Concentration (%): 2          01-07 @ 07:01  -  01-08 @ 07:00  --------------------------------------------------------  IN: 3214.6 mL / OUT: 1257 mL / NET: 1957.6 mL      CAPILLARY BLOOD GLUCOSE      POCT Blood Glucose.: 192 mg/dL (07 Jan 2024 22:12)      PHYSICAL EXAM:    General: In distress  HEENT: NC/AT; PERRL, scleral icterus  Respiratory: CTA b/l  Cardiovascular: +S1/S2; RRR  Abdomen: soft, NT/ND; +BS x4  Extremities: WWP, 2+ peripheral pulses b/l; no LE edema  Neurological: AOx1    MEDICATIONS:  MEDICATIONS  (STANDING):  camphor 0.5%/menthol 0.5% Topical Lotion 1 Application(s) Topical two times a day  cefTRIAXone   IVPB 1000 milliGRAM(s) IV Intermittent every 24 hours  chlorhexidine 2% Cloths 1 Application(s) Topical daily  chlorhexidine 4% Liquid 1 Application(s) Topical <User Schedule>  CRRT Treatment    <Continuous>  cyanocobalamin 1000 MICROGram(s) Oral daily  dexMEDEtomidine Infusion 0.598 MICROgram(s)/kG/Hr (14.3 mL/Hr) IV Continuous <Continuous>  dextrose 5%. 1000 milliLiter(s) (100 mL/Hr) IV Continuous <Continuous>  dextrose 5%. 1000 milliLiter(s) (50 mL/Hr) IV Continuous <Continuous>  dextrose 50% Injectable 25 Gram(s) IV Push once  dextrose 50% Injectable 25 Gram(s) IV Push once  dextrose 50% Injectable 12.5 Gram(s) IV Push once  folic acid 1 milliGRAM(s) Oral daily  glucagon  Injectable 1 milliGRAM(s) IntraMuscular once  insulin glargine Injectable (LANTUS) 5 Unit(s) SubCutaneous at bedtime  insulin lispro (ADMELOG) corrective regimen sliding scale   SubCutaneous three times a day before meals  insulin lispro (ADMELOG) corrective regimen sliding scale   SubCutaneous at bedtime  lactulose Syrup 10 Gram(s) Oral four times a day  melatonin 1 milliGRAM(s) Oral at bedtime  norepinephrine Infusion 0.6 MICROgram(s)/kG/Min (53.8 mL/Hr) IV Continuous <Continuous>  octreotide  Infusion 50 MICROgram(s)/Hr (10 mL/Hr) IV Continuous <Continuous>  pancrelipase  (CREON  6,000 Lipase Units) 1 Capsule(s) Oral three times a day with meals  pantoprazole  Injectable 40 milliGRAM(s) IV Push every 12 hours  PrismaSATE Dialysate BGK 4 / 2.5 5000 milliLiter(s) (1500 mL/Hr) CRRT <Continuous>  PrismaSOL Filtration BGK 0 / 2.5 5000 milliLiter(s) (200 mL/Hr) CRRT <Continuous>  PrismaSOL Filtration BGK 0 / 2.5 5000 milliLiter(s) (800 mL/Hr) CRRT <Continuous>  QUEtiapine 25 milliGRAM(s) Oral daily  rifAXIMin 550 milliGRAM(s) Oral two times a day  tamsulosin 0.4 milliGRAM(s) Oral at bedtime  thiamine 100 milliGRAM(s) Oral daily  ursodiol Tablet 250 milliGRAM(s) Oral every 12 hours  vasopressin Infusion 0.04 Unit(s)/Min (6 mL/Hr) IV Continuous <Continuous>    MEDICATIONS  (PRN):  dextrose Oral Gel 15 Gram(s) Oral once PRN Blood Glucose LESS THAN 70 milliGRAM(s)/deciliter  sodium chloride 0.9% lock flush 10 milliLiter(s) IV Push every 1 hour PRN Pre/post blood products, medications, blood draw, and to maintain line patency      ALLERGIES:  Allergies    No Known Allergies    Intolerances        LABS:                        7.7    16.80 )-----------( 66       ( 08 Jan 2024 06:26 )             23.8     01-08    135  |  101  |  32<H>  ----------------------------<  155<H>  4.8   |  18<L>  |  2.12<H>    Ca    9.0      08 Jan 2024 06:26  Phos  3.3     01-08  Mg     2.4     01-08    TPro  5.4<L>  /  Alb  3.4  /  TBili  18.7<H>  /  DBili  x   /  AST  118<H>  /  ALT  12  /  AlkPhos  696<H>  01-08    PT/INR - ( 08 Jan 2024 00:41 )   PT: 20.3 sec;   INR: 1.88 ratio         PTT - ( 08 Jan 2024 00:41 )  PTT:41.0 sec  Urinalysis Basic - ( 08 Jan 2024 06:26 )    Color: x / Appearance: x / SG: x / pH: x  Gluc: 155 mg/dL / Ketone: x  / Bili: x / Urobili: x   Blood: x / Protein: x / Nitrite: x   Leuk Esterase: x / RBC: x / WBC x   Sq Epi: x / Non Sq Epi: x / Bacteria: x        RADIOLOGY & ADDITIONAL TESTS: Reviewed.

## 2024-01-08 NOTE — PROGRESS NOTE ADULT - ASSESSMENT
57 y/o M PMH DM2, recently diagnosed alcoholic cirrhosis (3 weeks ago) presenting as a transfer from [facility] for transplant eval for decompensated liver cirrhosis. Patient initially presented to [facility] (from assisted living) last week with dizziness and worsening AMS.    #Decompensated cirrhosis likely 2/2 alcohol and MASLD  MELD 3.0 1/04/24 - 33  Blood Type AB+  HE: presented with AMS. Remains altered  EV: unknown (no prior EGD/c-scope)  HCC screening: will need MRI   Ascites: s/p paracentesis 1/02 neg for SBP  Bleeding hx: no s/s of bleeding   #RAHUL   #Frailty and muscle weakness/pain     #BRBPR-  1 episode of BRBPR overnight. Etiology unclear- likely LGIB w/ lower concern for UGIB at this time. Though patient is on pressors, suspect source of shock more likely infectious.     Recommendations:   - F/u triple phase liver protocol CTAP and CT chest  - Full infectious w/u per ICU team given AMS  - Timing of endoscopic eval to be determined  - C/w CTX, octreotide    Note incomplete until finalized by attending signature/attestation.    Heidy Saenz  GI/Hepatology Fellow PGY5    NON-URGENT CONSULTS:  Please email [email or web address]  AT NIGHT AND ON WEEKENDS:  Available on Microsoft Teams  [phone] (Long Range Pager)    After 5pm, please contact the on-call GI fellow. [phone]     59 y/o M PMH DM2, recently diagnosed alcoholic cirrhosis (3 weeks ago) presenting as a transfer from [facility] for transplant eval for decompensated liver cirrhosis. Patient initially presented to [facility] (from assisted living) last week with dizziness and worsening AMS.    #Decompensated cirrhosis likely 2/2 alcohol and MASLD  MELD 3.0 1/04/24 - 33  Blood Type AB+  HE: presented with AMS. Remains altered  EV: unknown (no prior EGD/c-scope)  HCC screening: will need MRI   Ascites: s/p paracentesis 1/02 neg for SBP  Bleeding hx: no s/s of bleeding   #RAHUL   #Frailty and muscle weakness/pain     #BRBPR-  1 episode of BRBPR overnight. Etiology unclear- likely LGIB w/ lower concern for UGIB at this time. Though patient is on pressors, suspect source of shock more likely infectious.     Recommendations:   - F/u triple phase liver protocol CTAP and CT chest  - Full infectious w/u per ICU team given AMS  - Timing of endoscopic eval to be determined  - C/w CTX, octreotide    Note incomplete until finalized by attending signature/attestation.    Heidy Saenz  GI/Hepatology Fellow PGY5    NON-URGENT CONSULTS:  Please email [email or web address]  AT NIGHT AND ON WEEKENDS:  Available on Microsoft Teams  [phone] (Long Range Pager)    After 5pm, please contact the on-call GI fellow. [phone]     57 y/o M PMH DM2, recently diagnosed alcoholic cirrhosis (3 weeks ago) presenting as a transfer from [facility] for transplant eval for decompensated liver cirrhosis. Patient initially presented to [facility] (from assisted living) last week with dizziness and worsening AMS.    #Decompensated cirrhosis likely 2/2 alcohol and MASLD  MELD 3.0 1/04/24 - 33  Blood Type AB+  HE: presented with AMS. Remains altered  EV: unknown (no prior EGD/c-scope)  HCC screening: will need MRI   Ascites: s/p paracentesis 1/02 neg for SBP  Bleeding hx: no s/s of bleeding   #RAHUL   #Frailty and muscle weakness/pain     #BRBPR-  1 episode of BRBPR overnight. Etiology unclear- likely LGIB w/ lower concern for UGIB at this time given stable labs. Though patient is on pressors, suspect source of shock more likely infectious.     Recommendations:   - F/u triple phase liver protocol CTAP and CT chest  -If concern for significant LGIB, would obtain CTA  -Supportive care per MICU  -Transfuse to maintain Hb >7  -active T&S  -2 large bore IVs  - Full infectious w/u per ICU team given AMS  - C/w CTX, octreotide    Note incomplete until finalized by attending signature/attestation.    Heidy Saenz  GI/Hepatology Fellow PGY5    NON-URGENT CONSULTS:  Please email [email or web address]  AT NIGHT AND ON WEEKENDS:  Available on Microsoft Teams  [phone] (Long Range Pager)    After 5pm, please contact the on-call GI fellow. [phone]     57 y/o M PMH DM2, recently diagnosed alcoholic cirrhosis (3 weeks ago) presenting as a transfer from [facility] for transplant eval for decompensated liver cirrhosis. Patient initially presented to [facility] (from assisted living) last week with dizziness and worsening AMS.    #Decompensated cirrhosis likely 2/2 alcohol and MASLD  MELD 3.0 1/04/24 - 33  Blood Type AB+  HE: presented with AMS. Remains altered  EV: unknown (no prior EGD/c-scope)  HCC screening: will need MRI   Ascites: s/p paracentesis 1/02 neg for SBP  Bleeding hx: no s/s of bleeding   #RAHUL   #Frailty and muscle weakness/pain     #BRBPR-  1 episode of BRBPR overnight. Etiology unclear- likely LGIB w/ lower concern for UGIB at this time given stable labs. Though patient is on pressors, suspect source of shock more likely infectious.     Recommendations:   - F/u triple phase liver protocol CTAP and CT chest  -If concern for significant LGIB, would obtain CTA  -Supportive care per MICU  -Transfuse to maintain Hb >7  -active T&S  -2 large bore IVs  - Full infectious w/u per ICU team given AMS  - C/w CTX, octreotide    Note incomplete until finalized by attending signature/attestation.    Heidy Saenz  GI/Hepatology Fellow PGY5    NON-URGENT CONSULTS:  Please email [email or web address]  AT NIGHT AND ON WEEKENDS:  Available on Microsoft Teams  [phone] (Long Range Pager)    After 5pm, please contact the on-call GI fellow. [phone]

## 2024-01-08 NOTE — PROGRESS NOTE ADULT - ASSESSMENT
YONATHAN MENDOZA is a 58y male with PMH of h/o type II DM for ~ 6-7 years, was on metformin and Jardiance,  diagnosed with liver cirrhosis 3 weeks ago, complicated by ascites in outside hospital, now admitted to floors undergoing workup of decompensated liver cirrhosis and RAHUL likely 2/2 HRS presenting for pressor assisted HD.     PLAN  =====Neurologic=====  #Hepatic Encephalopathy   #EtOH use disorder   #Uremic Encephalopathy   - pt with acute encephalopathy (A&Ox1, per HCP baseline A&Ox3) likely 2/2 toxic metabolic and hepatic processes   - PETH negative   -   - continue with lactulose 10mg TID and rifaximin, MELD 35 1/6/24  - start seroquel 25mg QHS    =====Pulmonary=====  Patient breathing comfortably on room air sating 100%       =====Cardiovascular=====  #Intravascular Depletion   #HRS   - pt BP in 90's/ 50's, unable to tolerate HD even with midodrine 30mg TID   - echo with normal EF 77% hyperdynamic otherwise normal diastolic function   - currently in MICU for CRRT pressor assisted (levophed), off midodrine   - wean vasopressors as tolerated     =====GI=====  #Decompensated hepatic cirrhosis   - currently undergoing transplant eval, f/u recs  - s/p IR paracentesis on 1/3- fluid analysis unremarkable for infectious etiology (total nucleated cell count 229 with 33 neutrophils) , fluid chemistry wnl, culture no growth to date   - MRI abd pending eval for HCC, hepatology following- unknown if patient has EV- will need screening EGD after MRI before deciding about A/C for possible PVT given thrombus in main protal vein   c/w Thiamine, B12, Folic Acid, lactulose and rifaximin    - cw octreotide drip   - pt initially scheduled for EGD on 1/5, f/u hepatology recs   - continue home famotidine 20mg   - renal diet   - start ceftriaxone 1/6/24 due to AMS and noted ascites     =====Renal/=====  #Renal mass  #HRS   #HD requiring pressors   - new renal mass seen on US concerning for malignancy, pending MRI for further characterization,- onc following f/u recs  - urology consulted- f/u recs   - HRS with creatinine >5, 4 on admission and 2 at baseline, shiley placed, s/p 2 rounds of HD unable to tolerate 3rd with midodrine   - CRRT 1/6/24 with levophed support   - porras placed 1/6/24 to monitor I/Os  #Electrolytes  - Maintain K>4, Phos>3, Mag>2, iCal>1    =====Endocrine=====  #DM2  - on 5 units lantus and SSI  - monitor and adjust as necessary     =====Infectious Disease=====  - pt with initial WBC to 13k, now downtrending  - infectious w/u negative and completed 5 days of Epimeric Meropenem 12/30- 1/4   - start ceftriaxone 1/6/24 due to AMS and noted ascites     =====Heme/Onc=====  #DVT Ppx- heparin sub q   #Normocytic anemia on initial CBC   likely 2/2 vitamin deficiencies  and anemia of chronic disease   -normal B12/folate  >VERENA- eventual scope to evaluate   - monitor platelet count in setting of thrombocytopenia 2/2 cirrhosis   - 1U pRBC 1/07      =====Ethics=====  FULL CODE   58y male with PMH of h/o type II DM for ~ 6-7 years, was on metformin and Jardiance,  diagnosed with liver cirrhosis 3 weeks ago, complicated by ascites in outside hospital, now admitted to floors undergoing workup of decompensated liver cirrhosis and RAHUL likely 2/2 HRS admitted for CRRT. Now w/ multiple large bloody BM pending GI eval    PLAN  =====Neurologic=====  #Hepatic Encephalopathy   #EtOH use disorder   #Uremic Encephalopathy   - pt with acute encephalopathy (A&Ox1, per HCP baseline A&Ox3) likely 2/2 toxic metabolic and hepatic processes   - PETH negative   - , improved  - continue with lactulose 10mg TID and rifaximin, MELD 35 1/6/24  - start seroquel 25mg QHS    =====Pulmonary=====  Patient breathing comfortably on room air sating 100%   May require intubation for EGD, will disucss with family  - aspiration precautions    =====Cardiovascular=====  #Intravascular Depletion   #HRS   - pt BP in 90's/ 50's, unable to tolerate HD even with midodrine 30mg TID   - echo with normal EF 77% hyperdynamic otherwise normal diastolic function   - currently in MICU for CRRT pressor assisted, off midodrine   - wean vasopressors as tolerated     =====GI=====  #Decompensated hepatic cirrhosis   - currently undergoing transplant eval, f/u recs  - s/p IR paracentesis on 1/3- fluid analysis unremarkable for infectious etiology (total nucleated cell count 229 with 33 neutrophils) , fluid chemistry wnl, culture no growth to date   - MRI abd pending eval for HCC, hepatology following- unknown if patient has EV- will need screening EGD after MRI before deciding about A/C for possible PVT given thrombus in main protal vein   c/w Thiamine, B12, Folic Acid, lactulose and rifaximin    - cw octreotide drip   - pt initially scheduled for EGD on 1/5, f/u hepatology recs   - continue home famotidine 20mg   - renal diet   - start ceftriaxone 1/6/24 due to AMS and noted ascites   - Triple phase CT scan suggestive of metastatic disease  - f/u GI recs    #GIB  - noted to have multiple large bloody BM, Hgb stable, pressor requirements stable  - Hepatology consulted for possible EGD    =====Renal/=====  #Renal mass  #HRS   #HD requiring pressors   - new renal mass seen on US concerning for malignancy, pending MRI for further characterization,- onc following f/u recs  - urology consulted- f/u recs   - HRS with creatinine >5, 4 on admission and 2 at baseline, shiley placed, s/p 2 rounds of HD unable to tolerate 3rd with midodrine   - CRRT 1/6/24 with levophed support   - porras placed 1/6/24 to monitor I/Os  - CT c/f malignancy    #Electrolytes  - Maintain K>4, Phos>3, Mag>2, iCal>1    =====Endocrine=====  #DM2  - on 5 units lantus and SSI  - monitor and adjust as necessary     =====Infectious Disease=====  - pt with initial WBC to 13k, now downtrending  - infectious w/u negative and completed 5 days of Epimeric Meropenem 12/30- 1/4   - start ceftriaxone 1/6/24 due to AMS and noted ascites     =====Heme/Onc=====  #DVT Ppx- heparin sub q   #Normocytic anemia on initial CBC   likely 2/2 vitamin deficiencies  and anemia of chronic disease   -normal B12/folate  >VERENA- eventual scope to evaluate   - monitor platelet count in setting of thrombocytopenia 2/2 cirrhosis   - 1U pRBC 1/07      =====Ethics=====  FULL CODE

## 2024-01-08 NOTE — PROGRESS NOTE ADULT - SUBJECTIVE AND OBJECTIVE BOX
Interval Events:   -remains on pressors  -had large BM w/ clots per team. Received 1u pRBCs for Hb 7.5. repeat 7.7.    Hospital Medications:  camphor 0.5%/menthol 0.5% Topical Lotion 1 Application(s) Topical two times a day  cefTRIAXone   IVPB 1000 milliGRAM(s) IV Intermittent every 24 hours  chlorhexidine 2% Cloths 1 Application(s) Topical daily  chlorhexidine 4% Liquid 1 Application(s) Topical <User Schedule>  CRRT Treatment    <Continuous>  cyanocobalamin 1000 MICROGram(s) Oral daily  dexMEDEtomidine Infusion 0.598 MICROgram(s)/kG/Hr (14.3 mL/Hr) IV Continuous <Continuous>  dextrose 5%. 1000 milliLiter(s) (50 mL/Hr) IV Continuous <Continuous>  dextrose 5%. 1000 milliLiter(s) (100 mL/Hr) IV Continuous <Continuous>  dextrose 50% Injectable 25 Gram(s) IV Push once  dextrose 50% Injectable 25 Gram(s) IV Push once  dextrose 50% Injectable 12.5 Gram(s) IV Push once  dextrose Oral Gel 15 Gram(s) Oral once PRN  folic acid 1 milliGRAM(s) Oral daily  glucagon  Injectable 1 milliGRAM(s) IntraMuscular once  insulin glargine Injectable (LANTUS) 5 Unit(s) SubCutaneous at bedtime  insulin lispro (ADMELOG) corrective regimen sliding scale   SubCutaneous three times a day before meals  insulin lispro (ADMELOG) corrective regimen sliding scale   SubCutaneous at bedtime  lactulose Syrup 10 Gram(s) Oral four times a day  melatonin 1 milliGRAM(s) Oral at bedtime  norepinephrine Infusion 0.6 MICROgram(s)/kG/Min (53.8 mL/Hr) IV Continuous <Continuous>  octreotide  Infusion 50 MICROgram(s)/Hr (10 mL/Hr) IV Continuous <Continuous>  pancrelipase  (CREON  6,000 Lipase Units) 1 Capsule(s) Oral three times a day with meals  pantoprazole  Injectable 40 milliGRAM(s) IV Push every 12 hours  PrismaSATE Dialysate BGK 4 / 2.5 5000 milliLiter(s) (1500 mL/Hr) CRRT <Continuous>  PrismaSOL Filtration BGK 0 / 2.5 5000 milliLiter(s) (200 mL/Hr) CRRT <Continuous>  PrismaSOL Filtration BGK 0 / 2.5 5000 milliLiter(s) (800 mL/Hr) CRRT <Continuous>  QUEtiapine 25 milliGRAM(s) Oral daily  rifAXIMin 550 milliGRAM(s) Oral two times a day  sodium chloride 0.9% lock flush 10 milliLiter(s) IV Push every 1 hour PRN  tamsulosin 0.4 milliGRAM(s) Oral at bedtime  thiamine 100 milliGRAM(s) Oral daily  ursodiol Tablet 250 milliGRAM(s) Oral every 12 hours  vasopressin Infusion 0.04 Unit(s)/Min (6 mL/Hr) IV Continuous <Continuous>      ROS: All system reviewed and negative except as mentioned above.  pancrelipase  (CREON  6,000 Lipase Units): 1 Capsule(s) Oral (24 @ 11:58)  cyanocobalamin: 1000 MICROGram(s) Oral (24 @ 12:01)  folic acid: 1 milliGRAM(s) Oral (24 @ 12:01)  thiamine: 100 milliGRAM(s) Oral (24 @ 12:01)  chlorhexidine 2% Cloths: 1 Application(s) Topical (24 @ 12:02)  lactulose Syrup: 10 Gram(s) Oral (24 @ 12:03)  insulin lispro (ADMELOG) corrective regimen sliding scale: 1 Unit(s) SubCutaneous (24 @ 12:46)  albumin human  5% IVPB: 125 mL/Hr IV Intermittent (24 @ 15:59)  dexMEDEtomidine Infusion: 14.3 mL/Hr IV Continuous (24 @ 16:10)  norepinephrine Infusion: 53.8 mL/Hr IV Continuous (24 @ 17:10)  dexMEDEtomidine Infusion: 14.3 mL/Hr IV Continuous (24 @ 17:11)  insulin lispro (ADMELOG) corrective regimen sliding scale: 1 Unit(s) SubCutaneous (24 @ 17:25)  pantoprazole  Injectable: 40 milliGRAM(s) IV Push (24 @ 17:25)  lactulose Syrup: 10 Gram(s) Oral (24 @ 17:48)  pancrelipase  (CREON  6,000 Lipase Units): 1 Capsule(s) Oral (24 @ 17:48)  rifAXIMin: 550 milliGRAM(s) Oral (24 @ 17:48)  ursodiol Tablet: 250 milliGRAM(s) Oral (24 @ 17:48)  camphor 0.5%/menthol 0.5% Topical Lotion: 1 Application(s) Topical (24 @ 17:49)  QUEtiapine: 25 milliGRAM(s) Oral (24 @ 22:13)  insulin glargine Injectable (LANTUS): 5 Unit(s) SubCutaneous (24 @ 22:13)  melatonin: 1 milliGRAM(s) Oral (24 @ 22:13)  tamsulosin: 0.4 milliGRAM(s) Oral (24 @ 22:13)  norepinephrine Infusion: 53.8 mL/Hr IV Continuous (24 @ 22:15)  octreotide  Infusion: 10 mL/Hr IV Continuous (24 @ 22:15)  PrismaSATE Dialysate BGK 4 / 2.5: 1500 mL/Hr CRRT (24 @ 00:21)  PrismaSOL Filtration BGK 0 / 2.5: 200 mL/Hr CRRT (24 @ 00:21)  PrismaSOL Filtration BGK 0 / 2.5: 800 mL/Hr CRRT (24 @ 00:21)  vasopressin Infusion: 6 mL/Hr IV Continuous (24 @ 01:35)  camphor 0.5%/menthol 0.5% Topical Lotion: 1 Application(s) Topical (24 @ 05:38)  chlorhexidine 4% Liquid: 1 Application(s) Topical (24 @ 05:39)  pantoprazole  Injectable: 40 milliGRAM(s) IV Push (24 @ 05:39)  rifAXIMin: 550 milliGRAM(s) Oral (24 @ 05:39)  ursodiol Tablet: 250 milliGRAM(s) Oral (24 @ 05:39)  pancrelipase  (CREON  6,000 Lipase Units): 1 Capsule(s) Oral (24 @ 08:17)  cefTRIAXone   IVPB: 100 mL/Hr IV Intermittent (24 @ 10:27)      Packed Red Cells Order:  1 Unit  Indication: Symptomatic Anemia - e.g. Chest Pain, Orthostasis, Tach  Infuse Unit : 3 Hours (24 @ 15:50)  Packed Red Cells Order:  1 Unit  Indication: Hgb <8 gm/dL -Stable Cardiovascular Disease or Acute Co  Infuse Unit : As Fast As Possible (24 @ 11:41)          24 @ 07:01  -  24 @ 07:00  --------------------------------------------------------  IN: 275 mL / OUT: 0 mL / NET: 275 mL          PHYSICAL EXAM:   Vital Signs:  Vital Signs Last 24 Hrs  T(C): 36.4 (2024 07:00), Max: 36.9 (2024 00:00)  T(F): 97.5 (2024 07:00), Max: 98.4 (2024 00:00)  HR: 88 (2024 10:30) (79 - 120)  BP: 119/56 (2024 10:30) (66/36 - 159/68)  BP(mean): 81 (2024 10:30) (46 - 98)  RR: 21 (2024 10:30) (11 - 34)  SpO2: 95% (2024 10:30) (92% - 100%)    Parameters below as of 2024 07:00  Patient On (Oxygen Delivery Method): room air      Daily     Daily   GENERAL:  Ill appearing, diffusely jaundiced  HEENT: Icteric sclera  CHEST:  Normal Effort, no signs of resp distress  HEART:  On levo 0.6, vaso 0.4  ABDOMEN:  distended, diffusely tender   EXTREMITIES:  no cyanosis or edema  SKIN:  Warm & Dry. No rash or erythema  NEURO:  A&Ox0, not following commands  LABS:                        7.7    16.80 )-----------( 66       ( 2024 06:26 )             23.8     Last Hb:Hemoglobin: 7.7 g/dL (24 @ 06:26)  Hemoglobin: 7.5 g/dL (24 @ 00:39)  Hemoglobin: 7.7 g/dL (24 @ 19:41)               135   |  101   |  32                 Ca: 9.0    BMP:   ----------------------------< 155    M.4   (24 @ 06:26)             4.8    |  18    | 2.12               Ph: 3.3      LFT:     TPro: 5.4 / Alb: 3.4 / TBili: 18.7 / DBili: x / AST: 118 / ALT: 12 / AlkPhos: 696   (24 @ 06:26)    Creatinine: 2.12 mg/dL  Creatinine: 2.47 mg/dL  Creatinine: 2.17 mg/dL      LIVER FUNCTIONS - ( 2024 06:26 )  Alb: 3.4 g/dL / Pro: 5.4 g/dL / ALK PHOS: 696 U/L / ALT: 12 U/L / AST: 118 U/L / GGT: x           PT/INR - ( 2024 00:41 )   PT: 20.3 sec;   INR: 1.88 ratio         PTT - ( 2024 00:41 )  PTT:41.0 sec  Urinalysis Basic - ( 2024 06:26 )    Color: x / Appearance: x / SG: x / pH: x  Gluc: 155 mg/dL / Ketone: x  / Bili: x / Urobili: x   Blood: x / Protein: x / Nitrite: x   Leuk Esterase: x / RBC: x / WBC x   Sq Epi: x / Non Sq Epi: x / Bacteria: x        Culture - Fungal, Body Fluid (collected 24 @ 17:41)  Source: Peritoneal Peritoneal Fluid  Preliminary Report (24 @ 15:03):    Culture is being performed. Fungal cultures are held for 4 weeks.    Culture - Body Fluid with Gram Stain (collected 24 @ 17:41)  Source: Peritoneal Peritoneal Fluid  Gram Stain (24 @ 05:48):    No polymorphonuclear cells seen    No organisms seen    by cytocentrifuge  Preliminary Report (24 @ 18:43):    No growth to date.    Culture - Urine (collected 24 @ 10:04)  Source: Clean Catch Clean Catch (Midstream)  Final Report (24 @ 10:19):    <10,000 CFU/mL Normal Urogenital Fabienne    Culture - Blood (collected 23 @ 07:46)  Source: .Blood Blood-Peripheral  Final Report (24 @ 09:00):    No growth at 5 days    Culture - Blood (collected 23 @ 07:37)  Source: .Blood Blood-Peripheral  Final Report (24 @ 09:00):    No growth at 5 days            Imaging: Images reviewed.

## 2024-01-09 NOTE — PROGRESS NOTE ADULT - SUBJECTIVE AND OBJECTIVE BOX
Heme/Onc Progress Note    INTERVAL HPI/OVERNIGHT EVENTS:  Required intubation yesterday and transferred to MICU.    MEDICATIONS  (STANDING):  camphor 0.5%/menthol 0.5% Topical Lotion 1 Application(s) Topical two times a day  chlorhexidine 0.12% Liquid 15 milliLiter(s) Oral Mucosa every 12 hours  chlorhexidine 4% Liquid 1 Application(s) Topical <User Schedule>  CRRT Treatment    <Continuous>  dextrose 5%. 1000 milliLiter(s) (50 mL/Hr) IV Continuous <Continuous>  dextrose 5%. 1000 milliLiter(s) (100 mL/Hr) IV Continuous <Continuous>  dextrose 50% Injectable 25 Gram(s) IV Push once  dextrose 50% Injectable 25 Gram(s) IV Push once  dextrose 50% Injectable 12.5 Gram(s) IV Push once  folic acid Injectable 1 milliGRAM(s) IV Push daily  glucagon  Injectable 1 milliGRAM(s) IntraMuscular once  insulin lispro (ADMELOG) corrective regimen sliding scale   SubCutaneous every 6 hours  ketamine Infusion. 0.2 mG/kG/Hr (1.91 mL/Hr) IV Continuous <Continuous>  lactulose Retention Enema 200 Gram(s) Rectal every 8 hours  norepinephrine Infusion 0.6 MICROgram(s)/kG/Min (53.8 mL/Hr) IV Continuous <Continuous>  octreotide  Infusion 50 MICROgram(s)/Hr (10 mL/Hr) IV Continuous <Continuous>  pantoprazole  Injectable 40 milliGRAM(s) IV Push every 12 hours  phenylephrine    Infusion 0.1 MICROgram(s)/kG/Min (3.59 mL/Hr) IV Continuous <Continuous>  piperacillin/tazobactam IVPB.- 3.375 Gram(s) IV Intermittent once  piperacillin/tazobactam IVPB.. 3.375 Gram(s) IV Intermittent every 8 hours  PrismaSATE Dialysate BGK 4 / 2.5 5000 milliLiter(s) (1500 mL/Hr) CRRT <Continuous>  PrismaSOL Filtration BGK 0 / 2.5 5000 milliLiter(s) (800 mL/Hr) CRRT <Continuous>  PrismaSOL Filtration BGK 0 / 2.5 5000 milliLiter(s) (200 mL/Hr) CRRT <Continuous>  propofol Infusion 50 MICROgram(s)/kG/Min (28.7 mL/Hr) IV Continuous <Continuous>  thiamine Injectable 100 milliGRAM(s) IV Push daily  vasopressin Infusion 0.04 Unit(s)/Min (6 mL/Hr) IV Continuous <Continuous>    MEDICATIONS  (PRN):  dextrose Oral Gel 15 Gram(s) Oral once PRN Blood Glucose LESS THAN 70 milliGRAM(s)/deciliter  sodium chloride 0.9% lock flush 10 milliLiter(s) IV Push every 1 hour PRN Pre/post blood products, medications, blood draw, and to maintain line patency      Allergies    No Known Allergies    Intolerances        VITAL SIGNS:  T(F): 100.4 (01-09-24 @ 08:00)  HR: 95 (01-09-24 @ 10:30)  BP: 102/52 (01-09-24 @ 10:30)  RR: 14 (01-09-24 @ 10:30)  SpO2: 100% (01-09-24 @ 10:30)  Wt(kg): --    PHYSICAL EXAM:  Constitutional: NAD  HEENT: non-icteric; moist mucous membranes, no mucositis  Neck: supple  Respiratory: CTA b/l, good air entry b/l  Cardiovascular: RRR, no M/R/G  Gastrointestinal: soft, NTND, no masses palpable, + BS  Extremities: no gross deformities   Neurological: nonfocal    LABS:                        7.6    14.60 )-----------( 47       ( 09 Jan 2024 06:23 )             23.5     01-09    134<L>  |  101  |  20  ----------------------------<  152<H>  4.3   |  21<L>  |  1.62<H>    Ca    8.7      09 Jan 2024 06:22  Phos  3.3     01-09  Mg     2.4     01-09    TPro  5.4<L>  /  Alb  3.4  /  TBili  19.1<H>  /  DBili  x   /  AST  125<H>  /  ALT  17  /  AlkPhos  653<H>  01-09    PT/INR - ( 09 Jan 2024 00:20 )   PT: 18.5 sec;   INR: 1.79 ratio         PTT - ( 09 Jan 2024 00:20 )  PTT:35.3 sec    RADIOLOGY & ADDITIONAL TESTS:

## 2024-01-09 NOTE — PROCEDURE NOTE - SUPERVISORY STATEMENT
Consent obtained. No immediate complications. Platelets and cryoprecipitate given prior to procedure.  US guidance for paracentesis.

## 2024-01-09 NOTE — PROGRESS NOTE ADULT - ASSESSMENT
59 y/o M PMH DM2, recently diagnosed alcoholic cirrhosis (3 weeks ago) presenting as a transfer from [facility] for transplant eval for decompensated liver cirrhosis. Patient initially presented to [facility] (from assisted living) last week with dizziness and worsening AMS, course c/b ARF (on CRRT), hematochezia s/p EGD on 1/8 showing PGH and polyp in esophagus (no varices) as well as CT showing ill defined lesions in liver c/f neoplasm w/ PVT, peritoneal implants and indeterminate lung nodules.       #Decompensated cirrhosis likely 2/2 alcohol and MASLD  #C/f metastatic cancer based on CT 1/8 showing ill defined liver lesions, periteonal implants and indeterminate lung nodules  MELD 3.0 1/04/24 - 33  Blood Type AB+  Now w/ imaging showing cirrhosis, ill defined liver lesions c/f neoplasm w/ PVT, peritoneal implants and indeterminate lung nodules. Given new findings, would not be transplant candidate (cannot transplant a patient who has underlying malignancy)  HE: Now intubated for airway protection in setting bleed  EV: Hematochezia on 1/8 s/p EGD w/ severe PGH and esophageal polyp (no EVs). Etiology most likely 2/2 LGIB, possibly from mets vs. AVM vs. diveritcular. Appears to have stabilized  Ascites: s/p paracentesis 1/02 neg for SBP  #RAHUL - on CRRT    Recommendations:   -Appreciate heme/onc recs for new findings on CT c/f metastatic disease  -Trend Hb; transfuse to maintain Hb>7  -C/w octreotide x72 hours for PHG  -C/w CTX or abx coverage for total 5 days  -Maintain active T&S  -recommend palliative care involvement if in line with primary team plan  -If further significant rectal bleeding, recommend CTA to further evaluate source of bleed  -Hepatology will sign off at this time. Please call with any questions    Heidy Saenz  GI/Hepatology Fellow PGY5    NON-URGENT CONSULTS:  Please email [email or web address]  AT NIGHT AND ON WEEKENDS:  Available on Microsoft Teams  [phone] (Long Range Pager)    After 5pm, please contact the on-call GI fellow. [phone] 57 y/o M PMH DM2, recently diagnosed alcoholic cirrhosis (3 weeks ago) presenting as a transfer from [facility] for transplant eval for decompensated liver cirrhosis. Patient initially presented to [facility] (from assisted living) last week with dizziness and worsening AMS, course c/b ARF (on CRRT), hematochezia s/p EGD on 1/8 showing PGH and polyp in esophagus (no varices) as well as CT showing ill defined lesions in liver c/f neoplasm w/ PVT, peritoneal implants and indeterminate lung nodules.       #Decompensated cirrhosis likely 2/2 alcohol and MASLD  #C/f metastatic cancer based on CT 1/8 showing ill defined liver lesions, periteonal implants and indeterminate lung nodules  MELD 3.0 1/04/24 - 33  Blood Type AB+  Now w/ imaging showing cirrhosis, ill defined liver lesions c/f neoplasm w/ PVT, peritoneal implants and indeterminate lung nodules. Given new findings, would not be transplant candidate (cannot transplant a patient who has underlying malignancy)  HE: Now intubated for airway protection in setting bleed  EV: Hematochezia on 1/8 s/p EGD w/ severe PGH and esophageal polyp (no EVs). Etiology most likely 2/2 LGIB, possibly from mets vs. AVM vs. diveritcular. Appears to have stabilized  Ascites: s/p paracentesis 1/02 neg for SBP  #RAHUL - on CRRT    Recommendations:   -Appreciate heme/onc recs for new findings on CT c/f metastatic disease  -Trend Hb; transfuse to maintain Hb>7  -C/w octreotide x72 hours for PHG  -C/w CTX or abx coverage for total 5 days  -Maintain active T&S  -recommend palliative care involvement if in line with primary team plan  -If further significant rectal bleeding, recommend CTA to further evaluate source of bleed  -Hepatology will sign off at this time. Please call with any questions    Heidy Saenz  GI/Hepatology Fellow PGY5    NON-URGENT CONSULTS:  Please email [email or web address]  AT NIGHT AND ON WEEKENDS:  Available on Microsoft Teams  [phone] (Long Range Pager)    After 5pm, please contact the on-call GI fellow. [phone] 59 y/o M PMH DM2, recently diagnosed alcoholic cirrhosis (3 weeks ago) presenting as a transfer from [facility] for transplant eval for decompensated liver cirrhosis. Patient initially presented to [facility] (from assisted living) last week with dizziness and worsening AMS, course c/b ARF (on CRRT), hematochezia s/p EGD on 1/8 showing PGH and polyp in esophagus (no varices) as well as CT showing ill defined lesions in liver c/f neoplasm w/ PVT, peritoneal implants and indeterminate lung nodules.       #Decompensated cirrhosis likely 2/2 alcohol and MASLD  #C/f metastatic cancer based on CT 1/8 showing ill defined liver lesions, periteonal implants and indeterminate lung nodules  MELD 3.0 1/04/24 - 33  Blood Type AB+  Now w/ imaging showing cirrhosis, ill defined liver lesions c/f neoplasm w/ PVT, peritoneal implants and indeterminate lung nodules. Given new findings, would not be transplant candidate (cannot transplant a patient who has underlying malignancy)  HE: Now intubated for airway protection in setting bleed  EV: Hematochezia on 1/8 s/p EGD w/ severe PGH and esophageal polyp (no EVs). Etiology most likely 2/2 LGIB, possibly from mets vs. AVM vs. diveritcular. Appears to have stabilized  Ascites: s/p paracentesis 1/02 neg for SBP  #RAHUL - on CRRT    Recommendations:   -Appreciate heme/onc recs for new findings on CT c/f metastatic disease  -Trend Hb; transfuse to maintain Hb>7  -C/w octreotide x72 hours for PHG  -C/w CTX or abx coverage for total 5 days  -Maintain active T&S  -recommend palliative care involvement if in line with primary team plan  -If further significant rectal bleeding, recommend CTA to further evaluate source of bleed  -Hepatology will sign off at this time. Please call with any questions    Heidy Saenz  GI/Hepatology Fellow PGY5    NON-URGENT CONSULTS:  Please email [email or web address] OR [email or web address]  AT NIGHT AND ON WEEKENDS:  Available on Microsoft Teams  [phone] (Long Range Pager)    After 5pm, please contact the on-call GI fellow. [phone] 57 y/o M PMH DM2, recently diagnosed alcoholic cirrhosis (3 weeks ago) presenting as a transfer from [facility] for transplant eval for decompensated liver cirrhosis. Patient initially presented to [facility] (from assisted living) last week with dizziness and worsening AMS, course c/b ARF (on CRRT), hematochezia s/p EGD on 1/8 showing PGH and polyp in esophagus (no varices) as well as CT showing ill defined lesions in liver c/f neoplasm w/ PVT, peritoneal implants and indeterminate lung nodules.       #Decompensated cirrhosis likely 2/2 alcohol and MASLD  #C/f metastatic cancer based on CT 1/8 showing ill defined liver lesions, periteonal implants and indeterminate lung nodules  MELD 3.0 1/04/24 - 33  Blood Type AB+  Now w/ imaging showing cirrhosis, ill defined liver lesions c/f neoplasm w/ PVT, peritoneal implants and indeterminate lung nodules. Given new findings, would not be transplant candidate (cannot transplant a patient who has underlying malignancy)  HE: Now intubated for airway protection in setting bleed  EV: Hematochezia on 1/8 s/p EGD w/ severe PGH and esophageal polyp (no EVs). Etiology most likely 2/2 LGIB, possibly from mets vs. AVM vs. diveritcular. Appears to have stabilized  Ascites: s/p paracentesis 1/02 neg for SBP  #RAHUL - on CRRT    Recommendations:   -Appreciate heme/onc recs for new findings on CT c/f metastatic disease  -Trend Hb; transfuse to maintain Hb>7  -C/w octreotide x72 hours for PHG  -C/w CTX or abx coverage for total 5 days  -Maintain active T&S  -recommend palliative care involvement if in line with primary team plan  -If further significant rectal bleeding, recommend CTA to further evaluate source of bleed  -Hepatology will sign off at this time. Please call with any questions    Heidy Saenz  GI/Hepatology Fellow PGY5    NON-URGENT CONSULTS:  Please email [email or web address]  AT NIGHT AND ON WEEKENDS:  Available on [employer] Teams  [phone] (Long Range Pager)    After 5pm, please contact the on-call GI fellow. [phone]

## 2024-01-09 NOTE — PROGRESS NOTE ADULT - ATTENDING COMMENTS
58 year old man with h/o type II DM for ~ 6-7 years, was on metformin and Jardiance, recently diagnosed with liver cirrhosis complicated by ascites. He underwent paracentesis x 3. Course complicated by worsening RAHUL and encephalopathy. He was transferred to [facility] for liver transplant evaluation. He was initiated on HD on 12/31/23.     Renal US with normal sized kidneys measuring ~ 11.6 and 11.8 cm. No hydro. Multilobulated vascular mass arises from the right kidney extending into the peritoneum. Findings are concerning for primary renal malignancy versus metastatic disease. MRI pending.     RAHUL on CKD - unclear baseline renal function. No significant proteinuria UPCR 0.4, normal sized kidneys on US. Initiated HD on 12/31, but Unable to tolerate HD due to hypotension unresponsive to increase midodrine dose.    Labs with worsening BUN/Cr and metabolic acidosis and therefore patient was moved to ICU consult for pressor support and started on CRRT.   Has a right IJ temporary cathter   continue CRRT for now. labs reviewed. 58 year old man with h/o type II DM for ~ 6-7 years, was on metformin and Jardiance, recently diagnosed with liver cirrhosis complicated by ascites. He underwent paracentesis x 3. Course complicated by worsening RAHUL and encephalopathy. He was transferred to [facility] for liver transplant evaluation. He was initiated on HD on 12/31/23.     Renal US with normal sized kidneys measuring ~ 11.6 and 11.8 cm. No hydro. Multilobulated vascular mass arises from the right kidney extending into the peritoneum. Findings are concerning for primary renal malignancy versus metastatic disease. MRI pending.     RAHUL on CKD - unclear baseline renal function. No significant proteinuria UPCR 0.4, normal sized kidneys on US. Initiated HD on 12/31, but Unable to tolerate HD due to hypotension unresponsive to increase midodrine dose.    Labs with worsening BUN/Cr and metabolic acidosis and therefore patient was moved to ICU consult for pressor support and started on CRRT.   Has a right IJ temporary cathter   continue CRRT for now. labs reviewed.

## 2024-01-09 NOTE — PROCEDURE NOTE - NSBRONCHFINDINGS_GEN_A_CORE_FT
slightly abnormal anatomic variants with respect to subsegments. Overall normal appearing airway wihtout any endobronchial lesions. No bleeding.

## 2024-01-09 NOTE — PROGRESS NOTE ADULT - SUBJECTIVE AND OBJECTIVE BOX
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     Hospital Medications:  camphor 0.5%/menthol 0.5% Topical Lotion 1 Application(s) Topical two times a day  chlorhexidine 0.12% Liquid 15 milliLiter(s) Oral Mucosa every 12 hours  chlorhexidine 4% Liquid 1 Application(s) Topical <User Schedule>  CRRT Treatment    <Continuous>  dextrose 5%. 1000 milliLiter(s) (50 mL/Hr) IV Continuous <Continuous>  dextrose 5%. 1000 milliLiter(s) (100 mL/Hr) IV Continuous <Continuous>  dextrose 50% Injectable 25 Gram(s) IV Push once  dextrose 50% Injectable 25 Gram(s) IV Push once  dextrose 50% Injectable 12.5 Gram(s) IV Push once  dextrose Oral Gel 15 Gram(s) Oral once PRN  folic acid Injectable 1 milliGRAM(s) IV Push daily  glucagon  Injectable 1 milliGRAM(s) IntraMuscular once  insulin lispro (ADMELOG) corrective regimen sliding scale   SubCutaneous every 6 hours  ketamine Infusion. 0.2 mG/kG/Hr (1.91 mL/Hr) IV Continuous <Continuous>  lactulose Retention Enema 200 Gram(s) Rectal every 8 hours  norepinephrine Infusion 0.6 MICROgram(s)/kG/Min (53.8 mL/Hr) IV Continuous <Continuous>  octreotide  Infusion 50 MICROgram(s)/Hr (10 mL/Hr) IV Continuous <Continuous>  pantoprazole  Injectable 40 milliGRAM(s) IV Push every 12 hours  phenylephrine    Infusion 0.1 MICROgram(s)/kG/Min (3.59 mL/Hr) IV Continuous <Continuous>  phytonadione  IVPB 10 milliGRAM(s) IV Intermittent once  piperacillin/tazobactam IVPB.- 3.375 Gram(s) IV Intermittent once  piperacillin/tazobactam IVPB.. 3.375 Gram(s) IV Intermittent every 8 hours  PrismaSATE Dialysate BGK 4 / 2.5 5000 milliLiter(s) (1500 mL/Hr) CRRT <Continuous>  PrismaSOL Filtration BGK 0 / 2.5 5000 milliLiter(s) (200 mL/Hr) CRRT <Continuous>  PrismaSOL Filtration BGK 0 / 2.5 5000 milliLiter(s) (800 mL/Hr) CRRT <Continuous>  propofol Infusion 50 MICROgram(s)/kG/Min (28.7 mL/Hr) IV Continuous <Continuous>  sodium chloride 0.9% lock flush 10 milliLiter(s) IV Push every 1 hour PRN  thiamine Injectable 100 milliGRAM(s) IV Push daily  vasopressin Infusion 0.04 Unit(s)/Min (6 mL/Hr) IV Continuous <Continuous>      ROS: All system reviewed and negative except as mentioned above.  cyanocobalamin: 1000 MICROGram(s) Oral (24 @ 12:47)  folic acid: 1 milliGRAM(s) Oral (24 @ 12:47)  thiamine: 100 milliGRAM(s) Oral (24 @ 12:47)  chlorhexidine 2% Cloths: 1 Application(s) Topical (24 @ 12:48)  lactulose Syrup: 10 Gram(s) Oral (24 @ 12:48)  pancrelipase  (CREON  6,000 Lipase Units): 1 Capsule(s) Oral (24 @ 12:48)  ketamine Injectable: 76 milliGRAM(s) IV Push (24 @ 14:00)  propofol Injectable: 50 milliGRAM(s) IV Push (24 @ 14:00)  erythromycin   IVPB: 500 mL/Hr IV Intermittent (24 @ 14:33)  ketamine Infusion.: 1.91 mL/Hr IV Continuous (24 @ 14:38)  ketamine Injectable: 47.8 milliGRAM(s) IV Push (24 @ 15:02)  propofol Infusion: 28.7 mL/Hr IV Continuous (24 @ 15:18)  LORazepam   Injectable: 3 milliGRAM(s) IV Push (24 @ 16:44)  propofol Infusion: 28.7 mL/Hr IV Continuous (24 @ 16:46)  chlorhexidine 0.12% Liquid: 15 milliLiter(s) Oral Mucosa (24 @ 17:13)  pantoprazole  Injectable: 40 milliGRAM(s) IV Push (24 @ 17:14)  camphor 0.5%/menthol 0.5% Topical Lotion: 1 Application(s) Topical (24 @ 17:15)  phytonadione  IVPB: 102 mL/Hr IV Intermittent (24 @ 20:26)  norepinephrine Infusion: 53.8 mL/Hr IV Continuous (24 @ 20:28)  octreotide  Infusion: 10 mL/Hr IV Continuous (24 @ 20:28)  propofol Infusion: 28.7 mL/Hr IV Continuous (24 @ 20:28)  vasopressin Infusion: 6 mL/Hr IV Continuous (24 @ 20:28)  phenylephrine    Infusion: 3.59 mL/Hr IV Continuous (24 @ 20:41)  vancomycin  IVPB: 250 mL/Hr IV Intermittent (24 @ 21:48)  acetaminophen   IVPB ..: 400 mL/Hr IV Intermittent (24 @ 00:16)  insulin lispro (ADMELOG) corrective regimen sliding scale: 1 Unit(s) SubCutaneous (24 @ 00:16)  chlorhexidine 0.12% Liquid: 15 milliLiter(s) Oral Mucosa (24 @ 05:21)  pantoprazole  Injectable: 40 milliGRAM(s) IV Push (24 @ 05:22)  camphor 0.5%/menthol 0.5% Topical Lotion: 1 Application(s) Topical (24 @ 05:24)  chlorhexidine 4% Liquid: 1 Application(s) Topical (24 @ 05:24)  insulin lispro (ADMELOG) corrective regimen sliding scale: 1 Unit(s) SubCutaneous (24 @ 05:24)  piperacillin/tazobactam IVPB.: 200 mL/Hr IV Intermittent (24 @ 10:49)  vancomycin  IVPB: 250 mL/Hr IV Intermittent (24 @ 11:27)  folic acid Injectable: 1 milliGRAM(s) IV Push (24 @ 12:00)  thiamine Injectable: 100 milliGRAM(s) IV Push (24 @ 12:00)  insulin lispro (ADMELOG) corrective regimen sliding scale: 1 Unit(s) SubCutaneous (24 @ 12:02)      Packed Red Cells Order:  1 Unit  Indication: Hgb <7 gm/dL  Infuse Unit : 3 Hours (24 @ 13:12)  Packed Red Cells Order:  1 Unit  Indication: Hgb <8 gm/dL -Stable Cardiovascular Disease or Acute Co  Infuse Unit : As Fast As Possible (24 @ 13:08)      CT Abdomen and Pelvis w/ IV Cont:   ACC: [id number] EXAM:  CT CHEST IC   ORDERED BY:  LADI DUNNE      <TRUNCATED> (24 @ 09:17)  US Abdomen Upper Quadrant Right:   ACC: [id number] EXAM:  US ABDOMEN RT UPR QUADRANT   ORDERED BY:  MERLIN  <TRUNCATED> (24 @ 12:51)            PHYSICAL EXAM:   Vital Signs:  Vital Signs Last 24 Hrs  T(C): 38 (2024 08:00), Max: 38.2 (2024 00:00)  T(F): 100.4 (2024 08:00), Max: 100.8 (2024 00:00)  HR: 96 (2024 12:00) (89 - 106)  BP: 93/50 (2024 12:00) (74/41 - 149/63)  BP(mean): 67 (2024 12:00) (52 - 92)  RR: 18 (2024 12:00) (0 - 25)  SpO2: 100% (2024 12:00) (96% - 100%)    Parameters below as of 2024 08:00  Patient On (Oxygen Delivery Method): ventilator    O2 Concentration (%): 40  Daily     Daily   GENERAL:  intubated, sedated  HEENT:  Vented  CHEST:  On volume support, O2 40%  HEART:  On levo, vaso, maintaining MAPs  ABDOMEN:  Soft, non-tender, non-distended  EXTREMITIES:  no cyanosis or edema  SKIN:  Warm & Dry. No rash or erythema  NEURO:  Not responsive to voice or touch, sedated  LABS:                        7.6    15.02 )-----------( 39       ( 2024 12:12 )             23.7     Last Hb:Hemoglobin: 7.6 g/dL (24 @ 12:12)  Hemoglobin: 7.6 g/dL (24 @ 06:23)  Hemoglobin: 7.3 g/dL (24 @ 00:20)               135   |  100   |  19                 Ca: 8.6    BMP:   ----------------------------< 177    M.5   (24 @ 12:12)             4.2    |  20    | 1.50               Ph: 3.3      LFT:     TPro: 5.1 / Alb: 2.8 / TBili: 19.1 / DBili: x / AST: 119 / ALT: 15 / AlkPhos: 661   (24 @ 12:12)    Creatinine: 1.50 mg/dL  Creatinine: 1.62 mg/dL  Creatinine: 1.74 mg/dL      LIVER FUNCTIONS - ( 2024 12:12 )  Alb: 2.8 g/dL / Pro: 5.1 g/dL / ALK PHOS: 661 U/L / ALT: 15 U/L / AST: 119 U/L / GGT: x           PT/INR - ( 2024 00:20 )   PT: 18.5 sec;   INR: 1.79 ratio         PTT - ( 2024 00:20 )  PTT:35.3 sec  Urinalysis Basic - ( 2024 12:12 )    Color: x / Appearance: x / SG: x / pH: x  Gluc: 177 mg/dL / Ketone: x  / Bili: x / Urobili: x   Blood: x / Protein: x / Nitrite: x   Leuk Esterase: x / RBC: x / WBC x   Sq Epi: x / Non Sq Epi: x / Bacteria: x        Culture - Fungal, Body Fluid (collected 24 @ 17:41)  Source: Peritoneal Peritoneal Fluid  Preliminary Report (24 @ 15:03):    Culture is being performed. Fungal cultures are held for 4 weeks.    Culture - Body Fluid with Gram Stain (collected 24 @ 17:41)  Source: Peritoneal Peritoneal Fluid  Gram Stain (24 @ 05:48):    No polymorphonuclear cells seen    No organisms seen    by cytocentrifuge  Final Report (24 @ 17:09):    No growth at 5 days    Culture - Urine (collected 24 @ 10:04)  Source: Clean Catch Clean Catch (Midstream)  Final Report (24 @ 10:19):    <10,000 CFU/mL Normal Urogenital Fabienne    Culture - Blood (collected 23 @ 07:46)  Source: .Blood Blood-Peripheral  Final Report (24 @ 09:00):    No growth at 5 days    Culture - Blood (collected 23 @ 07:37)  Source: .Blood Blood-Peripheral  Final Report (24 @ 09:00):    No growth at 5 days            Imaging: Images reviewed.      < from: CT Chest w/ IV Cont (24 @ 09:17) >  IMPRESSION:  Limited arterial phase.    Cirrhosis. Evidence of portal hypertension and moderate volume ascites.    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    Peritoneal metastatic implants as above.    Indeterminant lung nodules, metastasis cannot be excluded.    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     Hospital Medications:  camphor 0.5%/menthol 0.5% Topical Lotion 1 Application(s) Topical two times a day  chlorhexidine 0.12% Liquid 15 milliLiter(s) Oral Mucosa every 12 hours  chlorhexidine 4% Liquid 1 Application(s) Topical <User Schedule>  CRRT Treatment    <Continuous>  dextrose 5%. 1000 milliLiter(s) (50 mL/Hr) IV Continuous <Continuous>  dextrose 5%. 1000 milliLiter(s) (100 mL/Hr) IV Continuous <Continuous>  dextrose 50% Injectable 25 Gram(s) IV Push once  dextrose 50% Injectable 25 Gram(s) IV Push once  dextrose 50% Injectable 12.5 Gram(s) IV Push once  dextrose Oral Gel 15 Gram(s) Oral once PRN  folic acid Injectable 1 milliGRAM(s) IV Push daily  glucagon  Injectable 1 milliGRAM(s) IntraMuscular once  insulin lispro (ADMELOG) corrective regimen sliding scale   SubCutaneous every 6 hours  ketamine Infusion. 0.2 mG/kG/Hr (1.91 mL/Hr) IV Continuous <Continuous>  lactulose Retention Enema 200 Gram(s) Rectal every 8 hours  norepinephrine Infusion 0.6 MICROgram(s)/kG/Min (53.8 mL/Hr) IV Continuous <Continuous>  octreotide  Infusion 50 MICROgram(s)/Hr (10 mL/Hr) IV Continuous <Continuous>  pantoprazole  Injectable 40 milliGRAM(s) IV Push every 12 hours  phenylephrine    Infusion 0.1 MICROgram(s)/kG/Min (3.59 mL/Hr) IV Continuous <Continuous>  phytonadione  IVPB 10 milliGRAM(s) IV Intermittent once  piperacillin/tazobactam IVPB.- 3.375 Gram(s) IV Intermittent once  piperacillin/tazobactam IVPB.. 3.375 Gram(s) IV Intermittent every 8 hours  PrismaSATE Dialysate BGK 4 / 2.5 5000 milliLiter(s) (1500 mL/Hr) CRRT <Continuous>  PrismaSOL Filtration BGK 0 / 2.5 5000 milliLiter(s) (200 mL/Hr) CRRT <Continuous>  PrismaSOL Filtration BGK 0 / 2.5 5000 milliLiter(s) (800 mL/Hr) CRRT <Continuous>  propofol Infusion 50 MICROgram(s)/kG/Min (28.7 mL/Hr) IV Continuous <Continuous>  sodium chloride 0.9% lock flush 10 milliLiter(s) IV Push every 1 hour PRN  thiamine Injectable 100 milliGRAM(s) IV Push daily  vasopressin Infusion 0.04 Unit(s)/Min (6 mL/Hr) IV Continuous <Continuous>      ROS: All system reviewed and negative except as mentioned above.  cyanocobalamin: 1000 MICROGram(s) Oral (24 @ 12:47)  folic acid: 1 milliGRAM(s) Oral (24 @ 12:47)  thiamine: 100 milliGRAM(s) Oral (24 @ 12:47)  chlorhexidine 2% Cloths: 1 Application(s) Topical (24 @ 12:48)  lactulose Syrup: 10 Gram(s) Oral (24 @ 12:48)  pancrelipase  (CREON  6,000 Lipase Units): 1 Capsule(s) Oral (24 @ 12:48)  ketamine Injectable: 76 milliGRAM(s) IV Push (24 @ 14:00)  propofol Injectable: 50 milliGRAM(s) IV Push (24 @ 14:00)  erythromycin   IVPB: 500 mL/Hr IV Intermittent (24 @ 14:33)  ketamine Infusion.: 1.91 mL/Hr IV Continuous (24 @ 14:38)  ketamine Injectable: 47.8 milliGRAM(s) IV Push (24 @ 15:02)  propofol Infusion: 28.7 mL/Hr IV Continuous (24 @ 15:18)  LORazepam   Injectable: 3 milliGRAM(s) IV Push (24 @ 16:44)  propofol Infusion: 28.7 mL/Hr IV Continuous (24 @ 16:46)  chlorhexidine 0.12% Liquid: 15 milliLiter(s) Oral Mucosa (24 @ 17:13)  pantoprazole  Injectable: 40 milliGRAM(s) IV Push (24 @ 17:14)  camphor 0.5%/menthol 0.5% Topical Lotion: 1 Application(s) Topical (24 @ 17:15)  phytonadione  IVPB: 102 mL/Hr IV Intermittent (24 @ 20:26)  norepinephrine Infusion: 53.8 mL/Hr IV Continuous (24 @ 20:28)  octreotide  Infusion: 10 mL/Hr IV Continuous (24 @ 20:28)  propofol Infusion: 28.7 mL/Hr IV Continuous (24 @ 20:28)  vasopressin Infusion: 6 mL/Hr IV Continuous (24 @ 20:28)  phenylephrine    Infusion: 3.59 mL/Hr IV Continuous (24 @ 20:41)  vancomycin  IVPB: 250 mL/Hr IV Intermittent (24 @ 21:48)  acetaminophen   IVPB ..: 400 mL/Hr IV Intermittent (24 @ 00:16)  insulin lispro (ADMELOG) corrective regimen sliding scale: 1 Unit(s) SubCutaneous (24 @ 00:16)  chlorhexidine 0.12% Liquid: 15 milliLiter(s) Oral Mucosa (24 @ 05:21)  pantoprazole  Injectable: 40 milliGRAM(s) IV Push (24 @ 05:22)  camphor 0.5%/menthol 0.5% Topical Lotion: 1 Application(s) Topical (24 @ 05:24)  chlorhexidine 4% Liquid: 1 Application(s) Topical (24 @ 05:24)  insulin lispro (ADMELOG) corrective regimen sliding scale: 1 Unit(s) SubCutaneous (24 @ 05:24)  piperacillin/tazobactam IVPB.: 200 mL/Hr IV Intermittent (24 @ 10:49)  vancomycin  IVPB: 250 mL/Hr IV Intermittent (24 @ 11:27)  folic acid Injectable: 1 milliGRAM(s) IV Push (24 @ 12:00)  thiamine Injectable: 100 milliGRAM(s) IV Push (24 @ 12:00)  insulin lispro (ADMELOG) corrective regimen sliding scale: 1 Unit(s) SubCutaneous (24 @ 12:02)      Packed Red Cells Order:  1 Unit  Indication: Hgb <7 gm/dL  Infuse Unit : 3 Hours (24 @ 13:12)  Packed Red Cells Order:  1 Unit  Indication: Hgb <8 gm/dL -Stable Cardiovascular Disease or Acute Co  Infuse Unit : As Fast As Possible (24 @ 13:08)      CT Abdomen and Pelvis w/ IV Cont:   ACC: [id number] EXAM:  CT CHEST IC   ORDERED BY:  LADI DUNNE      <TRUNCATED> (24 @ 09:17)  US Abdomen Upper Quadrant Right:   ACC: [id number] EXAM:  US ABDOMEN RT UPR QUADRANT   ORDERED BY:  MERLIN  <TRUNCATED> (24 @ 12:51)            PHYSICAL EXAM:   Vital Signs:  Vital Signs Last 24 Hrs  T(C): 38 (2024 08:00), Max: 38.2 (2024 00:00)  T(F): 100.4 (2024 08:00), Max: 100.8 (2024 00:00)  HR: 96 (2024 12:00) (89 - 106)  BP: 93/50 (2024 12:00) (74/41 - 149/63)  BP(mean): 67 (2024 12:00) (52 - 92)  RR: 18 (2024 12:00) (0 - 25)  SpO2: 100% (2024 12:00) (96% - 100%)    Parameters below as of 2024 08:00  Patient On (Oxygen Delivery Method): ventilator    O2 Concentration (%): 40  Daily     Daily   GENERAL:  intubated, sedated  HEENT:  Vented  CHEST:  On volume support, O2 40%  HEART:  On levo, vaso, maintaining MAPs  ABDOMEN:  Soft, non-tender, non-distended  EXTREMITIES:  no cyanosis or edema  SKIN:  Warm & Dry. No rash or erythema  NEURO:  Not responsive to voice or touch, sedated  LABS:                        7.6    15.02 )-----------( 39       ( 2024 12:12 )             23.7     Last Hb:Hemoglobin: 7.6 g/dL (24 @ 12:12)  Hemoglobin: 7.6 g/dL (24 @ 06:23)  Hemoglobin: 7.3 g/dL (24 @ 00:20)               135   |  100   |  19                 Ca: 8.6    BMP:   ----------------------------< 177    M.5   (24 @ 12:12)             4.2    |  20    | 1.50               Ph: 3.3      LFT:     TPro: 5.1 / Alb: 2.8 / TBili: 19.1 / DBili: x / AST: 119 / ALT: 15 / AlkPhos: 661   (24 @ 12:12)    Creatinine: 1.50 mg/dL  Creatinine: 1.62 mg/dL  Creatinine: 1.74 mg/dL      LIVER FUNCTIONS - ( 2024 12:12 )  Alb: 2.8 g/dL / Pro: 5.1 g/dL / ALK PHOS: 661 U/L / ALT: 15 U/L / AST: 119 U/L / GGT: x           PT/INR - ( 2024 00:20 )   PT: 18.5 sec;   INR: 1.79 ratio         PTT - ( 2024 00:20 )  PTT:35.3 sec  Urinalysis Basic - ( 2024 12:12 )    Color: x / Appearance: x / SG: x / pH: x  Gluc: 177 mg/dL / Ketone: x  / Bili: x / Urobili: x   Blood: x / Protein: x / Nitrite: x   Leuk Esterase: x / RBC: x / WBC x   Sq Epi: x / Non Sq Epi: x / Bacteria: x        Culture - Fungal, Body Fluid (collected 24 @ 17:41)  Source: Peritoneal Peritoneal Fluid  Preliminary Report (24 @ 15:03):    Culture is being performed. Fungal cultures are held for 4 weeks.    Culture - Body Fluid with Gram Stain (collected 24 @ 17:41)  Source: Peritoneal Peritoneal Fluid  Gram Stain (24 @ 05:48):    No polymorphonuclear cells seen    No organisms seen    by cytocentrifuge  Final Report (24 @ 17:09):    No growth at 5 days    Culture - Urine (collected 24 @ 10:04)  Source: Clean Catch Clean Catch (Midstream)  Final Report (24 @ 10:19):    <10,000 CFU/mL Normal Urogenital Fabienne    Culture - Blood (collected 23 @ 07:46)  Source: .Blood Blood-Peripheral  Final Report (24 @ 09:00):    No growth at 5 days    Culture - Blood (collected 23 @ 07:37)  Source: .Blood Blood-Peripheral  Final Report (24 @ 09:00):    No growth at 5 days            Imaging: Images reviewed.      < from: CT Chest w/ IV Cont (24 @ 09:17) >  IMPRESSION:  Limited arterial phase.    Cirrhosis. Evidence of portal hypertension and moderate volume ascites.    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    Peritoneal metastatic implants as above.    Indeterminant lung nodules, metastasis cannot be excluded.    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     Hospital Medications:  camphor 0.5%/menthol 0.5% Topical Lotion 1 Application(s) Topical two times a day  chlorhexidine 0.12% Liquid 15 milliLiter(s) Oral Mucosa every 12 hours  chlorhexidine 4% Liquid 1 Application(s) Topical <User Schedule>  CRRT Treatment    <Continuous>  dextrose 5%. 1000 milliLiter(s) (50 mL/Hr) IV Continuous <Continuous>  dextrose 5%. 1000 milliLiter(s) (100 mL/Hr) IV Continuous <Continuous>  dextrose 50% Injectable 25 Gram(s) IV Push once  dextrose 50% Injectable 25 Gram(s) IV Push once  dextrose 50% Injectable 12.5 Gram(s) IV Push once  dextrose Oral Gel 15 Gram(s) Oral once PRN  folic acid Injectable 1 milliGRAM(s) IV Push daily  glucagon  Injectable 1 milliGRAM(s) IntraMuscular once  insulin lispro (ADMELOG) corrective regimen sliding scale   SubCutaneous every 6 hours  ketamine Infusion. 0.2 mG/kG/Hr (1.91 mL/Hr) IV Continuous <Continuous>  lactulose Retention Enema 200 Gram(s) Rectal every 8 hours  norepinephrine Infusion 0.6 MICROgram(s)/kG/Min (53.8 mL/Hr) IV Continuous <Continuous>  octreotide  Infusion 50 MICROgram(s)/Hr (10 mL/Hr) IV Continuous <Continuous>  pantoprazole  Injectable 40 milliGRAM(s) IV Push every 12 hours  phenylephrine    Infusion 0.1 MICROgram(s)/kG/Min (3.59 mL/Hr) IV Continuous <Continuous>  phytonadione  IVPB 10 milliGRAM(s) IV Intermittent once  piperacillin/tazobactam IVPB.- 3.375 Gram(s) IV Intermittent once  piperacillin/tazobactam IVPB.. 3.375 Gram(s) IV Intermittent every 8 hours  PrismaSATE Dialysate BGK 4 / 2.5 5000 milliLiter(s) (1500 mL/Hr) CRRT <Continuous>  PrismaSOL Filtration BGK 0 / 2.5 5000 milliLiter(s) (200 mL/Hr) CRRT <Continuous>  PrismaSOL Filtration BGK 0 / 2.5 5000 milliLiter(s) (800 mL/Hr) CRRT <Continuous>  propofol Infusion 50 MICROgram(s)/kG/Min (28.7 mL/Hr) IV Continuous <Continuous>  sodium chloride 0.9% lock flush 10 milliLiter(s) IV Push every 1 hour PRN  thiamine Injectable 100 milliGRAM(s) IV Push daily  vasopressin Infusion 0.04 Unit(s)/Min (6 mL/Hr) IV Continuous <Continuous>      ROS: All system reviewed and negative except as mentioned above.  cyanocobalamin: 1000 MICROGram(s) Oral (24 @ 12:47)  folic acid: 1 milliGRAM(s) Oral (24 @ 12:47)  thiamine: 100 milliGRAM(s) Oral (24 @ 12:47)  chlorhexidine 2% Cloths: 1 Application(s) Topical (24 @ 12:48)  lactulose Syrup: 10 Gram(s) Oral (24 @ 12:48)  pancrelipase  (CREON  6,000 Lipase Units): 1 Capsule(s) Oral (24 @ 12:48)  ketamine Injectable: 76 milliGRAM(s) IV Push (24 @ 14:00)  propofol Injectable: 50 milliGRAM(s) IV Push (24 @ 14:00)  erythromycin   IVPB: 500 mL/Hr IV Intermittent (24 @ 14:33)  ketamine Infusion.: 1.91 mL/Hr IV Continuous (24 @ 14:38)  ketamine Injectable: 47.8 milliGRAM(s) IV Push (24 @ 15:02)  propofol Infusion: 28.7 mL/Hr IV Continuous (24 @ 15:18)  LORazepam   Injectable: 3 milliGRAM(s) IV Push (24 @ 16:44)  propofol Infusion: 28.7 mL/Hr IV Continuous (24 @ 16:46)  chlorhexidine 0.12% Liquid: 15 milliLiter(s) Oral Mucosa (24 @ 17:13)  pantoprazole  Injectable: 40 milliGRAM(s) IV Push (24 @ 17:14)  camphor 0.5%/menthol 0.5% Topical Lotion: 1 Application(s) Topical (24 @ 17:15)  phytonadione  IVPB: 102 mL/Hr IV Intermittent (24 @ 20:26)  norepinephrine Infusion: 53.8 mL/Hr IV Continuous (24 @ 20:28)  octreotide  Infusion: 10 mL/Hr IV Continuous (24 @ 20:28)  propofol Infusion: 28.7 mL/Hr IV Continuous (24 @ 20:28)  vasopressin Infusion: 6 mL/Hr IV Continuous (24 @ 20:28)  phenylephrine    Infusion: 3.59 mL/Hr IV Continuous (24 @ 20:41)  vancomycin  IVPB: 250 mL/Hr IV Intermittent (24 @ 21:48)  acetaminophen   IVPB ..: 400 mL/Hr IV Intermittent (24 @ 00:16)  insulin lispro (ADMELOG) corrective regimen sliding scale: 1 Unit(s) SubCutaneous (24 @ 00:16)  chlorhexidine 0.12% Liquid: 15 milliLiter(s) Oral Mucosa (24 @ 05:21)  pantoprazole  Injectable: 40 milliGRAM(s) IV Push (24 @ 05:22)  camphor 0.5%/menthol 0.5% Topical Lotion: 1 Application(s) Topical (24 @ 05:24)  chlorhexidine 4% Liquid: 1 Application(s) Topical (24 @ 05:24)  insulin lispro (ADMELOG) corrective regimen sliding scale: 1 Unit(s) SubCutaneous (24 @ 05:24)  piperacillin/tazobactam IVPB.: 200 mL/Hr IV Intermittent (24 @ 10:49)  vancomycin  IVPB: 250 mL/Hr IV Intermittent (24 @ 11:27)  folic acid Injectable: 1 milliGRAM(s) IV Push (24 @ 12:00)  thiamine Injectable: 100 milliGRAM(s) IV Push (24 @ 12:00)  insulin lispro (ADMELOG) corrective regimen sliding scale: 1 Unit(s) SubCutaneous (24 @ 12:02)      Packed Red Cells Order:  1 Unit  Indication: Hgb <7 gm/dL  Infuse Unit : 3 Hours (24 @ 13:12)  Packed Red Cells Order:  1 Unit  Indication: Hgb <8 gm/dL -Stable Cardiovascular Disease or Acute Co  Infuse Unit : As Fast As Possible (24 @ 13:08)      CT Abdomen and Pelvis w/ IV Cont:   ACC: [id number] EXAM:  CT CHEST IC   ORDERED BY:  LADI DUNNE      <TRUNCATED> (24 @ 09:17)  US Abdomen Upper Quadrant Right:   ACC: [id number] EXAM:  US ABDOMEN RT UPR QUADRANT   ORDERED BY:  MERLIN  <TRUNCATED> (24 @ 12:51)            PHYSICAL EXAM:   Vital Signs:  Vital Signs Last 24 Hrs  T(C): 38 (2024 08:00), Max: 38.2 (2024 00:00)  T(F): 100.4 (2024 08:00), Max: 100.8 (2024 00:00)  HR: 96 (2024 12:00) (89 - 106)  BP: 93/50 (2024 12:00) (74/41 - 149/63)  BP(mean): 67 (2024 12:00) (52 - 92)  RR: 18 (2024 12:00) (0 - 25)  SpO2: 100% (2024 12:00) (96% - 100%)    Parameters below as of 2024 08:00  Patient On (Oxygen Delivery Method): ventilator    O2 Concentration (%): 40  Daily     Daily   GENERAL:  intubated, sedated  HEENT:  Vented  CHEST:  On volume support, O2 40%  HEART:  On levo, vaso, maintaining MAPs  ABDOMEN:  Soft, non-tender, non-distended  EXTREMITIES:  no cyanosis or edema  SKIN:  Warm & Dry. No rash or erythema  NEURO:  Not responsive to voice or touch, sedated  LABS:                        7.6    15.02 )-----------( 39       ( 2024 12:12 )             23.7     Last Hb:Hemoglobin: 7.6 g/dL (24 @ 12:12)  Hemoglobin: 7.6 g/dL (24 @ 06:23)  Hemoglobin: 7.3 g/dL (24 @ 00:20)               135   |  100   |  19                 Ca: 8.6    BMP:   ----------------------------< 177    M.5   (24 @ 12:12)             4.2    |  20    | 1.50               Ph: 3.3      LFT:     TPro: 5.1 / Alb: 2.8 / TBili: 19.1 / DBili: x / AST: 119 / ALT: 15 / AlkPhos: 661   (24 @ 12:12)    Creatinine: 1.50 mg/dL  Creatinine: 1.62 mg/dL  Creatinine: 1.74 mg/dL      LIVER FUNCTIONS - ( 2024 12:12 )  Alb: 2.8 g/dL / Pro: 5.1 g/dL / ALK PHOS: 661 U/L / ALT: 15 U/L / AST: 119 U/L / GGT: x           PT/INR - ( 2024 00:20 )   PT: 18.5 sec;   INR: 1.79 ratio         PTT - ( 2024 00:20 )  PTT:35.3 sec  Urinalysis Basic - ( 2024 12:12 )    Color: x / Appearance: x / SG: x / pH: x  Gluc: 177 mg/dL / Ketone: x  / Bili: x / Urobili: x   Blood: x / Protein: x / Nitrite: x   Leuk Esterase: x / RBC: x / WBC x   Sq Epi: x / Non Sq Epi: x / Bacteria: x        Culture - Fungal, Body Fluid (collected 24 @ 17:41)  Source: Peritoneal Peritoneal Fluid  Preliminary Report (24 @ 15:03):    Culture is being performed. Fungal cultures are held for 4 weeks.    Culture - Body Fluid with Gram Stain (collected 24 @ 17:41)  Source: Peritoneal Peritoneal Fluid  Gram Stain (24 @ 05:48):    No polymorphonuclear cells seen    No organisms seen    by cytocentrifuge  Final Report (24 @ 17:09):    No growth at 5 days    Culture - Urine (collected 24 @ 10:04)  Source: Clean Catch Clean Catch (Midstream)  Final Report (24 @ 10:19):    <10,000 CFU/mL Normal Urogenital Fabienne    Culture - Blood (collected 23 @ 07:46)  Source: .Blood Blood-Peripheral  Final Report (24 @ 09:00):    No growth at 5 days    Culture - Blood (collected 23 @ 07:37)  Source: .Blood Blood-Peripheral  Final Report (24 @ 09:00):    No growth at 5 days            Imaging: Images reviewed.      < from: CT Chest w/ IV Cont (24 @ 09:17) >  IMPRESSION:  Limited arterial phase.    Cirrhosis. Evidence of portal hypertension and moderate volume ascites.    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    Peritoneal metastatic implants as above.    Indeterminant lung nodules, metastasis cannot be excluded.    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     Hospital Medications:  camphor 0.5%/menthol 0.5% Topical Lotion 1 Application(s) Topical two times a day  chlorhexidine 0.12% Liquid 15 milliLiter(s) Oral Mucosa every 12 hours  chlorhexidine 4% Liquid 1 Application(s) Topical <User Schedule>  CRRT Treatment    <Continuous>  dextrose 5%. 1000 milliLiter(s) (50 mL/Hr) IV Continuous <Continuous>  dextrose 5%. 1000 milliLiter(s) (100 mL/Hr) IV Continuous <Continuous>  dextrose 50% Injectable 25 Gram(s) IV Push once  dextrose 50% Injectable 25 Gram(s) IV Push once  dextrose 50% Injectable 12.5 Gram(s) IV Push once  dextrose Oral Gel 15 Gram(s) Oral once PRN  folic acid Injectable 1 milliGRAM(s) IV Push daily  glucagon  Injectable 1 milliGRAM(s) IntraMuscular once  insulin lispro (ADMELOG) corrective regimen sliding scale   SubCutaneous every 6 hours  ketamine Infusion. 0.2 mG/kG/Hr (1.91 mL/Hr) IV Continuous <Continuous>  lactulose Retention Enema 200 Gram(s) Rectal every 8 hours  norepinephrine Infusion 0.6 MICROgram(s)/kG/Min (53.8 mL/Hr) IV Continuous <Continuous>  octreotide  Infusion 50 MICROgram(s)/Hr (10 mL/Hr) IV Continuous <Continuous>  pantoprazole  Injectable 40 milliGRAM(s) IV Push every 12 hours  phenylephrine    Infusion 0.1 MICROgram(s)/kG/Min (3.59 mL/Hr) IV Continuous <Continuous>  phytonadione  IVPB 10 milliGRAM(s) IV Intermittent once  piperacillin/tazobactam IVPB.- 3.375 Gram(s) IV Intermittent once  piperacillin/tazobactam IVPB.. 3.375 Gram(s) IV Intermittent every 8 hours  PrismaSATE Dialysate BGK 4 / 2.5 5000 milliLiter(s) (1500 mL/Hr) CRRT <Continuous>  PrismaSOL Filtration BGK 0 / 2.5 5000 milliLiter(s) (200 mL/Hr) CRRT <Continuous>  PrismaSOL Filtration BGK 0 / 2.5 5000 milliLiter(s) (800 mL/Hr) CRRT <Continuous>  propofol Infusion 50 MICROgram(s)/kG/Min (28.7 mL/Hr) IV Continuous <Continuous>  sodium chloride 0.9% lock flush 10 milliLiter(s) IV Push every 1 hour PRN  thiamine Injectable 100 milliGRAM(s) IV Push daily  vasopressin Infusion 0.04 Unit(s)/Min (6 mL/Hr) IV Continuous <Continuous>      ROS: All system reviewed and negative except as mentioned above.  cyanocobalamin: 1000 MICROGram(s) Oral (24 @ 12:47)  folic acid: 1 milliGRAM(s) Oral (24 @ 12:47)  thiamine: 100 milliGRAM(s) Oral (24 @ 12:47)  chlorhexidine 2% Cloths: 1 Application(s) Topical (24 @ 12:48)  lactulose Syrup: 10 Gram(s) Oral (24 @ 12:48)  pancrelipase  (CREON  6,000 Lipase Units): 1 Capsule(s) Oral (24 @ 12:48)  ketamine Injectable: 76 milliGRAM(s) IV Push (24 @ 14:00)  propofol Injectable: 50 milliGRAM(s) IV Push (24 @ 14:00)  erythromycin   IVPB: 500 mL/Hr IV Intermittent (24 @ 14:33)  ketamine Infusion.: 1.91 mL/Hr IV Continuous (24 @ 14:38)  ketamine Injectable: 47.8 milliGRAM(s) IV Push (24 @ 15:02)  propofol Infusion: 28.7 mL/Hr IV Continuous (24 @ 15:18)  LORazepam   Injectable: 3 milliGRAM(s) IV Push (24 @ 16:44)  propofol Infusion: 28.7 mL/Hr IV Continuous (24 @ 16:46)  chlorhexidine 0.12% Liquid: 15 milliLiter(s) Oral Mucosa (24 @ 17:13)  pantoprazole  Injectable: 40 milliGRAM(s) IV Push (24 @ 17:14)  camphor 0.5%/menthol 0.5% Topical Lotion: 1 Application(s) Topical (24 @ 17:15)  phytonadione  IVPB: 102 mL/Hr IV Intermittent (24 @ 20:26)  norepinephrine Infusion: 53.8 mL/Hr IV Continuous (24 @ 20:28)  octreotide  Infusion: 10 mL/Hr IV Continuous (24 @ 20:28)  propofol Infusion: 28.7 mL/Hr IV Continuous (24 @ 20:28)  vasopressin Infusion: 6 mL/Hr IV Continuous (24 @ 20:28)  phenylephrine    Infusion: 3.59 mL/Hr IV Continuous (24 @ 20:41)  vancomycin  IVPB: 250 mL/Hr IV Intermittent (24 @ 21:48)  acetaminophen   IVPB ..: 400 mL/Hr IV Intermittent (24 @ 00:16)  insulin lispro (ADMELOG) corrective regimen sliding scale: 1 Unit(s) SubCutaneous (24 @ 00:16)  chlorhexidine 0.12% Liquid: 15 milliLiter(s) Oral Mucosa (24 @ 05:21)  pantoprazole  Injectable: 40 milliGRAM(s) IV Push (24 @ 05:22)  camphor 0.5%/menthol 0.5% Topical Lotion: 1 Application(s) Topical (24 @ 05:24)  chlorhexidine 4% Liquid: 1 Application(s) Topical (24 @ 05:24)  insulin lispro (ADMELOG) corrective regimen sliding scale: 1 Unit(s) SubCutaneous (24 @ 05:24)  piperacillin/tazobactam IVPB.: 200 mL/Hr IV Intermittent (24 @ 10:49)  vancomycin  IVPB: 250 mL/Hr IV Intermittent (24 @ 11:27)  folic acid Injectable: 1 milliGRAM(s) IV Push (24 @ 12:00)  thiamine Injectable: 100 milliGRAM(s) IV Push (24 @ 12:00)  insulin lispro (ADMELOG) corrective regimen sliding scale: 1 Unit(s) SubCutaneous (24 @ 12:02)      Packed Red Cells Order:  1 Unit  Indication: Hgb <7 gm/dL  Infuse Unit : 3 Hours (24 @ 13:12)  Packed Red Cells Order:  1 Unit  Indication: Hgb <8 gm/dL -Stable Cardiovascular Disease or Acute Co  Infuse Unit : As Fast As Possible (24 @ 13:08)      CT Abdomen and Pelvis w/ IV Cont:   ACC: [id number] EXAM:  CT CHEST IC   ORDERED BY:  LADI DUNNE      <TRUNCATED> (24 @ 09:17)  US Abdomen Upper Quadrant Right:   ACC: [id number] EXAM:  US ABDOMEN RT UPR QUADRANT   ORDERED BY:  MERLIN  <TRUNCATED> (24 @ 12:51)            PHYSICAL EXAM:   Vital Signs:  Vital Signs Last 24 Hrs  T(C): 38 (2024 08:00), Max: 38.2 (2024 00:00)  T(F): 100.4 (2024 08:00), Max: 100.8 (2024 00:00)  HR: 96 (2024 12:00) (89 - 106)  BP: 93/50 (2024 12:00) (74/41 - 149/63)  BP(mean): 67 (2024 12:00) (52 - 92)  RR: 18 (2024 12:00) (0 - 25)  SpO2: 100% (2024 12:00) (96% - 100%)    Parameters below as of 2024 08:00  Patient On (Oxygen Delivery Method): ventilator    O2 Concentration (%): 40  Daily     Daily   GENERAL:  intubated, sedated  HEENT:  Vented  CHEST:  On volume support, O2 40%  HEART:  On levo, vaso, maintaining MAPs  ABDOMEN:  Soft, non-tender, non-distended  EXTREMITIES:  no cyanosis or edema  SKIN:  Warm & Dry. No rash or erythema  NEURO:  Not responsive to voice or touch, sedated  LABS:                        7.6    15.02 )-----------( 39       ( 2024 12:12 )             23.7     Last Hb:Hemoglobin: 7.6 g/dL (24 @ 12:12)  Hemoglobin: 7.6 g/dL (24 @ 06:23)  Hemoglobin: 7.3 g/dL (24 @ 00:20)               135   |  100   |  19                 Ca: 8.6    BMP:   ----------------------------< 177    M.5   (24 @ 12:12)             4.2    |  20    | 1.50               Ph: 3.3      LFT:     TPro: 5.1 / Alb: 2.8 / TBili: 19.1 / DBili: x / AST: 119 / ALT: 15 / AlkPhos: 661   (24 @ 12:12)    Creatinine: 1.50 mg/dL  Creatinine: 1.62 mg/dL  Creatinine: 1.74 mg/dL      LIVER FUNCTIONS - ( 2024 12:12 )  Alb: 2.8 g/dL / Pro: 5.1 g/dL / ALK PHOS: 661 U/L / ALT: 15 U/L / AST: 119 U/L / GGT: x           PT/INR - ( 2024 00:20 )   PT: 18.5 sec;   INR: 1.79 ratio         PTT - ( 2024 00:20 )  PTT:35.3 sec  Urinalysis Basic - ( 2024 12:12 )    Color: x / Appearance: x / SG: x / pH: x  Gluc: 177 mg/dL / Ketone: x  / Bili: x / Urobili: x   Blood: x / Protein: x / Nitrite: x   Leuk Esterase: x / RBC: x / WBC x   Sq Epi: x / Non Sq Epi: x / Bacteria: x        Culture - Fungal, Body Fluid (collected 24 @ 17:41)  Source: Peritoneal Peritoneal Fluid  Preliminary Report (24 @ 15:03):    Culture is being performed. Fungal cultures are held for 4 weeks.    Culture - Body Fluid with Gram Stain (collected 24 @ 17:41)  Source: Peritoneal Peritoneal Fluid  Gram Stain (24 @ 05:48):    No polymorphonuclear cells seen    No organisms seen    by cytocentrifuge  Final Report (24 @ 17:09):    No growth at 5 days    Culture - Urine (collected 24 @ 10:04)  Source: Clean Catch Clean Catch (Midstream)  Final Report (24 @ 10:19):    <10,000 CFU/mL Normal Urogenital Fabienne    Culture - Blood (collected 23 @ 07:46)  Source: .Blood Blood-Peripheral  Final Report (24 @ 09:00):    No growth at 5 days    Culture - Blood (collected 23 @ 07:37)  Source: .Blood Blood-Peripheral  Final Report (24 @ 09:00):    No growth at 5 days            Imaging: Images reviewed.      < from: CT Chest w/ IV Cont (24 @ 09:17) >  IMPRESSION:  Limited arterial phase.    Cirrhosis. Evidence of portal hypertension and moderate volume ascites.    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    Peritoneal metastatic implants as above.    Indeterminant lung nodules, metastasis cannot be excluded.    --- End of Report ---    < end of copied text >

## 2024-01-09 NOTE — CHART NOTE - NSCHARTNOTEFT_GEN_A_CORE
tPA/DNase instilled at 215pm into Pleurx and allowed to dwell for one hour; Pleurx uncapped and drained at 315pm with 250ml serosanguinous (although improvement in clarity/color from prior drain). Now s/p 3/6 MIST doses.

## 2024-01-09 NOTE — PROGRESS NOTE ADULT - SUBJECTIVE AND OBJECTIVE BOX
[facility] DIVISION OF KIDNEY DISEASES AND HYPERTENSION   FOLLOW UP NOTE  --------------------------------------------------------------------------------  Chief Complaint: Pt with acute renal failure requiring RRT in setting of liver failure. Undergoing liver transplant eval.     24 hour events/subjective: Pt. was seen and examined in the MICU. Intubated on 1/8/24 in setting of endoscopic procedure. Remains intubated and sedated. Remains on multiple IV vasopressors. Remains on CRRT, net even per nursing, occasional clotting but otherwise catheter functioning well.     PAST HISTORY  --------------------------------------------------------------------------------  No significant changes to PMH, PSH, FHx, SHx, unless otherwise noted    ALLERGIES & MEDICATIONS  --------------------------------------------------------------------------------  Allergies  No Known Allergies    Intolerances    Standing Inpatient Medications  camphor 0.5%/menthol 0.5% Topical Lotion 1 Application(s) Topical two times a day  cefTRIAXone   IVPB 1000 milliGRAM(s) IV Intermittent every 24 hours  chlorhexidine 0.12% Liquid 15 milliLiter(s) Oral Mucosa every 12 hours  chlorhexidine 4% Liquid 1 Application(s) Topical <User Schedule>  CRRT Treatment    <Continuous>  dextrose 5%. 1000 milliLiter(s) IV Continuous <Continuous>  dextrose 5%. 1000 milliLiter(s) IV Continuous <Continuous>  dextrose 50% Injectable 25 Gram(s) IV Push once  dextrose 50% Injectable 25 Gram(s) IV Push once  dextrose 50% Injectable 12.5 Gram(s) IV Push once  folic acid Injectable 1 milliGRAM(s) IV Push daily  glucagon  Injectable 1 milliGRAM(s) IntraMuscular once  insulin lispro (ADMELOG) corrective regimen sliding scale   SubCutaneous every 6 hours  ketamine Infusion. 0.2 mG/kG/Hr IV Continuous <Continuous>  lactulose Retention Enema 200 Gram(s) Rectal every 8 hours  norepinephrine Infusion 0.6 MICROgram(s)/kG/Min IV Continuous <Continuous>  octreotide  Infusion 50 MICROgram(s)/Hr IV Continuous <Continuous>  pantoprazole  Injectable 40 milliGRAM(s) IV Push every 12 hours  phenylephrine    Infusion 0.1 MICROgram(s)/kG/Min IV Continuous <Continuous>  PrismaSATE Dialysate BGK 4 / 2.5 5000 milliLiter(s) CRRT <Continuous>  PrismaSOL Filtration BGK 0 / 2.5 5000 milliLiter(s) CRRT <Continuous>  PrismaSOL Filtration BGK 0 / 2.5 5000 milliLiter(s) CRRT <Continuous>  propofol Infusion 50 MICROgram(s)/kG/Min IV Continuous <Continuous>  thiamine Injectable 100 milliGRAM(s) IV Push daily  vasopressin Infusion 0.04 Unit(s)/Min IV Continuous <Continuous>    PRN Inpatient Medications  dextrose Oral Gel 15 Gram(s) Oral once PRN  sodium chloride 0.9% lock flush 10 milliLiter(s) IV Push every 1 hour PRN    REVIEW OF SYSTEMS  --------------------------------------------------------------------------------  Unable to obtain ROS     VITALS/PHYSICAL EXAM  --------------------------------------------------------------------------------  T(C): 38 (01-09-24 @ 04:00), Max: 38.2 (01-09-24 @ 00:00)  HR: 101 (01-09-24 @ 05:30) (84 - 109)  BP: 101/55 (01-09-24 @ 05:30) (74/41 - 159/68)  RR: 8 (01-09-24 @ 05:30) (0 - 29)  SpO2: 100% (01-09-24 @ 05:30) (93% - 100%)  Wt(kg): --    01-07-24 @ 07:01  -  01-08-24 @ 07:00  --------------------------------------------------------  IN: 3214.6 mL / OUT: 1257 mL / NET: 1957.6 mL    01-08-24 @ 07:01  -  01-09-24 @ 06:00  --------------------------------------------------------  IN: 3258.4 mL / OUT: 1823 mL / NET: 1435.4 mL    Physical Exam:  GEN: Intubated and sedated  CVS: S1, S2 heard  CHEST: Mechanical breath sounds B/L  ABD: soft, nontender, distended, +BS  EXTR: + b/l LE edema. RUE edematous R>L  NEURO: sedated  SKIN: Jaundiced    LABS/STUDIES  --------------------------------------------------------------------------------              7.3    14.93 >-----------<  55       [01-09-24 @ 00:20]              22.7     133  |  100  |  24  ----------------------------<  176      [01-09-24 @ 00:23]  4.5   |  19  |  1.74        Ca     8.6     [01-09-24 @ 00:23]      Mg     2.3     [01-09-24 @ 00:23]      Phos  3.6     [01-09-24 @ 00:23]    TPro  5.3  /  Alb  3.4  /  TBili  18.5  /  DBili  x   /  AST  122  /  ALT  15  /  AlkPhos  599  [01-09-24 @ 00:23]    PT/INR: PT 18.5 , INR 1.79       [01-09-24 @ 00:20]  PTT: 35.3       [01-09-24 @ 00:20]    Creatinine Trend:  SCr 1.74 [01-09 @ 00:23]  SCr 1.79 [01-08 @ 18:38]  SCr 1.91 [01-08 @ 12:44]  SCr 2.12 [01-08 @ 06:26]  SCr 2.47 [01-08 @ 00:39]    HBsAg Nonreact      [12-31-23 @ 14:46]  HCV 0.19, Nonreact      [12-31-23 @ 14:46]  HIV Nonreact      [12-31-23 @ 14:46]    JOHNNY: titer 1:160, pattern Speckled      [12-31-23 @ 14:46]  Free Light Chains: kappa 21.71, lambda 13.12, ratio = 1.65      [12-31 @ 14:46] [facility] DIVISION OF KIDNEY DISEASES AND HYPERTENSION   FOLLOW UP NOTE  --------------------------------------------------------------------------------  Chief Complaint: Pt with acute renal failure requiring RRT in setting of liver failure. Undergoing liver transplant eval.     24 hour events/subjective: Pt. was seen and examined in the MICU. Intubated on 1/8/24 in setting of endoscopic procedure. Remains intubated and sedated. Remains on multiple IV vasopressors. Remains on CRRT, net even per nursing, occasional clotting but otherwise catheter functioning well.     PAST HISTORY  --------------------------------------------------------------------------------  No significant changes to PMH, PSH, FHx, SHx, unless otherwise noted    ALLERGIES & MEDICATIONS  --------------------------------------------------------------------------------  Allergies  No Known Allergies    Intolerances    Standing Inpatient Medications  camphor 0.5%/menthol 0.5% Topical Lotion 1 Application(s) Topical two times a day  cefTRIAXone   IVPB 1000 milliGRAM(s) IV Intermittent every 24 hours  chlorhexidine 0.12% Liquid 15 milliLiter(s) Oral Mucosa every 12 hours  chlorhexidine 4% Liquid 1 Application(s) Topical <User Schedule>  CRRT Treatment    <Continuous>  dextrose 5%. 1000 milliLiter(s) IV Continuous <Continuous>  dextrose 5%. 1000 milliLiter(s) IV Continuous <Continuous>  dextrose 50% Injectable 25 Gram(s) IV Push once  dextrose 50% Injectable 25 Gram(s) IV Push once  dextrose 50% Injectable 12.5 Gram(s) IV Push once  folic acid Injectable 1 milliGRAM(s) IV Push daily  glucagon  Injectable 1 milliGRAM(s) IntraMuscular once  insulin lispro (ADMELOG) corrective regimen sliding scale   SubCutaneous every 6 hours  ketamine Infusion. 0.2 mG/kG/Hr IV Continuous <Continuous>  lactulose Retention Enema 200 Gram(s) Rectal every 8 hours  norepinephrine Infusion 0.6 MICROgram(s)/kG/Min IV Continuous <Continuous>  octreotide  Infusion 50 MICROgram(s)/Hr IV Continuous <Continuous>  pantoprazole  Injectable 40 milliGRAM(s) IV Push every 12 hours  phenylephrine    Infusion 0.1 MICROgram(s)/kG/Min IV Continuous <Continuous>  PrismaSATE Dialysate BGK 4 / 2.5 5000 milliLiter(s) CRRT <Continuous>  PrismaSOL Filtration BGK 0 / 2.5 5000 milliLiter(s) CRRT <Continuous>  PrismaSOL Filtration BGK 0 / 2.5 5000 milliLiter(s) CRRT <Continuous>  propofol Infusion 50 MICROgram(s)/kG/Min IV Continuous <Continuous>  thiamine Injectable 100 milliGRAM(s) IV Push daily  vasopressin Infusion 0.04 Unit(s)/Min IV Continuous <Continuous>    PRN Inpatient Medications  dextrose Oral Gel 15 Gram(s) Oral once PRN  sodium chloride 0.9% lock flush 10 milliLiter(s) IV Push every 1 hour PRN    REVIEW OF SYSTEMS  --------------------------------------------------------------------------------  Unable to obtain ROS     VITALS/PHYSICAL EXAM  --------------------------------------------------------------------------------  T(C): 38 (01-09-24 @ 04:00), Max: 38.2 (01-09-24 @ 00:00)  HR: 101 (01-09-24 @ 05:30) (84 - 109)  BP: 101/55 (01-09-24 @ 05:30) (74/41 - 159/68)  RR: 8 (01-09-24 @ 05:30) (0 - 29)  SpO2: 100% (01-09-24 @ 05:30) (93% - 100%)  Wt(kg): --    01-07-24 @ 07:01  -  01-08-24 @ 07:00  --------------------------------------------------------  IN: 3214.6 mL / OUT: 1257 mL / NET: 1957.6 mL    01-08-24 @ 07:01  -  01-09-24 @ 06:00  --------------------------------------------------------  IN: 3258.4 mL / OUT: 1823 mL / NET: 1435.4 mL    Physical Exam:  GEN: Intubated and sedated  CVS: S1, S2 heard  CHEST: Mechanical breath sounds B/L  ABD: soft, nontender, distended, +BS  EXTR: + b/l LE edema. RUE edematous R>L  NEURO: sedated  SKIN: Jaundiced    LABS/STUDIES  --------------------------------------------------------------------------------              7.3    14.93 >-----------<  55       [01-09-24 @ 00:20]              22.7     133  |  100  |  24  ----------------------------<  176      [01-09-24 @ 00:23]  4.5   |  19  |  1.74        Ca     8.6     [01-09-24 @ 00:23]      Mg     2.3     [01-09-24 @ 00:23]      Phos  3.6     [01-09-24 @ 00:23]    TPro  5.3  /  Alb  3.4  /  TBili  18.5  /  DBili  x   /  AST  122  /  ALT  15  /  AlkPhos  599  [01-09-24 @ 00:23]    PT/INR: PT 18.5 , INR 1.79       [01-09-24 @ 00:20]  PTT: 35.3       [01-09-24 @ 00:20]    Creatinine Trend:  SCr 1.74 [01-09 @ 00:23]  SCr 1.79 [01-08 @ 18:38]  SCr 1.91 [01-08 @ 12:44]  SCr 2.12 [01-08 @ 06:26]  SCr 2.47 [01-08 @ 00:39]    HBsAg Nonreact      [12-31-23 @ 14:46]  HCV 0.19, Nonreact      [12-31-23 @ 14:46]  HIV Nonreact      [12-31-23 @ 14:46]    JOHNNY: titer 1:160, pattern Speckled      [12-31-23 @ 14:46]  Free Light Chains: kappa 21.71, lambda 13.12, ratio = 1.65      [12-31 @ 14:46]

## 2024-01-09 NOTE — PROCEDURE NOTE - SUPERVISORY STATEMENT
Consent obtained.  No immediate complications. Consent obtained.  No immediate complications.  Therapeutic aspiration of secretions.  BAL of LLL

## 2024-01-09 NOTE — ADVANCED PRACTICE NURSE CONSULT - ASSESSMENT
patient unstable at time consult as reported by covering ALEX Nolan . wound care nurse to re attempt at latter time

## 2024-01-09 NOTE — CHART NOTE - NSCHARTNOTEFT_GEN_A_CORE
Patient acutely worsened since yesterday, requiring intubation for airway protection and progressively hypotensive now on 3 pressors and CRRT. Underwent endoscopy on 1/8, during which a 13mm polyp 35 cm from incisors was noted but it was not biopsied due to coagulopathy. Of note, no varices or active bleeding visualized. Patient had blood in stools yesterday, received cryo x2, PRBCs x2 and platelets x1.     Patient underwent paracentesis today. Also per MICU team, family aware of likely metastatic disease. They would like to hold off on any invasive means of biopsy at this time but would also be open to potential treatment should patient's clinical status improve.    Recommendations:    Coagulopathy/Bleeding  - likely mixed picture: DIC vs. liver failure vs. anatomical bleed (not yet identified), which are being exacerbated by thrombocytopenia (likely due to cirrhosis)  >> Agree with empiric antibiotics for suspected infection  >> GI recs appreciated  >> If continues to have significant rectal bleeding, further evaluation with CTA  - Give Vit K x3 days  - Check PT dependent factors (II, V, VII, X) and mixing studies  - Check daily coags, d-dimer, fibrinogen and maintain fibrinogen >150  - Transfusional support as needed - maintain Hgb >7, Plt >50K given bleeding    Concern for metastatic malignancy  - F/U cytology from paracentesis  - There are currently no non-invasive means of biopsy at this time  - Also of note, patient currently not a candidate for any disease-targeted therapy at this time given he is critically ill and prognosis is guarded  - Recommend palliative consult for GOC discussion    Rest of care as per primary team.     Suzi Chi DO, MPH  Medical Oncology Fellow, PGY-8  *Can be reached via Teams, but please contact the on-call fellow after 5pm-8am on weekdays and on weekends.

## 2024-01-09 NOTE — PROGRESS NOTE ADULT - ASSESSMENT
58y male with PMH of h/o type II DM for ~ 6-7 years, was on metformin and Jardiance,  diagnosed with liver cirrhosis 3 weeks ago, complicated by ascites in outside hospital, now admitted to floors undergoing workup of decompensated liver cirrhosis and RAHUL likely 2/2 HRS admitted for CRRT. Now w/ multiple large bloody BM pending GI eval    PLAN  =====Neurologic=====  #Hepatic Encephalopathy   #EtOH use disorder   #Uremic Encephalopathy   - pt with acute encephalopathy (A&Ox1, per HCP baseline A&Ox3) likely 2/2 toxic metabolic and hepatic processes   - PETH negative   - , improved  - continue with lactulose 10mg TID and rifaximin, MELD 35 1/6/24  - start seroquel 25mg QHS    =====Pulmonary=====  Patient breathing comfortably on room air sating 100%   May require intubation for EGD, will disucss with family  - aspiration precautions    =====Cardiovascular=====  #Intravascular Depletion   #HRS   - pt BP in 90's/ 50's, unable to tolerate HD even with midodrine 30mg TID   - echo with normal EF 77% hyperdynamic otherwise normal diastolic function   - currently in MICU for CRRT pressor assisted, off midodrine   - wean vasopressors as tolerated     =====GI=====  #Decompensated hepatic cirrhosis   - currently undergoing transplant eval, f/u recs  - s/p IR paracentesis on 1/3- fluid analysis unremarkable for infectious etiology (total nucleated cell count 229 with 33 neutrophils) , fluid chemistry wnl, culture no growth to date   - MRI abd pending eval for HCC, hepatology following- unknown if patient has EV- will need screening EGD after MRI before deciding about A/C for possible PVT given thrombus in main protal vein   c/w Thiamine, B12, Folic Acid, lactulose and rifaximin    - cw octreotide drip   - pt initially scheduled for EGD on 1/5, f/u hepatology recs   - continue home famotidine 20mg   - renal diet   - start ceftriaxone 1/6/24 due to AMS and noted ascites   - Triple phase CT scan suggestive of metastatic disease  - f/u GI recs    #GIB  - noted to have multiple large bloody BM, Hgb stable, pressor requirements stable  - Hepatology consulted for possible EGD    =====Renal/=====  #Renal mass  #HRS   #HD requiring pressors   - new renal mass seen on US concerning for malignancy, pending MRI for further characterization,- onc following f/u recs  - urology consulted- f/u recs   - HRS with creatinine >5, 4 on admission and 2 at baseline, shiley placed, s/p 2 rounds of HD unable to tolerate 3rd with midodrine   - CRRT 1/6/24 with levophed support   - porras placed 1/6/24 to monitor I/Os  - CT c/f malignancy    #Electrolytes  - Maintain K>4, Phos>3, Mag>2, iCal>1    =====Endocrine=====  #DM2  - on 5 units lantus and SSI  - monitor and adjust as necessary     =====Infectious Disease=====  - pt with initial WBC to 13k, now downtrending  - infectious w/u negative and completed 5 days of Epimeric Meropenem 12/30- 1/4   - start ceftriaxone 1/6/24 due to AMS and noted ascites     =====Heme/Onc=====  #DVT Ppx- heparin sub q   #Normocytic anemia on initial CBC   likely 2/2 vitamin deficiencies  and anemia of chronic disease   -normal B12/folate  >VERENA- eventual scope to evaluate   - monitor platelet count in setting of thrombocytopenia 2/2 cirrhosis   - 1U pRBC 1/07      =====Ethics=====  FULL CODE   58y male with PMH of h/o type II DM for ~ 6-7 years, was on metformin and Jardiance,  diagnosed with liver cirrhosis 3 weeks ago, complicated by ascites in outside hospital, now admitted to floors undergoing workup of decompensated liver cirrhosis and RAHUL likely 2/2 HRS admitted for CRRT. Now w/ multiple large bloody BM pending GI eval    PLAN  =====Neurologic=====  #Hepatic Encephalopathy   #EtOH use disorder   #Uremic Encephalopathy   - pt with acute encephalopathy (A&Ox0, per HCP baseline A&Ox3) likely 2/2 toxic metabolic and hepatic processes   - PETH negative   - , improved  - continue with lactulose 10mg TID and rifaximin, MELD 35 1/6/24  - start seroquel 25mg QHS    =====Pulmonary=====  Patient breathing comfortably on room air sating 100%   Intubated for EGD, remains intubated due to clinical status  CT ches with nodular opacity c/f malignancy iso possible metastatic disease    =====Cardiovascular=====  #Intravascular Depletion   #HRS   - pt BP in 90's/ 50's, unable to tolerate HD even with midodrine 30mg TID   - echo with normal EF 77% hyperdynamic otherwise normal diastolic function   - patient in shock requiring vasopressors to maintain MAP > 65.   - Currently on Levo, Saleem, and Vaso    =====GI=====  #Decompensated hepatic cirrhosis   - currently undergoing transplant eval, f/u recs  - s/p IR paracentesis on 1/3- fluid analysis unremarkable for infectious etiology (total nucleated cell count 229 with 33 neutrophils) , fluid chemistry wnl, culture no growth to date   c/w Thiamine, B12, Folic Acid, lactulose and rifaximin    - cw octreotide drip   - EGD with polyp in esophagus, unable to biopsy due to coagulopathy  - significant ascites on exam, plan for repeat paracentesis pending family discussion, will require reversal of coagulopathy  - CT 1/8 showing ill defined liver lesions, periteonal implants and indeterminate lung nodules  - f/u Hepaology regarding further liver evaluation and whether candidate    #GIB  - continues to have bloody bowel movements  - likely iso coagulopathy  - active T&S    =====Renal/=====  #Renal mass  #HRS   #HD requiring pressors   - new renal mass seen on US concerning for malignancy, pending MRI for further characterization,- onc following f/u recs  - urology consulted- f/u recs   - HRS with creatinine >5, 4 on admission and 2 at baseline, shiley placed, s/p 2 rounds of HD unable to tolerate 3rd with midodrine   - CRRT 1/6/24 with levophed support   - porras placed 1/6/24 to monitor I/Os  - CT c/f malignancy wih mes    #Electrolytes  - Maintain K>4, Phos>3, Mag>2, iCal>1    =====Endocrine=====  #DM2  - on 5 units lantus and SSI  - monitor and adjust as necessary     =====Infectious Disease=====  - pt with initial WBC to 13k  - infectious w/u negative and completed 5 days of Epimeric Meropenem 12/30- 1/4   - febrile today, held CTX started broad spectrum abx vanc and zosyn dosed CRRT   - f/u BCx    =====Heme/Onc=====  #DVT Ppx- heparin sub q   #Normocytic anemia on initial CBC   #Coagulopathy, possibly DIC  likely 2/2 vitamin deficiencies  and anemia of chronic disease   -normal B12/folate  >VERENA- eventual scope to evaluate   - monitor platelet count in setting of thrombocytopenia 2/2 cirrhosis   - heme felt possible DIC iso TEG  - s/p Cryo, Plt, and pRBCs  - f/u heme recs      =====Ethics=====  FULL CODE

## 2024-01-09 NOTE — PROGRESS NOTE ADULT - ASSESSMENT
58 year old man with h/o type II DM for ~ 6-7 years, was on metformin and Jardiance, recently diagnosed with liver cirrhosis complicated by ascites. He underwent paracentesis x 3. Course complicated by worsening RAHUL and encephalopathy. He was transferred to [facility] for liver transplant evaluation. He was initiated on HD on 12/31/23.     Renal US with normal sized kidneys measuring ~ 11.6 and 11.8 cm. No hydro. Multilobulated vascular mass arises from the right kidney extending into the peritoneum. Findings are concerning for primary renal malignancy versus metastatic disease. MRI pending.     S/p large volume paracentesis 1/3/24 with 11 liter fluid removal.    RAHUL on CKD - unclear baseline renal function. No significant proteinuria UPCR 0.4, normal sized kidneys on US. Initiated HD on 12/31, but Unable to tolerate HD due to hypotension unresponsive to increase midodrine dose.    Labs with worsening BUN/Cr and metabolic acidosis and therefore patient was moved to ICU consult for pressor support and started on CRRT. Has a right IJ temporary cathter   He is now intubated and on sedation.  continue CRRT for now. labs reviewed.    58 year old man with h/o type II DM for ~ 6-7 years, was on metformin and Jardiance, recently diagnosed with liver cirrhosis complicated by ascites. He underwent paracentesis x 3. Course complicated by worsening RAHUL and encephalopathy. He was transferred to [facility] for liver transplant evaluation. He was initiated on HD on 12/31/23.     Renal US with normal sized kidneys measuring ~ 11.6 and 11.8 cm. No hydro. Multilobulated vascular mass arises from the right kidney extending into the peritoneum. Findings are concerning for primary renal malignancy versus metastatic disease. MRI pending.     S/p large volume paracentesis 1/3/24 with 11 liter fluid removal.    RAHUL on CKD - unclear baseline renal function. No significant proteinuria UPCR 0.4, normal sized kidneys on US. Initiated HD on 12/31, but Unable to tolerate HD due to hypotension unresponsive to increase midodrine dose.    Labs with worsening BUN/Cr and metabolic acidosis and therefore patient was moved to ICU consult for pressor support and started on CRRT. Has a right IJ temporary cathter   He is now intubated and on sedation.  continue CRRT for now. labs reviewed.

## 2024-01-09 NOTE — PROCEDURE NOTE - NSBRONCHPROCDETAILS_GEN_A_CORE_FT
Bronchoscopy inserted thru ETT. Position adequate, alba sharp. Airway inspection done throughout, slightly abnormal anatomic variants with respect to subsegments. Overall normal appearing airway wihtout any endobronchial lesions. No bleeding. LLL BAL done, sent for cell count, micro, and cytopath.

## 2024-01-09 NOTE — PROGRESS NOTE ADULT - ATTENDING COMMENTS
Patient seen and examined. Patient critically ill requiring ICU level of care and frequent bedside visits with therapy change. Patient is a 58M with DM2 and recently diagnosed cirrhosis presenting with worsening renal failure and ascites. He is now on CRRT and having signs of GI bleed. He is undergoing liver transplant evaluation.    #Neurology - patient with metabolic encephalopathy in setting of cirrhosis. AAO x 1-2.  - Initial ammonia not elevated - continue Lactulose and Rifaximin  #Cardiovascular - patient in shock requiring vasopressors to maintain MAP > 65. Currently on Levo, Saleem, and Vaso  - Monitor HR  #Pulmonary - intubated in setting of EGD 1/8/24 and remains intubated given worsening clinical state  - CT chest noted - nonspecific nodular opacity - will plan for bronch for BAL/cyto if family consents  - Maintain O2 sat > 90%  - Aspiration precautions  #Gastro - Hepatology follow-up regarding whether patient is a candidate for transplant or not  - EGD noted with polyp in esophagus - unable to be biopsied given coagulopathy  - Cirrhosis and portal hypertension with ascites - s/p prior paracentesis. Significant ascites on exam - plan for repeat paracentesis today after correction of coagulopathy  - Has liver lesion being evaluated further  - Bilirubin remains elevated  - EGD done 1/8 without clear source of bleeding - received PRBC - trend CBC  #Nephrology - acute renal failure in setting of possible hepatorenal syndrome  - Continue CRRT as per Nephro  - Monitor urine output  - Triple phase CT scan done to evaluate renal lesion  #Infectious Disease - broaden antibiotics given worsening clinical status. Follow-up cultures  - Initial paracentesis 1/3 - cultures negative  #Endo - DM2 and hyperglycemia - continue insulin with goal FS < 180  #DVT proph - SCDs. No AC given concern for bleeding      Prognosis poor. Patient is at high risk for clinical deterioration. Concern for underlying malignancy of unknown primary given imaging findings. Worsening shock state and now respiratory failure requiring mechanical ventilation.

## 2024-01-09 NOTE — PROGRESS NOTE ADULT - SUBJECTIVE AND OBJECTIVE BOX
INTERVAL HPI/OVERNIGHT EVENTS:    SUBJECTIVE: Patient seen and examined at bedside.     CONSTITUTIONAL: No weakness, fevers or chills  EYES/ENT: No visual changes;  No vertigo or throat pain   NECK: No pain or stiffness  RESPIRATORY: No cough, wheezing, hemoptysis; No shortness of breath  CARDIOVASCULAR: No chest pain or palpitations  GASTROINTESTINAL: No abdominal or epigastric pain. No nausea, vomiting, or hematemesis; No diarrhea or constipation. No melena or hematochezia.  GENITOURINARY: No dysuria, frequency or hematuria  NEUROLOGICAL: No numbness or weakness  SKIN: No itching, rashes    OBJECTIVE:    VITAL SIGNS:  ICU Vital Signs Last 24 Hrs  T(C): 38 (09 Jan 2024 04:00), Max: 38.2 (09 Jan 2024 00:00)  T(F): 100.4 (09 Jan 2024 04:00), Max: 100.8 (09 Jan 2024 00:00)  HR: 98 (09 Jan 2024 07:00) (88 - 109)  BP: 105/53 (09 Jan 2024 06:45) (74/41 - 149/63)  BP(mean): 76 (09 Jan 2024 06:45) (52 - 92)  ABP: --  ABP(mean): --  RR: 0 (09 Jan 2024 07:00) (0 - 28)  SpO2: 100% (09 Jan 2024 07:00) (94% - 100%)    O2 Parameters below as of 08 Jan 2024 20:00  Patient On (Oxygen Delivery Method): ventilator    O2 Concentration (%): 40      Mode: AC/ CMV (Assist Control/ Continuous Mandatory Ventilation), RR (machine): 14, TV (machine): 500, FiO2: 40, PEEP: 5, ITime: 1, MAP: 10, PIP: 27    01-08 @ 07:01  -  01-09 @ 07:00  --------------------------------------------------------  IN: 3504.6 mL / OUT: 2693 mL / NET: 811.6 mL      CAPILLARY BLOOD GLUCOSE      POCT Blood Glucose.: 153 mg/dL (09 Jan 2024 05:23)      PHYSICAL EXAM:    General: NAD  HEENT: NC/AT; PERRL, clear conjunctiva  Neck: supple  Respiratory: CTA b/l  Cardiovascular: +S1/S2; RRR  Abdomen: soft, NT/ND; +BS x4  Extremities: WWP, 2+ peripheral pulses b/l; no LE edema  Skin: normal color and turgor; no rash  Neurological:    MEDICATIONS:  MEDICATIONS  (STANDING):  camphor 0.5%/menthol 0.5% Topical Lotion 1 Application(s) Topical two times a day  cefTRIAXone   IVPB 1000 milliGRAM(s) IV Intermittent every 24 hours  chlorhexidine 0.12% Liquid 15 milliLiter(s) Oral Mucosa every 12 hours  chlorhexidine 4% Liquid 1 Application(s) Topical <User Schedule>  CRRT Treatment    <Continuous>  dextrose 5%. 1000 milliLiter(s) (100 mL/Hr) IV Continuous <Continuous>  dextrose 5%. 1000 milliLiter(s) (50 mL/Hr) IV Continuous <Continuous>  dextrose 50% Injectable 25 Gram(s) IV Push once  dextrose 50% Injectable 25 Gram(s) IV Push once  dextrose 50% Injectable 12.5 Gram(s) IV Push once  folic acid Injectable 1 milliGRAM(s) IV Push daily  glucagon  Injectable 1 milliGRAM(s) IntraMuscular once  insulin lispro (ADMELOG) corrective regimen sliding scale   SubCutaneous every 6 hours  ketamine Infusion. 0.2 mG/kG/Hr (1.91 mL/Hr) IV Continuous <Continuous>  lactulose Retention Enema 200 Gram(s) Rectal every 8 hours  norepinephrine Infusion 0.6 MICROgram(s)/kG/Min (53.8 mL/Hr) IV Continuous <Continuous>  octreotide  Infusion 50 MICROgram(s)/Hr (10 mL/Hr) IV Continuous <Continuous>  pantoprazole  Injectable 40 milliGRAM(s) IV Push every 12 hours  phenylephrine    Infusion 0.1 MICROgram(s)/kG/Min (3.59 mL/Hr) IV Continuous <Continuous>  PrismaSATE Dialysate BGK 4 / 2.5 5000 milliLiter(s) (1500 mL/Hr) CRRT <Continuous>  PrismaSOL Filtration BGK 0 / 2.5 5000 milliLiter(s) (800 mL/Hr) CRRT <Continuous>  PrismaSOL Filtration BGK 0 / 2.5 5000 milliLiter(s) (200 mL/Hr) CRRT <Continuous>  propofol Infusion 50 MICROgram(s)/kG/Min (28.7 mL/Hr) IV Continuous <Continuous>  thiamine Injectable 100 milliGRAM(s) IV Push daily  vasopressin Infusion 0.04 Unit(s)/Min (6 mL/Hr) IV Continuous <Continuous>    MEDICATIONS  (PRN):  dextrose Oral Gel 15 Gram(s) Oral once PRN Blood Glucose LESS THAN 70 milliGRAM(s)/deciliter  sodium chloride 0.9% lock flush 10 milliLiter(s) IV Push every 1 hour PRN Pre/post blood products, medications, blood draw, and to maintain line patency      ALLERGIES:  Allergies    No Known Allergies    Intolerances        LABS:                        7.6    14.60 )-----------( 47       ( 09 Jan 2024 06:23 )             23.5     01-09    134<L>  |  101  |  20  ----------------------------<  152<H>  4.3   |  21<L>  |  1.62<H>    Ca    8.7      09 Jan 2024 06:22  Phos  3.3     01-09  Mg     2.4     01-09    TPro  5.4<L>  /  Alb  3.4  /  TBili  19.1<H>  /  DBili  x   /  AST  125<H>  /  ALT  17  /  AlkPhos  653<H>  01-09    PT/INR - ( 09 Jan 2024 00:20 )   PT: 18.5 sec;   INR: 1.79 ratio         PTT - ( 09 Jan 2024 00:20 )  PTT:35.3 sec  Urinalysis Basic - ( 09 Jan 2024 06:22 )    Color: x / Appearance: x / SG: x / pH: x  Gluc: 152 mg/dL / Ketone: x  / Bili: x / Urobili: x   Blood: x / Protein: x / Nitrite: x   Leuk Esterase: x / RBC: x / WBC x   Sq Epi: x / Non Sq Epi: x / Bacteria: x        RADIOLOGY & ADDITIONAL TESTS: Reviewed. INTERVAL HPI/OVERNIGHT EVENTS:  Overnight 1 bloody bowel movement, on 3 pressors   SUBJECTIVE: Patient seen and examined at bedside. Febrile, large ascites      OBJECTIVE:    VITAL SIGNS:  ICU Vital Signs Last 24 Hrs  T(C): 38 (09 Jan 2024 04:00), Max: 38.2 (09 Jan 2024 00:00)  T(F): 100.4 (09 Jan 2024 04:00), Max: 100.8 (09 Jan 2024 00:00)  HR: 98 (09 Jan 2024 07:00) (88 - 109)  BP: 105/53 (09 Jan 2024 06:45) (74/41 - 149/63)  BP(mean): 76 (09 Jan 2024 06:45) (52 - 92)  ABP: --  ABP(mean): --  RR: 0 (09 Jan 2024 07:00) (0 - 28)  SpO2: 100% (09 Jan 2024 07:00) (94% - 100%)    O2 Parameters below as of 08 Jan 2024 20:00  Patient On (Oxygen Delivery Method): ventilator    O2 Concentration (%): 40      Mode: AC/ CMV (Assist Control/ Continuous Mandatory Ventilation), RR (machine): 14, TV (machine): 500, FiO2: 40, PEEP: 5, ITime: 1, MAP: 10, PIP: 27    01-08 @ 07:01  -  01-09 @ 07:00  --------------------------------------------------------  IN: 3504.6 mL / OUT: 2693 mL / NET: 811.6 mL      CAPILLARY BLOOD GLUCOSE      POCT Blood Glucose.: 153 mg/dL (09 Jan 2024 05:23)      PHYSICAL EXAM:    General: NAD  HEENT: NC/AT; PERRL, clear conjunctiva  Neck: supple  Respiratory: CTA b/l  Cardiovascular: +S1/S2; RRR  Abdomen: soft, NT/ND; +BS x4, large ascites  Extremities: WWP, 2+ peripheral pulses b/l; no LE edema  Skin: jaundice  Neurological: AOx4 intubated sedated    MEDICATIONS:  MEDICATIONS  (STANDING):  camphor 0.5%/menthol 0.5% Topical Lotion 1 Application(s) Topical two times a day  cefTRIAXone   IVPB 1000 milliGRAM(s) IV Intermittent every 24 hours  chlorhexidine 0.12% Liquid 15 milliLiter(s) Oral Mucosa every 12 hours  chlorhexidine 4% Liquid 1 Application(s) Topical <User Schedule>  CRRT Treatment    <Continuous>  dextrose 5%. 1000 milliLiter(s) (100 mL/Hr) IV Continuous <Continuous>  dextrose 5%. 1000 milliLiter(s) (50 mL/Hr) IV Continuous <Continuous>  dextrose 50% Injectable 25 Gram(s) IV Push once  dextrose 50% Injectable 25 Gram(s) IV Push once  dextrose 50% Injectable 12.5 Gram(s) IV Push once  folic acid Injectable 1 milliGRAM(s) IV Push daily  glucagon  Injectable 1 milliGRAM(s) IntraMuscular once  insulin lispro (ADMELOG) corrective regimen sliding scale   SubCutaneous every 6 hours  ketamine Infusion. 0.2 mG/kG/Hr (1.91 mL/Hr) IV Continuous <Continuous>  lactulose Retention Enema 200 Gram(s) Rectal every 8 hours  norepinephrine Infusion 0.6 MICROgram(s)/kG/Min (53.8 mL/Hr) IV Continuous <Continuous>  octreotide  Infusion 50 MICROgram(s)/Hr (10 mL/Hr) IV Continuous <Continuous>  pantoprazole  Injectable 40 milliGRAM(s) IV Push every 12 hours  phenylephrine    Infusion 0.1 MICROgram(s)/kG/Min (3.59 mL/Hr) IV Continuous <Continuous>  PrismaSATE Dialysate BGK 4 / 2.5 5000 milliLiter(s) (1500 mL/Hr) CRRT <Continuous>  PrismaSOL Filtration BGK 0 / 2.5 5000 milliLiter(s) (800 mL/Hr) CRRT <Continuous>  PrismaSOL Filtration BGK 0 / 2.5 5000 milliLiter(s) (200 mL/Hr) CRRT <Continuous>  propofol Infusion 50 MICROgram(s)/kG/Min (28.7 mL/Hr) IV Continuous <Continuous>  thiamine Injectable 100 milliGRAM(s) IV Push daily  vasopressin Infusion 0.04 Unit(s)/Min (6 mL/Hr) IV Continuous <Continuous>    MEDICATIONS  (PRN):  dextrose Oral Gel 15 Gram(s) Oral once PRN Blood Glucose LESS THAN 70 milliGRAM(s)/deciliter  sodium chloride 0.9% lock flush 10 milliLiter(s) IV Push every 1 hour PRN Pre/post blood products, medications, blood draw, and to maintain line patency      ALLERGIES:  Allergies    No Known Allergies    Intolerances        LABS:                        7.6    14.60 )-----------( 47       ( 09 Jan 2024 06:23 )             23.5     01-09    134<L>  |  101  |  20  ----------------------------<  152<H>  4.3   |  21<L>  |  1.62<H>    Ca    8.7      09 Jan 2024 06:22  Phos  3.3     01-09  Mg     2.4     01-09    TPro  5.4<L>  /  Alb  3.4  /  TBili  19.1<H>  /  DBili  x   /  AST  125<H>  /  ALT  17  /  AlkPhos  653<H>  01-09    PT/INR - ( 09 Jan 2024 00:20 )   PT: 18.5 sec;   INR: 1.79 ratio         PTT - ( 09 Jan 2024 00:20 )  PTT:35.3 sec  Urinalysis Basic - ( 09 Jan 2024 06:22 )    Color: x / Appearance: x / SG: x / pH: x  Gluc: 152 mg/dL / Ketone: x  / Bili: x / Urobili: x   Blood: x / Protein: x / Nitrite: x   Leuk Esterase: x / RBC: x / WBC x   Sq Epi: x / Non Sq Epi: x / Bacteria: x        RADIOLOGY & ADDITIONAL TESTS: Reviewed.

## 2024-01-10 NOTE — PROGRESS NOTE ADULT - ASSESSMENT
58 year old man with h/o type II DM for ~ 6-7 years, was on metformin and Jardiance, recently diagnosed with liver cirrhosis complicated by ascites. He underwent paracentesis x 3. Course complicated by worsening RAHUL and encephalopathy. He was transferred to [facility] for liver transplant evaluation. He was initiated on HD on 12/31/23.     Renal US with normal sized kidneys measuring ~ 11.6 and 11.8 cm. No hydro. Multilobulated vascular mass arises from the right kidney extending into the peritoneum. Findings are concerning for primary renal malignancy versus metastatic disease. MRI pending.     S/p large volume paracentesis 1/3/24 with 11 liter fluid removal.    RAHUL on CKD - unclear baseline renal function. No significant proteinuria UPCR 0.4, normal sized kidneys on US. Initiated HD on 12/31, but Unable to tolerate HD due to hypotension unresponsive to increase midodrine dose.    Labs with worsening BUN/Cr and metabolic acidosis and therefore patient was moved to ICU consult for pressor support and started on CRRT. Has a right IJ temporary cathter   He remains intubated and on sedation.  Labs reviewed.   Continue CRRT for now, will add heparin to circuit if no contraindications.   Overall prognosis appears very guarded, goc discussions in process but appears to remain full code at this time.    58 year old man with h/o type II DM for ~ 6-7 years, was on metformin and Jardiance, recently diagnosed with liver cirrhosis complicated by ascites. He underwent paracentesis x 3. Course complicated by worsening RAHUL and encephalopathy. He was transferred to [facility] for liver transplant evaluation. He was initiated on HD on 12/31/23.     Renal US with normal sized kidneys measuring ~ 11.6 and 11.8 cm. No hydro. Multilobulated vascular mass arises from the right kidney extending into the peritoneum. Findings are concerning for primary renal malignancy versus metastatic disease. MRI pending.     S/p large volume paracentesis 1/3/24 with 11 liter fluid removal.    RAHUL on CKD - unclear baseline renal function. No significant proteinuria UPCR 0.4, normal sized kidneys on US. Initiated HD on 12/31, but Unable to tolerate HD due to hypotension unresponsive to increase midodrine dose.    Labs with worsening BUN/Cr and metabolic acidosis and therefore patient was moved to ICU consult for pressor support and started on CRRT. Has a right IJ temporary cathter   He remains intubated and on sedation.  Labs reviewed.   Continue CRRT for now, will add heparin to circuit if no contraindications.   Overall prognosis appears very guarded, goc discussions in process but appears to remain full code at this time.

## 2024-01-10 NOTE — CONSULT NOTE ADULT - PROBLEM SELECTOR RECOMMENDATION 6
HCP: Ray Grajeda ([phone])  Patient lacks capacity.    Patient's condition discussed on 1/10/24. HCP with plans to visit tomorrow and discuss options at 11:15 AM. Continue medical management for now.     Dneah requests aide with setting up for [employer] burial. Informed primary team.  Patient is [employer]. Dneah declined  for now. HCP: Ray Grajeda ([phone])  Patient lacks capacity.    Patient's condition discussed on 1/10/24. HCP with plans to visit tomorrow and discuss options at 11:15 AM. Continue medical management for now.     Dneah requests aide with setting up for [employer] burial. Informed primary team.  Patient is [employer]. Dneah declined  for now.

## 2024-01-10 NOTE — CONSULT NOTE ADULT - NSCONSULTADDITIONALINFOA_GEN_ALL_CORE
The palliative medicine team will continue to follow for symptoms and goals of care as clinical course evolves.    Arcelia Melgar MD/MPA  Attending Physician, Geriatrics and Palliative Care ([place]) [facility]    Please contact me via Teams from Monday through Friday between 9am-5pm. If not answering, please call the palliative care pager [phone].    After 5pm and on weekends, please see the contact information below:    In the event of newly developing, evolving, or worsening symptoms, please contact the Palliative Medicine team via pager (if the patient is at [facility] #[phone] or if the patient is at [facility] #[phone]) The Geriatric and Palliative Medicine service has coverage 24 hours a day/ 7 days a week to provide medical recommendations regarding symptom management needs via telephone. The palliative medicine team will continue to follow for symptoms and goals of care as clinical course evolves.    Arcelia Melgar MD/MPA  Attending Physician, Geriatrics and Palliative Care ([place]) [facility]    Please contact me via Teams from Monday through Friday between 9am-5pm. If not answering, please call the palliative care pager [phone].    After 5pm and on weekends, please see the contact information below:    In the event of newly developing, evolving, or worsening symptoms, please contact the Palliative Medicine team via pager (if the patient is at [facility] #[phone] or if the patient is at [facility] #[phone]) The Geriatric and Palliative Medicine service has coverage 24 hours a day/ 7 days a week to provide medical recommendations regarding symptom management needs via telephone.

## 2024-01-10 NOTE — CONSULT NOTE ADULT - CONVERSATION DETAILS
Introduced myself as palliative medicine provider. Described role, including part of a team consisting of , JACQUELINE, doctors, chaplains, child life specialists. Discussed focus on supporting patient and family through serious illness and ensuring the medical care they receive is aligned with their goals and values.     Diagnosis/prognosis: Ray understands that the patient is ill and has problems with multiple organs including his liver and his kidneys. She last saw the patient in the hospital on 12/28. Discussed that he has gotten significantly worse since then. She was under the impression that his prognosis would possibly be a month. Discussed that patient is currently critically ill on multiple modes of life support (ventilator, pressors). Ray lives in [place] and will visit patient tomorrow. Per request, used Doximity to video chat Ray and show her how patient currently appears. She expressed surprise at his condition. She reports that she will see the patient tomorrow and follow up then. Offered to call patient's family with an Upper sorbian , but she declined and reported that she would call. Offered chaplaincy or Scientologist services. She declined and reported she wanted to get their own involved. Would like to continue medical management and reassess goals tomorrow when she arrives. Introduced myself as palliative medicine provider. Described role, including part of a team consisting of , JACQUELINE, doctors, chaplains, child life specialists. Discussed focus on supporting patient and family through serious illness and ensuring the medical care they receive is aligned with their goals and values.     Diagnosis/prognosis: Ray understands that the patient is ill and has problems with multiple organs including his liver and his kidneys. She last saw the patient in the hospital on 12/28. Discussed that he has gotten significantly worse since then. She was under the impression that his prognosis would possibly be a month. Discussed that patient is currently critically ill on multiple modes of life support (ventilator, pressors). Ray lives in [place] and will visit patient tomorrow. Per request, used Doximity to video chat Ray and show her how patient currently appears. She expressed surprise at his condition. She reports that she will see the patient tomorrow and follow up then. Offered to call patient's family with an Tajik , but she declined and reported that she would call. Offered chaplaincy or Cheondoism services. She declined and reported she wanted to get their own involved. Would like to continue medical management and reassess goals tomorrow when she arrives.

## 2024-01-10 NOTE — CONSULT NOTE ADULT - PROVIDER SPECIALTY LIST ADULT
Transplant Nephrology
Heme/Onc
Hepatology
Intervent Radiology
Vascular Surgery
Intervent Radiology
Wound Care
Palliative Care

## 2024-01-10 NOTE — PROGRESS NOTE ADULT - SUBJECTIVE AND OBJECTIVE BOX
[facility] DIVISION OF KIDNEY DISEASES AND HYPERTENSION   FOLLOW UP NOTE  --------------------------------------------------------------------------------  Chief Complaint: Pt with acute renal failure requiring RRT in setting of liver failure. Undergoing liver transplant eval.     24 hour events/subjective: Pt. was seen and examined in the MICU. Intubated on 1/8/24 in setting of endoscopic procedure and remains intubated and sedated. Remains on multiple IV vasopressors. Remains on CRRT, net even per nursing, appears catheter clotting once per shift.     PAST HISTORY  --------------------------------------------------------------------------------  No significant changes to PMH, PSH, FHx, SHx, unless otherwise noted    ALLERGIES & MEDICATIONS  --------------------------------------------------------------------------------  Allergies  No Known Allergies    Intolerances    Standing Inpatient Medications  camphor 0.5%/menthol 0.5% Topical Lotion 1 Application(s) Topical two times a day  chlorhexidine 0.12% Liquid 15 milliLiter(s) Oral Mucosa every 12 hours  chlorhexidine 4% Liquid 1 Application(s) Topical <User Schedule>  CRRT Treatment    <Continuous>  dextrose 5%. 1000 milliLiter(s) IV Continuous <Continuous>  dextrose 5%. 1000 milliLiter(s) IV Continuous <Continuous>  dextrose 50% Injectable 25 Gram(s) IV Push once  dextrose 50% Injectable 25 Gram(s) IV Push once  dextrose 50% Injectable 12.5 Gram(s) IV Push once  folic acid Injectable 1 milliGRAM(s) IV Push daily  glucagon  Injectable 1 milliGRAM(s) IntraMuscular once  insulin lispro (ADMELOG) corrective regimen sliding scale   SubCutaneous every 6 hours  norepinephrine Infusion 0.6 MICROgram(s)/kG/Min IV Continuous <Continuous>  octreotide  Infusion 50 MICROgram(s)/Hr IV Continuous <Continuous>  pantoprazole  Injectable 40 milliGRAM(s) IV Push every 12 hours  piperacillin/tazobactam IVPB.. 3.375 Gram(s) IV Intermittent every 8 hours  PrismaSATE Dialysate BGK 4 / 2.5 5000 milliLiter(s) CRRT <Continuous>  PrismaSOL Filtration BGK 0 / 2.5 5000 milliLiter(s) CRRT <Continuous>  PrismaSOL Filtration BGK 0 / 2.5 5000 milliLiter(s) CRRT <Continuous>  thiamine Injectable 100 milliGRAM(s) IV Push daily  vasopressin Infusion 0.04 Unit(s)/Min IV Continuous <Continuous>    PRN Inpatient Medications  dextrose Oral Gel 15 Gram(s) Oral once PRN  sodium chloride 0.9% lock flush 10 milliLiter(s) IV Push every 1 hour PRN    REVIEW OF SYSTEMS  --------------------------------------------------------------------------------  Unable to obtain ROS     VITALS/PHYSICAL EXAM  --------------------------------------------------------------------------------  T(C): 37.7 (01-10-24 @ 12:00), Max: 37.8 (01-10-24 @ 00:00)  HR: 92 (01-10-24 @ 12:15) (89 - 113)  BP: 97/54 (01-10-24 @ 12:15) (89/53 - 146/65)  RR: 17 (01-10-24 @ 12:15) (9 - 23)  SpO2: 100% (01-10-24 @ 12:15) (100% - 100%)  Wt(kg): --    01-09-24 @ 07:01  -  01-10-24 @ 07:00  --------------------------------------------------------  IN: 3891.6 mL / OUT: 2965 mL / NET: 926.6 mL    01-10-24 @ 07:01  -  01-10-24 @ 13:04  --------------------------------------------------------  IN: 396.6 mL / OUT: 0 mL / NET: 396.6 mL    Physical Exam:  GEN: Intubated and sedated  CVS: S1, S2 heard  CHEST: Mechanical breath sounds B/L  ABD: soft, nontender, distended, +BS  EXTR: + b/l LE edema. RUE edematous R>L  NEURO: sedated  SKIN: Jaundiced  Dialysis: R IJ non tunneled HD catheter    LABS/STUDIES  --------------------------------------------------------------------------------              8.4    13.89 >-----------<  58       [01-10-24 @ 06:27]              25.9     134  |  98  |  17  ----------------------------<  154      [01-10-24 @ 06:28]  4.0   |  20  |  1.39        Ca     9.1     [01-10-24 @ 06:28]      Mg     2.6     [01-10-24 @ 06:28]      Phos  3.5     [01-10-24 @ 06:28]    TPro  5.6  /  Alb  3.7  /  TBili  19.9  /  DBili  x   /  AST  119  /  ALT  16  /  AlkPhos  608  [01-10-24 @ 06:28]    PT/INR: PT 13.5 , INR 1.23       [01-10-24 @ 00:33]  PTT: 32.6       [01-10-24 @ 00:33]    Creatinine Trend:  SCr 1.39 [01-10 @ 06:28]  SCr 1.43 [01-10 @ 00:31]  SCr 1.51 [01-09 @ 18:51]  SCr 1.50 [01-09 @ 12:12]  SCr 1.62 [01-09 @ 06:22]    HBsAg Nonreact      [12-31-23 @ 14:46]  HCV 0.19, Nonreact      [12-31-23 @ 14:46]  HIV Nonreact      [12-31-23 @ 14:46]    JOHNNY: titer 1:160, pattern Speckled      [12-31-23 @ 14:46]  Free Light Chains: kappa 21.71, lambda 13.12, ratio = 1.65      [12-31 @ 14:46] [facility] DIVISION OF KIDNEY DISEASES AND HYPERTENSION   FOLLOW UP NOTE  --------------------------------------------------------------------------------  Chief Complaint: Pt with acute renal failure requiring RRT in setting of liver failure. Undergoing liver transplant eval.     24 hour events/subjective: Pt. was seen and examined in the MICU. Intubated on 1/8/24 in setting of endoscopic procedure and remains intubated and sedated. Remains on multiple IV vasopressors. Remains on CRRT, net even per nursing, appears catheter clotting once per shift.     PAST HISTORY  --------------------------------------------------------------------------------  No significant changes to PMH, PSH, FHx, SHx, unless otherwise noted    ALLERGIES & MEDICATIONS  --------------------------------------------------------------------------------  Allergies  No Known Allergies    Intolerances    Standing Inpatient Medications  camphor 0.5%/menthol 0.5% Topical Lotion 1 Application(s) Topical two times a day  chlorhexidine 0.12% Liquid 15 milliLiter(s) Oral Mucosa every 12 hours  chlorhexidine 4% Liquid 1 Application(s) Topical <User Schedule>  CRRT Treatment    <Continuous>  dextrose 5%. 1000 milliLiter(s) IV Continuous <Continuous>  dextrose 5%. 1000 milliLiter(s) IV Continuous <Continuous>  dextrose 50% Injectable 25 Gram(s) IV Push once  dextrose 50% Injectable 25 Gram(s) IV Push once  dextrose 50% Injectable 12.5 Gram(s) IV Push once  folic acid Injectable 1 milliGRAM(s) IV Push daily  glucagon  Injectable 1 milliGRAM(s) IntraMuscular once  insulin lispro (ADMELOG) corrective regimen sliding scale   SubCutaneous every 6 hours  norepinephrine Infusion 0.6 MICROgram(s)/kG/Min IV Continuous <Continuous>  octreotide  Infusion 50 MICROgram(s)/Hr IV Continuous <Continuous>  pantoprazole  Injectable 40 milliGRAM(s) IV Push every 12 hours  piperacillin/tazobactam IVPB.. 3.375 Gram(s) IV Intermittent every 8 hours  PrismaSATE Dialysate BGK 4 / 2.5 5000 milliLiter(s) CRRT <Continuous>  PrismaSOL Filtration BGK 0 / 2.5 5000 milliLiter(s) CRRT <Continuous>  PrismaSOL Filtration BGK 0 / 2.5 5000 milliLiter(s) CRRT <Continuous>  thiamine Injectable 100 milliGRAM(s) IV Push daily  vasopressin Infusion 0.04 Unit(s)/Min IV Continuous <Continuous>    PRN Inpatient Medications  dextrose Oral Gel 15 Gram(s) Oral once PRN  sodium chloride 0.9% lock flush 10 milliLiter(s) IV Push every 1 hour PRN    REVIEW OF SYSTEMS  --------------------------------------------------------------------------------  Unable to obtain ROS     VITALS/PHYSICAL EXAM  --------------------------------------------------------------------------------  T(C): 37.7 (01-10-24 @ 12:00), Max: 37.8 (01-10-24 @ 00:00)  HR: 92 (01-10-24 @ 12:15) (89 - 113)  BP: 97/54 (01-10-24 @ 12:15) (89/53 - 146/65)  RR: 17 (01-10-24 @ 12:15) (9 - 23)  SpO2: 100% (01-10-24 @ 12:15) (100% - 100%)  Wt(kg): --    01-09-24 @ 07:01  -  01-10-24 @ 07:00  --------------------------------------------------------  IN: 3891.6 mL / OUT: 2965 mL / NET: 926.6 mL    01-10-24 @ 07:01  -  01-10-24 @ 13:04  --------------------------------------------------------  IN: 396.6 mL / OUT: 0 mL / NET: 396.6 mL    Physical Exam:  GEN: Intubated and sedated  CVS: S1, S2 heard  CHEST: Mechanical breath sounds B/L  ABD: soft, nontender, distended, +BS  EXTR: + b/l LE edema. RUE edematous R>L  NEURO: sedated  SKIN: Jaundiced  Dialysis: R IJ non tunneled HD catheter    LABS/STUDIES  --------------------------------------------------------------------------------              8.4    13.89 >-----------<  58       [01-10-24 @ 06:27]              25.9     134  |  98  |  17  ----------------------------<  154      [01-10-24 @ 06:28]  4.0   |  20  |  1.39        Ca     9.1     [01-10-24 @ 06:28]      Mg     2.6     [01-10-24 @ 06:28]      Phos  3.5     [01-10-24 @ 06:28]    TPro  5.6  /  Alb  3.7  /  TBili  19.9  /  DBili  x   /  AST  119  /  ALT  16  /  AlkPhos  608  [01-10-24 @ 06:28]    PT/INR: PT 13.5 , INR 1.23       [01-10-24 @ 00:33]  PTT: 32.6       [01-10-24 @ 00:33]    Creatinine Trend:  SCr 1.39 [01-10 @ 06:28]  SCr 1.43 [01-10 @ 00:31]  SCr 1.51 [01-09 @ 18:51]  SCr 1.50 [01-09 @ 12:12]  SCr 1.62 [01-09 @ 06:22]    HBsAg Nonreact      [12-31-23 @ 14:46]  HCV 0.19, Nonreact      [12-31-23 @ 14:46]  HIV Nonreact      [12-31-23 @ 14:46]    JOHNNY: titer 1:160, pattern Speckled      [12-31-23 @ 14:46]  Free Light Chains: kappa 21.71, lambda 13.12, ratio = 1.65      [12-31 @ 14:46]

## 2024-01-10 NOTE — CONSULT NOTE ADULT - NS ATTEST RISK PROBLEM GEN_ALL_CORE FT
1. Number and complexity of problems addressed for this patient:    1.1 Moderate (At least 1)  [ ] 1 or more chronic illnesses with exacerbation, progression, or side effects of treatment  [ ] 2 or more stable chronic illnesses  [ ] 1 undiagnosed new problem with uncertain prognosis  [ ] 1 acute illness with systemic symptoms  [ ] 1 acute complicated injury  1.2 High (At least 1)   [ ] 1 or more chronic illnesses with severe exacerbation, progression, or side effects of treatment  [ x] 1 acute or chronic illnesses or injuries that may pose a threat to life or bodily function    2. Amount and/or Complexity of Data that was Reviewed and Analyzed for this case:       Moderate (1 out of 3)       High (2 out of 3)  2.1. (Any combination of 3 of the following)   [ ] Prior External notes were reviewed  [x ] Each test result was reviewed (see "LABS" and "RADIOLOGY & ADDITIONAL STUDIES" above)  [ x] The following tests were ordered and/or reviewed (Only count 1 point for ordering or reviewing a unique test):  	[x ]CBC  	[x ] Chemistry   	[x ] Imaging   	[ ] Other:   [ x] Assessment requiring an independent historian   		Name of historian and relationship: [facility]  2.2  [x] Personally review and interpretation of  image or testing   2.3  [ x] Discussion of management or test interpretation with external physician/other qualified health care professional\appropriate source (not separately reported) - primary team    3. Risk of Complications and/or Morbidity or Mortality of for this Patient’s Management:  3.1 Moderate risk of morbidity from additional diagnostic testing or treatment (At least 1):   [ ] Prescription drug management   [ ] Decision regarding minor surgery, treatment, or procedure with identified patient or procedure risk factors  [ ] Decision regarding elective major surgery, treatment, or procedure without identified patient or procedure risk factors   [ ] Diagnosis or treatment significantly limited by social determinants of health   [ ] Other:   3.2 High risk of morbidity from additional diagnostic testing or treatment (At least 1):   [ ] Drug therapy requiring intensive monitoring for toxicity   [ ] Decision regarding elective major surgery, treatment, or procedure with identified patient or procedure risk factors   [ ] Decision regarding emergency major surgery, treatment, or procedure   [ ] Decision regarding hospitalization or escalation of hospital-level of care  [x] Decision not to resuscitate, not to intubate, or to de-escalate care because of poor prognosis   [ ] Decision to proceed or not with artificial nutrition   [ ] Parenteral controlled substance  [x ] Other: 1. Number and complexity of problems addressed for this patient:    1.1 Moderate (At least 1)  [ ] 1 or more chronic illnesses with exacerbation, progression, or side effects of treatment  [ ] 2 or more stable chronic illnesses  [ ] 1 undiagnosed new problem with uncertain prognosis  [ ] 1 acute illness with systemic symptoms  [ ] 1 acute complicated injury  1.2 High (At least 1)   [ ] 1 or more chronic illnesses with severe exacerbation, progression, or side effects of treatment  [ x] 1 acute or chronic illnesses or injuries that may pose a threat to life or bodily function    2. Amount and/or Complexity of Data that was Reviewed and Analyzed for this case:       Moderate (1 out of 3)       High (2 out of 3)  2.1. (Any combination of 3 of the following)   [ ] Prior External notes were reviewed  [x ] Each test result was reviewed (see "LABS" and "RADIOLOGY & ADDITIONAL STUDIES" above)  [ x] The following tests were ordered and/or reviewed (Only count 1 point for ordering or reviewing a unique test):  	[x ]CBC  	[x ] Chemistry   	[x ] Imaging   	[ ] Other:   [ x] Assessment requiring an independent historian   		Name of historian and relationship: [facility]  2.2  [x] Personally review and interpretation of  image or testing   2.3  [ x] Discussion of management or test interpretation with external physician/other qualified health care professional\appropriate source (not separately reported) - primary team    3. Risk of Complications and/or Morbidity or Mortality of for this Patient’s Management:  3.1 Moderate risk of morbidity from additional diagnostic testing or treatment (At least 1):   [ ] Prescription drug management   [ ] Decision regarding minor surgery, treatment, or procedure with identified patient or procedure risk factors  [ ] Decision regarding elective major surgery, treatment, or procedure without identified patient or procedure risk factors   [ ] Diagnosis or treatment significantly limited by social determinants of health   [ ] Other:   3.2 High risk of morbidity from additional diagnostic testing or treatment (At least 1):   [ ] Drug therapy requiring intensive monitoring for toxicity   [ ] Decision regarding elective major surgery, treatment, or procedure with identified patient or procedure risk factors   [ ] Decision regarding emergency major surgery, treatment, or procedure   [ ] Decision regarding hospitalization or escalation of hospital-level of care  [x] Decision not to resuscitate, not to intubate, or to de-escalate care because of poor prognosis   [ ] Decision to proceed or not with artificial nutrition   [ ] Parenteral controlled substance  [x ] Other:

## 2024-01-10 NOTE — CHART NOTE - NSCHARTNOTEFT_GEN_A_CORE
Nutrition Follow Up Note  Patient seen for: Nutrition Services Follow Up     Chart reviewed, events noted. "59 y/o M Pt with acute renal failure requiring CRRT in setting of liver failure. Undergoing liver transplant eval.     Source: [] Patient       [x] Current Medical Record     [] RN        [] Family at bedside       [] Other:    -If unable to interview patient: [X] Trach/Vent/BiPAP  [] Disoriented/confused/inappropriate to interview    Diet Order:   Diet, NPO with Tube Feed:   Tube Feeding Modality: Orogastric  Nepro with Carb Steady (NEPRORTH)  Total Volume for 24 Hours (mL): 720  Continuous  Starting Tube Feed Rate {mL per Hour}: 10  Increase Tube Feed Rate by (mL): 10     Every 4 hours  Until Goal Tube Feed Rate (mL per Hour): 40  Tube Feed Duration (in Hours): 18  Tube Feed Start Time: 13:00 (01-10-24)    - Is current order appropriate/adequate? [] Yes  [X]  No: See recommendations below    - Nutrition-related concerns:      - Liver: Decompensated hepatic cirrhosis; s/p paracentesis on 1/9/24 6,100 ml/hr removed; ordered for lactulose and rifaximin       - Endo: on sliding Scale insulin      - Neuro: Intubated on 1/8/24, ordered for Vaso and Levo      -GI:  Last BM: 1/8/24       -Renal: on CCRT, electrolytes are within goal range 1/10/24    Weights: Drug Dosing Weight  Weight (kg): 95.6 (02 Jan 2024 05:00)  BMI (kg/m2): 27 (02 Jan 2024 05:00)    Nutritionally MEDICATIONS  (STANDING):  dextrose 5%. 1000 milliLiter(s) (50 mL/Hr) IV Continuous <Continuous>  dextrose 5%. 1000 milliLiter(s) (100 mL/Hr) IV Continuous <Continuous>  dextrose 50% Injectable 25 Gram(s) IV Push once  dextrose 50% Injectable 25 Gram(s) IV Push once  dextrose 50% Injectable 12.5 Gram(s) IV Push once  folic acid Injectable 1 milliGRAM(s) IV Push daily  glucagon  Injectable 1 milliGRAM(s) IntraMuscular once  heparin  Infusion Syringe 300 Unit(s)/Hr (0.6 mL/Hr) CRRT <Continuous>  insulin lispro (ADMELOG) corrective regimen sliding scale   SubCutaneous every 6 hours  lactulose Syrup 10 Gram(s) Oral three times a day  norepinephrine Infusion 0.6 MICROgram(s)/kG/Min (53.8 mL/Hr) IV Continuous <Continuous>  octreotide  Infusion 50 MICROgram(s)/Hr (10 mL/Hr) IV Continuous <Continuous>  pantoprazole  Injectable 40 milliGRAM(s) IV Push every 12 hours  piperacillin/tazobactam IVPB.. 3.375 Gram(s) IV Intermittent every 8 hours  PrismaSATE Dialysate BGK 4 / 2.5 5000 milliLiter(s) (1500 mL/Hr) CRRT <Continuous>  PrismaSOL Filtration BGK 0 / 2.5 5000 milliLiter(s) (800 mL/Hr) CRRT <Continuous>  PrismaSOL Filtration BGK 0 / 2.5 5000 milliLiter(s) (200 mL/Hr) CRRT <Continuous>  rifAXIMin 550 milliGRAM(s) Oral two times a day  thiamine Injectable 100 milliGRAM(s) IV Push daily  vasopressin Infusion 0.04 Unit(s)/Min (6 mL/Hr) IV Continuous <Continuous>    MEDICATIONS  (PRN):  dextrose Oral Gel 15 Gram(s) Oral once PRN Blood Glucose LESS THAN 70 milliGRAM(s)/deciliter  sodium chloride 0.9% lock flush 10 milliLiter(s) IV Push every 1 hour PRN Pre/post blood products, medications, blood draw, and to maintain line patency    Pertinent Labs: 01-10 @ 13:13: Na 136, BUN 22, Cr 1.74<H>, <H>, K+ 4.3, Phos 4.5, Mg 2.5, Alk Phos 592<H>, ALT/SGPT 18, AST/SGOT 121<H>, HbA1c --  01-10 @ 06:28: Na 134<L>, BUN 17, Cr 1.39<H>, <H>, K+ 4.0, Phos 3.5, Mg 2.6, Alk Phos 608<H>, ALT/SGPT 16, AST/SGOT 119<H>, HbA1c --  01-10 @ 00:31: Na 134<L>, BUN 17, Cr 1.43<H>, <H>, K+ 4.0, Phos 3.4, Mg 2.6, Alk Phos 564<H>, ALT/SGPT 17, AST/SGOT 111<H>, HbA1c --  01-09 @ 18:51: Na 134<L>, BUN 19, Cr 1.51<H>, <H>, K+ 4.1, Phos 3.7, Mg 2.5, Alk Phos 591<H>, ALT/SGPT 16, AST/SGOT 115<H>, HbA1c --    A1C with Estimated Average Glucose Result: 5.2 % (12-31-23 @ 07:34)    Finger Sticks:  POCT Blood Glucose.: 147 mg/dL (01-10 @ 05:44)  POCT Blood Glucose.: 175 mg/dL (01-09 @ 18:33)      Skin per nursing documentation 1/9/24: Sacral/bilateral Buttocks deep tissue injury with evolution present on admission   Edema: -- +1 generalized edema and +2 to bilateral arms per flow sheets    Estimated Needs:   [] no change since previous assessment  [X] recalculated:  Based on IBW of 190 lbs/ 86.2 kg  25-30 kcal/kg= 6303-4594 kcal  [facility]= 2019 kcal  1.2-1.4 gm/kg= 103.44- 120.68 gm pro  Defer Fluids to medical team    Previous Nutrition Diagnosis:   Increased Nutrient Needs  Nutrition Diagnosis is: [X] ongoing  [] resolved [] not applicable     New Nutrition Diagnosis: Inadequate protein-energy intake related to suboptimal enteral provision as evidenced current regimen does not meet >/=80% of estimated nutritional needs   New Nutrition Goal: Pt to meet >/=80% of estimated nutritional needs during hospital stay.     Nutrition Care Plan:  [X] In Progress  [] Achieved  [] Not applicable    Nutrition Interventions:     Education Provided:       [] Yes:  [X] No: Pt intubated and sedated        Recommendations:      1. As tolerated, recommend  Lori [employer]ms Peptide 1.5 @ 10mL/hr and advance by 10mL q6H until goal rate of 70 mL/hr x 18 hrs is reached. Regimen at goal provides  1,260 mL total volume, 883.94mL free water, 1938.46 kcal/d and 93.05 g pro/day   -- Recommend modular component of Prosource TF 30 ml via OGT, provides 90 kcal, 15 gm pro  -- Enteral feeding recommendation and modular component providing ~2,028 kcal/d and ~108 gm pro/day  (--kcal/kg and --g/kg) based on   2.Defer diet/texture advancement to medical team/SLP as indicated   3.RD remains available to adjust EN formulary, volume/rate  4. Continue Micronutrient supplement of folic acid and thiamine 1x/Day. Consider adding daily Multivitamin to aid in wound healing.     Monitoring and Evaluation:   Continue to monitor GI tolerance, skin integrity, labs, weight, and bowel movement regularity.      RD remains available upon request and will follow up per protocol  Ivana Reddy, MS,RDN,CDN AVAILABLE ON TEAMS Nutrition Follow Up Note  Patient seen for: Nutrition Services Follow Up     Chart reviewed, events noted. "57 y/o M Pt with acute renal failure requiring CRRT in setting of liver failure. Undergoing liver transplant eval.     Source: [] Patient       [x] Current Medical Record     [] RN        [] Family at bedside       [] Other:    -If unable to interview patient: [X] Trach/Vent/BiPAP  [] Disoriented/confused/inappropriate to interview    Diet Order:   Diet, NPO with Tube Feed:   Tube Feeding Modality: Orogastric  Nepro with Carb Steady (NEPRORTH)  Total Volume for 24 Hours (mL): 720  Continuous  Starting Tube Feed Rate {mL per Hour}: 10  Increase Tube Feed Rate by (mL): 10     Every 4 hours  Until Goal Tube Feed Rate (mL per Hour): 40  Tube Feed Duration (in Hours): 18  Tube Feed Start Time: 13:00 (01-10-24)    - Is current order appropriate/adequate? [] Yes  [X]  No: See recommendations below    - Nutrition-related concerns:      - Liver: Decompensated hepatic cirrhosis; s/p paracentesis on 1/9/24 6,100 ml/hr removed; ordered for lactulose and rifaximin       - Endo: on sliding Scale insulin      - Neuro: Intubated on 1/8/24, ordered for Vaso and Levo      -GI:  Last BM: 1/8/24       -Renal: on CCRT, electrolytes are within goal range 1/10/24    Weights: Drug Dosing Weight  Weight (kg): 95.6 (02 Jan 2024 05:00)  BMI (kg/m2): 27 (02 Jan 2024 05:00)    Nutritionally MEDICATIONS  (STANDING):  dextrose 5%. 1000 milliLiter(s) (50 mL/Hr) IV Continuous <Continuous>  dextrose 5%. 1000 milliLiter(s) (100 mL/Hr) IV Continuous <Continuous>  dextrose 50% Injectable 25 Gram(s) IV Push once  dextrose 50% Injectable 25 Gram(s) IV Push once  dextrose 50% Injectable 12.5 Gram(s) IV Push once  folic acid Injectable 1 milliGRAM(s) IV Push daily  glucagon  Injectable 1 milliGRAM(s) IntraMuscular once  heparin  Infusion Syringe 300 Unit(s)/Hr (0.6 mL/Hr) CRRT <Continuous>  insulin lispro (ADMELOG) corrective regimen sliding scale   SubCutaneous every 6 hours  lactulose Syrup 10 Gram(s) Oral three times a day  norepinephrine Infusion 0.6 MICROgram(s)/kG/Min (53.8 mL/Hr) IV Continuous <Continuous>  octreotide  Infusion 50 MICROgram(s)/Hr (10 mL/Hr) IV Continuous <Continuous>  pantoprazole  Injectable 40 milliGRAM(s) IV Push every 12 hours  piperacillin/tazobactam IVPB.. 3.375 Gram(s) IV Intermittent every 8 hours  PrismaSATE Dialysate BGK 4 / 2.5 5000 milliLiter(s) (1500 mL/Hr) CRRT <Continuous>  PrismaSOL Filtration BGK 0 / 2.5 5000 milliLiter(s) (800 mL/Hr) CRRT <Continuous>  PrismaSOL Filtration BGK 0 / 2.5 5000 milliLiter(s) (200 mL/Hr) CRRT <Continuous>  rifAXIMin 550 milliGRAM(s) Oral two times a day  thiamine Injectable 100 milliGRAM(s) IV Push daily  vasopressin Infusion 0.04 Unit(s)/Min (6 mL/Hr) IV Continuous <Continuous>    MEDICATIONS  (PRN):  dextrose Oral Gel 15 Gram(s) Oral once PRN Blood Glucose LESS THAN 70 milliGRAM(s)/deciliter  sodium chloride 0.9% lock flush 10 milliLiter(s) IV Push every 1 hour PRN Pre/post blood products, medications, blood draw, and to maintain line patency    Pertinent Labs: 01-10 @ 13:13: Na 136, BUN 22, Cr 1.74<H>, <H>, K+ 4.3, Phos 4.5, Mg 2.5, Alk Phos 592<H>, ALT/SGPT 18, AST/SGOT 121<H>, HbA1c --  01-10 @ 06:28: Na 134<L>, BUN 17, Cr 1.39<H>, <H>, K+ 4.0, Phos 3.5, Mg 2.6, Alk Phos 608<H>, ALT/SGPT 16, AST/SGOT 119<H>, HbA1c --  01-10 @ 00:31: Na 134<L>, BUN 17, Cr 1.43<H>, <H>, K+ 4.0, Phos 3.4, Mg 2.6, Alk Phos 564<H>, ALT/SGPT 17, AST/SGOT 111<H>, HbA1c --  01-09 @ 18:51: Na 134<L>, BUN 19, Cr 1.51<H>, <H>, K+ 4.1, Phos 3.7, Mg 2.5, Alk Phos 591<H>, ALT/SGPT 16, AST/SGOT 115<H>, HbA1c --    A1C with Estimated Average Glucose Result: 5.2 % (12-31-23 @ 07:34)    Finger Sticks:  POCT Blood Glucose.: 147 mg/dL (01-10 @ 05:44)  POCT Blood Glucose.: 175 mg/dL (01-09 @ 18:33)      Skin per nursing documentation 1/9/24: Sacral/bilateral Buttocks deep tissue injury with evolution present on admission   Edema: -- +1 generalized edema and +2 to bilateral arms per flow sheets    Estimated Needs:   [] no change since previous assessment  [X] recalculated:  Based on IBW of 190 lbs/ 86.2 kg  25-30 kcal/kg= 8353-6797 kcal  [facility]= 2019 kcal  1.2-1.4 gm/kg= 103.44- 120.68 gm pro  Defer Fluids to medical team    Previous Nutrition Diagnosis:   Increased Nutrient Needs  Nutrition Diagnosis is: [X] ongoing  [] resolved [] not applicable     New Nutrition Diagnosis: Inadequate protein-energy intake related to suboptimal enteral provision as evidenced current regimen does not meet >/=80% of estimated nutritional needs   New Nutrition Goal: Pt to meet >/=80% of estimated nutritional needs during hospital stay.     Nutrition Care Plan:  [X] In Progress  [] Achieved  [] Not applicable    Nutrition Interventions:     Education Provided:       [] Yes:  [X] No: Pt intubated and sedated        Recommendations:      1. As tolerated, recommend  Lori [employer]ms Peptide 1.5 @ 10mL/hr and advance by 10mL q6H until goal rate of 70 mL/hr x 18 hrs is reached. Regimen at goal provides  1,260 mL total volume, 883.94mL free water, 1938.46 kcal/d and 93.05 g pro/day   -- Recommend modular component of Prosource TF 30 ml via OGT, provides 90 kcal, 15 gm pro  -- Enteral feeding recommendation and modular component providing ~2,028 kcal/d and ~108 gm pro/day  (--kcal/kg and --g/kg) based on   2.Defer diet/texture advancement to medical team/SLP as indicated   3.RD remains available to adjust EN formulary, volume/rate  4. Continue Micronutrient supplement of folic acid and thiamine 1x/Day. Consider adding daily Multivitamin to aid in wound healing.     Monitoring and Evaluation:   Continue to monitor GI tolerance, skin integrity, labs, weight, and bowel movement regularity.      RD remains available upon request and will follow up per protocol  Ivana Reddy, MS,RDN,CDN AVAILABLE ON TEAMS Nutrition Follow Up Note  Patient seen for: Nutrition Services Follow Up     Chart reviewed, events noted. "59 y/o M Pt with acute renal failure requiring CRRT in setting of liver failure. Undergoing liver transplant eval.     Source: [] Patient       [x] Current Medical Record     [] RN        [] Family at bedside       [] Other:    -If unable to interview patient: [X] Trach/Vent/BiPAP  [] Disoriented/confused/inappropriate to interview    Diet Order:   Diet, NPO with Tube Feed:   Tube Feeding Modality: Orogastric  Nepro with Carb Steady (NEPRORTH)  Total Volume for 24 Hours (mL): 720  Continuous  Starting Tube Feed Rate {mL per Hour}: 10  Increase Tube Feed Rate by (mL): 10     Every 4 hours  Until Goal Tube Feed Rate (mL per Hour): 40  Tube Feed Duration (in Hours): 18  Tube Feed Start Time: 13:00 (01-10-24)    - Is current order appropriate/adequate? [] Yes  [X]  No: See recommendations below    - Nutrition-related concerns:      - Liver: Decompensated hepatic cirrhosis; s/p paracentesis on 24 6,100 ml/hr removed; ordered for lactulose and rifaximin       - Endo: on sliding Scale insulin      - Neuro: Intubated on 24, ordered for Vaso and Levo      -GI:  Last BM: 24       -Renal: on CCRT, electrolytes are within goal range 1/10/24    Weights: Drug Dosing Weight  Weight (kg): 95.6 (2024 05:00)  BMI (kg/m2): 27 (2024 05:00)  Daily Weights in k ()   -- No further weights to comment on. Continue to monitor weight status as able during current admission.     Nutritionally MEDICATIONS  (STANDING):  dextrose 5%. 1000 milliLiter(s) (50 mL/Hr) IV Continuous <Continuous>  dextrose 5%. 1000 milliLiter(s) (100 mL/Hr) IV Continuous <Continuous>  dextrose 50% Injectable 25 Gram(s) IV Push once  dextrose 50% Injectable 25 Gram(s) IV Push once  dextrose 50% Injectable 12.5 Gram(s) IV Push once  folic acid Injectable 1 milliGRAM(s) IV Push daily  glucagon  Injectable 1 milliGRAM(s) IntraMuscular once  heparin  Infusion Syringe 300 Unit(s)/Hr (0.6 mL/Hr) CRRT <Continuous>  insulin lispro (ADMELOG) corrective regimen sliding scale   SubCutaneous every 6 hours  lactulose Syrup 10 Gram(s) Oral three times a day  norepinephrine Infusion 0.6 MICROgram(s)/kG/Min (53.8 mL/Hr) IV Continuous <Continuous>  octreotide  Infusion 50 MICROgram(s)/Hr (10 mL/Hr) IV Continuous <Continuous>  pantoprazole  Injectable 40 milliGRAM(s) IV Push every 12 hours  piperacillin/tazobactam IVPB.. 3.375 Gram(s) IV Intermittent every 8 hours  PrismaSATE Dialysate BGK 4 / 2.5 5000 milliLiter(s) (1500 mL/Hr) CRRT <Continuous>  PrismaSOL Filtration BGK 0 / 2.5 5000 milliLiter(s) (800 mL/Hr) CRRT <Continuous>  PrismaSOL Filtration BGK 0 / 2.5 5000 milliLiter(s) (200 mL/Hr) CRRT <Continuous>  rifAXIMin 550 milliGRAM(s) Oral two times a day  thiamine Injectable 100 milliGRAM(s) IV Push daily  vasopressin Infusion 0.04 Unit(s)/Min (6 mL/Hr) IV Continuous <Continuous>    MEDICATIONS  (PRN):  dextrose Oral Gel 15 Gram(s) Oral once PRN Blood Glucose LESS THAN 70 milliGRAM(s)/deciliter  sodium chloride 0.9% lock flush 10 milliLiter(s) IV Push every 1 hour PRN Pre/post blood products, medications, blood draw, and to maintain line patency    Pertinent Labs: 01-10 @ 13:13: Na 136, BUN 22, Cr 1.74<H>, <H>, K+ 4.3, Phos 4.5, Mg 2.5, Alk Phos 592<H>, ALT/SGPT 18, AST/SGOT 121<H>, HbA1c --  01-10 @ 06:28: Na 134<L>, BUN 17, Cr 1.39<H>, <H>, K+ 4.0, Phos 3.5, Mg 2.6, Alk Phos 608<H>, ALT/SGPT 16, AST/SGOT 119<H>, HbA1c --  01-10 @ 00:31: Na 134<L>, BUN 17, Cr 1.43<H>, <H>, K+ 4.0, Phos 3.4, Mg 2.6, Alk Phos 564<H>, ALT/SGPT 17, AST/SGOT 111<H>, HbA1c --   @ 18:51: Na 134<L>, BUN 19, Cr 1.51<H>, <H>, K+ 4.1, Phos 3.7, Mg 2.5, Alk Phos 591<H>, ALT/SGPT 16, AST/SGOT 115<H>, HbA1c --    A1C with Estimated Average Glucose Result: 5.2 % (23 @ 07:34)    Finger Sticks:  POCT Blood Glucose.: 147 mg/dL (01-10 @ 05:44)  POCT Blood Glucose.: 175 mg/dL ( @ 18:33)    Skin per nursing documentation 24: Sacral/bilateral Buttocks deep tissue injury with evolution present on admission   Edema: -- +1 generalized edema and +2 to bilateral arms per flow sheets    Estimated Needs:   [] no change since previous assessment  [X] recalculated:  Based on IBW of 190 pounds/ 86.2 kg  25-30 kcal/kg= 5568-5063 kcal  [place] [facility]= 2019 kcal  1.2-1.4 gm/kg= 103.44- 120.68 gm pro  Defer Fluids to medical team    Previous Nutrition Diagnosis:   Increased Nutrient Needs  Nutrition Diagnosis is: [X] ongoing  [] resolved [] not applicable     New Nutrition Diagnosis: Inadequate protein-energy intake related to suboptimal enteral provision as evidenced current regimen does not meet >/=80% of estimated nutritional needs   New Nutrition Goal: Pt to meet >/=80% of estimated nutritional needs during hospital stay.     Nutrition Care Plan:  [X] In Progress  [] Achieved  [] Not applicable    Nutrition Interventions:     Education Provided:       [] Yes:  [X] No: Pt intubated and sedated        Recommendations:      1. As tolerated, recommend  Lori [employer]ms Peptide 1.5 @ 10mL/hr and advance by 10mL q6H until goal rate of 70 mL/hr x 18 hrs is reached. Regimen at goal provides  1,260 mL total volume, 883.94mL free water, 1938.46 kcal/d and 93.05 g pro/day   -- Defer Free water flushes to medical team   -- Recommend modular component of Prosource TF 30 ml via OGT, provides 90 kcal, 15 gm pro  -- Enteral feeding recommendation and modular component providing ~2,028 kcal/d and ~108 gm pro/day  (23.5 kcal/kg and 1.25g/kg) based on IBW of 190 pounds / 86.2 kg  2.Defer diet/texture advancement to medical team/SLP as indicated   3.RD remains available to adjust EN formulary, volume/rate  4.Continue Micronutrient supplement of folic acid and thiamine 1x/Day. Consider adding daily multivitamin and vitamin C supplements if no medical contraindications to aid in wound healing.     Monitoring and Evaluation:   Continue to monitor GI tolerance, skin integrity, labs, weight, and bowel movement regularity.      RD remains available upon request and will follow up per protocol  Ivana Reddy MS,RDN,CDN AVAILABLE ON TEAMS Nutrition Follow Up Note  Patient seen for: Nutrition Services Follow Up     Chart reviewed, events noted. "59 y/o M Pt with acute renal failure requiring CRRT in setting of liver failure. Undergoing liver transplant eval.     Source: [] Patient       [x] Current Medical Record     [] RN        [] Family at bedside       [] Other:    -If unable to interview patient: [X] Trach/Vent/BiPAP  [] Disoriented/confused/inappropriate to interview    Diet Order:   Diet, NPO with Tube Feed:   Tube Feeding Modality: Orogastric  Nepro with Carb Steady (NEPRORTH)  Total Volume for 24 Hours (mL): 720  Continuous  Starting Tube Feed Rate {mL per Hour}: 10  Increase Tube Feed Rate by (mL): 10     Every 4 hours  Until Goal Tube Feed Rate (mL per Hour): 40  Tube Feed Duration (in Hours): 18  Tube Feed Start Time: 13:00 (01-10-24)    - Is current order appropriate/adequate? [] Yes  [X]  No: See recommendations below    - Nutrition-related concerns:      - Liver: Decompensated hepatic cirrhosis; s/p paracentesis on 24 6,100 ml/hr removed; ordered for lactulose and rifaximin       - Endo: on sliding Scale insulin      - Neuro: Intubated on 24, ordered for Vaso and Levo      -GI:  Last BM: 24       -Renal: on CCRT, electrolytes are within goal range 1/10/24    Weights: Drug Dosing Weight  Weight (kg): 95.6 (2024 05:00)  BMI (kg/m2): 27 (2024 05:00)  Daily Weights in k ()   -- No further weights to comment on. Continue to monitor weight status as able during current admission.     Nutritionally MEDICATIONS  (STANDING):  dextrose 5%. 1000 milliLiter(s) (50 mL/Hr) IV Continuous <Continuous>  dextrose 5%. 1000 milliLiter(s) (100 mL/Hr) IV Continuous <Continuous>  dextrose 50% Injectable 25 Gram(s) IV Push once  dextrose 50% Injectable 25 Gram(s) IV Push once  dextrose 50% Injectable 12.5 Gram(s) IV Push once  folic acid Injectable 1 milliGRAM(s) IV Push daily  glucagon  Injectable 1 milliGRAM(s) IntraMuscular once  heparin  Infusion Syringe 300 Unit(s)/Hr (0.6 mL/Hr) CRRT <Continuous>  insulin lispro (ADMELOG) corrective regimen sliding scale   SubCutaneous every 6 hours  lactulose Syrup 10 Gram(s) Oral three times a day  norepinephrine Infusion 0.6 MICROgram(s)/kG/Min (53.8 mL/Hr) IV Continuous <Continuous>  octreotide  Infusion 50 MICROgram(s)/Hr (10 mL/Hr) IV Continuous <Continuous>  pantoprazole  Injectable 40 milliGRAM(s) IV Push every 12 hours  piperacillin/tazobactam IVPB.. 3.375 Gram(s) IV Intermittent every 8 hours  PrismaSATE Dialysate BGK 4 / 2.5 5000 milliLiter(s) (1500 mL/Hr) CRRT <Continuous>  PrismaSOL Filtration BGK 0 / 2.5 5000 milliLiter(s) (800 mL/Hr) CRRT <Continuous>  PrismaSOL Filtration BGK 0 / 2.5 5000 milliLiter(s) (200 mL/Hr) CRRT <Continuous>  rifAXIMin 550 milliGRAM(s) Oral two times a day  thiamine Injectable 100 milliGRAM(s) IV Push daily  vasopressin Infusion 0.04 Unit(s)/Min (6 mL/Hr) IV Continuous <Continuous>    MEDICATIONS  (PRN):  dextrose Oral Gel 15 Gram(s) Oral once PRN Blood Glucose LESS THAN 70 milliGRAM(s)/deciliter  sodium chloride 0.9% lock flush 10 milliLiter(s) IV Push every 1 hour PRN Pre/post blood products, medications, blood draw, and to maintain line patency    Pertinent Labs: 01-10 @ 13:13: Na 136, BUN 22, Cr 1.74<H>, <H>, K+ 4.3, Phos 4.5, Mg 2.5, Alk Phos 592<H>, ALT/SGPT 18, AST/SGOT 121<H>, HbA1c --  01-10 @ 06:28: Na 134<L>, BUN 17, Cr 1.39<H>, <H>, K+ 4.0, Phos 3.5, Mg 2.6, Alk Phos 608<H>, ALT/SGPT 16, AST/SGOT 119<H>, HbA1c --  01-10 @ 00:31: Na 134<L>, BUN 17, Cr 1.43<H>, <H>, K+ 4.0, Phos 3.4, Mg 2.6, Alk Phos 564<H>, ALT/SGPT 17, AST/SGOT 111<H>, HbA1c --   @ 18:51: Na 134<L>, BUN 19, Cr 1.51<H>, <H>, K+ 4.1, Phos 3.7, Mg 2.5, Alk Phos 591<H>, ALT/SGPT 16, AST/SGOT 115<H>, HbA1c --    A1C with Estimated Average Glucose Result: 5.2 % (23 @ 07:34)    Finger Sticks:  POCT Blood Glucose.: 147 mg/dL (01-10 @ 05:44)  POCT Blood Glucose.: 175 mg/dL ( @ 18:33)    Skin per nursing documentation 24: Sacral/bilateral Buttocks deep tissue injury with evolution present on admission   Edema: -- +1 generalized edema and +2 to bilateral arms per flow sheets    Estimated Needs:   [] no change since previous assessment  [X] recalculated:  Based on IBW of 190 pounds/ 86.2 kg  25-30 kcal/kg= 2619-0461 kcal  [place] [facility]= 2019 kcal  1.2-1.4 gm/kg= 103.44- 120.68 gm pro  Defer Fluids to medical team    Previous Nutrition Diagnosis:   Increased Nutrient Needs  Nutrition Diagnosis is: [X] ongoing  [] resolved [] not applicable     New Nutrition Diagnosis: Inadequate protein-energy intake related to suboptimal enteral provision as evidenced current regimen does not meet >/=80% of estimated nutritional needs   New Nutrition Goal: Pt to meet >/=80% of estimated nutritional needs during hospital stay.     Nutrition Care Plan:  [X] In Progress  [] Achieved  [] Not applicable    Nutrition Interventions:     Education Provided:       [] Yes:  [X] No: Pt intubated and sedated        Recommendations:      1. As tolerated, recommend  Lori Farms Peptide 1.5 @ 10mL/hr and advance by 10mL q6H until goal rate of 70 mL/hr x 18 hrs is reached. Regimen at goal provides  1,260 mL total volume, 883.94mL free water, 1938.46 kcal/d and 93.05 g pro/day   -- Defer Free water flushes to medical team   -- Recommend modular component of Prosource TF 30 ml via OGT, provides 90 kcal, 15 gm pro  -- Enteral feeding recommendation and modular component providing ~2,028 kcal/d and ~108 gm pro/day  (23.5 kcal/kg and 1.25g/kg) based on IBW of 190 pounds / 86.2 kg  2.Defer diet/texture advancement to medical team/SLP as indicated   3.RD remains available to adjust EN formulary, volume/rate  4.Continue Micronutrient supplement of folic acid and thiamine 1x/Day. Consider adding daily multivitamin and vitamin C supplements if no medical contraindications to aid in wound healing.     Monitoring and Evaluation:   Continue to monitor GI tolerance, skin integrity, labs, weight, and bowel movement regularity.      RD remains available upon request and will follow up per protocol  Ivana Reddy MS,RDN,CDN AVAILABLE ON TEAMS

## 2024-01-10 NOTE — CONSULT NOTE ADULT - PROBLEM SELECTOR RECOMMENDATION 2
Intubated with concern for inability to protect airway. Patient with slight grimacing on exam, not opening eyes with physical stimuli and unable to follow commands. Discussed with RN.    - Consider PRN for nonverbal signs of pain (grimacing, fidgeting, moaning, etc.) - could start with Dilaudid 0.2 mg IV q6h PRN for PAIN-AD score >3

## 2024-01-10 NOTE — PROGRESS NOTE ADULT - ATTENDING COMMENTS
58 year old man with h/o type II DM for ~ 6-7 years, was on metformin and Jardiance, recently diagnosed with liver cirrhosis complicated by ascites. He underwent paracentesis x 3. Course complicated by worsening RAHUL and encephalopathy. He was transferred to [facility] for liver transplant evaluation. He was initiated on HD on 12/31/23.     Renal US with normal sized kidneys measuring ~ 11.6 and 11.8 cm. No hydro. Multilobulated vascular mass arises from the right kidney extending into the peritoneum. Findings are concerning for primary renal malignancy versus metastatic disease. MRI pending.     RAHUL on CKD - unclear baseline renal function. No significant proteinuria UPCR 0.4, normal sized kidneys on US. Initiated HD on 12/31, but Unable to tolerate HD due to hypotension unresponsive to increase midodrine dose.    Labs with worsening BUN/Cr and metabolic acidosis and therefore patient was moved to ICU consult for pressor support and started on CRRT since 1/5/24.   continue CRRT for now. labs reviewed. 58 year old man with h/o type II DM for ~ 6-7 years, was on metformin and Jardiance, recently diagnosed with liver cirrhosis complicated by ascites. He underwent paracentesis x 3. Course complicated by worsening RAHUL and encephalopathy. He was transferred to [facility] for liver transplant evaluation. He was initiated on HD on 12/31/23.     Renal US with normal sized kidneys measuring ~ 11.6 and 11.8 cm. No hydro. Multilobulated vascular mass arises from the right kidney extending into the peritoneum. Findings are concerning for primary renal malignancy versus metastatic disease. MRI pending.     RAHUL on CKD - unclear baseline renal function. No significant proteinuria UPCR 0.4, normal sized kidneys on US. Initiated HD on 12/31, but Unable to tolerate HD due to hypotension unresponsive to increase midodrine dose.    Labs with worsening BUN/Cr and metabolic acidosis and therefore patient was moved to ICU consult for pressor support and started on CRRT since 1/5/24.   continue CRRT for now. labs reviewed.

## 2024-01-10 NOTE — PROGRESS NOTE ADULT - ASSESSMENT
Impression:    Sacral/bilateral Buttocks deep tissue injury with evolution present on admission  Incontinence of bowel and bladder  Incontinence Dermatitis    Recommend:  1.) topical therapy: sacral/buttock injury - cleanse with incontinence cleanser, pat dry, apply Karlo ointment BID and PRN for incontinent episodes  2.) Incontinence Management - incontinence cleanser, pads, pericare BID  3.) Maintain on an alternating air with low air loss surface  4.) Turn and reposition Q 2 hours  5.) Nutrition optimization - please add Gigi  6.) Offload heels/feet with complete cair air fluidized boots; ensure that the soles of the feet are not resting on the foot board of the bed.    Care as per medicine. Will not actively follow but will remain available. Please recall for new issues or deterioration.  Upon [facility] [address] [place] [address] [phone]  Thank you for this consult  Seen and discussed with clinical nurse  Tanja Garcia, JOON-C, CWOCN via TEAMS Impression:    Sacral/bilateral Buttocks deep tissue injury with evolution present on admission  Incontinence of bowel and bladder  Incontinence Dermatitis    Recommend:  1.) topical therapy: sacral/buttock injury - cleanse with incontinence cleanser, pat dry, apply Karlo ointment BID and PRN for incontinent episodes  2.) Incontinence Management - incontinence cleanser, pads, pericare BID  3.) Maintain on an alternating air with low air loss surface  4.) Turn and reposition Q 2 hours  5.) Nutrition optimization - please add Gigi  6.) Offload heels/feet with complete cair air fluidized boots; ensure that the soles of the feet are not resting on the foot board of the bed.    Care as per medicine. Will not actively follow but will remain available. Please recall for new issues or deterioration.  Upon discharge f/u [facility] outpatient at [facility] [address] [place] [address] [phone]  Thank you for this consult  Seen and discussed with clinical nurse  Tanja Garcia, JOON-C, CWOCN via TEAMS

## 2024-01-10 NOTE — PROCEDURE NOTE - NSPOSTPRCRAD_GEN_A_CORE
Fluoroscopy/central line located in the superior vena cava/no pneumothorax/post-procedure radiography performed
central line located in the superior vena cava
post-procedure radiography performed

## 2024-01-10 NOTE — PROGRESS NOTE ADULT - SUBJECTIVE AND OBJECTIVE BOX
Wound Surgery Progress Note:    HPI:  Patient history limited by patient mental status. Spoke to family member/HCP Juan Grajeda who also had limited information.   59 y/o M PMH DM2, recently diagnosed alcoholic cirrhosis (3 weeks ago) presenting as a transfer from [facility] for transplant eval for decompensated liver cirrhosis (accepted by Dr. Amezquita). Patient initially presented to [facility] (from assisted living) last week with dizziness and worsening AMS. Per HCP, patient first diagnosed with cirrhosis 3 weeks ago after found lethargic. Underwent abdominal paracentesis with large volume fluid removal at that time. Patient discharged to assisted living with hepatology followup. Patient represented to OSH with AMS, dizziness, and increased abdominal distension last week. Noted to have worsening renal function, however still able to produce urine.     At OSH, patient was started on albumin, octreotide, midodrine for concern for HRS. Also started on meropenem per discharge paperwork, no clear indication listed.     Patient directly transferred from [facility] to medicine Floors. Initial EKG demonstrating sinus rhythm with frequent PVCs.  (30 Dec 2023 19:56)    Request for wound care consult for sacral/bilateral buttocks skin injury received from nursing. Mr. Jeff was encountered on an alternating air with low air loss surface. He was seen with clinical nurse. He is incontinent of stool and urine at this time. Darkened skin noted on sacrum/bilateral buttocks and perianal skin noted. Patient has been seen previously for a deep tissue injury present on admission which has now evolved. He required assistance to turn/shift in bed. His extreme immobility, inactivity, incontinence of stool and urine and poor nutritional status all contribute to his high risk for pressure injury development and hinder healing.    PAST MEDICAL & SURGICAL HISTORY:  Type 2 diabetes mellitus  Decompensated hepatic cirrhosis  No significant past surgical history    REVIEW OF SYSTEMS  Unable to obtain due to patient's mentat status    MEDICATIONS  (STANDING):  camphor 0.5%/menthol 0.5% Topical Lotion 1 Application(s) Topical two times a day  chlorhexidine 0.12% Liquid 15 milliLiter(s) Oral Mucosa every 12 hours  chlorhexidine 4% Liquid 1 Application(s) Topical <User Schedule>  CRRT Treatment    <Continuous>  dextrose 5%. 1000 milliLiter(s) (100 mL/Hr) IV Continuous <Continuous>  dextrose 5%. 1000 milliLiter(s) (50 mL/Hr) IV Continuous <Continuous>  dextrose 50% Injectable 25 Gram(s) IV Push once  dextrose 50% Injectable 25 Gram(s) IV Push once  dextrose 50% Injectable 12.5 Gram(s) IV Push once  folic acid Injectable 1 milliGRAM(s) IV Push daily  glucagon  Injectable 1 milliGRAM(s) IntraMuscular once  insulin lispro (ADMELOG) corrective regimen sliding scale   SubCutaneous every 6 hours  ketamine Infusion. 0.2 mG/kG/Hr (1.91 mL/Hr) IV Continuous <Continuous>  lactulose Retention Enema 200 Gram(s) Rectal every 8 hours  norepinephrine Infusion 0.6 MICROgram(s)/kG/Min (53.8 mL/Hr) IV Continuous <Continuous>  octreotide  Infusion 50 MICROgram(s)/Hr (10 mL/Hr) IV Continuous <Continuous>  pantoprazole  Injectable 40 milliGRAM(s) IV Push every 12 hours  phenylephrine    Infusion 0.1 MICROgram(s)/kG/Min (3.59 mL/Hr) IV Continuous <Continuous>  piperacillin/tazobactam IVPB.. 3.375 Gram(s) IV Intermittent every 8 hours  PrismaSATE Dialysate BGK 4 / 2.5 5000 milliLiter(s) (1500 mL/Hr) CRRT <Continuous>  PrismaSOL Filtration BGK 0 / 2.5 5000 milliLiter(s) (200 mL/Hr) CRRT <Continuous>  PrismaSOL Filtration BGK 0 / 2.5 5000 milliLiter(s) (800 mL/Hr) CRRT <Continuous>  propofol Infusion 50 MICROgram(s)/kG/Min (28.7 mL/Hr) IV Continuous <Continuous>  thiamine Injectable 100 milliGRAM(s) IV Push daily  vasopressin Infusion 0.04 Unit(s)/Min (6 mL/Hr) IV Continuous <Continuous>    MEDICATIONS  (PRN):  dextrose Oral Gel 15 Gram(s) Oral once PRN Blood Glucose LESS THAN 70 milliGRAM(s)/deciliter  sodium chloride 0.9% lock flush 10 milliLiter(s) IV Push every 1 hour PRN Pre/post blood products, medications, blood draw, and to maintain line patency    Allergies    No Known Allergies    Intolerances    Vital Signs Last 24 Hrs  T(C): 37.3 (10 Brian 2024 08:00), Max: 37.8 (10 Brian 2024 00:00)  T(F): 99.1 (10 Brian 2024 08:00), Max: 100 (10 Brian 2024 00:00)  HR: 92 (10 Brian 2024 11:15) (89 - 113)  BP: 110/56 (10 Brian 2024 11:15) (92/50 - 146/65)  BP(mean): 77 (10 Brian 2024 11:15) (69 - 94)  RR: 17 (10 Brian 2024 11:15) (9 - 23)  SpO2: 100% (10 Brian 2024 11:15) (100% - 100%)    Parameters below as of 10 Brian 2024 08:00  Patient On (Oxygen Delivery Method): ventilator    O2 Concentration (%): 40    Physical Exam:  General: Sedated  Ophthamology: sclera clear  ENMT: moist mucous membranes, trachea midline, intubated  Respiratory: equal chest rise with respirations, vented  Gastrointestinal: soft NT/ND  Neurology: nonverbal, not following commands  Psych: sedated  Musculoskeletal: no contractures  Vascular: BLE edema equal  Skin:  Sacral/bilateral buttocks with deep maroon discolored intact skin in and around the gluteal cleft extending across both buttocks with patches of superficially denuded skin diffuse peeling of white flaky skin L 10cm X W 10cm x D 0,1cm, moderate blood tinged drainage  No odor, erythema, increased warmth, tenderness, induration, fluctuance    LABS:  01-10    134<L>  |  98  |  17  ----------------------------<  154<H>  4.0   |  20<L>  |  1.39<H>    Ca    9.1      10 Brian 2024 06:28  Phos  3.5     01-10  Mg     2.6     01-10    TPro  5.6<L>  /  Alb  3.7  /  TBili  19.9<H>  /  DBili  x   /  AST  119<H>  /  ALT  16  /  AlkPhos  608<H>  01-10                          8.4    13.89 )-----------( 58       ( 10 Brian 2024 06:27 )             25.9     PT/INR - ( 10 Brian 2024 00:33 )   PT: 13.5 sec;   INR: 1.23 ratio         PTT - ( 10 Brian 2024 00:33 )  PTT:32.6 sec  Urinalysis Basic - ( 10 Brian 2024 06:28 )    Color: x / Appearance: x / SG: x / pH: x  Gluc: 154 mg/dL / Ketone: x  / Bili: x / Urobili: x   Blood: x / Protein: x / Nitrite: x   Leuk Esterase: x / RBC: x / WBC x   Sq Epi: x / Non Sq Epi: x / Bacteria: x       Wound Surgery Progress Note:    HPI:  Patient history limited by patient mental status. Spoke to family member/HCP Juan Grajeda who also had limited information.   57 y/o M PMH DM2, recently diagnosed alcoholic cirrhosis (3 weeks ago) presenting as a transfer from [facility] for transplant eval for decompensated liver cirrhosis (accepted by Dr. Amezquita). Patient initially presented to [facility] (from assisted living) last week with dizziness and worsening AMS. Per HCP, patient first diagnosed with cirrhosis 3 weeks ago after found lethargic. Underwent abdominal paracentesis with large volume fluid removal at that time. Patient discharged to assisted living with hepatology followup. Patient represented to OSH with AMS, dizziness, and increased abdominal distension last week. Noted to have worsening renal function, however still able to produce urine.     At OSH, patient was started on albumin, octreotide, midodrine for concern for HRS. Also started on meropenem per discharge paperwork, no clear indication listed.     Patient directly transferred from [facility] to medicine Floors. Initial EKG demonstrating sinus rhythm with frequent PVCs.  (30 Dec 2023 19:56)    Request for wound care consult for sacral/bilateral buttocks skin injury received from nursing. Mr. Jeff was encountered on an alternating air with low air loss surface. He was seen with clinical nurse. He is incontinent of stool and urine at this time. Darkened skin noted on sacrum/bilateral buttocks and perianal skin noted. Patient has been seen previously for a deep tissue injury present on admission which has now evolved. He required assistance to turn/shift in bed. His extreme immobility, inactivity, incontinence of stool and urine and poor nutritional status all contribute to his high risk for pressure injury development and hinder healing.    PAST MEDICAL & SURGICAL HISTORY:  Type 2 diabetes mellitus  Decompensated hepatic cirrhosis  No significant past surgical history    REVIEW OF SYSTEMS  Unable to obtain due to patient's mentat status    MEDICATIONS  (STANDING):  camphor 0.5%/menthol 0.5% Topical Lotion 1 Application(s) Topical two times a day  chlorhexidine 0.12% Liquid 15 milliLiter(s) Oral Mucosa every 12 hours  chlorhexidine 4% Liquid 1 Application(s) Topical <User Schedule>  CRRT Treatment    <Continuous>  dextrose 5%. 1000 milliLiter(s) (100 mL/Hr) IV Continuous <Continuous>  dextrose 5%. 1000 milliLiter(s) (50 mL/Hr) IV Continuous <Continuous>  dextrose 50% Injectable 25 Gram(s) IV Push once  dextrose 50% Injectable 25 Gram(s) IV Push once  dextrose 50% Injectable 12.5 Gram(s) IV Push once  folic acid Injectable 1 milliGRAM(s) IV Push daily  glucagon  Injectable 1 milliGRAM(s) IntraMuscular once  insulin lispro (ADMELOG) corrective regimen sliding scale   SubCutaneous every 6 hours  ketamine Infusion. 0.2 mG/kG/Hr (1.91 mL/Hr) IV Continuous <Continuous>  lactulose Retention Enema 200 Gram(s) Rectal every 8 hours  norepinephrine Infusion 0.6 MICROgram(s)/kG/Min (53.8 mL/Hr) IV Continuous <Continuous>  octreotide  Infusion 50 MICROgram(s)/Hr (10 mL/Hr) IV Continuous <Continuous>  pantoprazole  Injectable 40 milliGRAM(s) IV Push every 12 hours  phenylephrine    Infusion 0.1 MICROgram(s)/kG/Min (3.59 mL/Hr) IV Continuous <Continuous>  piperacillin/tazobactam IVPB.. 3.375 Gram(s) IV Intermittent every 8 hours  PrismaSATE Dialysate BGK 4 / 2.5 5000 milliLiter(s) (1500 mL/Hr) CRRT <Continuous>  PrismaSOL Filtration BGK 0 / 2.5 5000 milliLiter(s) (200 mL/Hr) CRRT <Continuous>  PrismaSOL Filtration BGK 0 / 2.5 5000 milliLiter(s) (800 mL/Hr) CRRT <Continuous>  propofol Infusion 50 MICROgram(s)/kG/Min (28.7 mL/Hr) IV Continuous <Continuous>  thiamine Injectable 100 milliGRAM(s) IV Push daily  vasopressin Infusion 0.04 Unit(s)/Min (6 mL/Hr) IV Continuous <Continuous>    MEDICATIONS  (PRN):  dextrose Oral Gel 15 Gram(s) Oral once PRN Blood Glucose LESS THAN 70 milliGRAM(s)/deciliter  sodium chloride 0.9% lock flush 10 milliLiter(s) IV Push every 1 hour PRN Pre/post blood products, medications, blood draw, and to maintain line patency    Allergies    No Known Allergies    Intolerances    Vital Signs Last 24 Hrs  T(C): 37.3 (10 Brian 2024 08:00), Max: 37.8 (10 Brian 2024 00:00)  T(F): 99.1 (10 Brian 2024 08:00), Max: 100 (10 Brian 2024 00:00)  HR: 92 (10 Brian 2024 11:15) (89 - 113)  BP: 110/56 (10 Brian 2024 11:15) (92/50 - 146/65)  BP(mean): 77 (10 Brian 2024 11:15) (69 - 94)  RR: 17 (10 Brian 2024 11:15) (9 - 23)  SpO2: 100% (10 Brian 2024 11:15) (100% - 100%)    Parameters below as of 10 Brian 2024 08:00  Patient On (Oxygen Delivery Method): ventilator    O2 Concentration (%): 40    Physical Exam:  General: Sedated  Ophthamology: sclera clear  ENMT: moist mucous membranes, trachea midline, intubated  Respiratory: equal chest rise with respirations, vented  Gastrointestinal: soft NT/ND  Neurology: nonverbal, not following commands  Psych: sedated  Musculoskeletal: no contractures  Vascular: BLE edema equal  Skin:  Sacral/bilateral buttocks with deep maroon discolored intact skin in and around the gluteal cleft extending across both buttocks with patches of superficially denuded skin diffuse peeling of white flaky skin L 10cm X W 10cm x D 0,1cm, moderate blood tinged drainage  No odor, erythema, increased warmth, tenderness, induration, fluctuance    LABS:  01-10    134<L>  |  98  |  17  ----------------------------<  154<H>  4.0   |  20<L>  |  1.39<H>    Ca    9.1      10 Brian 2024 06:28  Phos  3.5     01-10  Mg     2.6     01-10    TPro  5.6<L>  /  Alb  3.7  /  TBili  19.9<H>  /  DBili  x   /  AST  119<H>  /  ALT  16  /  AlkPhos  608<H>  01-10                          8.4    13.89 )-----------( 58       ( 10 Brian 2024 06:27 )             25.9     PT/INR - ( 10 Brian 2024 00:33 )   PT: 13.5 sec;   INR: 1.23 ratio         PTT - ( 10 Brian 2024 00:33 )  PTT:32.6 sec  Urinalysis Basic - ( 10 Brian 2024 06:28 )    Color: x / Appearance: x / SG: x / pH: x  Gluc: 154 mg/dL / Ketone: x  / Bili: x / Urobili: x   Blood: x / Protein: x / Nitrite: x   Leuk Esterase: x / RBC: x / WBC x   Sq Epi: x / Non Sq Epi: x / Bacteria: x

## 2024-01-10 NOTE — CONSULT NOTE ADULT - SUBJECTIVE AND OBJECTIVE BOX
Palliative Medicine Consult Note  Indication for Geriatrics and Palliative Care Services: GOC    Date of Service:01-10-24 @ 16:47  [facility] MICU 524 BW ([facility] MICU)  23 (11d)    59 y/o M PMH DM2, recently diagnosed alcoholic cirrhosis (3 weeks ago) presenting as a transfer from [facility] for transplant eval for decompensated liver cirrhosis (accepted by Dr. Amezquita). Patient initially presented to [facility] (from assisted living) last week with dizziness and worsening AMS. At OSH, patient was started on albumin, octreotide, midodrine for concern for HRS. Transferred for liver transplant evaluation. 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 Patient intubated for EGD and now remains intubated due to encephalopathy, not opening eyes to physical stimuli. Not a candidate for liver transplant given ongoing coagulopathy. Palliative medicine team was called for GOC.     Urine habits: Normal [ ]  Incontinent [ ]  Oliguria/Anuria [ ]  Indwelling urinary catheter [ x]  External urinary cathter [ ]  Deferred [ ]     PO intake/swallow: intubated    LB24    Prescribed home Opioid hx:  Yes [ ]  No [ ]  Deferred [x ]       I-Stop Reference No:     PRESENT SYMPTOMS:  Patient is unable to communicate ---    For the critically ill: CPOT   - Facial expression: Relaxed, neutral (0) [ ]  Tense (1) [ ]  Grimacing (2) [x ]   - Body movements: Absence of movements (0) [ x]  Protection (1) [ ]  Restlessness (2) [ ]   - Muscle tension: Relaxed (0) [x]  Tense/rigid (1) [ ]  Very tense or rigid (2) [ ]   - Compliance with ventilator: Tolerating (0) [ x]  Coughing but tolerating (1) [ ]  Fighting ventilator (2) [ ]  OR  Vocalization: Talking in normal tone or no sounds (0) [ ]  Sighing, moaning [ ]  Crying out, sobbing [ ]  Total (0-8): 2    For patients with dementia: Pain Assessment in Advanced Dementia Scale (PAINAD) Score: N/a    For respiratory distress: RDOS (See RDOS tool and score below)  0 to 2  minimal or no respiratory distress   3  mild distress  4 to 6 moderate distress  >7 severe distress    PERTINENT PM/SXH:   Type 2 diabetes mellitus    Decompensated hepatic cirrhosis    No significant past surgical history    FAMILY HISTORY:  No pertinent family history in first degree relatives    Family Hx substance abuse [ ]yes [ ]no [x]deferred    SOCIAL HISTORY:  Patient's family all in [place]. Primary language is Lao. Per chart, patient was previously living in an assisted living facility. Rastafarian.   Family: Significant other/partner [ ]  Children [ ]  Deferred [ x]  Living Situation: [ ]  Home [ ]  Long term care [ ]  Rehab [ ]  Other [x] - assisted living facility Deferred [ ]  Caregiver [place]? : Yes [ ]  No [ ]  Deferred [x ]             Anticipatory Grief present?:  Yes [ ]  No [ ]  Deferred [x ]     Substance hx: [ ]  Tobacco hx: [ ]  Alcohol hx: [x ]  Deferred [ ]    DECISION MAKER(s):  Capacity: Yes [ ]  No [ x]  Deferred [ ]       Health Care Agent: Health Care Proxy(s) [x ]  Surrogate(s) [ ]  Guardian [ ]          Name(s): Phone Number(s): HCP form in chart listing Chato Grajeda ([phone])    SPIRITUALITY:  Oriental orthodox/Spirituality: [x ]  Rastafarian  PCSSQ [Palliative Care Spiritual Screening Question] Severity (0-10): Deferred (Score of 4 or > indicate consideration of Chaplaincy referral)  Chaplaincy Referral: [ ] yes [x] refused [ ] following [ ] Deferred     FUNCTIONAL STATUS: Unable to answer  BASELINE (I)ADL(s) (prior to admission): [place]: Total [ ]  Moderate [ ]  Dependent [ ]  Deferred[x]  Palliative Performance Status Version 2:   See PPSv2 tool and score below          NUTRITION  BMI (kg/m2): 27 (24 @ 05:00)(normal 18.5-24.9)  Weight: Normal [x ]  Underweight [ ]  Morbid obesity [ ]    Protein calorie malnutrition: None [ ]  Mild [ ]  Moderate [ ]  Severe [ ]  Deferred [x ]  [x ]PPSV2 < or = 30%  [ ]significant weight loss [ ]poor nutritional intake [x ]anasarca [ ]Artificial Nutrition    Other current referrals: Hospice [ ]  Child Life [ ]  Social Work [ ]  Case management [ ]  Holistic Therapy [ ]  Patient & Family Centered Care Referral [ ]      ADVANCE DIRECTIVES:    DNR on chart: Full code    Completed documents: MOLST [ ]  Living Will [ ]  HCP [x ]  Goals of Care Document: See GOC section      CRITICAL CARE:  [x ]Shock Present  [ ]Septic [ ]Cardiogenic [ ]Neurologic [ ]Hypovolemic  [ x]Vasopressors [ ]Inotropes  [ x]Respiratory failure present: [ ]Acute  [ ]Chronic [ x]Hypoxic  [ ]Hypercarbic [ ]Other  [x ]Mechanical Ventilation [ ]Non-invasive ventilatory support [ ]High-Flow 23 (11d)  [xOther organ failure - renal, heme, encephalopathy      Allergies    No Known Allergies    Intolerances      MEDICATIONS  (STANDING):  camphor 0.5%/menthol 0.5% Topical Lotion 1 Application(s) Topical two times a day  chlorhexidine 0.12% Liquid 15 milliLiter(s) Oral Mucosa every 12 hours  chlorhexidine 4% Liquid 1 Application(s) Topical <User Schedule>  CRRT Treatment    <Continuous>  dextrose 5%. 1000 milliLiter(s) (50 mL/Hr) IV Continuous <Continuous>  dextrose 5%. 1000 milliLiter(s) (100 mL/Hr) IV Continuous <Continuous>  dextrose 50% Injectable 25 Gram(s) IV Push once  dextrose 50% Injectable 25 Gram(s) IV Push once  dextrose 50% Injectable 12.5 Gram(s) IV Push once  folic acid Injectable 1 milliGRAM(s) IV Push daily  glucagon  Injectable 1 milliGRAM(s) IntraMuscular once  heparin  Infusion Syringe 300 Unit(s)/Hr (0.6 mL/Hr) CRRT <Continuous>  insulin lispro (ADMELOG) corrective regimen sliding scale   SubCutaneous every 6 hours  lactulose Syrup 10 Gram(s) Oral three times a day  norepinephrine Infusion 0.6 MICROgram(s)/kG/Min (53.8 mL/Hr) IV Continuous <Continuous>  octreotide  Infusion 50 MICROgram(s)/Hr (10 mL/Hr) IV Continuous <Continuous>  pantoprazole  Injectable 40 milliGRAM(s) IV Push every 12 hours  piperacillin/tazobactam IVPB.. 3.375 Gram(s) IV Intermittent every 8 hours  PrismaSATE Dialysate BGK 4 / 2.5 5000 milliLiter(s) (1500 mL/Hr) CRRT <Continuous>  PrismaSOL Filtration BGK 0 / 2.5 5000 milliLiter(s) (800 mL/Hr) CRRT <Continuous>  PrismaSOL Filtration BGK 0 / 2.5 5000 milliLiter(s) (200 mL/Hr) CRRT <Continuous>  rifAXIMin 550 milliGRAM(s) Oral two times a day  thiamine Injectable 100 milliGRAM(s) IV Push daily  vasopressin Infusion 0.04 Unit(s)/Min (6 mL/Hr) IV Continuous <Continuous>    MEDICATIONS  (PRN):  dextrose Oral Gel 15 Gram(s) Oral once PRN Blood Glucose LESS THAN 70 milliGRAM(s)/deciliter  sodium chloride 0.9% lock flush 10 milliLiter(s) IV Push every 1 hour PRN Pre/post blood products, medications, blood draw, and to maintain line patency      MEDICATIONS:  Allergies    No Known Allergies    Intolerances    MEDICATIONS  (STANDING):  camphor 0.5%/menthol 0.5% Topical Lotion 1 Application(s) Topical two times a day  chlorhexidine 0.12% Liquid 15 milliLiter(s) Oral Mucosa every 12 hours  chlorhexidine 4% Liquid 1 Application(s) Topical <User Schedule>  CRRT Treatment    <Continuous>  dextrose 5%. 1000 milliLiter(s) (100 mL/Hr) IV Continuous <Continuous>  dextrose 5%. 1000 milliLiter(s) (50 mL/Hr) IV Continuous <Continuous>  dextrose 50% Injectable 25 Gram(s) IV Push once  dextrose 50% Injectable 25 Gram(s) IV Push once  dextrose 50% Injectable 12.5 Gram(s) IV Push once  folic acid Injectable 1 milliGRAM(s) IV Push daily  glucagon  Injectable 1 milliGRAM(s) IntraMuscular once  heparin  Infusion Syringe 300 Unit(s)/Hr (0.6 mL/Hr) CRRT <Continuous>  insulin lispro (ADMELOG) corrective regimen sliding scale   SubCutaneous every 6 hours  lactulose Syrup 10 Gram(s) Oral three times a day  norepinephrine Infusion 0.6 MICROgram(s)/kG/Min (53.8 mL/Hr) IV Continuous <Continuous>  octreotide  Infusion 50 MICROgram(s)/Hr (10 mL/Hr) IV Continuous <Continuous>  pantoprazole  Injectable 40 milliGRAM(s) IV Push every 12 hours  piperacillin/tazobactam IVPB.. 3.375 Gram(s) IV Intermittent every 8 hours  PrismaSATE Dialysate BGK 4 / 2.5 5000 milliLiter(s) (1500 mL/Hr) CRRT <Continuous>  PrismaSOL Filtration BGK 0 / 2.5 5000 milliLiter(s) (800 mL/Hr) CRRT <Continuous>  PrismaSOL Filtration BGK 0 / 2.5 5000 milliLiter(s) (200 mL/Hr) CRRT <Continuous>  rifAXIMin 550 milliGRAM(s) Oral two times a day  thiamine Injectable 100 milliGRAM(s) IV Push daily  Vital Signs Last 24 Hrs  T(C): 37.7 (10 Brian 2024 16:00), Max: 37.8 (10 Brian 2024 00:00)  T(F): 99.9 (10 Brian 2024 16:00), Max: 100 (10 Brian 2024 00:00)  HR: 95 (10 Brian 2024 16:45) (89 - 113)  BP: 117/57 (10 Brian 2024 16:45) (89/53 - 146/65)  BP(mean): 78 (10 Brian 2024 16:45) (66 - 94)  RR: 23 (10 Brian 2024 16:45) (9 - 24)  SpO2: 100% (10 Brain 2024 16:45) (100% - 100%)    Parameters below as of 10 Brian 2024 08:00  Patient On (Oxygen Delivery Method): ventilator    O2 Concentration (%): 40  I&O's Summary    2024 07:01  -  10 Brian 2024 07:00  --------------------------------------------------------  IN: 3891.6 mL / OUT: 2965 mL / NET: 926.6 mL    10 Brian 2024 07:01  -  10 Brian 2024 17:03  --------------------------------------------------------  IN: 805.6 mL / OUT: 0 mL / NET: 805.6 mL      GENERAL:   Nutritional Status: Normal appearance [ ]  Cachexia [ ]  Deferred [ ]  Anasarca [x]  Behavioral: Normal [ ]  Anxiety [ ]  Delirium [ ]  Agitation [ ]  Other [ ]  Deferred [x ]   HEENT:   Scleral icterus, PERRLA [x]  [ ]Normal   [ ]Dry mouth   [ x]ET Tube/Trach  [ ]Oral lesions  PULMONARY: Normal work of breathing [ x]  [ ]Audible excessive secretions  [ ]Clear   [ ]Rhonchi        [ ]Right [ ]Left [ ]Bilateral  [ ]Crackles        [ ]Right [ ]Left [ ]Bilateral  [ ]Wheezing     [ ]Right [ ]Left [ ]Bilateral  [ ]Diminished breath sounds [ ]right [ ]left [ ]bilateral  Rate: Normal [x ]  Bradypnea [ ]  Cheynes-Hinds [ ]  Tachypnea [ ]   CARDIOVASCULAR:    Regular rhythm [ ]  Irregular rhythm [ ]  Regular rate [x ]  Tachycardia [ ]  Bradycardia [ ]  Murmur [ ]  Other [ ]   GASTROINTESTINAL:  Soft [ x]  Firm [ ]  Distended [ ]  Nondistended [ ]  Non-tender [ ]  Tender [ ]   Bowel sounds: Normal [ ]  Hypoactive [ ]  Hyperactive [ ]  Absent [ ]    PEG [ ]  OGT/ NGT [ ]  Scar [ ]  Other [ ]    GENITOURINARY: Urinary catheter [ ]  Deferred [ x]    MUSCULOSKELETAL: Normal [ ]  Deformity [ ]  Edema [x ]    Patient with severely swollen hands (R>L)  Range of motion: Normal [ ]  Other [ ]  Deferred [ x]   Bed/Wheelchair Bound [ x]  NEUROLOGIC: Level of consciousness:  Alert [ ]   Lethargic [ ]  Unarousable [ x]    Verbal [ ]  Non-Verbal [ x]  No focal deficits [ ]  Other: [ ]  Deferred [ x]  Strength: Normal [ ]  Paresis [ ]  Other [ ]  Deferred [x ]   Cognitive impairment [ ]   SKIN: Normal [ ]  Jaundice [x]  Rash [ ]  Pressure ulcer(s) [ ]      Present on admission [ ]y [ ]n  vasopressin Infusion 0.04 Unit(s)/Min (6 mL/Hr) IV Continuous <Continuous>    MEDICATIONS  (PRN):  dextrose Oral Gel 15 Gram(s) Oral once PRN Blood Glucose LESS THAN 70 milliGRAM(s)/deciliter  sodium chloride 0.9% lock flush 10 milliLiter(s) IV Push every 1 hour PRN Pre/post blood products, medications, blood draw, and to maintain line patency    RESULTS  < from: TTE W or WO Ultrasound Enhancing Agent (24 @ 14:32) >      1. Left ventricular cavity is normal. Left ventricular wall thickness is normal. Left ventricular systolic function is hyperdynamic with an ejection fraction of 77 % by Mckenzie's method of disks.   2. Normal left ventricular diastolic function, with normal filling pressure.   3. Normal right ventricular cavity size, wall thickness, and systolic function.   4. Normal left and right atrial size.   5. No significant valvular disease.   6. No pericardial effusion seen.   7. Abdominal ascites is incidentally noted.   8. No prior echocardiogram is available for comparison.   9. Technically difficult image quality.    < end of copied text >  < from: Upper Endoscopy (24 @ 14:54) >                                                          Impression:          - 1 Esophageal polyp vs. mass were found. Biopsy was not attempted given                        significant coagulopathy                     - Severe, diffuse portal hypertensive gastropathy. Though this may be                        contributing to patient's anemia, unlikely the sole source of hematochezia                       - Congested duodenal mucosa.    < end of copied text >  Comprehensive Metabolic Panel (01.10.24 @ 13:13)   Sodium: 136 mmol/L  Potassium: 4.3 mmol/L  Chloride: 100 mmol/L  Carbon Dioxide: 19 mmol/L  Anion Gap: 17 mmol/L  Blood Urea Nitrogen: 22 mg/dL  Creatinine: 1.74 mg/dL  Glucose: 187 mg/dL  Calcium: 9.1 mg/dL  Protein Total: 5.5: Gross Icterus results may be affected g/dL  Albumin: 3.5 g/dL  Bilirubin Total: 19.6 mg/dL  Alkaline Phosphatase: 592 U/L  Aspartate Aminotransferase (AST/SGOT): 121 U/L  Alanine Aminotransferase (ALT/SGPT): 18 U/L  eGFR: 45: The estimated glomerular filtration rate (eGFR) is calculated using the Lactate: 2.5: Blood Gas Profile - Venous (01.10.24 @ 12:55)   pH, Venous: 7.25: No collection time indicated, please interpret with caution  pCO2, Venous: 46 mmHg  pO2, Venous: 45 mmHg  HCO3, Venous: 20 mmol/L  Base Excess, Venous: -6.6 mmol/L  Oxygen Saturation, Venous: 78.3 %  Total CO2, Venous: 22 mmol/L  Blood Gas Source Venous: VenousComprehensive Metabolic Panel (01.10.24 @ 06:28)   Sodium: 134 mmol/L  Potassium: 4.0 mmol/L  Chloride: 98 mmol/L  Carbon Dioxide: 20 mmol/L  Anion Gap: 16 mmol/L  Blood Urea Nitrogen: 17 mg/dL  Creatinine: 1.39 mg/dL  Glucose: 154 mg/dL  Calcium: 9.1 mg/dL  Protein Total: 5.6: Gross Icterus results may be affected g/dL  Albumin: 3.7 g/dL  Bilirubin Total: 19.9 mg/dL  Alkaline Phosphatase: 608 U/L  Aspartate Aminotransferase (AST/SGOT): 119 U/L  Alanine Aminotransferase (ALT/SGPT): 16 U/L  eGFR: 58: The estimated glomerular filtration rate (eGFR) is calculated using the < from: US Abdomen Upper Quadrant Right (24 @ 12:51) >  IMPRESSION:  *  Cirrhosis with portal vein thrombus, unchanged from prior ultrasound..  *  Moderate ascites..  *  5 x 5.2 x 5.6 cm  heterogeneous mass abutting the lateral aspect of   the right kidney, correlating withlikely metastatic implant seen on CT   scan performed the same day.  *  Gallbladder is nondistended and contains small amount of sludge.   Gallstones seen on the CT scan performed earlier the same day were not   evident on this exam.        < end of copied text >  < from: CT Chest w/ IV Cont (24 @ 09:17) >  IMPRESSION:  Limited arterial phase.    Cirrhosis. Evidence of portal hypertension and moderate volume ascites.    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    Peritoneal metastatic implants as above.    Indeterminant lung nodules, metastasis cannot be excluded.    --- End of Report ---    < end of copied text >       Palliative Medicine Consult Note  Indication for Geriatrics and Palliative Care Services: GOC    Date of Service:01-10-24 @ 16:47  [facility] MICU 524 BW ([facility] MICU)  23 (11d)    59 y/o M PMH DM2, recently diagnosed alcoholic cirrhosis (3 weeks ago) presenting as a transfer from [facility] for transplant eval for decompensated liver cirrhosis (accepted by Dr. Amezquita). Patient initially presented to [facility] (from assisted living) last week with dizziness and worsening AMS. At OSH, patient was started on albumin, octreotide, midodrine for concern for HRS. Transferred for liver transplant evaluation. 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 Patient intubated for EGD and now remains intubated due to encephalopathy, not opening eyes to physical stimuli. Not a candidate for liver transplant given ongoing coagulopathy. Palliative medicine team was called for GOC.     Urine habits: Normal [ ]  Incontinent [ ]  Oliguria/Anuria [ ]  Indwelling urinary catheter [ x]  External urinary cathter [ ]  Deferred [ ]     PO intake/swallow: intubated    LB24    Prescribed home Opioid hx:  Yes [ ]  No [ ]  Deferred [x ]       I-Stop Reference No:     PRESENT SYMPTOMS:  Patient is unable to communicate ---    For the critically ill: CPOT   - Facial expression: Relaxed, neutral (0) [ ]  Tense (1) [ ]  Grimacing (2) [x ]   - Body movements: Absence of movements (0) [ x]  Protection (1) [ ]  Restlessness (2) [ ]   - Muscle tension: Relaxed (0) [x]  Tense/rigid (1) [ ]  Very tense or rigid (2) [ ]   - Compliance with ventilator: Tolerating (0) [ x]  Coughing but tolerating (1) [ ]  Fighting ventilator (2) [ ]  OR  Vocalization: Talking in normal tone or no sounds (0) [ ]  Sighing, moaning [ ]  Crying out, sobbing [ ]  Total (0-8): 2    For patients with dementia: Pain Assessment in Advanced Dementia Scale (PAINAD) Score: N/a    For respiratory distress: RDOS (See RDOS tool and score below)  0 to 2  minimal or no respiratory distress   3  mild distress  4 to 6 moderate distress  >7 severe distress    PERTINENT PM/SXH:   Type 2 diabetes mellitus    Decompensated hepatic cirrhosis    No significant past surgical history    FAMILY HISTORY:  No pertinent family history in first degree relatives    Family Hx substance abuse [ ]yes [ ]no [x]deferred    SOCIAL HISTORY:  Patient's family all in [place]. Primary language is Estonian. Per chart, patient was previously living in an assisted living facility. [place].   Family: Significant other/partner [ ]  Children [ ]  Deferred [ x]  Living Situation: [ ]  Home [ ]  Long term care [ ]  Rehab [ ]  Other [x] - assisted living facility Deferred [ ]  Caregiver [place]? : Yes [ ]  No [ ]  Deferred [x ]             Anticipatory Grief present?:  Yes [ ]  No [ ]  Deferred [x ]     Substance hx: [ ]  Tobacco hx: [ ]  Alcohol hx: [x ]  Deferred [ ]    DECISION MAKER(s):  Capacity: Yes [ ]  No [ x]  Deferred [ ]       Health Care Agent: Health Care Proxy(s) [x ]  Surrogate(s) [ ]  Guardian [ ]          Name(s): Phone Number(s): HCP form in chart listing Chato Grajeda ([phone])    SPIRITUALITY:  [place]/Spirituality: [x ]  [place]  PCSSQ [Palliative Care Spiritual Screening Question] Severity (0-10): Deferred (Score of 4 or > indicate consideration of Chaplaincy referral)  Chaplaincy Referral: [ ] yes [x] refused [ ] following [ ] Deferred     FUNCTIONAL STATUS: Unable to answer  BASELINE (I)ADL(s) (prior to admission): [place]: Total [ ]  Moderate [ ]  Dependent [ ]  Deferred[x]  Palliative Performance Status Version 2:   See PPSv2 tool and score below          NUTRITION  BMI (kg/m2): 27 (24 @ 05:00)(normal 18.5-24.9)  Weight: Normal [x ]  Underweight [ ]  Morbid obesity [ ]    Protein calorie malnutrition: None [ ]  Mild [ ]  Moderate [ ]  Severe [ ]  Deferred [x ]  [x ]PPSV2 < or = 30%  [ ]significant weight loss [ ]poor nutritional intake [x ]anasarca [ ]Artificial Nutrition    Other current referrals: Hospice [ ]  Child Life [ ]  Social Work [ ]  Case management [ ]  Holistic Therapy [ ]  Patient & Family Centered Care Referral [ ]      ADVANCE DIRECTIVES:    DNR on chart: Full code    Completed documents: MOLST [ ]  Living Will [ ]  HCP [x ]  Goals of Care Document: See GOC section      CRITICAL CARE:  [x ]Shock Present  [ ]Septic [ ]Cardiogenic [ ]Neurologic [ ]Hypovolemic  [ x]Vasopressors [ ]Inotropes  [ x]Respiratory failure present: [ ]Acute  [ ]Chronic [ x]Hypoxic  [ ]Hypercarbic [ ]Other  [x ]Mechanical Ventilation [ ]Non-invasive ventilatory support [ ]High-Flow 23 (11d)  [xOther organ failure - renal, heme, encephalopathy      Allergies    No Known Allergies    Intolerances      MEDICATIONS  (STANDING):  camphor 0.5%/menthol 0.5% Topical Lotion 1 Application(s) Topical two times a day  chlorhexidine 0.12% Liquid 15 milliLiter(s) Oral Mucosa every 12 hours  chlorhexidine 4% Liquid 1 Application(s) Topical <User Schedule>  CRRT Treatment    <Continuous>  dextrose 5%. 1000 milliLiter(s) (50 mL/Hr) IV Continuous <Continuous>  dextrose 5%. 1000 milliLiter(s) (100 mL/Hr) IV Continuous <Continuous>  dextrose 50% Injectable 25 Gram(s) IV Push once  dextrose 50% Injectable 25 Gram(s) IV Push once  dextrose 50% Injectable 12.5 Gram(s) IV Push once  folic acid Injectable 1 milliGRAM(s) IV Push daily  glucagon  Injectable 1 milliGRAM(s) IntraMuscular once  heparin  Infusion Syringe 300 Unit(s)/Hr (0.6 mL/Hr) CRRT <Continuous>  insulin lispro (ADMELOG) corrective regimen sliding scale   SubCutaneous every 6 hours  lactulose Syrup 10 Gram(s) Oral three times a day  norepinephrine Infusion 0.6 MICROgram(s)/kG/Min (53.8 mL/Hr) IV Continuous <Continuous>  octreotide  Infusion 50 MICROgram(s)/Hr (10 mL/Hr) IV Continuous <Continuous>  pantoprazole  Injectable 40 milliGRAM(s) IV Push every 12 hours  piperacillin/tazobactam IVPB.. 3.375 Gram(s) IV Intermittent every 8 hours  PrismaSATE Dialysate BGK 4 / 2.5 5000 milliLiter(s) (1500 mL/Hr) CRRT <Continuous>  PrismaSOL Filtration BGK 0 / 2.5 5000 milliLiter(s) (800 mL/Hr) CRRT <Continuous>  PrismaSOL Filtration BGK 0 / 2.5 5000 milliLiter(s) (200 mL/Hr) CRRT <Continuous>  rifAXIMin 550 milliGRAM(s) Oral two times a day  thiamine Injectable 100 milliGRAM(s) IV Push daily  vasopressin Infusion 0.04 Unit(s)/Min (6 mL/Hr) IV Continuous <Continuous>    MEDICATIONS  (PRN):  dextrose Oral Gel 15 Gram(s) Oral once PRN Blood Glucose LESS THAN 70 milliGRAM(s)/deciliter  sodium chloride 0.9% lock flush 10 milliLiter(s) IV Push every 1 hour PRN Pre/post blood products, medications, blood draw, and to maintain line patency      MEDICATIONS:  Allergies    No Known Allergies    Intolerances    MEDICATIONS  (STANDING):  camphor 0.5%/menthol 0.5% Topical Lotion 1 Application(s) Topical two times a day  chlorhexidine 0.12% Liquid 15 milliLiter(s) Oral Mucosa every 12 hours  chlorhexidine 4% Liquid 1 Application(s) Topical <User Schedule>  CRRT Treatment    <Continuous>  dextrose 5%. 1000 milliLiter(s) (100 mL/Hr) IV Continuous <Continuous>  dextrose 5%. 1000 milliLiter(s) (50 mL/Hr) IV Continuous <Continuous>  dextrose 50% Injectable 25 Gram(s) IV Push once  dextrose 50% Injectable 25 Gram(s) IV Push once  dextrose 50% Injectable 12.5 Gram(s) IV Push once  folic acid Injectable 1 milliGRAM(s) IV Push daily  glucagon  Injectable 1 milliGRAM(s) IntraMuscular once  heparin  Infusion Syringe 300 Unit(s)/Hr (0.6 mL/Hr) CRRT <Continuous>  insulin lispro (ADMELOG) corrective regimen sliding scale   SubCutaneous every 6 hours  lactulose Syrup 10 Gram(s) Oral three times a day  norepinephrine Infusion 0.6 MICROgram(s)/kG/Min (53.8 mL/Hr) IV Continuous <Continuous>  octreotide  Infusion 50 MICROgram(s)/Hr (10 mL/Hr) IV Continuous <Continuous>  pantoprazole  Injectable 40 milliGRAM(s) IV Push every 12 hours  piperacillin/tazobactam IVPB.. 3.375 Gram(s) IV Intermittent every 8 hours  PrismaSATE Dialysate BGK 4 / 2.5 5000 milliLiter(s) (1500 mL/Hr) CRRT <Continuous>  PrismaSOL Filtration BGK 0 / 2.5 5000 milliLiter(s) (800 mL/Hr) CRRT <Continuous>  PrismaSOL Filtration BGK 0 / 2.5 5000 milliLiter(s) (200 mL/Hr) CRRT <Continuous>  rifAXIMin 550 milliGRAM(s) Oral two times a day  thiamine Injectable 100 milliGRAM(s) IV Push daily  Vital Signs Last 24 Hrs  T(C): 37.7 (10 Brian 2024 16:00), Max: 37.8 (10 Brian 2024 00:00)  T(F): 99.9 (10 Brian 2024 16:00), Max: 100 (10 Brian 2024 00:00)  HR: 95 (10 Brian 2024 16:45) (89 - 113)  BP: 117/57 (10 Brian 2024 16:45) (89/53 - 146/65)  BP(mean): 78 (10 Brian 2024 16:45) (66 - 94)  RR: 23 (10 Brian 2024 16:45) (9 - 24)  SpO2: 100% (10 Brian 2024 16:45) (100% - 100%)    Parameters below as of 10 Brian 2024 08:00  Patient On (Oxygen Delivery Method): ventilator    O2 Concentration (%): 40  I&O's Summary    2024 07:01  -  10 Brian 2024 07:00  --------------------------------------------------------  IN: 3891.6 mL / OUT: 2965 mL / NET: 926.6 mL    10 Brian 2024 07:01  -  10 Brian 2024 17:03  --------------------------------------------------------  IN: 805.6 mL / OUT: 0 mL / NET: 805.6 mL      GENERAL:   Nutritional Status: Normal appearance [ ]  Cachexia [ ]  Deferred [ ]  Anasarca [x]  Behavioral: Normal [ ]  Anxiety [ ]  Delirium [ ]  Agitation [ ]  Other [ ]  Deferred [x ]   HEENT:   Scleral icterus, PERRLA [x]  [ ]Normal   [ ]Dry mouth   [ x]ET Tube/Trach  [ ]Oral lesions  PULMONARY: Normal work of breathing [ x]  [ ]Audible excessive secretions  [ ]Clear   [ ]Rhonchi        [ ]Right [ ]Left [ ]Bilateral  [ ]Crackles        [ ]Right [ ]Left [ ]Bilateral  [ ]Wheezing     [ ]Right [ ]Left [ ]Bilateral  [ ]Diminished breath sounds [ ]right [ ]left [ ]bilateral  Rate: Normal [x ]  Bradypnea [ ]  Cheynes-Hinds [ ]  Tachypnea [ ]   CARDIOVASCULAR:    Regular rhythm [ ]  Irregular rhythm [ ]  Regular rate [x ]  Tachycardia [ ]  Bradycardia [ ]  Murmur [ ]  Other [ ]   GASTROINTESTINAL:  Soft [ x]  Firm [ ]  Distended [ ]  Nondistended [ ]  Non-tender [ ]  Tender [ ]   Bowel sounds: Normal [ ]  Hypoactive [ ]  Hyperactive [ ]  Absent [ ]    PEG [ ]  OGT/ NGT [ ]  Scar [ ]  Other [ ]    GENITOURINARY: Urinary catheter [ ]  Deferred [ x]    MUSCULOSKELETAL: Normal [ ]  Deformity [ ]  Edema [x ]    Patient with severely swollen hands (R>L)  Range of motion: Normal [ ]  Other [ ]  Deferred [ x]   Bed/Wheelchair Bound [ x]  NEUROLOGIC: Level of consciousness:  Alert [ ]   Lethargic [ ]  Unarousable [ x]    Verbal [ ]  Non-Verbal [ x]  No focal deficits [ ]  Other: [ ]  Deferred [ x]  Strength: Normal [ ]  Paresis [ ]  Other [ ]  Deferred [x ]   Cognitive impairment [ ]   SKIN: Normal [ ]  Jaundice [x]  Rash [ ]  Pressure ulcer(s) [ ]      Present on admission [ ]y [ ]n  vasopressin Infusion 0.04 Unit(s)/Min (6 mL/Hr) IV Continuous <Continuous>    MEDICATIONS  (PRN):  dextrose Oral Gel 15 Gram(s) Oral once PRN Blood Glucose LESS THAN 70 milliGRAM(s)/deciliter  sodium chloride 0.9% lock flush 10 milliLiter(s) IV Push every 1 hour PRN Pre/post blood products, medications, blood draw, and to maintain line patency    RESULTS  < from: TTE W or WO Ultrasound Enhancing Agent (24 @ 14:32) >      1. Left ventricular cavity is normal. Left ventricular wall thickness is normal. Left ventricular systolic function is hyperdynamic with an ejection fraction of 77 % by Mckenzie's method of disks.   2. Normal left ventricular diastolic function, with normal filling pressure.   3. Normal right ventricular cavity size, wall thickness, and systolic function.   4. Normal left and right atrial size.   5. No significant valvular disease.   6. No pericardial effusion seen.   7. Abdominal ascites is incidentally noted.   8. No prior echocardiogram is available for comparison.   9. Technically difficult image quality.    < end of copied text >  < from: Upper Endoscopy (24 @ 14:54) >                                                          Impression:          - 1 Esophageal polyp vs. mass were found. Biopsy was not attempted given                        significant coagulopathy                     - Severe, diffuse portal hypertensive gastropathy. Though this may be                        contributing to patient's anemia, unlikely the sole source of hematochezia                       - Congested duodenal mucosa.    < end of copied text >  Comprehensive Metabolic Panel (01.10.24 @ 13:13)   Sodium: 136 mmol/L  Potassium: 4.3 mmol/L  Chloride: 100 mmol/L  Carbon Dioxide: 19 mmol/L  Anion Gap: 17 mmol/L  Blood Urea Nitrogen: 22 mg/dL  Creatinine: 1.74 mg/dL  Glucose: 187 mg/dL  Calcium: 9.1 mg/dL  Protein Total: 5.5: Gross Icterus results may be affected g/dL  Albumin: 3.5 g/dL  Bilirubin Total: 19.6 mg/dL  Alkaline Phosphatase: 592 U/L  Aspartate Aminotransferase (AST/SGOT): 121 U/L  Alanine Aminotransferase (ALT/SGPT): 18 U/L  eGFR: 45: The estimated glomerular filtration rate (eGFR) is calculated using the Lactate: 2.5: Blood Gas Profile - Venous (01.10.24 @ 12:55)   pH, Venous: 7.25: No collection time indicated, please interpret with caution  pCO2, Venous: 46 mmHg  pO2, Venous: 45 mmHg  HCO3, Venous: 20 mmol/L  Base Excess, Venous: -6.6 mmol/L  Oxygen Saturation, Venous: 78.3 %  Total CO2, Venous: 22 mmol/L  Blood Gas Source Venous: VenousComprehensive Metabolic Panel (01.10.24 @ 06:28)   Sodium: 134 mmol/L  Potassium: 4.0 mmol/L  Chloride: 98 mmol/L  Carbon Dioxide: 20 mmol/L  Anion Gap: 16 mmol/L  Blood Urea Nitrogen: 17 mg/dL  Creatinine: 1.39 mg/dL  Glucose: 154 mg/dL  Calcium: 9.1 mg/dL  Protein Total: 5.6: Gross Icterus results may be affected g/dL  Albumin: 3.7 g/dL  Bilirubin Total: 19.9 mg/dL  Alkaline Phosphatase: 608 U/L  Aspartate Aminotransferase (AST/SGOT): 119 U/L  Alanine Aminotransferase (ALT/SGPT): 16 U/L  eGFR: 58: The estimated glomerular filtration rate (eGFR) is calculated using the < from: US Abdomen Upper Quadrant Right (24 @ 12:51) >  IMPRESSION:  *  Cirrhosis with portal vein thrombus, unchanged from prior ultrasound..  *  Moderate ascites..  *  5 x 5.2 x 5.6 cm  heterogeneous mass abutting the lateral aspect of   the right kidney, correlating withlikely metastatic implant seen on CT   scan performed the same day.  *  Gallbladder is nondistended and contains small amount of sludge.   Gallstones seen on the CT scan performed earlier the same day were not   evident on this exam.        < end of copied text >  < from: CT Chest w/ IV Cont (24 @ 09:17) >  IMPRESSION:  Limited arterial phase.    Cirrhosis. Evidence of portal hypertension and moderate volume ascites.    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    Peritoneal metastatic implants as above.    Indeterminant lung nodules, metastasis cannot be excluded.    --- End of Report ---    < end of copied text >

## 2024-01-10 NOTE — PROGRESS NOTE ADULT - ASSESSMENT
58y male with PMH of h/o type II DM for ~ 6-7 years, was on metformin and Jardiance,  diagnosed with liver cirrhosis 3 weeks ago, complicated by ascites in outside hospital, now admitted to floors undergoing workup of decompensated liver cirrhosis and RAHUL likely 2/2 HRS admitted for CRRT. Now w/ multiple large bloody BM pending GI eval    PLAN  =====Neurologic=====  #Hepatic Encephalopathy   #EtOH use disorder   #Uremic Encephalopathy   - pt with acute encephalopathy (A&Ox0, per HCP baseline A&Ox3) likely 2/2 toxic metabolic and hepatic processes   - PETH negative   - , improved  - continue with lactulose 10mg TID and rifaximin, MELD 35 1/6/24  - start seroquel 25mg QHS    =====Pulmonary=====  Patient breathing comfortably on room air sating 100%   Intubated for EGD, remains intubated due to clinical status  CT ches with nodular opacity c/f malignancy iso possible metastatic disease    =====Cardiovascular=====  #Intravascular Depletion   #HRS   - pt BP in 90's/ 50's, unable to tolerate HD even with midodrine 30mg TID   - echo with normal EF 77% hyperdynamic otherwise normal diastolic function   - patient in shock requiring vasopressors to maintain MAP > 65.   - Currently on Levo, Saleem, and Vaso    =====GI=====  #Decompensated hepatic cirrhosis   - currently undergoing transplant eval, f/u recs  - s/p IR paracentesis on 1/3- fluid analysis unremarkable for infectious etiology (total nucleated cell count 229 with 33 neutrophils) , fluid chemistry wnl, culture no growth to date   c/w Thiamine, B12, Folic Acid, lactulose and rifaximin    - cw octreotide drip   - EGD with polyp in esophagus, unable to biopsy due to coagulopathy  - significant ascites on exam, plan for repeat paracentesis pending family discussion, will require reversal of coagulopathy  - CT 1/8 showing ill defined liver lesions, periteonal implants and indeterminate lung nodules  - f/u Hepaology regarding further liver evaluation and whether candidate    #GIB  - continues to have bloody bowel movements  - likely iso coagulopathy  - active T&S    =====Renal/=====  #Renal mass  #HRS   #HD requiring pressors   - new renal mass seen on US concerning for malignancy, pending MRI for further characterization,- onc following f/u recs  - urology consulted- f/u recs   - HRS with creatinine >5, 4 on admission and 2 at baseline, shiley placed, s/p 2 rounds of HD unable to tolerate 3rd with midodrine   - CRRT 1/6/24 with levophed support   - porras placed 1/6/24 to monitor I/Os  - CT c/f malignancy wih mes    #Electrolytes  - Maintain K>4, Phos>3, Mag>2, iCal>1    =====Endocrine=====  #DM2  - on 5 units lantus and SSI  - monitor and adjust as necessary     =====Infectious Disease=====  - pt with initial WBC to 13k  - infectious w/u negative and completed 5 days of Epimeric Meropenem 12/30- 1/4   - febrile today, held CTX started broad spectrum abx vanc and zosyn dosed CRRT   - f/u BCx    =====Heme/Onc=====  #DVT Ppx- heparin sub q   #Normocytic anemia on initial CBC   #Coagulopathy, possibly DIC  likely 2/2 vitamin deficiencies  and anemia of chronic disease   -normal B12/folate  >VERENA- eventual scope to evaluate   - monitor platelet count in setting of thrombocytopenia 2/2 cirrhosis   - heme felt possible DIC iso TEG  - s/p Cryo, Plt, and pRBCs  - f/u heme recs      =====Ethics=====  FULL CODE   59 y/o M PMH DM2, recently diagnosed alcoholic cirrhosis (3 weeks ago) presenting as a transfer from [facility] for transplant eval for decompensated liver cirrhosis. Patient initially presented to [facility] (from assisted living) last week with dizziness and worsening AMS, course c/b ARF (on CRRT), hematochezia s/p EGD on 1/8 showing PGH and polyp in esophagus (no varices) as well as CT showing ill defined lesions in liver c/f neoplasm w/ PVT, peritoneal implants and indeterminate lung nodules.     PLAN  =====Neurologic=====  #Hepatic Encephalopathy   #EtOH use disorder   #Uremic Encephalopathy   - pt with acute encephalopathy (A&Ox0, per HCP baseline A&Ox3) likely 2/2 toxic metabolic and hepatic processes   - PETH negative   - , improved  - continue with lactulose 10mg TID and rifaximin, MELD 35 1/6/24  - EEG pending    =====Pulmonary=====  Patient breathing comfortably on room air sating 100%   Intubated for EGD, remains intubated due to clinical status  CT ches with nodular opacity c/f malignancy iso possible metastatic disease    =====Cardiovascular=====  #Intravascular Depletion   #HRS   - pt BP in 90's/ 50's, unable to tolerate HD even with midodrine 30mg TID   - echo with normal EF 77% hyperdynamic otherwise normal diastolic function   - patient in shock requiring vasopressors to maintain MAP > 65.   - Currently on Levo and Vaso    =====GI=====  #Decompensated hepatic cirrhosis   - currently undergoing transplant eval, f/u recs  - s/p IR paracentesis on 1/3- fluid analysis unremarkable for infectious etiology (total nucleated cell count 229 with 33 neutrophils) , fluid chemistry wnl, culture no growth to date   c/w Thiamine, B12, Folic Acid, lactulose and rifaximin    - cw octreotide drip   - EGD with polyp in esophagus, unable to biopsy due to coagulopathy  - significant ascites on exam, plan for repeat paracentesis pending family discussion, will require reversal of coagulopathy  - CT 1/8 showing ill defined liver lesions, peritoneal implants and indeterminate lung nodules  - Hepatology determined not to be a candidate for transplant    #GIB  - continues to have bloody bowel movements  - likely iso coagulopathy  - active T&S    =====Renal/=====  #Renal mass  #HRS   #HD requiring pressors   - new renal mass seen on US concerning for malignancy, pending MRI for further characterization,- onc following f/u recs  - urology consulted- f/u recs   - HRS with creatinine >5, 4 on admission and 2 at baseline, shiley placed, s/p 2 rounds of HD unable to tolerate 3rd with midodrine   - CRRT 1/6/24 with levophed support   - porras placed 1/6/24 to monitor I/Os  - CT c/f malignancy wi mes    #Electrolytes  - Maintain K>4, Phos>3, Mag>2, iCal>1    =====Endocrine=====  #DM2  - on 5 units lantus and SSI  - monitor and adjust as necessary     =====Infectious Disease=====  - pt with initial WBC to 13k  - infectious w/u negative and completed 5 days of Epimeric Meropenem 12/30- 1/4   - c/w zosyn  - f/u MRSA swab  - f/u BCx    =====Heme/Onc=====  #DVT Ppx- heparin sub q   #Normocytic anemia on initial CBC   #Coagulopathy, possibly DIC  likely 2/2 vitamin deficiencies  and anemia of chronic disease   -normal B12/folate  >VERENA- eventual scope to evaluate   - monitor platelet count in setting of thrombocytopenia 2/2 cirrhosis   - heme felt possible DIC iso TEG  - s/p Cryo, Plt, and pRBCs  - f/u heme recs      =====Ethics=====  FULL CODE   57 y/o M PMH DM2, recently diagnosed alcoholic cirrhosis (3 weeks ago) presenting as a transfer from [facility] for transplant eval for decompensated liver cirrhosis. Patient initially presented to [facility] (from assisted living) last week with dizziness and worsening AMS, course c/b ARF (on CRRT), hematochezia s/p EGD on 1/8 showing PGH and polyp in esophagus (no varices) as well as CT showing ill defined lesions in liver c/f neoplasm w/ PVT, peritoneal implants and indeterminate lung nodules.     PLAN  =====Neurologic=====  #Hepatic Encephalopathy   #EtOH use disorder   #Uremic Encephalopathy   - pt with acute encephalopathy (A&Ox0, per HCP baseline A&Ox3) likely 2/2 toxic metabolic and hepatic processes   - PETH negative   - , improved  - continue with lactulose 10mg TID and rifaximin, MELD 35 1/6/24  - EEG pending    =====Pulmonary=====  Patient breathing comfortably on room air sating 100%   Intubated for EGD, remains intubated due to clinical status  CT ches with nodular opacity c/f malignancy iso possible metastatic disease    =====Cardiovascular=====  #Intravascular Depletion   #HRS   - pt BP in 90's/ 50's, unable to tolerate HD even with midodrine 30mg TID   - echo with normal EF 77% hyperdynamic otherwise normal diastolic function   - patient in shock requiring vasopressors to maintain MAP > 65.   - Currently on Levo and Vaso    =====GI=====  #Decompensated hepatic cirrhosis   - currently undergoing transplant eval, f/u recs  - s/p IR paracentesis on 1/3- fluid analysis unremarkable for infectious etiology (total nucleated cell count 229 with 33 neutrophils) , fluid chemistry wnl, culture no growth to date   c/w Thiamine, B12, Folic Acid, lactulose and rifaximin    - cw octreotide drip   - EGD with polyp in esophagus, unable to biopsy due to coagulopathy  - significant ascites on exam, plan for repeat paracentesis pending family discussion, will require reversal of coagulopathy  - CT 1/8 showing ill defined liver lesions, peritoneal implants and indeterminate lung nodules  - Hepatology determined not to be a candidate for transplant    #GIB  - continues to have bloody bowel movements  - likely iso coagulopathy  - active T&S    =====Renal/=====  #Renal mass  #HRS   #HD requiring pressors   - new renal mass seen on US concerning for malignancy, pending MRI for further characterization,- onc following f/u recs  - urology consulted- f/u recs   - HRS with creatinine >5, 4 on admission and 2 at baseline, shiley placed, s/p 2 rounds of HD unable to tolerate 3rd with midodrine   - CRRT 1/6/24 with levophed support   - porras placed 1/6/24 to monitor I/Os  - CT c/f malignancy wi mes    #Electrolytes  - Maintain K>4, Phos>3, Mag>2, iCal>1    =====Endocrine=====  #DM2  - on 5 units lantus and SSI  - monitor and adjust as necessary     =====Infectious Disease=====  - pt with initial WBC to 13k  - infectious w/u negative and completed 5 days of Epimeric Meropenem 12/30- 1/4   - c/w zosyn  - f/u MRSA swab  - f/u BCx    =====Heme/Onc=====  #DVT Ppx- heparin sub q   #Normocytic anemia on initial CBC   #Coagulopathy, possibly DIC  likely 2/2 vitamin deficiencies  and anemia of chronic disease   -normal B12/folate  >VERENA- eventual scope to evaluate   - monitor platelet count in setting of thrombocytopenia 2/2 cirrhosis   - heme felt possible DIC iso TEG  - s/p Cryo, Plt, and pRBCs  - f/u heme recs      =====Ethics=====  FULL CODE

## 2024-01-10 NOTE — CONSULT NOTE ADULT - CONSULT REASON
Renal mass, portal vein thrombosis
sacral/bilateral buttocks skin changes
Dialysis Catheter
replace femoral hd cath to cesar
decomp cirrhosis
RAHUL in liver cirrhosis patient
Paracentesis
GOC

## 2024-01-10 NOTE — PROGRESS NOTE ADULT - ATTENDING COMMENTS
Patient seen and examined. Patient critically ill requiring ICU level of care and frequent bedside visits with therapy change. Patient is a 58M with DM2 and recently diagnosed cirrhosis presenting with worsening renal failure and ascites. He is now on CRRT and having signs of GI bleed. He is undergoing liver transplant evaluation.    #Neurology - patient with metabolic encephalopathy in setting of cirrhosis.   - Initial ammonia not elevated - continue Lactulose and Rifaximin once PO access obtained  #Cardiovascular - patient in shock requiring vasopressors to maintain MAP > 65. Currently on Levo and Vaso  #Pulmonary - intubated in setting of EGD 1/8/24 and remains intubated given worsening clinical state  - CT chest noted - nonspecific nodular opacity - BAL cytology pending  - Maintain O2 sat > 90%  - Aspiration precautions  #Gastro - Hepatology follow-up noted and patient is NOT a candidate for transplant  - EGD noted with polyp in esophagus - unable to be biopsied given coagulopathy  - Cirrhosis and portal hypertension with ascites - s/p prior paracentesis. Significant ascites on exam - plan for repeat paracentesis today after correction of coagulopathy  - Required 1 unit pRBC overnight - monitor Hb  - Has liver lesion being evaluated further  - Bilirubin remains elevated  - EGD done 1/8 without clear source of bleeding - received PRBC - trend CBC  #Nephrology - acute renal failure in setting of possible hepatorenal syndrome  - Continue CRRT as per Nephro - has had issues with clotting  - Monitor urine output  - Triple phase CT scan done to evaluate renal lesion  #Infectious Disease - broaden antibiotics given worsening clinical status. Follow-up cultures  - Repeat paracentesis done 1/9 with 6L removed  #Endo - DM2 and hyperglycemia - continue insulin with goal FS < 180  #DVT proph - SCDs. No AC given concern for bleeding      Prognosis poor. Patient is at high risk for clinical deterioration. Concern for underlying malignancy of unknown primary given imaging findings. Bad shock state and now respiratory failure requiring mechanical ventilation. Palliative care evaluation requested.

## 2024-01-10 NOTE — CHART NOTE - NSCHARTNOTEFT_GEN_A_CORE
EEG preliminary read (not final) on the initial recording hour(s) = x 3     No seizures recorded.  Quasiperiodic sharp transients  Moderate slowing noted, nonspecific.  Final report to follow tomorrow morning after completion of study.    [facility] EEG Reading Room Ph#: [phone]  Epilepsy Answering Service after 5PM and before 8:30AM: Ph#: [phone] EEG preliminary read (not final) on the initial recording hour(s) = x 3     No seizures recorded.  Quasiperiodic sharp transients  Moderate slowing noted, nonspecific.  Final report to follow tomorrow morning after completion of study.    [facility] EEG Reading Room Ph#: [phone]  Epilepsy Answering Service after 5PM and before 8:30AM: Ph#: [phone]

## 2024-01-10 NOTE — CONSULT NOTE ADULT - ASSESSMENT
57 y/o M PMH DM2, recently diagnosed alcoholic cirrhosis (3 weeks ago) now with decompensated liver cirrhosis, hepatorenal syndrome with acute renal failure on CRRT, on multiple pressors, with hematochezia s/p EGD showing severe portal hypertensive gastropathy diffusely in stomach, friable tissue and polyp, as well as CT with concern for metastatic cancer - liver lesions with portal vein tumor thrombus, peritoneal implants, and lung nodules. Patient intubated for EGD and now remains intubated due to encephalopathy, not opening eyes to physical stimuli. Not a candidate for liver transplant given ongoing coagulopathy. Palliative medicine team was called for GOC.     59 y/o M PMH DM2, recently diagnosed alcoholic cirrhosis (3 weeks ago) now with decompensated liver cirrhosis, hepatorenal syndrome with acute renal failure on CRRT, on multiple pressors, with hematochezia s/p EGD showing severe portal hypertensive gastropathy diffusely in stomach, friable tissue and polyp, as well as CT with concern for metastatic cancer - liver lesions with portal vein tumor thrombus, peritoneal implants, and lung nodules. Patient intubated for EGD and now remains intubated due to encephalopathy, not opening eyes to physical stimuli. Not a candidate for liver transplant given ongoing coagulopathy. Palliative medicine team was called for GOC.

## 2024-01-10 NOTE — CONSULT NOTE ADULT - NSGCTIMESPENTATTENDAPP_GEN_A_CORE
Caller would like to discuss why her blood that was taken on 9.21.2020 was not used for her thyroid test.  Patient said the  told her the blood taken on 9.21.2020 could be used for the thyroid lab that was ordered on the 9.22.2020. Writer advised caller of callback within 24-72 hours.    Patient Name: Aida Nuno  Caller Name: Aida  Name of Facility:   Callback Number:   [phone]  Best Availability:  anytime  Can A Detailed Message Be left? yes  Fax Number:   Additional Info:   Did you confirm the message with the caller?: yes    Thank you,  Sangeetha Manuel    
Returned call to patient.  Appears that even though order was placed, lab did not add on the tube of blood that was drawn.  Patient states she talked to a Garry with [facility] who informed her that the order was placed as \"future\" and it was incorrect.  Writer apologized and advised that this office never received a notification from the lab that the wrong order was placed, otherwise we could have corrected this.  Patient states, \"It just seems like no one cares.\"  Writer advised that we do care, unfortunately mistakes happen.  If office is unaware that something is incorrect and is not notified to fix this, then the problem is not identified.  Writer offered to get patient to speak with supervisor however she declines as \"whats done is done.\"  She thanks writer for listening.   
20

## 2024-01-10 NOTE — CONSULT NOTE ADULT - REASON FOR ADMISSION
Liver Transplant Eval

## 2024-01-10 NOTE — PROGRESS NOTE ADULT - SUBJECTIVE AND OBJECTIVE BOX
Anders Rubio MD   Internal Medicine, PGY 2  Contact via TEAMS.     INTERVAL HPI/OVERNIGHT EVENTS:    SUBJECTIVE: Patient seen and examined at bedside.       VITAL SIGNS:  ICU Vital Signs Last 24 Hrs  T(C): 37 (10 Brian 2024 04:00), Max: 38 (09 Jan 2024 08:00)  T(F): 98.6 (10 Brian 2024 04:00), Max: 100.4 (09 Jan 2024 08:00)  HR: 93 (10 Brian 2024 07:15) (91 - 113)  BP: 136/60 (10 Brian 2024 07:15) (92/50 - 146/65)  BP(mean): 87 (10 Brian 2024 07:15) (67 - 94)  ABP: --  ABP(mean): --  RR: 14 (10 Brian 2024 07:15) (9 - 23)  SpO2: 100% (10 Brian 2024 07:15) (100% - 100%)    O2 Parameters below as of 10 Brian 2024 04:00      O2 Concentration (%): 40      Mode: AC/ CMV (Assist Control/ Continuous Mandatory Ventilation), RR (machine): 14, TV (machine): 500, FiO2: 40, PEEP: 5, ITime: 1, MAP: 10, PIP: 28  Plateau pressure:   P/F ratio:     01-09 @ 07:01  -  01-10 @ 07:00  --------------------------------------------------------  IN: 3891.6 mL / OUT: 2965 mL / NET: 926.6 mL      CAPILLARY BLOOD GLUCOSE      POCT Blood Glucose.: 147 mg/dL (10 Brian 2024 05:44)    ECG:    PHYSICAL EXAM:  VITALS:   T(C): 37 (01-10-24 @ 04:00), Max: 38 (01-09-24 @ 08:00)  HR: 93 (01-10-24 @ 07:15) (91 - 113)  BP: 136/60 (01-10-24 @ 07:15) (92/50 - 146/65)  RR: 14 (01-10-24 @ 07:15) (9 - 23)  SpO2: 100% (01-10-24 @ 07:15) (100% - 100%)    GENERAL: NAD, lying in bed comfortably  HEAD:  Atraumatic, Normocephalic  EYES: EOMI, PERRLA, conjunctiva and sclera clear  ENT: Moist mucous membranes  NECK: Supple, No JVD  CHEST/LUNG: CTABL; No rales, rhonchi, wheezing, or rubs. Unlabored respirations  HEART: RRR. No M/R/G  ABDOMEN: Soft, nontender, non-distended, normoactive BS. No hepatomegaly  EXTREMITIES:  2+ Peripheral Pulses, brisk capillary refill. No clubbing, cyanosis, or edema  NERVOUS SYSTEM:  Alert & Oriented X3, speech clear. No deficits, CN II-XII intact. Normal sensation   MSK: FROM all 4 extremities, full and equal strength  PSYCH: Normal affect, normal speech, normal behavior  SKIN: No rashes or lesions      MEDICATIONS:  MEDICATIONS  (STANDING):  camphor 0.5%/menthol 0.5% Topical Lotion 1 Application(s) Topical two times a day  chlorhexidine 0.12% Liquid 15 milliLiter(s) Oral Mucosa every 12 hours  chlorhexidine 4% Liquid 1 Application(s) Topical <User Schedule>  CRRT Treatment    <Continuous>  dextrose 5%. 1000 milliLiter(s) (100 mL/Hr) IV Continuous <Continuous>  dextrose 5%. 1000 milliLiter(s) (50 mL/Hr) IV Continuous <Continuous>  dextrose 50% Injectable 25 Gram(s) IV Push once  dextrose 50% Injectable 25 Gram(s) IV Push once  dextrose 50% Injectable 12.5 Gram(s) IV Push once  folic acid Injectable 1 milliGRAM(s) IV Push daily  glucagon  Injectable 1 milliGRAM(s) IntraMuscular once  insulin lispro (ADMELOG) corrective regimen sliding scale   SubCutaneous every 6 hours  ketamine Infusion. 0.2 mG/kG/Hr (1.91 mL/Hr) IV Continuous <Continuous>  lactulose Retention Enema 200 Gram(s) Rectal every 8 hours  norepinephrine Infusion 0.6 MICROgram(s)/kG/Min (53.8 mL/Hr) IV Continuous <Continuous>  octreotide  Infusion 50 MICROgram(s)/Hr (10 mL/Hr) IV Continuous <Continuous>  pantoprazole  Injectable 40 milliGRAM(s) IV Push every 12 hours  phenylephrine    Infusion 0.1 MICROgram(s)/kG/Min (3.59 mL/Hr) IV Continuous <Continuous>  piperacillin/tazobactam IVPB.. 3.375 Gram(s) IV Intermittent every 8 hours  PrismaSATE Dialysate BGK 4 / 2.5 5000 milliLiter(s) (1500 mL/Hr) CRRT <Continuous>  PrismaSOL Filtration BGK 0 / 2.5 5000 milliLiter(s) (200 mL/Hr) CRRT <Continuous>  PrismaSOL Filtration BGK 0 / 2.5 5000 milliLiter(s) (800 mL/Hr) CRRT <Continuous>  propofol Infusion 50 MICROgram(s)/kG/Min (28.7 mL/Hr) IV Continuous <Continuous>  thiamine Injectable 100 milliGRAM(s) IV Push daily  vasopressin Infusion 0.04 Unit(s)/Min (6 mL/Hr) IV Continuous <Continuous>    MEDICATIONS  (PRN):  dextrose Oral Gel 15 Gram(s) Oral once PRN Blood Glucose LESS THAN 70 milliGRAM(s)/deciliter  sodium chloride 0.9% lock flush 10 milliLiter(s) IV Push every 1 hour PRN Pre/post blood products, medications, blood draw, and to maintain line patency      ALLERGIES:  Allergies    No Known Allergies    Intolerances        LABS:                        8.4    13.89 )-----------( 58       ( 10 Brian 2024 06:27 )             25.9     01-10    134<L>  |  98  |  17  ----------------------------<  154<H>  4.0   |  20<L>  |  1.39<H>    Ca    9.1      10 Brian 2024 06:28  Phos  3.5     01-10  Mg     2.6     01-10    TPro  5.6<L>  /  Alb  3.7  /  TBili  19.9<H>  /  DBili  x   /  AST  119<H>  /  ALT  16  /  AlkPhos  608<H>  01-10    PT/INR - ( 10 Brian 2024 00:33 )   PT: 13.5 sec;   INR: 1.23 ratio         PTT - ( 10 Brian 2024 00:33 )  PTT:32.6 sec  Urinalysis Basic - ( 10 Brian 2024 06:28 )    Color: x / Appearance: x / SG: x / pH: x  Gluc: 154 mg/dL / Ketone: x  / Bili: x / Urobili: x   Blood: x / Protein: x / Nitrite: x   Leuk Esterase: x / RBC: x / WBC x   Sq Epi: x / Non Sq Epi: x / Bacteria: x        RADIOLOGY & ADDITIONAL TESTS: Reviewed. INTERVAL HPI/OVERNIGHT EVENTS:  Transfused 1 unit of pRBCs ON    SUBJECTIVE: Patient seen and examined at bedside.   Pt responsive only to pain.       VITAL SIGNS:  ICU Vital Signs Last 24 Hrs  T(C): 37 (10 Brian 2024 04:00), Max: 38 (09 Jan 2024 08:00)  T(F): 98.6 (10 Brian 2024 04:00), Max: 100.4 (09 Jan 2024 08:00)  HR: 93 (10 Brian 2024 07:15) (91 - 113)  BP: 136/60 (10 Brian 2024 07:15) (92/50 - 146/65)  BP(mean): 87 (10 Brian 2024 07:15) (67 - 94)  ABP: --  ABP(mean): --  RR: 14 (10 Brian 2024 07:15) (9 - 23)  SpO2: 100% (10 Brian 2024 07:15) (100% - 100%)    O2 Parameters below as of 10 Brian 2024 04:00      O2 Concentration (%): 40      Mode: AC/ CMV (Assist Control/ Continuous Mandatory Ventilation), RR (machine): 14, TV (machine): 500, FiO2: 40, PEEP: 5, ITime: 1, MAP: 10, PIP: 28  Plateau pressure:   P/F ratio:     01-09 @ 07:01  -  01-10 @ 07:00  --------------------------------------------------------  IN: 3891.6 mL / OUT: 2965 mL / NET: 926.6 mL      CAPILLARY BLOOD GLUCOSE      POCT Blood Glucose.: 147 mg/dL (10 Brian 2024 05:44)    ECG:    PHYSICAL EXAM:  VITALS:   T(C): 37 (01-10-24 @ 04:00), Max: 38 (01-09-24 @ 08:00)  HR: 93 (01-10-24 @ 07:15) (91 - 113)  BP: 136/60 (01-10-24 @ 07:15) (92/50 - 146/65)  RR: 14 (01-10-24 @ 07:15) (9 - 23)  SpO2: 100% (01-10-24 @ 07:15) (100% - 100%)    General: NAD  HEENT: NC/AT; PERRL, clear conjunctiva  Neck: supple  Respiratory: CTA b/l  Cardiovascular: +S1/S2; RRR  Abdomen: soft, NT/ND; +BS x4, large ascites  Extremities: WWP, 2+ peripheral pulses b/l; no LE edema  Skin: jaundice  Neurological: intubated sedated      MEDICATIONS:  MEDICATIONS  (STANDING):  camphor 0.5%/menthol 0.5% Topical Lotion 1 Application(s) Topical two times a day  chlorhexidine 0.12% Liquid 15 milliLiter(s) Oral Mucosa every 12 hours  chlorhexidine 4% Liquid 1 Application(s) Topical <User Schedule>  CRRT Treatment    <Continuous>  dextrose 5%. 1000 milliLiter(s) (100 mL/Hr) IV Continuous <Continuous>  dextrose 5%. 1000 milliLiter(s) (50 mL/Hr) IV Continuous <Continuous>  dextrose 50% Injectable 25 Gram(s) IV Push once  dextrose 50% Injectable 25 Gram(s) IV Push once  dextrose 50% Injectable 12.5 Gram(s) IV Push once  folic acid Injectable 1 milliGRAM(s) IV Push daily  glucagon  Injectable 1 milliGRAM(s) IntraMuscular once  insulin lispro (ADMELOG) corrective regimen sliding scale   SubCutaneous every 6 hours  ketamine Infusion. 0.2 mG/kG/Hr (1.91 mL/Hr) IV Continuous <Continuous>  lactulose Retention Enema 200 Gram(s) Rectal every 8 hours  norepinephrine Infusion 0.6 MICROgram(s)/kG/Min (53.8 mL/Hr) IV Continuous <Continuous>  octreotide  Infusion 50 MICROgram(s)/Hr (10 mL/Hr) IV Continuous <Continuous>  pantoprazole  Injectable 40 milliGRAM(s) IV Push every 12 hours  phenylephrine    Infusion 0.1 MICROgram(s)/kG/Min (3.59 mL/Hr) IV Continuous <Continuous>  piperacillin/tazobactam IVPB.. 3.375 Gram(s) IV Intermittent every 8 hours  PrismaSATE Dialysate BGK 4 / 2.5 5000 milliLiter(s) (1500 mL/Hr) CRRT <Continuous>  PrismaSOL Filtration BGK 0 / 2.5 5000 milliLiter(s) (200 mL/Hr) CRRT <Continuous>  PrismaSOL Filtration BGK 0 / 2.5 5000 milliLiter(s) (800 mL/Hr) CRRT <Continuous>  propofol Infusion 50 MICROgram(s)/kG/Min (28.7 mL/Hr) IV Continuous <Continuous>  thiamine Injectable 100 milliGRAM(s) IV Push daily  vasopressin Infusion 0.04 Unit(s)/Min (6 mL/Hr) IV Continuous <Continuous>    MEDICATIONS  (PRN):  dextrose Oral Gel 15 Gram(s) Oral once PRN Blood Glucose LESS THAN 70 milliGRAM(s)/deciliter  sodium chloride 0.9% lock flush 10 milliLiter(s) IV Push every 1 hour PRN Pre/post blood products, medications, blood draw, and to maintain line patency      ALLERGIES:  Allergies    No Known Allergies    Intolerances        LABS:                        8.4    13.89 )-----------( 58       ( 10 Brian 2024 06:27 )             25.9     01-10    134<L>  |  98  |  17  ----------------------------<  154<H>  4.0   |  20<L>  |  1.39<H>    Ca    9.1      10 Brian 2024 06:28  Phos  3.5     01-10  Mg     2.6     01-10    TPro  5.6<L>  /  Alb  3.7  /  TBili  19.9<H>  /  DBili  x   /  AST  119<H>  /  ALT  16  /  AlkPhos  608<H>  01-10    PT/INR - ( 10 Brian 2024 00:33 )   PT: 13.5 sec;   INR: 1.23 ratio         PTT - ( 10 Brian 2024 00:33 )  PTT:32.6 sec  Urinalysis Basic - ( 10 Brian 2024 06:28 )    Color: x / Appearance: x / SG: x / pH: x  Gluc: 154 mg/dL / Ketone: x  / Bili: x / Urobili: x   Blood: x / Protein: x / Nitrite: x   Leuk Esterase: x / RBC: x / WBC x   Sq Epi: x / Non Sq Epi: x / Bacteria: x        RADIOLOGY & ADDITIONAL TESTS: Reviewed.

## 2024-01-11 NOTE — PROGRESS NOTE ADULT - ATTENDING COMMENTS
58 year old man with h/o type II DM for ~ 6-7 years, was on metformin and Jardiance, recently diagnosed with liver cirrhosis complicated by ascites. He underwent paracentesis x 3. Course complicated by worsening RAHUL and encephalopathy. He was transferred to [facility] for liver transplant evaluation. He was initiated on HD on 12/31/23.     Renal US with normal sized kidneys measuring ~ 11.6 and 11.8 cm. No hydro. Multilobulated vascular mass arises from the right kidney extending into the peritoneum. Findings are concerning for primary renal malignancy versus metastatic disease. MRI pending.     RAHUL on CKD - unclear baseline renal function. No significant proteinuria UPCR 0.4, normal sized kidneys on US. Initiated HD on 12/31, but Unable to tolerate HD due to hypotension unresponsive to increase midodrine dose.    Labs with worsening BUN/Cr and metabolic acidosis and therefore patient was moved to ICU consult for pressor support and started on CRRT since 1/5/24.   continue CRRT . labs reviewed. 58 year old man with h/o type II DM for ~ 6-7 years, was on metformin and Jardiance, recently diagnosed with liver cirrhosis complicated by ascites. He underwent paracentesis x 3. Course complicated by worsening RAHUL and encephalopathy. He was transferred to [facility] for liver transplant evaluation. He was initiated on HD on 12/31/23.     Renal US with normal sized kidneys measuring ~ 11.6 and 11.8 cm. No hydro. Multilobulated vascular mass arises from the right kidney extending into the peritoneum. Findings are concerning for primary renal malignancy versus metastatic disease. MRI pending.     RAHUL on CKD - unclear baseline renal function. No significant proteinuria UPCR 0.4, normal sized kidneys on US. Initiated HD on 12/31, but Unable to tolerate HD due to hypotension unresponsive to increase midodrine dose.    Labs with worsening BUN/Cr and metabolic acidosis and therefore patient was moved to ICU consult for pressor support and started on CRRT since 1/5/24.   continue CRRT . labs reviewed.

## 2024-01-11 NOTE — PROGRESS NOTE ADULT - WHAT MATTERS MOST
Comfort, following Hoah[employer]ism rituals around dying process Comfort, following Orthodox rituals around dying process

## 2024-01-11 NOTE — CHART NOTE - NSCHARTNOTEFT_GEN_A_CORE
Brief Hepatology f/u note:  -Patient reportedly continues to have hematochezia. Dropped Hb yesterday, received 1u pRBcs. Hb 6.6--> 8.4 -->7.9. Suspect most likely 2/2 malignancy vs. portal hypertensive colopathy. Per team, had GOC meeting today and family moving towards comfort care.  Recommendations:  -hepatology will sign off at this time. Please call with questions      Heidy Saenz  GI/Hepatology Fellow PGY5    NON-URGENT CONSULTS:  Please email [email or web address]  AT NIGHT AND ON WEEKENDS:  Available on [employer] Teams  [phone] ([place] Pager)    After 5pm, please contact the on-call GI fellow. [phone] Brief Hepatology f/u note:  -Patient reportedly continues to have hematochezia. Dropped Hb yesterday, received 1u pRBcs. Hb 6.6--> 8.4 -->7.9. Suspect most likely 2/2 malignancy vs. portal hypertensive colopathy. Per team, had GOC meeting today and family moving towards comfort care.  Recommendations:  -hepatology will sign off at this time. Please call with questions      Heidy Saenz  GI/Hepatology Fellow PGY5    NON-URGENT CONSULTS:  Please email [email or web address]  AT NIGHT AND ON WEEKENDS:  Available on [employer] Teams  [phone] ([place] Pager)    After 5pm, please contact the on-call GI fellow. [phone]

## 2024-01-11 NOTE — PROGRESS NOTE ADULT - ASSESSMENT
57 y/o M PMH DM2, recently diagnosed alcoholic cirrhosis (3 weeks ago) presenting as a transfer from [facility] for transplant eval for decompensated liver cirrhosis. Patient initially presented to [facility] (from assisted living) last week with dizziness and worsening AMS, course c/b ARF (on CRRT), hematochezia s/p EGD on 1/8 showing PGH and polyp in esophagus (no varices) as well as CT showing ill defined lesions in liver c/f neoplasm w/ PVT, peritoneal implants and indeterminate lung nodules.     PLAN  =====Neurologic=====  #Hepatic Encephalopathy   #EtOH use disorder   #Uremic Encephalopathy   - pt with acute encephalopathy (A&Ox0, per HCP baseline A&Ox3) likely 2/2 toxic metabolic and hepatic processes   - PETH negative   - , improved  - continue with lactulose 10mg TID and rifaximin, MELD 35 1/6/24  - EEG pending    =====Pulmonary=====  Patient breathing comfortably on room air sating 100%   Intubated for EGD, remains intubated due to clinical status  CT ches with nodular opacity c/f malignancy iso possible metastatic disease    =====Cardiovascular=====  #Intravascular Depletion   #HRS   - pt BP in 90's/ 50's, unable to tolerate HD even with midodrine 30mg TID   - echo with normal EF 77% hyperdynamic otherwise normal diastolic function   - patient in shock requiring vasopressors to maintain MAP > 65.   - Currently on Levo and Vaso    =====GI=====  #Decompensated hepatic cirrhosis   - currently undergoing transplant eval, f/u recs  - s/p IR paracentesis on 1/3- fluid analysis unremarkable for infectious etiology (total nucleated cell count 229 with 33 neutrophils) , fluid chemistry wnl, culture no growth to date   c/w Thiamine, B12, Folic Acid, lactulose and rifaximin    - cw octreotide drip   - EGD with polyp in esophagus, unable to biopsy due to coagulopathy  - significant ascites on exam, plan for repeat paracentesis pending family discussion, will require reversal of coagulopathy  - CT 1/8 showing ill defined liver lesions, peritoneal implants and indeterminate lung nodules  - Hepatology determined not to be a candidate for transplant    #GIB  - continues to have bloody bowel movements  - likely iso coagulopathy  - active T&S    =====Renal/=====  #Renal mass  #HRS   #HD requiring pressors   - new renal mass seen on US concerning for malignancy, pending MRI for further characterization,- onc following f/u recs  - urology consulted- f/u recs   - HRS with creatinine >5, 4 on admission and 2 at baseline, shiley placed, s/p 2 rounds of HD unable to tolerate 3rd with midodrine   - CRRT 1/6/24 with levophed support   - porras placed 1/6/24 to monitor I/Os  - CT c/f malignancy wi mes    #Electrolytes  - Maintain K>4, Phos>3, Mag>2, iCal>1    =====Endocrine=====  #DM2  - on 5 units lantus and SSI  - monitor and adjust as necessary     =====Infectious Disease=====  - pt with initial WBC to 13k  - infectious w/u negative and completed 5 days of Epimeric Meropenem 12/30- 1/4   - c/w zosyn  - f/u MRSA swab  - f/u BCx    =====Heme/Onc=====  #DVT Ppx- heparin sub q   #Normocytic anemia on initial CBC   #Coagulopathy, possibly DIC  likely 2/2 vitamin deficiencies  and anemia of chronic disease   -normal B12/folate  >VERENA- eventual scope to evaluate   - monitor platelet count in setting of thrombocytopenia 2/2 cirrhosis   - heme felt possible DIC iso TEG  - s/p Cryo, Plt, and pRBCs  - f/u heme recs      =====Ethics=====  FULL CODE   59 y/o M PMH DM2, recently diagnosed alcoholic cirrhosis (3 weeks ago) presenting as a transfer from [facility] for transplant eval for decompensated liver cirrhosis. Patient initially presented to [facility] (from assisted living) last week with dizziness and worsening AMS, course c/b ARF (on CRRT), hematochezia s/p EGD on 1/8 showing PGH and polyp in esophagus (no varices) as well as CT showing ill defined lesions in liver c/f neoplasm w/ PVT, peritoneal implants and indeterminate lung nodules.     PLAN  =====Neurologic=====  #Hepatic Encephalopathy   #EtOH use disorder   #Uremic Encephalopathy   - pt with acute encephalopathy (A&Ox0, per HCP baseline A&Ox3) likely 2/2 toxic metabolic and hepatic processes   - PETH negative   - , improved  - continue with lactulose 10mg TID and rifaximin, MELD 35 1/6/24  - EEG pending    =====Pulmonary=====  Patient breathing comfortably on room air sating 100%   Intubated for EGD, remains intubated due to clinical status  CT ches with nodular opacity c/f malignancy iso possible metastatic disease    =====Cardiovascular=====  #Intravascular Depletion   #HRS   - pt BP in 90's/ 50's, unable to tolerate HD even with midodrine 30mg TID   - echo with normal EF 77% hyperdynamic otherwise normal diastolic function   - patient in shock requiring vasopressors to maintain MAP > 65.   - Currently on Levo and Vaso    =====GI=====  #Decompensated hepatic cirrhosis   - currently undergoing transplant eval, f/u recs  - s/p IR paracentesis on 1/3- fluid analysis unremarkable for infectious etiology (total nucleated cell count 229 with 33 neutrophils) , fluid chemistry wnl, culture no growth to date   c/w Thiamine, B12, Folic Acid, lactulose and rifaximin    - cw octreotide drip   - EGD with polyp in esophagus, unable to biopsy due to coagulopathy  - significant ascites on exam, plan for repeat paracentesis pending family discussion, will require reversal of coagulopathy  - CT 1/8 showing ill defined liver lesions, peritoneal implants and indeterminate lung nodules  - Hepatology determined not to be a candidate for transplant    #GIB  - continues to have bloody bowel movements  - likely iso coagulopathy  - active T&S    =====Renal/=====  #Renal mass  #HRS   #HD requiring pressors   - new renal mass seen on US concerning for malignancy, pending MRI for further characterization,- onc following f/u recs  - urology consulted- f/u recs   - HRS with creatinine >5, 4 on admission and 2 at baseline, shiley placed, s/p 2 rounds of HD unable to tolerate 3rd with midodrine   - CRRT 1/6/24 with levophed support   - porras placed 1/6/24 to monitor I/Os  - CT c/f malignancy wi mes    #Electrolytes  - Maintain K>4, Phos>3, Mag>2, iCal>1    =====Endocrine=====  #DM2  - on 5 units lantus and SSI  - monitor and adjust as necessary     =====Infectious Disease=====  - pt with initial WBC to 13k  - infectious w/u negative and completed 5 days of Epimeric Meropenem 12/30- 1/4   - c/w zosyn  - f/u MRSA swab  - f/u BCx    =====Heme/Onc=====  #DVT Ppx- heparin sub q   #Normocytic anemia on initial CBC   #Coagulopathy, possibly DIC  likely 2/2 vitamin deficiencies  and anemia of chronic disease   -normal B12/folate  >VERENA- eventual scope to evaluate   - monitor platelet count in setting of thrombocytopenia 2/2 cirrhosis   - heme felt possible DIC iso TEG  - s/p Cryo, Plt, and pRBCs  - f/u heme recs      =====Ethics=====  FULL CODE   59 y/o M PMH DM2, recently diagnosed alcoholic cirrhosis (3 weeks ago) presenting as a transfer from [facility] for transplant eval for decompensated liver cirrhosis. Patient initially presented to [facility] (from assisted living) last week with dizziness and worsening AMS, course c/b ARF (on CRRT), hematochezia s/p EGD on 1/8 showing PGH and polyp in esophagus (no varices) as well as CT showing ill defined lesions in liver c/f neoplasm w/ PVT, peritoneal implants and indeterminate lung nodules.     PLAN  =====Neurologic=====  #Hepatic Encephalopathy   #EtOH use disorder   #Uremic Encephalopathy   - pt with acute encephalopathy (A&Ox0, per HCP baseline A&Ox3) likely 2/2 toxic metabolic and hepatic processes   - PETH negative   - BUN improved on CRRT  - continue with lactulose 10mg TID and rifaximin, MELD 35 1/6/24  - EEG pending    =====Pulmonary=====  Patient breathing comfortably on room air sating 100%   Intubated for EGD, remains intubated due to clinical status  CT ches with nodular opacity c/f malignancy iso possible metastatic disease    =====Cardiovascular=====  #Intravascular Depletion   #HRS   - pt BP in 90's/ 50's, unable to tolerate HD even with midodrine 30mg TID   - echo with normal EF 77% hyperdynamic otherwise normal diastolic function   - patient in shock requiring vasopressors to maintain MAP > 65.   - Currently on Levo and Vaso    =====GI=====  #Decompensated hepatic cirrhosis   - currently undergoing transplant eval, f/u recs  - s/p IR paracentesis on 1/3- fluid analysis unremarkable for infectious etiology (total nucleated cell count 229 with 33 neutrophils) , fluid chemistry wnl, culture no growth to date   c/w Thiamine, B12, Folic Acid, lactulose and rifaximin    - cw octreotide drip   - EGD with polyp in esophagus, unable to biopsy due to coagulopathy  - significant ascites on exam, plan for repeat paracentesis pending family discussion, will require reversal of coagulopathy  - CT 1/8 showing ill defined liver lesions, peritoneal implants and indeterminate lung nodules  - Hepatology determined not to be a candidate for transplant    #GIB  - continues to have bloody bowel movements  - likely iso coagulopathy  - active T&S    =====Renal/=====  #Renal mass  #HRS   #HD requiring pressors   - new renal mass seen on US concerning for malignancy, pending MRI for further characterization,- onc following f/u recs  - urology consulted- f/u recs   - HRS with creatinine >5, 4 on admission and 2 at baseline, shiley placed, s/p 2 rounds of HD unable to tolerate 3rd with midodrine   - CRRT 1/6/24 with levophed support   - porras placed 1/6/24 to monitor I/Os  - CT c/f malignancy wi mes    #Electrolytes  - Maintain K>4, Phos>3, Mag>2, iCal>1    =====Endocrine=====  #DM2  - on 5 units lantus and SSI  - monitor and adjust as necessary     =====Infectious Disease=====  - pt with initial WBC to 13k  - infectious w/u negative and completed 5 days of Epimeric Meropenem 12/30- 1/4   - c/w zosyn  - f/u MRSA swab  - f/u BCx    =====Heme/Onc=====  #DVT Ppx- heparin sub q   #Normocytic anemia on initial CBC   #Coagulopathy, possibly DIC  likely 2/2 vitamin deficiencies  and anemia of chronic disease   -normal B12/folate  >VERENA- eventual scope to evaluate   - monitor platelet count in setting of thrombocytopenia 2/2 cirrhosis   - heme felt possible DIC iso TEG  - s/p Cryo, Plt, and pRBCs  - f/u heme recs      =====Ethics=====  FULL CODE   59 y/o M PMH DM2, recently diagnosed alcoholic cirrhosis (3 weeks ago) presenting as a transfer from [facility] for transplant eval for decompensated liver cirrhosis. Patient initially presented to [facility] (from assisted living) last week with dizziness and worsening AMS, course c/b ARF (on CRRT), hematochezia s/p EGD on 1/8 showing PGH and polyp in esophagus (no varices) as well as CT showing ill defined lesions in liver c/f neoplasm w/ PVT, peritoneal implants and indeterminate lung nodules.     PLAN  =====Neurologic=====  #Hepatic Encephalopathy   #EtOH use disorder   #Uremic Encephalopathy   - pt with acute encephalopathy (A&Ox0, per HCP baseline A&Ox3) likely 2/2 toxic metabolic and hepatic processes   - PETH negative   - BUN improved on CRRT  - continue with lactulose 10mg TID and rifaximin, MELD 35 1/6/24  - EEG pending    =====Pulmonary=====  Patient breathing comfortably on room air sating 100%   Intubated for EGD, remains intubated due to clinical status  CT ches with nodular opacity c/f malignancy iso possible metastatic disease    =====Cardiovascular=====  #Intravascular Depletion   #HRS   - pt BP in 90's/ 50's, unable to tolerate HD even with midodrine 30mg TID   - echo with normal EF 77% hyperdynamic otherwise normal diastolic function   - patient in shock requiring vasopressors to maintain MAP > 65.   - Currently on Levo and Vaso    =====GI=====  #Decompensated hepatic cirrhosis   - currently undergoing transplant eval, f/u recs  - s/p IR paracentesis on 1/3- fluid analysis unremarkable for infectious etiology (total nucleated cell count 229 with 33 neutrophils) , fluid chemistry wnl, culture no growth to date   c/w Thiamine, B12, Folic Acid, lactulose and rifaximin    - cw octreotide drip   - EGD with polyp in esophagus, unable to biopsy due to coagulopathy  - significant ascites on exam, plan for repeat paracentesis pending family discussion, will require reversal of coagulopathy  - CT 1/8 showing ill defined liver lesions, peritoneal implants and indeterminate lung nodules  - Hepatology determined not to be a candidate for transplant    #GIB  - continues to have bloody bowel movements  - likely iso coagulopathy  - active T&S    =====Renal/=====  #Renal mass  #HRS   #HD requiring pressors   - new renal mass seen on US concerning for malignancy, pending MRI for further characterization,- onc following f/u recs  - urology consulted- f/u recs   - HRS with creatinine >5, 4 on admission and 2 at baseline, shiley placed, s/p 2 rounds of HD unable to tolerate 3rd with midodrine   - CRRT 1/6/24 with levophed support   - porras placed 1/6/24 to monitor I/Os  - CT c/f malignancy wi mes    #Electrolytes  - Maintain K>4, Phos>3, Mag>2, iCal>1    =====Endocrine=====  #DM2  - on 5 units lantus and SSI  - monitor and adjust as necessary     =====Infectious Disease=====  - pt with initial WBC to 13k  - infectious w/u negative and completed 5 days of Epimeric Meropenem 12/30- 1/4   - c/w zosyn  - f/u MRSA swab  - f/u BCx    =====Heme/Onc=====  #DVT Ppx- heparin sub q   #Normocytic anemia on initial CBC   #Coagulopathy, possibly DIC  likely 2/2 vitamin deficiencies  and anemia of chronic disease   -normal B12/folate  >VERENA- eventual scope to evaluate   - monitor platelet count in setting of thrombocytopenia 2/2 cirrhosis   - heme felt possible DIC iso TEG  - s/p Cryo, Plt, and pRBCs  - f/u heme recs      =====Ethics=====  FULL CODE   59 y/o M PMH DM2, recently diagnosed alcoholic cirrhosis (3 weeks ago) presenting as a transfer from [facility] for transplant eval for decompensated liver cirrhosis. Patient initially presented to [facility] (from assisted living) last week with dizziness and worsening AMS, course c/b ARF (on CRRT), hematochezia s/p EGD on 1/8 showing PGH and polyp in esophagus (no varices) as well as CT showing ill defined lesions in liver c/f neoplasm w/ PVT, peritoneal implants and indeterminate lung nodules.     PLAN  =====Neurologic=====  #Hepatic Encephalopathy   #EtOH use disorder   #Uremic Encephalopathy   - pt with acute encephalopathy (A&Ox0, per HCP baseline A&Ox3) likely 2/2 toxic metabolic and hepatic processes   - PETH negative   - BUN improved on CRRT  - continue with lactulose 10mg TID and rifaximin, MELD 35 1/6/24  - EEG neg for epileptic waves    =====Pulmonary=====  Patient breathing comfortably on room air sating 100%   Intubated for EGD, remains intubated due to clinical status  CT ches with nodular opacity c/f malignancy iso possible metastatic disease    =====Cardiovascular=====  #Intravascular Depletion   #HRS   - pt BP in 90's/ 50's, unable to tolerate HD even with midodrine 30mg TID   - echo with normal EF 77% hyperdynamic otherwise normal diastolic function   - patient in shock requiring vasopressors to maintain MAP > 65.   - Currently on Levo and Vaso    =====GI=====  #Decompensated hepatic cirrhosis   - currently undergoing transplant eval, f/u recs  - s/p IR paracentesis on 1/3- fluid analysis unremarkable for infectious etiology (total nucleated cell count 229 with 33 neutrophils) , fluid chemistry wnl, culture no growth to date   c/w Thiamine, B12, Folic Acid, lactulose and rifaximin    - cw octreotide drip, dc tomorrow  - EGD with polyp in esophagus, unable to biopsy due to coagulopathy  - significant ascites on exam, plan for repeat paracentesis pending family discussion, will require reversal of coagulopathy  - CT 1/8 showing ill defined liver lesions, peritoneal implants and indeterminate lung nodules  - Hepatology determined not to be a candidate for transplant    #GIB  - continues to have bloody bowel movements  - likely iso coagulopathy  - active T&S    =====Renal/=====  #Renal mass  #HRS   #HD requiring pressors   - new renal mass seen on US concerning for malignancy, pending MRI for further characterization,- onc following f/u recs  - urology consulted- f/u recs   - HRS with creatinine >5, 4 on admission and 2 at baseline, shiley placed, s/p 2 rounds of HD unable to tolerate 3rd with midodrine   - CRRT 1/6/24 with levophed support   - porras placed 1/6/24 to monitor I/Os  - CT c/f malignancy wih mes    #Electrolytes  - Maintain K>4, Phos>3, Mag>2, iCal>1    =====Endocrine=====  #DM2  - on 5 units lantus and SSI  - monitor and adjust as necessary     =====Infectious Disease=====  - pt with initial WBC to 13k  - infectious w/u negative and completed 5 days of Epimeric Meropenem 12/30- 1/4   - c/w zosyn  - f/u MRSA swab neg  - f/u BCx    =====Heme/Onc=====  #DVT Ppx- heparin sub q   #Normocytic anemia on initial CBC   #Coagulopathy, possibly DIC  likely 2/2 vitamin deficiencies  and anemia of chronic disease   -normal B12/folate  >VERENA- eventual scope to evaluate   - monitor platelet count in setting of thrombocytopenia 2/2 cirrhosis   - heme felt possible DIC iso TEG  - s/p Cryo, Plt, and pRBCs  - f/u heme recs      =====Ethics=====  FULL CODE    Transition to comfort care after Imam is able to see pt tomorrow.    57 y/o M PMH DM2, recently diagnosed alcoholic cirrhosis (3 weeks ago) presenting as a transfer from [facility] for transplant eval for decompensated liver cirrhosis. Patient initially presented to [facility] (from assisted living) last week with dizziness and worsening AMS, course c/b ARF (on CRRT), hematochezia s/p EGD on 1/8 showing PGH and polyp in esophagus (no varices) as well as CT showing ill defined lesions in liver c/f neoplasm w/ PVT, peritoneal implants and indeterminate lung nodules.     PLAN  =====Neurologic=====  #Hepatic Encephalopathy   #EtOH use disorder   #Uremic Encephalopathy   - pt with acute encephalopathy (A&Ox0, per HCP baseline A&Ox3) likely 2/2 toxic metabolic and hepatic processes   - PETH negative   - BUN improved on CRRT  - continue with lactulose 10mg TID and rifaximin, MELD 35 1/6/24  - EEG neg for epileptic waves    =====Pulmonary=====  Patient breathing comfortably on room air sating 100%   Intubated for EGD, remains intubated due to clinical status  CT ches with nodular opacity c/f malignancy iso possible metastatic disease    =====Cardiovascular=====  #Intravascular Depletion   #HRS   - pt BP in 90's/ 50's, unable to tolerate HD even with midodrine 30mg TID   - echo with normal EF 77% hyperdynamic otherwise normal diastolic function   - patient in shock requiring vasopressors to maintain MAP > 65.   - Currently on Levo and Vaso    =====GI=====  #Decompensated hepatic cirrhosis   - currently undergoing transplant eval, f/u recs  - s/p IR paracentesis on 1/3- fluid analysis unremarkable for infectious etiology (total nucleated cell count 229 with 33 neutrophils) , fluid chemistry wnl, culture no growth to date   c/w Thiamine, B12, Folic Acid, lactulose and rifaximin    - cw octreotide drip, dc tomorrow  - EGD with polyp in esophagus, unable to biopsy due to coagulopathy  - significant ascites on exam, plan for repeat paracentesis pending family discussion, will require reversal of coagulopathy  - CT 1/8 showing ill defined liver lesions, peritoneal implants and indeterminate lung nodules  - Hepatology determined not to be a candidate for transplant    #GIB  - continues to have bloody bowel movements  - likely iso coagulopathy  - active T&S    =====Renal/=====  #Renal mass  #HRS   #HD requiring pressors   - new renal mass seen on US concerning for malignancy, pending MRI for further characterization,- onc following f/u recs  - urology consulted- f/u recs   - HRS with creatinine >5, 4 on admission and 2 at baseline, shiley placed, s/p 2 rounds of HD unable to tolerate 3rd with midodrine   - CRRT 1/6/24 with levophed support   - porras placed 1/6/24 to monitor I/Os  - CT c/f malignancy wih mes    #Electrolytes  - Maintain K>4, Phos>3, Mag>2, iCal>1    =====Endocrine=====  #DM2  - on 5 units lantus and SSI  - monitor and adjust as necessary     =====Infectious Disease=====  - pt with initial WBC to 13k  - infectious w/u negative and completed 5 days of Epimeric Meropenem 12/30- 1/4   - c/w zosyn  - f/u MRSA swab neg  - f/u BCx    =====Heme/Onc=====  #DVT Ppx- heparin sub q   #Normocytic anemia on initial CBC   #Coagulopathy, possibly DIC  likely 2/2 vitamin deficiencies  and anemia of chronic disease   -normal B12/folate  >VERENA- eventual scope to evaluate   - monitor platelet count in setting of thrombocytopenia 2/2 cirrhosis   - heme felt possible DIC iso TEG  - s/p Cryo, Plt, and pRBCs  - f/u heme recs      =====Ethics=====  FULL CODE    Transition to comfort care after Imam is able to see pt tomorrow.

## 2024-01-11 NOTE — PROGRESS NOTE ADULT - ATTENDING COMMENTS
Patient seen and examined. Patient critically ill requiring ICU level of care and frequent bedside visits with therapy change. Patient is a 58M with DM2 and recently diagnosed cirrhosis presenting with worsening renal failure and ascites. He is now on CRRT and having signs of GI bleed. He is now deemed not a candidate for liver transplant based.    He remains encephalopathic and obtunded off sedation. He is on EEG presently. He is on CRRT as tolerated though has had issues with clotting. He remains on Lactulose and Rifaximin via his OGT. He is in shock requiring multiple vasopressors (Levophed and Vaso) to maintain MAP > 65. He is intubated and saturating well but not presently stable for an extubation attempt. His imaging has multiple potential sources of metastatic malignancy but his coagulopathy precludes biopsy. He continues to have hematochezia and his Hb is being monitored. He is being transfused as needed. He had an EGD earlier this week and we will follow-up with GI, pending discussion with family, about whether any further interventions would be appropriate. He is unfortunately not a candidate for liver transplant and in that setting is unlikely to improve. He has had a repeat paracentesis this week and he has had an EGD which notes an esophageal polyp/mass. In addition he has abnormal imaging concerning for a malignant process which can also not be intervened upon.     He remains on antibiotics in case of infection though most recent cultures are negative. He remains on CRRT as able for his renal failure which is suspected as being due to hepatorenal syndrome. We will adjust his insulin regimen to maintain a FS < 180.    Palliative Care evaluation noted and plan for further discussions with family today regarding long term GOC. The patient is in the dying process and he has unfortunately problems, liver failure and possible malignancy, that are not able to be treated or cured. In this setting he has progressed to multiorgan failure. Prognosis grave. Patient is at high risk for clinical deterioration. Patient seen and examined. Patient critically ill requiring ICU level of care and frequent bedside visits with therapy change. Patient is a 58M with DM2 and recently diagnosed cirrhosis presenting with worsening renal failure and ascites. He is now on CRRT and having signs of GI bleed. He is now deemed not a candidate for liver transplant based.    He remains encephalopathic and obtunded off sedation. He is on EEG presently. He is on CRRT as tolerated though has had issues with clotting. He remains on Lactulose and Rifaximin via his OGT. He is in shock requiring multiple vasopressors (Levophed and Vaso) to maintain MAP > 65. He is intubated and saturating well but not presently stable for an extubation attempt. His imaging has multiple potential sources of metastatic malignancy but his coagulopathy precludes biopsy. He continues to have hematochezia and his Hb is being monitored. He is being transfused as needed. He had an EGD earlier this week and we will follow-up with GI, pending discussion with family, about whether any further interventions would be appropriate. He is unfortunately not a candidate for liver transplant and in that setting is unlikely to improve. He has had a repeat paracentesis this week and he has had an EGD which notes an esophageal polyp/mass. In addition he has abnormal imaging concerning for a malignant process which can also not be intervened upon.     He remains on antibiotics in case of infection though most recent cultures are negative. He remains on CRRT as able for his renal failure which is suspected as being due to hepatorenal syndrome. We will adjust his insulin regimen to maintain a FS < 180.    Palliative Care evaluation noted and plan for further discussions with family today regarding long term GOC. The patient is in the dying process and unfortunately has problems, liver failure and possible malignancy, that are not able to be treated or cured. In this setting he has progressed to multiorgan failure. Prognosis grave. Patient is at high risk for clinical deterioration.   - Will continue current care pending further discussions with family.

## 2024-01-11 NOTE — PROGRESS NOTE ADULT - PROBLEM SELECTOR PLAN 6
HCP: Ray Grajeda ([phone])  Patient lacks capacity.    GOC discussion 1/10/24, 1/11/24. P    Dneah requests imam visit for prayer before transition to focus on comfort only. Please contact Dneah after imam visits to confirm plan and complete MOLST. HCP: Ray Grajeda ([phone])  Patient lacks capacity.    GOC discussion 1/10/24, 1/11/24. P    Dneah requests imam visit for prayer before transition to focus on comfort only. Please contact Dneah after imam visits to confirm plan and complete MOLST.

## 2024-01-11 NOTE — PROGRESS NOTE ADULT - PROBLEM SELECTOR PLAN 1
Pt. with RAHUL on CKD, in the setting of liver cirrhosis/ascites, and hypotension. unclear baseline renal function. No significant proteinuria UPCR 0.4, normal sized kidneys on US. Initiated HD on 12/31, but Unable to tolerate HD due to hypotension. Transitioned to CRRT on 1/5. Pt. remains hemodynamically unstable, requiring IV vasopressor support in MICU. Pt. remains anuric. Plan to continue with CRRT today. Monitor labs and urine output. Avoid nephrotoxins. Dose medications to CRRT.    If you have any questions, please feel free to contact me  Kenny Strickland  Nephrology Fellow  [phone] / [employer] Teams(Preferred)  (After 5pm or on weekends please page the on-call fellow) Pt. with RAHUL on CKD, in the setting of liver cirrhosis/ascites, and hypotension. unclear baseline renal function. No significant proteinuria UPCR 0.4, normal sized kidneys on US. Initiated HD on 12/31, but Unable to tolerate HD due to hypotension. Transitioned to CRRT on 1/5. Pt. remains hemodynamically unstable, requiring IV vasopressor support in MICU. Pt. remains anuric. Plan to continue with CRRT today. Monitor labs and urine output. Avoid nephrotoxins. Dose medications to CRRT.    If you have any questions, please feel free to contact me  Kenny Strickland  Nephrology Fellow  [phone] / [employer] Teams(Preferred)  (After 5pm or on weekends please page the on-call fellow)

## 2024-01-11 NOTE — PROGRESS NOTE ADULT - ASSESSMENT
58 year old man with h/o type II DM for ~ 6-7 years, was on metformin and Jardiance, recently diagnosed with liver cirrhosis complicated by ascites. He underwent paracentesis x 3. Course complicated by worsening RAHUL and encephalopathy. He was transferred to [facility] for liver transplant evaluation. He was initiated on HD on 12/31/23, transitioned to CRRT on 1/5 due to hypotension.   58 year old man with h/o type II DM for ~ 6-7 years, was on metformin and Jardiance, recently diagnosed with liver cirrhosis complicated by ascites. He underwent paracentesis x 3. Course complicated by worsening RAHUL and encephalopathy. He was transferred to [facility] for liver transplant evaluation. He was initiated on HD on 12/31/23, transitioned to CRRT on 1/5 due to hypotension.

## 2024-01-11 NOTE — PROGRESS NOTE ADULT - SUBJECTIVE AND OBJECTIVE BOX
INTERVAL HPI/OVERNIGHT EVENTS:    SUBJECTIVE: Patient seen and examined at bedside.       VITAL SIGNS:  ICU Vital Signs Last 24 Hrs  T(C): 37.3 (11 Jan 2024 04:00), Max: 37.7 (10 Brian 2024 12:00)  T(F): 99.1 (11 Jan 2024 04:00), Max: 99.9 (10 Brian 2024 12:00)  HR: 79 (11 Jan 2024 07:30) (76 - 103)  BP: 102/51 (11 Jan 2024 07:30) (88/49 - 125/58)  BP(mean): 71 (11 Jan 2024 07:30) (63 - 84)  ABP: --  ABP(mean): --  RR: 17 (11 Jan 2024 07:30) (14 - 24)  SpO2: 100% (11 Jan 2024 07:30) (100% - 100%)    O2 Parameters below as of 10 Brian 2024 19:00      O2 Concentration (%): 40      Mode: AC/ CMV (Assist Control/ Continuous Mandatory Ventilation), RR (machine): 14, TV (machine): 500, FiO2: 40, PEEP: 5, ITime: 1, MAP: 10, PIP: 26  Plateau pressure:   P/F ratio:     01-10 @ 07:01  -  01-11 @ 07:00  --------------------------------------------------------  IN: 2197.6 mL / OUT: 871 mL / NET: 1326.6 mL      CAPILLARY BLOOD GLUCOSE      POCT Blood Glucose.: 205 mg/dL (11 Jan 2024 05:20)    ECG:    PHYSICAL EXAM:  VITALS:   T(C): 37.3 (01-11-24 @ 04:00), Max: 37.7 (01-10-24 @ 12:00)  HR: 79 (01-11-24 @ 07:30) (76 - 103)  BP: 102/51 (01-11-24 @ 07:30) (88/49 - 125/58)  RR: 17 (01-11-24 @ 07:30) (14 - 24)  SpO2: 100% (01-11-24 @ 07:30) (100% - 100%)    GENERAL: NAD, lying in bed comfortably  HEAD:  Atraumatic, Normocephalic  EYES: EOMI, PERRLA, conjunctiva and sclera clear  ENT: Moist mucous membranes  NECK: Supple, No JVD  CHEST/LUNG: CTABL; No rales, rhonchi, wheezing, or rubs. Unlabored respirations  HEART: RRR. No M/R/G  ABDOMEN: Soft, nontender, non-distended, normoactive BS. No hepatomegaly  EXTREMITIES:  2+ Peripheral Pulses, brisk capillary refill. No clubbing, cyanosis, or edema  NERVOUS SYSTEM:  Alert & Oriented X3, speech clear. No deficits, CN II-XII intact. Normal sensation   MSK: FROM all 4 extremities, full and equal strength  PSYCH: Normal affect, normal speech, normal behavior  SKIN: No rashes or lesions      MEDICATIONS:  MEDICATIONS  (STANDING):  camphor 0.5%/menthol 0.5% Topical Lotion 1 Application(s) Topical two times a day  chlorhexidine 0.12% Liquid 15 milliLiter(s) Oral Mucosa every 12 hours  chlorhexidine 4% Liquid 1 Application(s) Topical <User Schedule>  CRRT Treatment    <Continuous>  dextrose 5%. 1000 milliLiter(s) (50 mL/Hr) IV Continuous <Continuous>  dextrose 5%. 1000 milliLiter(s) (100 mL/Hr) IV Continuous <Continuous>  dextrose 50% Injectable 25 Gram(s) IV Push once  dextrose 50% Injectable 25 Gram(s) IV Push once  dextrose 50% Injectable 12.5 Gram(s) IV Push once  folic acid Injectable 1 milliGRAM(s) IV Push daily  glucagon  Injectable 1 milliGRAM(s) IntraMuscular once  heparin  Infusion Syringe 300 Unit(s)/Hr (0.6 mL/Hr) CRRT <Continuous>  insulin lispro (ADMELOG) corrective regimen sliding scale   SubCutaneous every 6 hours  lactulose Syrup 10 Gram(s) Oral three times a day  norepinephrine Infusion 0.6 MICROgram(s)/kG/Min (53.8 mL/Hr) IV Continuous <Continuous>  octreotide  Infusion 50 MICROgram(s)/Hr (10 mL/Hr) IV Continuous <Continuous>  pantoprazole  Injectable 40 milliGRAM(s) IV Push every 12 hours  piperacillin/tazobactam IVPB.. 3.375 Gram(s) IV Intermittent every 8 hours  PrismaSATE Dialysate BGK 4 / 2.5 5000 milliLiter(s) (1500 mL/Hr) CRRT <Continuous>  PrismaSOL Filtration BGK 0 / 2.5 5000 milliLiter(s) (200 mL/Hr) CRRT <Continuous>  PrismaSOL Filtration BGK 0 / 2.5 5000 milliLiter(s) (800 mL/Hr) CRRT <Continuous>  rifAXIMin 550 milliGRAM(s) Oral two times a day  thiamine Injectable 100 milliGRAM(s) IV Push daily  vasopressin Infusion 0.04 Unit(s)/Min (6 mL/Hr) IV Continuous <Continuous>    MEDICATIONS  (PRN):  dextrose Oral Gel 15 Gram(s) Oral once PRN Blood Glucose LESS THAN 70 milliGRAM(s)/deciliter  sodium chloride 0.9% lock flush 10 milliLiter(s) IV Push every 1 hour PRN Pre/post blood products, medications, blood draw, and to maintain line patency      ALLERGIES:  Allergies    No Known Allergies    Intolerances        LABS:                        7.9    14.54 )-----------( 58       ( 11 Jan 2024 00:26 )             23.9     01-11    134<L>  |  100  |  24<H>  ----------------------------<  207<H>  3.7   |  21<L>  |  1.69<H>    Ca    8.6      11 Jan 2024 05:30  Phos  3.3     01-11  Mg     2.5     01-11    TPro  5.3<L>  /  Alb  3.3  /  TBili  19.5<H>  /  DBili  x   /  AST  128<H>  /  ALT  21  /  AlkPhos  584<H>  01-11    PT/INR - ( 11 Jan 2024 00:26 )   PT: 12.3 sec;   INR: 1.18 ratio         PTT - ( 11 Jan 2024 00:26 )  PTT:30.6 sec  Urinalysis Basic - ( 11 Jan 2024 05:30 )    Color: x / Appearance: x / SG: x / pH: x  Gluc: 207 mg/dL / Ketone: x  / Bili: x / Urobili: x   Blood: x / Protein: x / Nitrite: x   Leuk Esterase: x / RBC: x / WBC x   Sq Epi: x / Non Sq Epi: x / Bacteria: x        RADIOLOGY & ADDITIONAL TESTS: Reviewed.   INTERVAL HPI/OVERNIGHT EVENTS:  NAEON    SUBJECTIVE: Patient seen and examined at bedside.   Pt still unresponsive on sedation. Continues to have hematochezia. Hemodynamically stable.     VITAL SIGNS:  ICU Vital Signs Last 24 Hrs  T(C): 37.3 (11 Jan 2024 04:00), Max: 37.7 (10 Brian 2024 12:00)  T(F): 99.1 (11 Jan 2024 04:00), Max: 99.9 (10 Brian 2024 12:00)  HR: 79 (11 Jan 2024 07:30) (76 - 103)  BP: 102/51 (11 Jan 2024 07:30) (88/49 - 125/58)  BP(mean): 71 (11 Jan 2024 07:30) (63 - 84)  ABP: --  ABP(mean): --  RR: 17 (11 Jan 2024 07:30) (14 - 24)  SpO2: 100% (11 Jan 2024 07:30) (100% - 100%)    O2 Parameters below as of 10 Brian 2024 19:00      O2 Concentration (%): 40      Mode: AC/ CMV (Assist Control/ Continuous Mandatory Ventilation), RR (machine): 14, TV (machine): 500, FiO2: 40, PEEP: 5, ITime: 1, MAP: 10, PIP: 26  Plateau pressure:   P/F ratio:     01-10 @ 07:01  -  01-11 @ 07:00  --------------------------------------------------------  IN: 2197.6 mL / OUT: 871 mL / NET: 1326.6 mL      CAPILLARY BLOOD GLUCOSE      POCT Blood Glucose.: 205 mg/dL (11 Jan 2024 05:20)    ECG:    PHYSICAL EXAM:  VITALS:   T(C): 37.3 (01-11-24 @ 04:00), Max: 37.7 (01-10-24 @ 12:00)  HR: 79 (01-11-24 @ 07:30) (76 - 103)  BP: 102/51 (01-11-24 @ 07:30) (88/49 - 125/58)  RR: 17 (01-11-24 @ 07:30) (14 - 24)  SpO2: 100% (01-11-24 @ 07:30) (100% - 100%)    General: NAD  HEENT: NC/AT; PERRL, clear conjunctiva  Neck: supple  Respiratory: CTA b/l  Cardiovascular: +S1/S2; RRR  Abdomen: soft, NT/ND; +BS x4, large ascites  Extremities: WWP, 2+ peripheral pulses b/l; no LE edema  Skin: jaundice  Neurological: intubated sedated      MEDICATIONS:  MEDICATIONS  (STANDING):  camphor 0.5%/menthol 0.5% Topical Lotion 1 Application(s) Topical two times a day  chlorhexidine 0.12% Liquid 15 milliLiter(s) Oral Mucosa every 12 hours  chlorhexidine 4% Liquid 1 Application(s) Topical <User Schedule>  CRRT Treatment    <Continuous>  dextrose 5%. 1000 milliLiter(s) (50 mL/Hr) IV Continuous <Continuous>  dextrose 5%. 1000 milliLiter(s) (100 mL/Hr) IV Continuous <Continuous>  dextrose 50% Injectable 25 Gram(s) IV Push once  dextrose 50% Injectable 25 Gram(s) IV Push once  dextrose 50% Injectable 12.5 Gram(s) IV Push once  folic acid Injectable 1 milliGRAM(s) IV Push daily  glucagon  Injectable 1 milliGRAM(s) IntraMuscular once  heparin  Infusion Syringe 300 Unit(s)/Hr (0.6 mL/Hr) CRRT <Continuous>  insulin lispro (ADMELOG) corrective regimen sliding scale   SubCutaneous every 6 hours  lactulose Syrup 10 Gram(s) Oral three times a day  norepinephrine Infusion 0.6 MICROgram(s)/kG/Min (53.8 mL/Hr) IV Continuous <Continuous>  octreotide  Infusion 50 MICROgram(s)/Hr (10 mL/Hr) IV Continuous <Continuous>  pantoprazole  Injectable 40 milliGRAM(s) IV Push every 12 hours  piperacillin/tazobactam IVPB.. 3.375 Gram(s) IV Intermittent every 8 hours  PrismaSATE Dialysate BGK 4 / 2.5 5000 milliLiter(s) (1500 mL/Hr) CRRT <Continuous>  PrismaSOL Filtration BGK 0 / 2.5 5000 milliLiter(s) (200 mL/Hr) CRRT <Continuous>  PrismaSOL Filtration BGK 0 / 2.5 5000 milliLiter(s) (800 mL/Hr) CRRT <Continuous>  rifAXIMin 550 milliGRAM(s) Oral two times a day  thiamine Injectable 100 milliGRAM(s) IV Push daily  vasopressin Infusion 0.04 Unit(s)/Min (6 mL/Hr) IV Continuous <Continuous>    MEDICATIONS  (PRN):  dextrose Oral Gel 15 Gram(s) Oral once PRN Blood Glucose LESS THAN 70 milliGRAM(s)/deciliter  sodium chloride 0.9% lock flush 10 milliLiter(s) IV Push every 1 hour PRN Pre/post blood products, medications, blood draw, and to maintain line patency      ALLERGIES:  Allergies    No Known Allergies    Intolerances        LABS:                        7.9    14.54 )-----------( 58       ( 11 Jan 2024 00:26 )             23.9     01-11    134<L>  |  100  |  24<H>  ----------------------------<  207<H>  3.7   |  21<L>  |  1.69<H>    Ca    8.6      11 Jan 2024 05:30  Phos  3.3     01-11  Mg     2.5     01-11    TPro  5.3<L>  /  Alb  3.3  /  TBili  19.5<H>  /  DBili  x   /  AST  128<H>  /  ALT  21  /  AlkPhos  584<H>  01-11    PT/INR - ( 11 Jan 2024 00:26 )   PT: 12.3 sec;   INR: 1.18 ratio         PTT - ( 11 Jan 2024 00:26 )  PTT:30.6 sec  Urinalysis Basic - ( 11 Jan 2024 05:30 )    Color: x / Appearance: x / SG: x / pH: x  Gluc: 207 mg/dL / Ketone: x  / Bili: x / Urobili: x   Blood: x / Protein: x / Nitrite: x   Leuk Esterase: x / RBC: x / WBC x   Sq Epi: x / Non Sq Epi: x / Bacteria: x        RADIOLOGY & ADDITIONAL TESTS: Reviewed.

## 2024-01-11 NOTE — PROGRESS NOTE ADULT - NS ATTEST RISK PROBLEM GEN_ALL_CORE FT
1. Number and complexity of problems addressed for this patient:    1.1 Moderate (At least 1)  [ ] 1 or more chronic illnesses with exacerbation, progression, or side effects of treatment  [ ] 2 or more stable chronic illnesses  [ ] 1 undiagnosed new problem with uncertain prognosis  [ ] 1 acute illness with systemic symptoms  [ ] 1 acute complicated injury  1.2 High (At least 1)   [ ] 1 or more chronic illnesses with severe exacerbation, progression, or side effects of treatment  [ x] 1 acute or chronic illnesses or injuries that may pose a threat to life or bodily function    2. Amount and/or Complexity of Data that was Reviewed and Analyzed for this case:       Moderate (1 out of 3)       High (2 out of 3)  2.1. (Any combination of 3 of the following)   [ ] Prior External notes were reviewed  [x ] Each test result was reviewed (see "LABS" and "RADIOLOGY & ADDITIONAL STUDIES" above)  [ ] The following tests were ordered and/or reviewed (Only count 1 point for ordering or reviewing a unique test):  	[ ]CBC  	[ ] Chemistry   	[ ] Imaging   	[ ] Other:   [ x] Assessment requiring an independent historian   		Name of historian and relationship: [facility]  2.2  [] Personally review and interpretation of  image or testing   2.3  [ x] Discussion of management or test interpretation with external physician/other qualified health care professional\appropriate source (not separately reported) - primary team    3. Risk of Complications and/or Morbidity or Mortality of for this Patient’s Management:  3.1 Moderate risk of morbidity from additional diagnostic testing or treatment (At least 1):   [ ] Prescription drug management   [ ] Decision regarding minor surgery, treatment, or procedure with identified patient or procedure risk factors  [ ] Decision regarding elective major surgery, treatment, or procedure without identified patient or procedure risk factors   [ ] Diagnosis or treatment significantly limited by social determinants of health   [ ] Other:   3.2 High risk of morbidity from additional diagnostic testing or treatment (At least 1):   [ ] Drug therapy requiring intensive monitoring for toxicity   [ ] Decision regarding elective major surgery, treatment, or procedure with identified patient or procedure risk factors   [ ] Decision regarding emergency major surgery, treatment, or procedure   [ ] Decision regarding hospitalization or escalation of hospital-level of care  [x] Decision not to resuscitate, not to intubate, or to de-escalate care because of poor prognosis   [ ] Decision to proceed or not with artificial nutrition   [ ] Parenteral controlled substance  [x ] Other: decision for continuation of medical care until imam visits tomorrow, then likely transition to CMO 1. Number and complexity of problems addressed for this patient:    1.1 Moderate (At least 1)  [ ] 1 or more chronic illnesses with exacerbation, progression, or side effects of treatment  [ ] 2 or more stable chronic illnesses  [ ] 1 undiagnosed new problem with uncertain prognosis  [ ] 1 acute illness with systemic symptoms  [ ] 1 acute complicated injury  1.2 High (At least 1)   [ ] 1 or more chronic illnesses with severe exacerbation, progression, or side effects of treatment  [ x] 1 acute or chronic illnesses or injuries that may pose a threat to life or bodily function    2. Amount and/or Complexity of Data that was Reviewed and Analyzed for this case:       Moderate (1 out of 3)       High (2 out of 3)  2.1. (Any combination of 3 of the following)   [ ] Prior External notes were reviewed  [x ] Each test result was reviewed (see "LABS" and "RADIOLOGY & ADDITIONAL STUDIES" above)  [ ] The following tests were ordered and/or reviewed (Only count 1 point for ordering or reviewing a unique test):  	[ ]CBC  	[ ] Chemistry   	[ ] Imaging   	[ ] Other:   [ x] Assessment requiring an independent historian   		Name of historian and relationship: [facility]  2.2  [] Personally review and interpretation of  image or testing   2.3  [ x] Discussion of management or test interpretation with external physician/other qualified health care professional\appropriate source (not separately reported) - primary team    3. Risk of Complications and/or Morbidity or Mortality of for this Patient’s Management:  3.1 Moderate risk of morbidity from additional diagnostic testing or treatment (At least 1):   [ ] Prescription drug management   [ ] Decision regarding minor surgery, treatment, or procedure with identified patient or procedure risk factors  [ ] Decision regarding elective major surgery, treatment, or procedure without identified patient or procedure risk factors   [ ] Diagnosis or treatment significantly limited by social determinants of health   [ ] Other:   3.2 High risk of morbidity from additional diagnostic testing or treatment (At least 1):   [ ] Drug therapy requiring intensive monitoring for toxicity   [ ] Decision regarding elective major surgery, treatment, or procedure with identified patient or procedure risk factors   [ ] Decision regarding emergency major surgery, treatment, or procedure   [ ] Decision regarding hospitalization or escalation of hospital-level of care  [x] Decision not to resuscitate, not to intubate, or to de-escalate care because of poor prognosis   [ ] Decision to proceed or not with artificial nutrition   [ ] Parenteral controlled substance  [x ] Other: decision for continuation of medical care until imam visits tomorrow, then likely transition to CMO

## 2024-01-11 NOTE — PROGRESS NOTE ADULT - SUBJECTIVE AND OBJECTIVE BOX
Palliative Medicine Consult Note  Indication for Geriatrics and Palliative Care Services: GOC    Date of service: 01-11-24 @ 19:34  [facility] MICU [address] ([facility] MICU)  12-30-23 - admit date    Interval events: Patient seen at bedside. Remains unarousable, nonverbal.   Had GOC discussion. See GOC section for detail.     Urine habits: Normal [ ]  Incontinent [ ]  Oliguria/Anuria [ ]  Indwelling urinary catheter [ x]  External urinary cathter [ ]  Deferred [ ]     PO intake/swallow: intubated    Last Bowel Movement: 11-Jan-2024 (01-11-24 @ 08:00)    PRESENT SYMPTOMS:  Patient is unable to communicate ---    For the critically ill: CPOT   - Facial expression: Relaxed, neutral (0) [x ]  Tense (1) [ ]  Grimacing (2) [ ]   - Body movements: Absence of movements (0) [ x]  Protection (1) [ ]  Restlessness (2) [ ]   - Muscle tension: Relaxed (0) [x]  Tense/rigid (1) [ ]  Very tense or rigid (2) [ ]   - Compliance with ventilator: Tolerating (0) [ x]  Coughing but tolerating (1) [ ]  Fighting ventilator (2) [ ]  OR  Vocalization: Talking in normal tone or no sounds (0) [ ]  Sighing, moaning [ ]  Crying out, sobbing [ ]  Total (0-8): 0    For patients with dementia: Pain Assessment in Advanced Dementia Scale (PAINAD) Score: N/a    For respiratory distress: RDOS (See RDOS tool and score below)  0 to 2  minimal or no respiratory distress   3  mild distress  4 to 6 moderate distress  >7 severe distress    PERTINENT PM/SXH:   Type 2 diabetes mellitus    Decompensated hepatic cirrhosis    No significant past surgical history      SOCIAL HISTORY:  Family: Patient's family all in [place]. Primary language is Yakut. Per chart, patient was previously living in an assisted living facility. [facility].   Living Situation: [ ]  Home [ ]  Long term care [ ]  Rehab [ ]  Other [x] - assisted living facility Deferred [ ]  Caregiver [place]? : Yes [ ]  No [ ]  Deferred [x ]             Anticipatory Grief present?:  Yes [ x]  No [ ]  Deferred [ ]     Substance hx: [ ]  Tobacco hx: [ ]  Alcohol hx: [x ]  Deferred [ ]    DECISION MAKER(s):  Capacity: Yes [ ]  No [ x]  Deferred [ ]       Health Care Agent: Health Care Proxy(s) [x ]  Surrogate(s) [ ]  Guardian [ ]          Name(s): Phone Number(s): HCP form in chart listing Chato Grajeda ([phone])    SPIRITUALITY:  Episcopal/Spirituality: [x ]  [facility]  PCSSQ [Palliative Care Spiritual Screening Question] Severity (0-10): Deferred (Score of 4 or > indicate consideration of Chaplaincy referral)  Chaplaincy Referral: [x ] yes [ ] refused [ ] following [ ] Deferred     FUNCTIONAL STATUS: Unable to answer  BASELINE (I)ADL(s) (prior to admission): [place]: Total [ ]  Moderate [ ]  Dependent [ ]  Deferred[x]  Palliative Performance Status Version 2:   See PPSv2 tool and score below          NUTRITION  BMI (kg/m2): 27 (01-02-24 @ 05:00)(normal 18.5-24.9)  Weight: Normal [x ]  Underweight [ ]  Morbid obesity [ ]    Protein calorie malnutrition: None [ ]  Mild [ ]  Moderate [ ]  Severe [ ]  Deferred [x ]  [x ]PPSV2 < or = 30%  [ ]significant weight loss [ ]poor nutritional intake [x ]anasarca [ ]Artificial Nutrition    Other current referrals: Hospice [ ]  Child Life [ ]  Social Work [ ]  Case management [ ]  Holistic Therapy [ ]  Patient & Family Centered Care Referral [ ]      ADVANCE DIRECTIVES:    DNR on chart: Full code    Completed documents: MOLST [ ]  Living Will [ ]  HCP [x ]  Goals of Care Document: See GOC section      CRITICAL CARE:  [x ]Shock Present  [ ]Septic [ ]Cardiogenic [ ]Neurologic [ ]Hypovolemic  [ x]Vasopressors [ ]Inotropes  [ x]Respiratory failure present: [ ]Acute  [ ]Chronic [ x]Hypoxic  [ ]Hypercarbic [ ]Other  [x ]Mechanical Ventilation [ ]Non-invasive ventilatory support [ ]  [xOther organ failure - renal, heme, encephalopathy      Allergies    No Known Allergies    Intolerances    MEDICATIONS  (STANDING):  camphor 0.5%/menthol 0.5% Topical Lotion 1 Application(s) Topical two times a day  chlorhexidine 0.12% Liquid 15 milliLiter(s) Oral Mucosa every 12 hours  chlorhexidine 4% Liquid 1 Application(s) Topical <User Schedule>  CRRT Treatment    <Continuous>  dextrose 5%. 1000 milliLiter(s) (50 mL/Hr) IV Continuous <Continuous>  dextrose 5%. 1000 milliLiter(s) (100 mL/Hr) IV Continuous <Continuous>  dextrose 50% Injectable 12.5 Gram(s) IV Push once  dextrose 50% Injectable 25 Gram(s) IV Push once  dextrose 50% Injectable 25 Gram(s) IV Push once  folic acid Injectable 1 milliGRAM(s) IV Push daily  glucagon  Injectable 1 milliGRAM(s) IntraMuscular once  heparin  Infusion Syringe 300 Unit(s)/Hr (0.6 mL/Hr) CRRT <Continuous>  lactulose Syrup 10 Gram(s) Oral three times a day  norepinephrine Infusion 0.6 MICROgram(s)/kG/Min (53.8 mL/Hr) IV Continuous <Continuous>  octreotide  Infusion 50 MICROgram(s)/Hr (10 mL/Hr) IV Continuous <Continuous>  pantoprazole  Injectable 40 milliGRAM(s) IV Push every 12 hours  piperacillin/tazobactam IVPB.. 3.375 Gram(s) IV Intermittent every 8 hours  PrismaSATE Dialysate BGK 4 / 2.5 5000 milliLiter(s) (1500 mL/Hr) CRRT <Continuous>  PrismaSOL Filtration BGK 0 / 2.5 5000 milliLiter(s) (800 mL/Hr) CRRT <Continuous>  PrismaSOL Filtration BGK 0 / 2.5 5000 milliLiter(s) (200 mL/Hr) CRRT <Continuous>  rifAXIMin 550 milliGRAM(s) Oral two times a day  thiamine Injectable 100 milliGRAM(s) IV Push daily  vasopressin Infusion 0.04 Unit(s)/Min (6 mL/Hr) IV Continuous <Continuous>    MEDICATIONS  (PRN):  dextrose Oral Gel 15 Gram(s) Oral once PRN Blood Glucose LESS THAN 70 milliGRAM(s)/deciliter  sodium chloride 0.9% lock flush 10 milliLiter(s) IV Push every 1 hour PRN Pre/post blood products, medications, blood draw, and to maintain line patency    MEDICATIONS:  Allergies    No Known Allergies    Intolerances      EXAM    Vital Signs Last 24 Hrs  T(C): 33.9 (11 Jan 2024 20:00), Max: 43 (11 Jan 2024 16:00)  T(F): 93 (11 Jan 2024 20:00), Max: 109.4 (11 Jan 2024 16:00)  HR: 78 (11 Jan 2024 19:30) (62 - 102)  BP: 95/52 (11 Jan 2024 19:30) (71/49 - 135/66)  BP(mean): 70 (11 Jan 2024 19:30) (56 - 95)  RR: 19 (11 Jan 2024 19:30) (14 - 23)  SpO2: 100% (11 Jan 2024 19:30) (93% - 100%)    Parameters below as of 11 Jan 2024 20:00  Patient On (Oxygen Delivery Method): ventilator    O2 Concentration (%): 30  I&O's Summary    10 Brain 2024 07:01  -  11 Jan 2024 07:00  --------------------------------------------------------  IN: 2197.6 mL / OUT: 871 mL / NET: 1326.6 mL    11 Jan 2024 07:01  -  11 Jan 2024 19:38  --------------------------------------------------------  IN: 1298.5 mL / OUT: 917 mL / NET: 381.5 mL      GENERAL:   Nutritional Status: Normal appearance [ ]  Cachexia [ ]  Deferred [ ]  Anasarca [x]  Behavioral: Normal [ ]  Anxiety [ ]  Delirium [ ]  Agitation [ ]  Other [ ]  Deferred [x ]   HEENT:   Scleral icterus  [x]  [ ]Normal   [ ]Dry mouth   [ x]ET Tube/Trach  [ ]Oral lesions  PULMONARY: Normal work of breathing [ x]  [ ]Audible excessive secretions  [ ]Clear   [ ]Rhonchi        [ ]Right [ ]Left [ ]Bilateral  [ ]Crackles        [ ]Right [ ]Left [ ]Bilateral  [ ]Wheezing     [ ]Right [ ]Left [ ]Bilateral  [ ]Diminished breath sounds [ ]right [ ]left [ ]bilateral  Rate: Normal [x ]  Bradypnea [ ]  Cheynes-Hinds [ ]  Tachypnea [ ]   CARDIOVASCULAR:    Regular rhythm [ ]  Irregular rhythm [ ]  Regular rate [x ]  Tachycardia [ ]  Bradycardia [ ]  Murmur [ ]  Other [ ]   GASTROINTESTINAL:  Soft [ x]  Firm [ ]  Distended [ ]  Nondistended [ ]  Non-tender [ ]  Tender [ ]   Bowel sounds: Normal [ ]  Hypoactive [ ]  Hyperactive [ ]  Absent [ ]    PEG [ ]  OGT/ NGT [ ]  Scar [ ]  Other [ ]    GENITOURINARY: Urinary catheter [ ]  Deferred [ x]    MUSCULOSKELETAL: Normal [ ]  Deformity [ ]  Edema [x ]    Patient with severely swollen hands (R>L)  Range of motion: Normal [ ]  Other [ ]  Deferred [ x]   Bed/Wheelchair Bound [ x]  NEUROLOGIC: Level of consciousness:  Alert [ ]   Lethargic [ ]  Unarousable [ x]    Verbal [ ]  Non-Verbal [ x]  No focal deficits [ ]  Other: [ ]  Deferred [ x]  Strength: Normal [ ]  Paresis [ ]  Other [ ]  Deferred [x ]   Cognitive impairment [ ]   SKIN: Normal [ ]  Jaundice [x]  Rash [ ]  Pressure ulcer(s) [ ]      Present on admission [ ]y [ ]n    RESULTS  LABS: CBC Full  -  ( 11 Jan 2024 13:12 )  WBC Count : 15.47 K/uL  RBC Count : 2.52 M/uL  Hemoglobin : 7.6 g/dL  Hematocrit : 24.1 %  Platelet Count - Automated : 61 K/uL  Mean Cell Volume : 95.6 fl  Mean Cell Hemoglobin : 30.2 pg  Mean Cell Hemoglobin Concentration : 31.5 gm/dL  Auto Neutrophil # : x  Auto Lymphocyte # : x  Auto Monocyte # : x  Auto Eosinophil # : x  Auto Basophil # : x  Auto Neutrophil % : x  Auto Lymphocyte % : x  Auto Monocyte % : x  Auto Eosinophil % : x  Auto Basophil % : x    01-11    134<L>  |  100  |  22  ----------------------------<  178<H>  3.8   |  21<L>  |  1.45<H>    Ca    8.6      11 Jan 2024 13:12  Phos  3.1     01-11  Mg     2.5     01-11    TPro  5.4<L>  /  Alb  3.2<L>  /  TBili  19.1<H>  /  DBili  x   /  AST  133<H>  /  ALT  25  /  AlkPhos  575<H>  01-11    LIVER FUNCTIONS - ( 11 Jan 2024 13:12 )  Alb: 3.2 g/dL / Pro: 5.4 g/dL / ALK PHOS: 575 U/L / ALT: 25 U/L / AST: 133 U/L / GGT: x               Urinalysis Basic - ( 11 Jan 2024 13:12 )    Color: x / Appearance: x / SG: x / pH: x  Gluc: 178 mg/dL / Ketone: x  / Bili: x / Urobili: x   Blood: x / Protein: x / Nitrite: x   Leuk Esterase: x / RBC: x / WBC x   Sq Epi: x / Non Sq Epi: x / Bacteria: x          Culture - Fungal, Body Fluid (collected 01-09-24 @ 17:21)  Source: Peritoneal Peritoneal Fluid  Preliminary Report (01-10-24 @ 07:00):    Testing in progress    Culture - Body Fluid with Gram Stain (collected 01-09-24 @ 17:21)  Source: Ascites Fl Ascites Fluid  Gram Stain (01-10-24 @ 22:34):    polymorphonuclear leukocytes seen    No organisms seen    by cytocentrifuge  Preliminary Report (01-11-24 @ 15:36):    No growth        RADIOLOGY & ADDITIONAL STUDIES:            CT Abdomen and Pelvis w/ IV Cont:   ACC: [id number] EXAM:  CT CHEST IC   ORDERED BY:  LADI DUNNE     ACC: [id number] EXAM:  CT ABDOMEN AND PELVIS IC   ORDERED BY:  LADI DUNNE     PROCEDURE DATE:  01/08/2024          INTERPRETATION:  CLINICAL INFORMATION: Liver cirrhosis. Evaluate for   malignancy.    COMPARISON: Abdominal ultrasound 1/2/2024..    CONTRAST/COMPLICATIONS:  IV Contrast: IV contrast documented in unlinked concurrent exam   (accession [id number]), Omnipaque 350 (accession [id number])  90 cc   administered   10 cc discarded  Oral Contrast: NONE  Complications: None reported at time of study completion    PROCEDURE:  CT of the Chest, Abdomen and Pelvis was performed.  Arterial, portal venous and delayed phase imaging was performed through   the upper abdomen.  Sagittal and coronal reformats were performed.    FINDINGS:  CHEST:  LUNGS AND LARGE AIRWAYS: Patent central airways. 1.6 cm calcified right   upper lobe nodule. Few additional noncalcified nodules. For example, a 5   mm right upper lobe nodule (6, 34)and 1.2 cm left lower lobe nodule (6,   46). Mild dependent atelectasis.  PLEURA: No pleural effusion.  VESSELS: Bilateral IJ central venous catheter is terminating in the SVC.  HEART: Heart size is normal. No pericardial effusion.  MEDIASTINUM AND KIKI: No lymphadenopathy.  CHEST WALL AND LOWER NECK: Within normal limits.    ABDOMEN AND PELVIS:  LIVER: Cirrhosis. Limited arterial phase. Heterogeneous bilobar   infiltration with ill-defined hypoattenuating lesions. For example, left   hepatic dome lesion (6, 77) 4.0 x 2.6 cm. Segment 2/3 peripheral lesion   measuring 2.8 cm (6, 102). Heterogeneous thrombosis involving the main   portal vein, right portal vein and its branches, and the left portal   vein, suspicious for tumor thrombus.  BILE DUCTS: Normal caliber.  GALLBLADDER: Cholelithiasis.  SPLEEN: Splenomegaly. Punctate calcifications.  PANCREAS: Within normal limits.  ADRENALS: Within normal limits.  KIDNEYS/URETERS: No hydronephrosis. Lobulated soft tissue mass measuring   9.2 x 5.2cm abutting the right anterior kidney (6, 134), favored to   represent metastatic implant rather than primary kidney neoplasm.  BLADDER: Blair catheter.  REPRODUCTIVE ORGANS: Prostate within normal limits.    BOWEL: No bowel obstruction. Appendix is normal.  PERITONEUM: Moderate volume ascites. Lobulated soft tissue mass inferior   to the left hepatic lobe measuring 4.2 x 4.2 cm (7, 70). Soft tissue   lesion abutting the right kidney as above. Multiple additional smaller   peritoneal nodules.  VESSELS: Paraesophageal and perigastric varices. Atherosclerotic changes.  RETROPERITONEUM/LYMPH NODES: Portacaval node (7, 78) 3.2 x 1.5 cm.  ABDOMINAL WALL: Within normal limits.  BONES: Degenerative changes.    IMPRESSION:  Limited arterial phase.    Cirrhosis. Evidence of portal hypertension and moderate volume ascites.    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    Peritoneal metastatic implants as above.    Indeterminant lung nodules, metastasis cannot be excluded.    --- End of Report ---           FRANCISCA FAGAN MD; Resident Radiologist  This document has been electronically signed.  HIRAM ROSEN MD; Attending Radiologist  This document has been electronically signed. Jan 8 2024 12:33PM (01-08-24 @ 09:17)     Palliative Medicine Consult Note  Indication for Geriatrics and Palliative Care Services: GOC    Date of service: 01-11-24 @ 19:34  [facility] MICU [address] ([facility] MICU)  12-30-23 - admit date    Interval events: Patient seen at bedside. Remains unarousable, nonverbal.   Had GOC discussion. See GOC section for detail.     Urine habits: Normal [ ]  Incontinent [ ]  Oliguria/Anuria [ ]  Indwelling urinary catheter [ x]  External urinary cathter [ ]  Deferred [ ]     PO intake/swallow: intubated    Last Bowel Movement: 11-Jan-2024 (01-11-24 @ 08:00)    PRESENT SYMPTOMS:  Patient is unable to communicate ---    For the critically ill: CPOT   - Facial expression: Relaxed, neutral (0) [x ]  Tense (1) [ ]  Grimacing (2) [ ]   - Body movements: Absence of movements (0) [ x]  Protection (1) [ ]  Restlessness (2) [ ]   - Muscle tension: Relaxed (0) [x]  Tense/rigid (1) [ ]  Very tense or rigid (2) [ ]   - Compliance with ventilator: Tolerating (0) [ x]  Coughing but tolerating (1) [ ]  Fighting ventilator (2) [ ]  OR  Vocalization: Talking in normal tone or no sounds (0) [ ]  Sighing, moaning [ ]  Crying out, sobbing [ ]  Total (0-8): 0    For patients with dementia: Pain Assessment in Advanced Dementia Scale (PAINAD) Score: N/a    For respiratory distress: RDOS (See RDOS tool and score below)  0 to 2  minimal or no respiratory distress   3  mild distress  4 to 6 moderate distress  >7 severe distress    PERTINENT PM/SXH:   Type 2 diabetes mellitus    Decompensated hepatic cirrhosis    No significant past surgical history      SOCIAL HISTORY:  Family: Patient's family all in [place]. Primary language is Spanish. Per chart, patient was previously living in an assisted living facility. [facility].   Living Situation: [ ]  Home [ ]  Long term care [ ]  Rehab [ ]  Other [x] - assisted living facility Deferred [ ]  Caregiver [place]? : Yes [ ]  No [ ]  Deferred [x ]             Anticipatory Grief present?:  Yes [ x]  No [ ]  Deferred [ ]     Substance hx: [ ]  Tobacco hx: [ ]  Alcohol hx: [x ]  Deferred [ ]    DECISION MAKER(s):  Capacity: Yes [ ]  No [ x]  Deferred [ ]       Health Care Agent: Health Care Proxy(s) [x ]  Surrogate(s) [ ]  Guardian [ ]          Name(s): Phone Number(s): HCP form in chart listing Chato Grajeda ([phone])    SPIRITUALITY:  Cheondoism/Spirituality: [x ]  [facility]  PCSSQ [Palliative Care Spiritual Screening Question] Severity (0-10): Deferred (Score of 4 or > indicate consideration of Chaplaincy referral)  Chaplaincy Referral: [x ] yes [ ] refused [ ] following [ ] Deferred     FUNCTIONAL STATUS: Unable to answer  BASELINE (I)ADL(s) (prior to admission): [place]: Total [ ]  Moderate [ ]  Dependent [ ]  Deferred[x]  Palliative Performance Status Version 2:   See PPSv2 tool and score below          NUTRITION  BMI (kg/m2): 27 (01-02-24 @ 05:00)(normal 18.5-24.9)  Weight: Normal [x ]  Underweight [ ]  Morbid obesity [ ]    Protein calorie malnutrition: None [ ]  Mild [ ]  Moderate [ ]  Severe [ ]  Deferred [x ]  [x ]PPSV2 < or = 30%  [ ]significant weight loss [ ]poor nutritional intake [x ]anasarca [ ]Artificial Nutrition    Other current referrals: Hospice [ ]  Child Life [ ]  Social Work [ ]  Case management [ ]  Holistic Therapy [ ]  Patient & Family Centered Care Referral [ ]      ADVANCE DIRECTIVES:    DNR on chart: Full code    Completed documents: MOLST [ ]  Living Will [ ]  HCP [x ]  Goals of Care Document: See GOC section      CRITICAL CARE:  [x ]Shock Present  [ ]Septic [ ]Cardiogenic [ ]Neurologic [ ]Hypovolemic  [ x]Vasopressors [ ]Inotropes  [ x]Respiratory failure present: [ ]Acute  [ ]Chronic [ x]Hypoxic  [ ]Hypercarbic [ ]Other  [x ]Mechanical Ventilation [ ]Non-invasive ventilatory support [ ]  [xOther organ failure - renal, heme, encephalopathy      Allergies    No Known Allergies    Intolerances    MEDICATIONS  (STANDING):  camphor 0.5%/menthol 0.5% Topical Lotion 1 Application(s) Topical two times a day  chlorhexidine 0.12% Liquid 15 milliLiter(s) Oral Mucosa every 12 hours  chlorhexidine 4% Liquid 1 Application(s) Topical <User Schedule>  CRRT Treatment    <Continuous>  dextrose 5%. 1000 milliLiter(s) (50 mL/Hr) IV Continuous <Continuous>  dextrose 5%. 1000 milliLiter(s) (100 mL/Hr) IV Continuous <Continuous>  dextrose 50% Injectable 12.5 Gram(s) IV Push once  dextrose 50% Injectable 25 Gram(s) IV Push once  dextrose 50% Injectable 25 Gram(s) IV Push once  folic acid Injectable 1 milliGRAM(s) IV Push daily  glucagon  Injectable 1 milliGRAM(s) IntraMuscular once  heparin  Infusion Syringe 300 Unit(s)/Hr (0.6 mL/Hr) CRRT <Continuous>  lactulose Syrup 10 Gram(s) Oral three times a day  norepinephrine Infusion 0.6 MICROgram(s)/kG/Min (53.8 mL/Hr) IV Continuous <Continuous>  octreotide  Infusion 50 MICROgram(s)/Hr (10 mL/Hr) IV Continuous <Continuous>  pantoprazole  Injectable 40 milliGRAM(s) IV Push every 12 hours  piperacillin/tazobactam IVPB.. 3.375 Gram(s) IV Intermittent every 8 hours  PrismaSATE Dialysate BGK 4 / 2.5 5000 milliLiter(s) (1500 mL/Hr) CRRT <Continuous>  PrismaSOL Filtration BGK 0 / 2.5 5000 milliLiter(s) (800 mL/Hr) CRRT <Continuous>  PrismaSOL Filtration BGK 0 / 2.5 5000 milliLiter(s) (200 mL/Hr) CRRT <Continuous>  rifAXIMin 550 milliGRAM(s) Oral two times a day  thiamine Injectable 100 milliGRAM(s) IV Push daily  vasopressin Infusion 0.04 Unit(s)/Min (6 mL/Hr) IV Continuous <Continuous>    MEDICATIONS  (PRN):  dextrose Oral Gel 15 Gram(s) Oral once PRN Blood Glucose LESS THAN 70 milliGRAM(s)/deciliter  sodium chloride 0.9% lock flush 10 milliLiter(s) IV Push every 1 hour PRN Pre/post blood products, medications, blood draw, and to maintain line patency    MEDICATIONS:  Allergies    No Known Allergies    Intolerances      EXAM    Vital Signs Last 24 Hrs  T(C): 33.9 (11 Jan 2024 20:00), Max: 43 (11 Jan 2024 16:00)  T(F): 93 (11 Jan 2024 20:00), Max: 109.4 (11 Jan 2024 16:00)  HR: 78 (11 Jan 2024 19:30) (62 - 102)  BP: 95/52 (11 Jan 2024 19:30) (71/49 - 135/66)  BP(mean): 70 (11 Jan 2024 19:30) (56 - 95)  RR: 19 (11 Jan 2024 19:30) (14 - 23)  SpO2: 100% (11 Jan 2024 19:30) (93% - 100%)    Parameters below as of 11 Jan 2024 20:00  Patient On (Oxygen Delivery Method): ventilator    O2 Concentration (%): 30  I&O's Summary    10 Brian 2024 07:01  -  11 Jan 2024 07:00  --------------------------------------------------------  IN: 2197.6 mL / OUT: 871 mL / NET: 1326.6 mL    11 Jan 2024 07:01  -  11 Jan 2024 19:38  --------------------------------------------------------  IN: 1298.5 mL / OUT: 917 mL / NET: 381.5 mL      GENERAL:   Nutritional Status: Normal appearance [ ]  Cachexia [ ]  Deferred [ ]  Anasarca [x]  Behavioral: Normal [ ]  Anxiety [ ]  Delirium [ ]  Agitation [ ]  Other [ ]  Deferred [x ]   HEENT:   Scleral icterus  [x]  [ ]Normal   [ ]Dry mouth   [ x]ET Tube/Trach  [ ]Oral lesions  PULMONARY: Normal work of breathing [ x]  [ ]Audible excessive secretions  [ ]Clear   [ ]Rhonchi        [ ]Right [ ]Left [ ]Bilateral  [ ]Crackles        [ ]Right [ ]Left [ ]Bilateral  [ ]Wheezing     [ ]Right [ ]Left [ ]Bilateral  [ ]Diminished breath sounds [ ]right [ ]left [ ]bilateral  Rate: Normal [x ]  Bradypnea [ ]  Cheynes-Hinds [ ]  Tachypnea [ ]   CARDIOVASCULAR:    Regular rhythm [ ]  Irregular rhythm [ ]  Regular rate [x ]  Tachycardia [ ]  Bradycardia [ ]  Murmur [ ]  Other [ ]   GASTROINTESTINAL:  Soft [ x]  Firm [ ]  Distended [ ]  Nondistended [ ]  Non-tender [ ]  Tender [ ]   Bowel sounds: Normal [ ]  Hypoactive [ ]  Hyperactive [ ]  Absent [ ]    PEG [ ]  OGT/ NGT [ ]  Scar [ ]  Other [ ]    GENITOURINARY: Urinary catheter [ ]  Deferred [ x]    MUSCULOSKELETAL: Normal [ ]  Deformity [ ]  Edema [x ]    Patient with severely swollen hands (R>L)  Range of motion: Normal [ ]  Other [ ]  Deferred [ x]   Bed/Wheelchair Bound [ x]  NEUROLOGIC: Level of consciousness:  Alert [ ]   Lethargic [ ]  Unarousable [ x]    Verbal [ ]  Non-Verbal [ x]  No focal deficits [ ]  Other: [ ]  Deferred [ x]  Strength: Normal [ ]  Paresis [ ]  Other [ ]  Deferred [x ]   Cognitive impairment [ ]   SKIN: Normal [ ]  Jaundice [x]  Rash [ ]  Pressure ulcer(s) [ ]      Present on admission [ ]y [ ]n    RESULTS  LABS: CBC Full  -  ( 11 Jan 2024 13:12 )  WBC Count : 15.47 K/uL  RBC Count : 2.52 M/uL  Hemoglobin : 7.6 g/dL  Hematocrit : 24.1 %  Platelet Count - Automated : 61 K/uL  Mean Cell Volume : 95.6 fl  Mean Cell Hemoglobin : 30.2 pg  Mean Cell Hemoglobin Concentration : 31.5 gm/dL  Auto Neutrophil # : x  Auto Lymphocyte # : x  Auto Monocyte # : x  Auto Eosinophil # : x  Auto Basophil # : x  Auto Neutrophil % : x  Auto Lymphocyte % : x  Auto Monocyte % : x  Auto Eosinophil % : x  Auto Basophil % : x    01-11    134<L>  |  100  |  22  ----------------------------<  178<H>  3.8   |  21<L>  |  1.45<H>    Ca    8.6      11 Jan 2024 13:12  Phos  3.1     01-11  Mg     2.5     01-11    TPro  5.4<L>  /  Alb  3.2<L>  /  TBili  19.1<H>  /  DBili  x   /  AST  133<H>  /  ALT  25  /  AlkPhos  575<H>  01-11    LIVER FUNCTIONS - ( 11 Jan 2024 13:12 )  Alb: 3.2 g/dL / Pro: 5.4 g/dL / ALK PHOS: 575 U/L / ALT: 25 U/L / AST: 133 U/L / GGT: x               Urinalysis Basic - ( 11 Jan 2024 13:12 )    Color: x / Appearance: x / SG: x / pH: x  Gluc: 178 mg/dL / Ketone: x  / Bili: x / Urobili: x   Blood: x / Protein: x / Nitrite: x   Leuk Esterase: x / RBC: x / WBC x   Sq Epi: x / Non Sq Epi: x / Bacteria: x          Culture - Fungal, Body Fluid (collected 01-09-24 @ 17:21)  Source: Peritoneal Peritoneal Fluid  Preliminary Report (01-10-24 @ 07:00):    Testing in progress    Culture - Body Fluid with Gram Stain (collected 01-09-24 @ 17:21)  Source: Ascites Fl Ascites Fluid  Gram Stain (01-10-24 @ 22:34):    polymorphonuclear leukocytes seen    No organisms seen    by cytocentrifuge  Preliminary Report (01-11-24 @ 15:36):    No growth        RADIOLOGY & ADDITIONAL STUDIES:            CT Abdomen and Pelvis w/ IV Cont:   ACC: [id number] EXAM:  CT CHEST IC   ORDERED BY:  LADI DUNNE     ACC: [id number] EXAM:  CT ABDOMEN AND PELVIS IC   ORDERED BY:  LADI DUNNE     PROCEDURE DATE:  01/08/2024          INTERPRETATION:  CLINICAL INFORMATION: Liver cirrhosis. Evaluate for   malignancy.    COMPARISON: Abdominal ultrasound 1/2/2024..    CONTRAST/COMPLICATIONS:  IV Contrast: IV contrast documented in unlinked concurrent exam   (accession [id number]), Omnipaque 350 (accession [id number])  90 cc   administered   10 cc discarded  Oral Contrast: NONE  Complications: None reported at time of study completion    PROCEDURE:  CT of the Chest, Abdomen and Pelvis was performed.  Arterial, portal venous and delayed phase imaging was performed through   the upper abdomen.  Sagittal and coronal reformats were performed.    FINDINGS:  CHEST:  LUNGS AND LARGE AIRWAYS: Patent central airways. 1.6 cm calcified right   upper lobe nodule. Few additional noncalcified nodules. For example, a 5   mm right upper lobe nodule (6, 34)and 1.2 cm left lower lobe nodule (6,   46). Mild dependent atelectasis.  PLEURA: No pleural effusion.  VESSELS: Bilateral IJ central venous catheter is terminating in the SVC.  HEART: Heart size is normal. No pericardial effusion.  MEDIASTINUM AND KIKI: No lymphadenopathy.  CHEST WALL AND LOWER NECK: Within normal limits.    ABDOMEN AND PELVIS:  LIVER: Cirrhosis. Limited arterial phase. Heterogeneous bilobar   infiltration with ill-defined hypoattenuating lesions. For example, left   hepatic dome lesion (6, 77) 4.0 x 2.6 cm. Segment 2/3 peripheral lesion   measuring 2.8 cm (6, 102). Heterogeneous thrombosis involving the main   portal vein, right portal vein and its branches, and the left portal   vein, suspicious for tumor thrombus.  BILE DUCTS: Normal caliber.  GALLBLADDER: Cholelithiasis.  SPLEEN: Splenomegaly. Punctate calcifications.  PANCREAS: Within normal limits.  ADRENALS: Within normal limits.  KIDNEYS/URETERS: No hydronephrosis. Lobulated soft tissue mass measuring   9.2 x 5.2cm abutting the right anterior kidney (6, 134), favored to   represent metastatic implant rather than primary kidney neoplasm.  BLADDER: Blair catheter.  REPRODUCTIVE ORGANS: Prostate within normal limits.    BOWEL: No bowel obstruction. Appendix is normal.  PERITONEUM: Moderate volume ascites. Lobulated soft tissue mass inferior   to the left hepatic lobe measuring 4.2 x 4.2 cm (7, 70). Soft tissue   lesion abutting the right kidney as above. Multiple additional smaller   peritoneal nodules.  VESSELS: Paraesophageal and perigastric varices. Atherosclerotic changes.  RETROPERITONEUM/LYMPH NODES: Portacaval node (7, 78) 3.2 x 1.5 cm.  ABDOMINAL WALL: Within normal limits.  BONES: Degenerative changes.    IMPRESSION:  Limited arterial phase.    Cirrhosis. Evidence of portal hypertension and moderate volume ascites.    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    Peritoneal metastatic implants as above.    Indeterminant lung nodules, metastasis cannot be excluded.    --- End of Report ---           FRANCISCA FAGAN MD; Resident Radiologist  This document has been electronically signed.  HIRAM ROSEN MD; Attending Radiologist  This document has been electronically signed. Jan 8 2024 12:33PM (01-08-24 @ 09:17)

## 2024-01-11 NOTE — PROGRESS NOTE ADULT - NSPROGADDITIONALINFOA_GEN_ALL_CORE
The palliative medicine team will continue to follow for symptoms and goals of care as clinical course evolves.    Arcelia Melgar MD/MPA  Attending Physician, Geriatrics and Palliative Care ([place]) [facility]    Please contact me via Teams from Monday through Friday between 9am-5pm. If not answering, please call the palliative care pager [phone].    After 5pm and on weekends, please see the contact information below:    In the event of newly developing, evolving, or worsening symptoms, please contact the Palliative Medicine team via pager (if the patient is at [facility] #[phone] or if the patient is at [facility] #[phone]) The Geriatric and Palliative Medicine service has coverage 24 hours a day/ 7 days a week to provide medical recommendations regarding symptom management needs via telephone. The palliative medicine team will continue to follow for symptoms and goals of care as clinical course evolves.    Arcelia Melgar MD/MPA  Attending Physician, Geriatrics and Palliative Care ([place]) [facility]    Please contact me via Teams from Monday through Friday between 9am-5pm. If not answering, please call the palliative care pager [phone].    After 5pm and on weekends, please see the contact information below:    In the event of newly developing, evolving, or worsening symptoms, please contact the Palliative Medicine team via pager (if the patient is at [facility] #[phone] or if the patient is at [facility] #[phone]) The Geriatric and Palliative Medicine service has coverage 24 hours a day/ 7 days a week to provide medical recommendations regarding symptom management needs via telephone.

## 2024-01-11 NOTE — PROGRESS NOTE ADULT - SUBJECTIVE AND OBJECTIVE BOX
[facility] Division of Kidney Diseases & Hypertension  FOLLOW UP NOTE  [phone]--------------------------------------------------------------------------------    Chief Complaint: Pt with acute renal failure requiring RRT in setting of liver failure. Undergoing liver transplant eval.     24 hour events/subjective: Pt. was seen and examined in the MICU. Intubated on 1/8/24 in setting of endoscopic procedure and remains intubated and sedated. Remains on multiple IV vasopressors. Remains on CRRT, net even per nursing. Unable to obtain ROS      PAST HISTORY  --------------------------------------------------------------------------------  No significant changes to PMH, PSH, FHx, SHx, unless otherwise noted    ALLERGIES & MEDICATIONS  --------------------------------------------------------------------------------  Allergies    No Known Allergies    Intolerances      Standing Inpatient Medications  camphor 0.5%/menthol 0.5% Topical Lotion 1 Application(s) Topical two times a day  chlorhexidine 0.12% Liquid 15 milliLiter(s) Oral Mucosa every 12 hours  chlorhexidine 4% Liquid 1 Application(s) Topical <User Schedule>  CRRT Treatment    <Continuous>  dextrose 5%. 1000 milliLiter(s) IV Continuous <Continuous>  dextrose 5%. 1000 milliLiter(s) IV Continuous <Continuous>  dextrose 50% Injectable 25 Gram(s) IV Push once  dextrose 50% Injectable 25 Gram(s) IV Push once  dextrose 50% Injectable 12.5 Gram(s) IV Push once  folic acid Injectable 1 milliGRAM(s) IV Push daily  glucagon  Injectable 1 milliGRAM(s) IntraMuscular once  heparin  Infusion Syringe 300 Unit(s)/Hr CRRT <Continuous>  insulin glargine Injectable (LANTUS) 5 Unit(s) SubCutaneous at bedtime  insulin lispro (ADMELOG) corrective regimen sliding scale   SubCutaneous every 6 hours  lactulose Syrup 10 Gram(s) Oral three times a day  norepinephrine Infusion 0.6 MICROgram(s)/kG/Min IV Continuous <Continuous>  octreotide  Infusion 50 MICROgram(s)/Hr IV Continuous <Continuous>  pantoprazole  Injectable 40 milliGRAM(s) IV Push every 12 hours  piperacillin/tazobactam IVPB.. 3.375 Gram(s) IV Intermittent every 8 hours  PrismaSATE Dialysate BGK 4 / 2.5 5000 milliLiter(s) CRRT <Continuous>  PrismaSOL Filtration BGK 0 / 2.5 5000 milliLiter(s) CRRT <Continuous>  PrismaSOL Filtration BGK 0 / 2.5 5000 milliLiter(s) CRRT <Continuous>  rifAXIMin 550 milliGRAM(s) Oral two times a day  thiamine Injectable 100 milliGRAM(s) IV Push daily  vasopressin Infusion 0.04 Unit(s)/Min IV Continuous <Continuous>    PRN Inpatient Medications  dextrose Oral Gel 15 Gram(s) Oral once PRN  sodium chloride 0.9% lock flush 10 milliLiter(s) IV Push every 1 hour PRN      REVIEW OF SYSTEMS  --------------------------------------------------------------------------------  Unable to obtain ROS as pt. is intubated    VITALS/PHYSICAL EXAM  --------------------------------------------------------------------------------  T(C): 36 (01-11-24 @ 08:00), Max: 37.7 (01-10-24 @ 12:00)  HR: 72 (01-11-24 @ 10:00) (62 - 103)  BP: 113/58 (01-11-24 @ 10:00) (71/49 - 125/58)  RR: 14 (01-11-24 @ 10:00) (14 - 24)  SpO2: 100% (01-11-24 @ 10:00) (98% - 100%)  Wt(kg): --    01-10-24 @ 07:01  -  01-11-24 @ 07:00  --------------------------------------------------------  IN: 2197.6 mL / OUT: 871 mL / NET: 1326.6 mL    01-11-24 @ 07:01  -  01-11-24 @ 10:36  --------------------------------------------------------  IN: 220.7 mL / OUT: 164 mL / NET: 56.7 mL    Physical Exam:  GEN: Intubated and sedated  CVS: S1, S2 heard  CHEST: Mechanical breath sounds B/L  ABD: soft, nontender, distended, +BS  EXTR: + b/l LE edema. RUE edematous R>L  NEURO: sedated  SKIN: Jaundiced  Dialysis: R IJ non tunneled HD catheter    LABS/STUDIES  --------------------------------------------------------------------------------              7.9    14.54 >-----------<  58       [01-11-24 @ 00:26]              23.9     134  |  100  |  24  ----------------------------<  207      [01-11-24 @ 05:30]  3.7   |  21  |  1.69        Ca     8.6     [01-11-24 @ 05:30]      Mg     2.5     [01-11-24 @ 05:30]      Phos  3.3     [01-11-24 @ 05:30]    TPro  5.3  /  Alb  3.3  /  TBili  19.5  /  DBili  x   /  AST  128  /  ALT  21  /  AlkPhos  584  [01-11-24 @ 05:30]    PT/INR: PT 12.3 , INR 1.18       [01-11-24 @ 00:26]  PTT: 30.6       [01-11-24 @ 00:26]    Creatinine Trend:  SCr 1.69 [01-11 @ 05:30]  SCr 1.61 [01-11 @ 00:25]  SCr 1.80 [01-10 @ 18:09]  SCr 1.74 [01-10 @ 13:13]  SCr 1.39 [01-10 @ 06:28] [facility] Division of Kidney Diseases & Hypertension  FOLLOW UP NOTE  [phone]--------------------------------------------------------------------------------    Chief Complaint: Pt with acute renal failure requiring RRT in setting of liver failure. Undergoing liver transplant eval.     24 hour events/subjective: Pt. was seen and examined in the MICU. Intubated on 1/8/24 in setting of endoscopic procedure and remains intubated and sedated. Remains on multiple IV vasopressors. Remains on CRRT, net even per nursing. Unable to obtain ROS      PAST HISTORY  --------------------------------------------------------------------------------  No significant changes to PMH, PSH, FHx, SHx, unless otherwise noted    ALLERGIES & MEDICATIONS  --------------------------------------------------------------------------------  Allergies    No Known Allergies    Intolerances      Standing Inpatient Medications  camphor 0.5%/menthol 0.5% Topical Lotion 1 Application(s) Topical two times a day  chlorhexidine 0.12% Liquid 15 milliLiter(s) Oral Mucosa every 12 hours  chlorhexidine 4% Liquid 1 Application(s) Topical <User Schedule>  CRRT Treatment    <Continuous>  dextrose 5%. 1000 milliLiter(s) IV Continuous <Continuous>  dextrose 5%. 1000 milliLiter(s) IV Continuous <Continuous>  dextrose 50% Injectable 25 Gram(s) IV Push once  dextrose 50% Injectable 25 Gram(s) IV Push once  dextrose 50% Injectable 12.5 Gram(s) IV Push once  folic acid Injectable 1 milliGRAM(s) IV Push daily  glucagon  Injectable 1 milliGRAM(s) IntraMuscular once  heparin  Infusion Syringe 300 Unit(s)/Hr CRRT <Continuous>  insulin glargine Injectable (LANTUS) 5 Unit(s) SubCutaneous at bedtime  insulin lispro (ADMELOG) corrective regimen sliding scale   SubCutaneous every 6 hours  lactulose Syrup 10 Gram(s) Oral three times a day  norepinephrine Infusion 0.6 MICROgram(s)/kG/Min IV Continuous <Continuous>  octreotide  Infusion 50 MICROgram(s)/Hr IV Continuous <Continuous>  pantoprazole  Injectable 40 milliGRAM(s) IV Push every 12 hours  piperacillin/tazobactam IVPB.. 3.375 Gram(s) IV Intermittent every 8 hours  PrismaSATE Dialysate BGK 4 / 2.5 5000 milliLiter(s) CRRT <Continuous>  PrismaSOL Filtration BGK 0 / 2.5 5000 milliLiter(s) CRRT <Continuous>  PrismaSOL Filtration BGK 0 / 2.5 5000 milliLiter(s) CRRT <Continuous>  rifAXIMin 550 milliGRAM(s) Oral two times a day  thiamine Injectable 100 milliGRAM(s) IV Push daily  vasopressin Infusion 0.04 Unit(s)/Min IV Continuous <Continuous>    PRN Inpatient Medications  dextrose Oral Gel 15 Gram(s) Oral once PRN  sodium chloride 0.9% lock flush 10 milliLiter(s) IV Push every 1 hour PRN      REVIEW OF SYSTEMS  --------------------------------------------------------------------------------  Unable to obtain ROS as pt. is intubated    VITALS/PHYSICAL EXAM  --------------------------------------------------------------------------------  T(C): 36 (01-11-24 @ 08:00), Max: 37.7 (01-10-24 @ 12:00)  HR: 72 (01-11-24 @ 10:00) (62 - 103)  BP: 113/58 (01-11-24 @ 10:00) (71/49 - 125/58)  RR: 14 (01-11-24 @ 10:00) (14 - 24)  SpO2: 100% (01-11-24 @ 10:00) (98% - 100%)  Wt(kg): --    01-10-24 @ 07:01  -  01-11-24 @ 07:00  --------------------------------------------------------  IN: 2197.6 mL / OUT: 871 mL / NET: 1326.6 mL    01-11-24 @ 07:01  -  01-11-24 @ 10:36  --------------------------------------------------------  IN: 220.7 mL / OUT: 164 mL / NET: 56.7 mL    Physical Exam:  GEN: Intubated and sedated  CVS: S1, S2 heard  CHEST: Mechanical breath sounds B/L  ABD: soft, nontender, distended, +BS  EXTR: + b/l LE edema. RUE edematous R>L  NEURO: sedated  SKIN: Jaundiced  Dialysis: R IJ non tunneled HD catheter    LABS/STUDIES  --------------------------------------------------------------------------------              7.9    14.54 >-----------<  58       [01-11-24 @ 00:26]              23.9     134  |  100  |  24  ----------------------------<  207      [01-11-24 @ 05:30]  3.7   |  21  |  1.69        Ca     8.6     [01-11-24 @ 05:30]      Mg     2.5     [01-11-24 @ 05:30]      Phos  3.3     [01-11-24 @ 05:30]    TPro  5.3  /  Alb  3.3  /  TBili  19.5  /  DBili  x   /  AST  128  /  ALT  21  /  AlkPhos  584  [01-11-24 @ 05:30]    PT/INR: PT 12.3 , INR 1.18       [01-11-24 @ 00:26]  PTT: 30.6       [01-11-24 @ 00:26]    Creatinine Trend:  SCr 1.69 [01-11 @ 05:30]  SCr 1.61 [01-11 @ 00:25]  SCr 1.80 [01-10 @ 18:09]  SCr 1.74 [01-10 @ 13:13]  SCr 1.39 [01-10 @ 06:28]

## 2024-01-11 NOTE — EEG REPORT - NS EEG TEXT BOX
REPORT OF CONTINUOUS VIDEO EEG      [facility]: [address] [facility] [address], [place] [address], Phone: [phone]  [facility]: [address] [place] [address], [place] [address], Phone: [phone]  [facility]: [address] [place] [address], Phone: [phone]    Patient Name: Narda Jeff    Age: 58 year, : 1965  [address]  EEG #: [id number]    Study Date: 1/10/2024   Start Time: 16:43     End Date: 2024         End Time: 00:47     Study Duration: 7 h 51 m    Study Information:    EEG Recording Technique:  The patient underwent continuous Video-EEG monitoring, using Telemetry System hardware on the XLTek Digital System. EEG and video data were stored on a computer hard drive with important events saved in digital archive files. The material was reviewed by a physician (electroencephalographer / epileptologist) on a daily basis. Kristopher and seizure detection algorithms were utilized and reviewed. An EEG Technician attended to the patient, and was available throughout daytime work hours.  The epilepsy center neurologist was available in person or on call 24-hours per day.    EEG Placement and Labeling of Electrodes:  The EEG was performed utilizing 20 channel referential EEG connections (coronal over temporal over parasagittal montage) using all standard 10-20 electrode placements with EKG, with additional electrodes placed in the inferior temporal region using the modified 10-10 montage electrode placements for elective admissions, or if deemed necessary. Recording was at a sampling rate of 256 samples per second per channel. Time synchronized digital video recording was done simultaneously with EEG recording. A low light infrared camera was used for low light recording.     History:  58 year-old male with a history of Renal mass, Leukocytosis, DM2, Anemia, RAHUL, Cirrhosis who is here for seizure evaluation.    Medication  Precedex    Diprivan (Propofol)    Ativan (Lorazepam)    Ketalar      Interpretation:    [[[Abbreviation Key:  PDR=alpha rhythm/posterior dominant rhythm. A-P=anterior posterior.  Amplitude: ‘very low’:<20; ‘low’:20-49; ‘medium’:; ‘high’:>150uV.  Persistence for periodic/rhythmic patterns (% of epoch) ‘rare’:<1%; ‘occasional’:1-10%; ‘frequent’:10-50%; ‘abundant’:50-90%; ‘continuous’:>90%.  Persistence for sporadic discharges: ‘rare’:<1/hr; ‘occasional’:1/min-1/hr; ‘frequent’:>1/min; ‘abundant’:>1/10 sec.  RPP=rhythmic and periodic patterns; GRDA=generalized rhythmic delta activity; FIRDA=frontal intermittent GRDA; LRDA=lateralized rhythmic delta activity; TIRDA=temporal intermittent rhythmic delta activity;  LPD=PLED=lateralized periodic discharges; GPD=generalized periodic discharges; BIPDs =bilateral independent periodic discharges; Mf=multifocal; SIRPDs=stimulus induced rhythmic, periodic, or ictal appearing discharges; BIRDs=brief potentially ictal rhythmic discharges >4 Hz, lasting .5-10s; PFA (paroxysmal bursts >13 Hz or =8 Hz <10s).  Modifiers: +F=with fast component; +S=with spike component; +R=with rhythmic component.  S-B=burst suppression pattern.  Max=maximal. N1-drowsy; N2-stage II sleep; N3-slow wave sleep. SSS/BETS=small sharp spikes/benign epileptiform transients of sleep. HV=hyperventilation; PS=photic stimulation]]]    Daily EEG Visual Analysis    Study Date: 1/10/2024   Start Time: 4:43:39 PM    x Duration (hours) =     FINDINGS:      Background:  Symmetry: symmetric  Continuous: continuous  PDR: absent  Reactivity: present  Voltage: normal, mostly 20-150uV  Anterior Posterior Gradient: absent  Breach: absent    Background Slowing:  Generalized slowing: present. Background is diffusely slow and consists primarily of delta activities up to 4 Hz.  Focal slowing: none was present.    State Changes:   -absent    Sporadic Epileptiform Discharges:   None    Rhythmic and Periodic Patterns (RPPs):  Generalized triphasic waves with periodicity at 1 Hz abundantly seen during the recording    Electrographic and Electroclinical seizures:  None    Other Clinical Events:  None    Activation Procedures:   -Hyperventilation was not performed.    -Photic stimulation was not performed.    Artifacts:  Intermittent myogenic and movement artifacts were noted.    ECG:  The heart rate on single channel ECG was predominantly between  BPM.    EEG Summary / Classification:    Abnormal EEG/ no change in states.  -	Background slowing   -	Triphasic waves    EEG Impression / Clinical Correlate:    Abnormal prolonged EEG study due to   1-	Severe diffuse slowing which indicates nonspecific severe diffuse cerebral dysfunction  2-	Triphasic waves which indicates toxic/metabolic encephalopathy  No epileptic discharges recorded.  No seizures recorded.      ________________________________________    TOMMIE Sauer  Attending Physician, [facility]      ------------------------------------  EEG Reading Room: [phone]  On Call Service After Hours: [phone]      REPORT OF CONTINUOUS VIDEO EEG      [facility]: [address] [facility] [address], [place] [address], Phone: [phone]  [facility]: [address] [place] [address], [place] [address], Phone: [phone]  [facility]: [address] [place] [address], Phone: [phone]    Patient Name: Narda Jeff    Age: 58 year, : 1965  [address]  EEG #: [id number]    Study Date: 1/10/2024   Start Time: 16:43     End Date: 2024         End Time: 00:47     Study Duration: 7 h 51 m    Study Information:    EEG Recording Technique:  The patient underwent continuous Video-EEG monitoring, using Telemetry System hardware on the XLTek Digital System. EEG and video data were stored on a computer hard drive with important events saved in digital archive files. The material was reviewed by a physician (electroencephalographer / epileptologist) on a daily basis. Kristopher and seizure detection algorithms were utilized and reviewed. An EEG Technician attended to the patient, and was available throughout daytime work hours.  The epilepsy center neurologist was available in person or on call 24-hours per day.    EEG Placement and Labeling of Electrodes:  The EEG was performed utilizing 20 channel referential EEG connections (coronal over temporal over parasagittal montage) using all standard 10-20 electrode placements with EKG, with additional electrodes placed in the inferior temporal region using the modified 10-10 montage electrode placements for elective admissions, or if deemed necessary. Recording was at a sampling rate of 256 samples per second per channel. Time synchronized digital video recording was done simultaneously with EEG recording. A low light infrared camera was used for low light recording.     History:  58 year-old male with a history of Renal mass, Leukocytosis, DM2, Anemia, RAHUL, Cirrhosis who is here for seizure evaluation.    Medication  Precedex    Diprivan (Propofol)    Ativan (Lorazepam)    Ketalar      Interpretation:    [[[Abbreviation Key:  PDR=alpha rhythm/posterior dominant rhythm. A-P=anterior posterior.  Amplitude: ‘very low’:<20; ‘low’:20-49; ‘medium’:; ‘high’:>150uV.  Persistence for periodic/rhythmic patterns (% of epoch) ‘rare’:<1%; ‘occasional’:1-10%; ‘frequent’:10-50%; ‘abundant’:50-90%; ‘continuous’:>90%.  Persistence for sporadic discharges: ‘rare’:<1/hr; ‘occasional’:1/min-1/hr; ‘frequent’:>1/min; ‘abundant’:>1/10 sec.  RPP=rhythmic and periodic patterns; GRDA=generalized rhythmic delta activity; FIRDA=frontal intermittent GRDA; LRDA=lateralized rhythmic delta activity; TIRDA=temporal intermittent rhythmic delta activity;  LPD=PLED=lateralized periodic discharges; GPD=generalized periodic discharges; BIPDs =bilateral independent periodic discharges; Mf=multifocal; SIRPDs=stimulus induced rhythmic, periodic, or ictal appearing discharges; BIRDs=brief potentially ictal rhythmic discharges >4 Hz, lasting .5-10s; PFA (paroxysmal bursts >13 Hz or =8 Hz <10s).  Modifiers: +F=with fast component; +S=with spike component; +R=with rhythmic component.  S-B=burst suppression pattern.  Max=maximal. N1-drowsy; N2-stage II sleep; N3-slow wave sleep. SSS/BETS=small sharp spikes/benign epileptiform transients of sleep. HV=hyperventilation; PS=photic stimulation]]]    Daily EEG Visual Analysis    Study Date: 1/10/2024   Start Time: 4:43:39 PM    x Duration (hours) =     FINDINGS:      Background:  Symmetry: symmetric  Continuous: continuous  PDR: absent  Reactivity: present  Voltage: normal, mostly 20-150uV  Anterior Posterior Gradient: absent  Breach: absent    Background Slowing:  Generalized slowing: present. Background is diffusely slow and consists primarily of delta activities up to 4 Hz.  Focal slowing: none was present.    State Changes:   -absent    Sporadic Epileptiform Discharges:   None    Rhythmic and Periodic Patterns (RPPs):  Generalized triphasic waves with periodicity at 1 Hz abundantly seen during the recording    Electrographic and Electroclinical seizures:  None    Other Clinical Events:  None    Activation Procedures:   -Hyperventilation was not performed.    -Photic stimulation was not performed.    Artifacts:  Intermittent myogenic and movement artifacts were noted.    ECG:  The heart rate on single channel ECG was predominantly between  BPM.    EEG Summary / Classification:    Abnormal EEG/ no change in states.  -	Background slowing   -	Triphasic waves    EEG Impression / Clinical Correlate:    Abnormal prolonged EEG study due to   1-	Severe diffuse slowing which indicates nonspecific severe diffuse cerebral dysfunction  2-	Triphasic waves which indicates toxic/metabolic encephalopathy  No epileptic discharges recorded.  No seizures recorded.      ________________________________________    TOMMIE Sauer  Attending Physician, [facility]      ------------------------------------  EEG Reading Room: [phone]  On Call Service After Hours: [phone]      REPORT OF CONTINUOUS VIDEO EEG      [facility]: [address] [facility] [address], [place] [address], Phone: [phone]  [facility]: [address] [place] [address], [place] [address], Phone: [phone]  [facility]: [address] [place] [address], Phone: [phone]    Patient Name: Narda Jeff    Age: 58 year, : 1965  [address]  EEG #: [id number]    Study Date: 1/10/2024   Start Time: 16:43     End Date: 2024         End Time: 00:47     Study Duration: 7 h 51 m    Study Information:    EEG Recording Technique:  The patient underwent continuous Video-EEG monitoring, using Telemetry System hardware on the XLTek Digital System. EEG and video data were stored on a computer hard drive with important events saved in digital archive files. The material was reviewed by a physician (electroencephalographer / epileptologist) on a daily basis. Kristopher and seizure detection algorithms were utilized and reviewed. An EEG Technician attended to the patient, and was available throughout daytime work hours.  The epilepsy center neurologist was available in person or on call 24-hours per day.    EEG Placement and Labeling of Electrodes:  The EEG was performed utilizing 20 channel referential EEG connections (coronal over temporal over parasagittal montage) using all standard 10-20 electrode placements with EKG, with additional electrodes placed in the inferior temporal region using the modified 10-10 montage electrode placements for elective admissions, or if deemed necessary. Recording was at a sampling rate of 256 samples per second per channel. Time synchronized digital video recording was done simultaneously with EEG recording. A low light infrared camera was used for low light recording.     History:  58 year-old male with a history of Renal mass, Leukocytosis, DM2, Anemia, RAHUL, Cirrhosis who is here for seizure evaluation.    Medication  Precedex    Diprivan (Propofol)    Ativan (Lorazepam)    Ketalar      Interpretation:    [[[Abbreviation Key:  PDR=alpha rhythm/posterior dominant rhythm. A-P=anterior posterior.  Amplitude: ‘very low’:<20; ‘low’:20-49; ‘medium’:; ‘high’:>150uV.  Persistence for periodic/rhythmic patterns (% of epoch) ‘rare’:<1%; ‘occasional’:1-10%; ‘frequent’:10-50%; ‘abundant’:50-90%; ‘continuous’:>90%.  Persistence for sporadic discharges: ‘rare’:<1/hr; ‘occasional’:1/min-1/hr; ‘frequent’:>1/min; ‘abundant’:>1/10 sec.  RPP=rhythmic and periodic patterns; GRDA=generalized rhythmic delta activity; FIRDA=frontal intermittent GRDA; LRDA=lateralized rhythmic delta activity; TIRDA=temporal intermittent rhythmic delta activity;  LPD=PLED=lateralized periodic discharges; GPD=generalized periodic discharges; BIPDs =bilateral independent periodic discharges; Mf=multifocal; SIRPDs=stimulus induced rhythmic, periodic, or ictal appearing discharges; BIRDs=brief potentially ictal rhythmic discharges >4 Hz, lasting .5-10s; PFA (paroxysmal bursts >13 Hz or =8 Hz <10s).  Modifiers: +F=with fast component; +S=with spike component; +R=with rhythmic component.  S-B=burst suppression pattern.  Max=maximal. N1-drowsy; N2-stage II sleep; N3-slow wave sleep. SSS/BETS=small sharp spikes/benign epileptiform transients of sleep. HV=hyperventilation; PS=photic stimulation]]]    Daily EEG Visual Analysis    Study Date: 1/10/2024   Start Time: 4:43:39 PM    x Duration (hours) =     FINDINGS:      Background:  Symmetry: symmetric  Continuous: continuous  PDR: absent  Reactivity: present  Voltage: normal, mostly 20-150uV  Anterior Posterior Gradient: absent  Breach: absent    Background Slowing:  Generalized slowing: present. Background is diffusely slow and consists primarily of delta activities up to 4 Hz.  Focal slowing: none was present.    State Changes:   -absent    Sporadic Epileptiform Discharges:   None    Rhythmic and Periodic Patterns (RPPs):  Generalized triphasic waves with periodicity at 1 Hz abundantly seen during the recording    Electrographic and Electroclinical seizures:  None    Other Clinical Events:  None    Activation Procedures:   -Hyperventilation was not performed.    -Photic stimulation was not performed.    Artifacts:  Intermittent myogenic and movement artifacts were noted.    ECG:  The heart rate on single channel ECG was predominantly between  BPM.    EEG Summary / Classification:    Abnormal EEG/ no change in states.  -	Background slowing, moderate to severe  -	Triphasic waves    EEG Impression / Clinical Correlate:    Abnormal prolonged EEG study due to   1-	Moderate to severe diffuse slowing which indicates nonspecific moderate to severe diffuse cerebral dysfunction  2-	Triphasic waves which indicates toxic/metabolic encephalopathy  No epileptic discharges recorded.  No seizures recorded.      ________________________________________    TOMMIE Sauer  Attending Physician, [facility] [place]    Kalyan Delgado MD  EEG/Epilepsy Attending   ------------------------------------  EEG Reading Room: [phone]  On Call Service After Hours: [phone]      REPORT OF CONTINUOUS VIDEO EEG      [facility]: [address] [facility] [address], [place] [address], Phone: [phone]  [facility]: [address] [place] [address], [place] [address], Phone: [phone]  [facility]: [address] [place] [address], Phone: [phone]    Patient Name: Narda Jeff    Age: 58 year, : 1965  [address]  EEG #: [id number]    Study Date: 1/10/2024   Start Time: 16:43     End Date: 2024         End Time: 00:47     Study Duration: 7 h 51 m    Study Information:    EEG Recording Technique:  The patient underwent continuous Video-EEG monitoring, using Telemetry System hardware on the XLTek Digital System. EEG and video data were stored on a computer hard drive with important events saved in digital archive files. The material was reviewed by a physician (electroencephalographer / epileptologist) on a daily basis. Kristopher and seizure detection algorithms were utilized and reviewed. An EEG Technician attended to the patient, and was available throughout daytime work hours.  The epilepsy center neurologist was available in person or on call 24-hours per day.    EEG Placement and Labeling of Electrodes:  The EEG was performed utilizing 20 channel referential EEG connections (coronal over temporal over parasagittal montage) using all standard 10-20 electrode placements with EKG, with additional electrodes placed in the inferior temporal region using the modified 10-10 montage electrode placements for elective admissions, or if deemed necessary. Recording was at a sampling rate of 256 samples per second per channel. Time synchronized digital video recording was done simultaneously with EEG recording. A low light infrared camera was used for low light recording.     History:  58 year-old male with a history of Renal mass, Leukocytosis, DM2, Anemia, RAHUL, Cirrhosis who is here for seizure evaluation.    Medication  Precedex    Diprivan (Propofol)    Ativan (Lorazepam)    Ketalar      Interpretation:    [[[Abbreviation Key:  PDR=alpha rhythm/posterior dominant rhythm. A-P=anterior posterior.  Amplitude: ‘very low’:<20; ‘low’:20-49; ‘medium’:; ‘high’:>150uV.  Persistence for periodic/rhythmic patterns (% of epoch) ‘rare’:<1%; ‘occasional’:1-10%; ‘frequent’:10-50%; ‘abundant’:50-90%; ‘continuous’:>90%.  Persistence for sporadic discharges: ‘rare’:<1/hr; ‘occasional’:1/min-1/hr; ‘frequent’:>1/min; ‘abundant’:>1/10 sec.  RPP=rhythmic and periodic patterns; GRDA=generalized rhythmic delta activity; FIRDA=frontal intermittent GRDA; LRDA=lateralized rhythmic delta activity; TIRDA=temporal intermittent rhythmic delta activity;  LPD=PLED=lateralized periodic discharges; GPD=generalized periodic discharges; BIPDs =bilateral independent periodic discharges; Mf=multifocal; SIRPDs=stimulus induced rhythmic, periodic, or ictal appearing discharges; BIRDs=brief potentially ictal rhythmic discharges >4 Hz, lasting .5-10s; PFA (paroxysmal bursts >13 Hz or =8 Hz <10s).  Modifiers: +F=with fast component; +S=with spike component; +R=with rhythmic component.  S-B=burst suppression pattern.  Max=maximal. N1-drowsy; N2-stage II sleep; N3-slow wave sleep. SSS/BETS=small sharp spikes/benign epileptiform transients of sleep. HV=hyperventilation; PS=photic stimulation]]]    Daily EEG Visual Analysis    Study Date: 1/10/2024   Start Time: 4:43:39 PM    x Duration (hours) =     FINDINGS:      Background:  Symmetry: symmetric  Continuous: continuous  PDR: absent  Reactivity: present  Voltage: normal, mostly 20-150uV  Anterior Posterior Gradient: absent  Breach: absent    Background Slowing:  Generalized slowing: present. Background is diffusely slow and consists primarily of delta activities up to 4 Hz.  Focal slowing: none was present.    State Changes:   -absent    Sporadic Epileptiform Discharges:   None    Rhythmic and Periodic Patterns (RPPs):  Generalized triphasic waves with periodicity at 1 Hz abundantly seen during the recording    Electrographic and Electroclinical seizures:  None    Other Clinical Events:  None    Activation Procedures:   -Hyperventilation was not performed.    -Photic stimulation was not performed.    Artifacts:  Intermittent myogenic and movement artifacts were noted.    ECG:  The heart rate on single channel ECG was predominantly between  BPM.    EEG Summary / Classification:    Abnormal EEG/ no change in states.  -	Background slowing, moderate to severe  -	Triphasic waves    EEG Impression / Clinical Correlate:    Abnormal prolonged EEG study due to   1-	Moderate to severe diffuse slowing which indicates nonspecific moderate to severe diffuse cerebral dysfunction  2-	Triphasic waves which indicates toxic/metabolic encephalopathy  No epileptic discharges recorded.  No seizures recorded.      ________________________________________    TOMMIE Sauer  Attending Physician, [facility] [place]    Kalyan Delgado MD  EEG/Epilepsy Attending   ------------------------------------  EEG Reading Room: [phone]  On Call Service After Hours: [phone]      REPORT OF CONTINUOUS VIDEO EEG      [facility]: [address] [facility] [address], [place] [address], Phone: [phone]  [facility]: [address], [place] [address], Phone: [phone]  [facility]: [address] [place] [address], Phone: [phone]    Patient Name: Narda Jeff    Age: 58 year, : 1965  [address]: [address]  EEG #: [id number]    Study Date: 1/10/2024   Start Time: 16:43     End Date: 2024         End Time: 13:45     Off EEG from around midnight to 0800.  Study Duration: 13 h    Study Information:    EEG Recording Technique:  The patient underwent continuous Video-EEG monitoring, using Telemetry System hardware on the XLTek Digital System. EEG and video data were stored on a computer hard drive with important events saved in digital archive files. The material was reviewed by a physician (electroencephalographer / epileptologist) on a daily basis. Kristopher and seizure detection algorithms were utilized and reviewed. An EEG Technician attended to the patient, and was available throughout daytime work hours.  The epilepsy center neurologist was available in person or on call 24-hours per day.    EEG Placement and Labeling of Electrodes:  The EEG was performed utilizing 20 channel referential EEG connections (coronal over temporal over parasagittal montage) using all standard 10-20 electrode placements with EKG, with additional electrodes placed in the inferior temporal region using the modified 10-10 montage electrode placements for elective admissions, or if deemed necessary. Recording was at a sampling rate of 256 samples per second per channel. Time synchronized digital video recording was done simultaneously with EEG recording. A low light infrared camera was used for low light recording.     History:  58 year-old male with a history of Renal mass, Leukocytosis, DM2, Anemia, RAHUL, Cirrhosis who is here for seizure evaluation.    Medication  Precedex    Diprivan (Propofol)    Ativan (Lorazepam)    Ketalar      Interpretation:    [[[Abbreviation Key:  PDR=alpha rhythm/posterior dominant rhythm. A-P=anterior posterior.  Amplitude: ‘very low’:<20; ‘low’:20-49; ‘medium’:; ‘high’:>150uV.  Persistence for periodic/rhythmic patterns (% of epoch) ‘rare’:<1%; ‘occasional’:1-10%; ‘frequent’:10-50%; ‘abundant’:50-90%; ‘continuous’:>90%.  Persistence for sporadic discharges: ‘rare’:<1/hr; ‘occasional’:1/min-1/hr; ‘frequent’:>1/min; ‘abundant’:>1/10 sec.  RPP=rhythmic and periodic patterns; GRDA=generalized rhythmic delta activity; FIRDA=frontal intermittent GRDA; LRDA=lateralized rhythmic delta activity; TIRDA=temporal intermittent rhythmic delta activity;  LPD=PLED=lateralized periodic discharges; GPD=generalized periodic discharges; BIPDs =bilateral independent periodic discharges; Mf=multifocal; SIRPDs=stimulus induced rhythmic, periodic, or ictal appearing discharges; BIRDs=brief potentially ictal rhythmic discharges >4 Hz, lasting .5-10s; PFA (paroxysmal bursts >13 Hz or =8 Hz <10s).  Modifiers: +F=with fast component; +S=with spike component; +R=with rhythmic component.  S-B=burst suppression pattern.  Max=maximal. N1-drowsy; N2-stage II sleep; N3-slow wave sleep. SSS/BETS=small sharp spikes/benign epileptiform transients of sleep. HV=hyperventilation; PS=photic stimulation]]]    Daily EEG Visual Analysis    Study Date: 1/10/2024   Start Time: 4:43:39 PM    x Duration (hours) =     FINDINGS:      Background:  Symmetry: symmetric  Continuous: continuous  PDR: absent  Reactivity: present  Voltage: normal, mostly 20-150uV  Anterior Posterior Gradient: absent  Breach: absent    Background Slowing:  Generalized slowing: present. Background is diffusely slow and consists primarily of delta activities up to 4 Hz.  Focal slowing: none was present.    State Changes:   -absent    Sporadic Epileptiform Discharges:   None    Rhythmic and Periodic Patterns (RPPs):  Generalized triphasic waves with periodicity at 1 Hz abundantly seen during the recording    Electrographic and Electroclinical seizures:  None    Other Clinical Events:  None    Activation Procedures:   -Hyperventilation was not performed.    -Photic stimulation was not performed.    Artifacts:  Intermittent myogenic and movement artifacts were noted.    ECG:  The heart rate on single channel ECG was predominantly between  BPM.    EEG Summary / Classification:    Abnormal EEG/ no change in states.  -	Background slowing, moderate to severe  -	Triphasic waves    EEG Impression / Clinical Correlate:    Abnormal prolonged EEG study due to   1-	Moderate to severe diffuse slowing which indicates nonspecific moderate to severe diffuse cerebral dysfunction  2-	Triphasic waves which indicates toxic/metabolic encephalopathy  No epileptic discharges recorded.  No seizures recorded.      ________________________________________    TOMMIE Sauer  Attending Physician, [facility]    Kalyan Delgado MD  EEG/Epilepsy Attending   ------------------------------------  EEG Reading Room: [phone]  On Call Service After Hours: [phone]      REPORT OF CONTINUOUS VIDEO EEG      [facility]: [address], [place] [address], Phone: [phone]  [facility]: [id number] [facility], [place] [address], Phone: [phone]  [facility]: [address] [place] [address], Phone: [phone]    Patient Name: Narda Jeff    Age: 58 year, : 1965  [place]: [address]  EEG #: [id number]    Study Date: 1/10/2024   Start Time: 16:43     End Date: 2024         End Time: 13:45     Off EEG from around midnight to 0800.  Study Duration: 13 h    Study Information:    EEG Recording Technique:  The patient underwent continuous Video-EEG monitoring, using Telemetry System hardware on the XLTek Digital System. EEG and video data were stored on a computer hard drive with important events saved in digital archive files. The material was reviewed by a physician (electroencephalographer / epileptologist) on a daily basis. Kristopher and seizure detection algorithms were utilized and reviewed. An EEG Technician attended to the patient, and was available throughout daytime work hours.  The epilepsy center neurologist was available in person or on call 24-hours per day.    EEG Placement and Labeling of Electrodes:  The EEG was performed utilizing 20 channel referential EEG connections (coronal over temporal over parasagittal montage) using all standard 10-20 electrode placements with EKG, with additional electrodes placed in the inferior temporal region using the modified 10-10 montage electrode placements for elective admissions, or if deemed necessary. Recording was at a sampling rate of 256 samples per second per channel. Time synchronized digital video recording was done simultaneously with EEG recording. A low light infrared camera was used for low light recording.     History:  58 year-old male with a history of Renal mass, Leukocytosis, DM2, Anemia, RAHUL, Cirrhosis who is here for seizure evaluation.    Medication  Precedex    Diprivan (Propofol)    Ativan (Lorazepam)    Ketalar      Interpretation:    [[[Abbreviation Key:  PDR=alpha rhythm/posterior dominant rhythm. A-P=anterior posterior.  Amplitude: ‘very low’:<20; ‘low’:20-49; ‘medium’:; ‘high’:>150uV.  Persistence for periodic/rhythmic patterns (% of epoch) ‘rare’:<1%; ‘occasional’:1-10%; ‘frequent’:10-50%; ‘abundant’:50-90%; ‘continuous’:>90%.  Persistence for sporadic discharges: ‘rare’:<1/hr; ‘occasional’:1/min-1/hr; ‘frequent’:>1/min; ‘abundant’:>1/10 sec.  RPP=rhythmic and periodic patterns; GRDA=generalized rhythmic delta activity; FIRDA=frontal intermittent GRDA; LRDA=lateralized rhythmic delta activity; TIRDA=temporal intermittent rhythmic delta activity;  LPD=PLED=lateralized periodic discharges; GPD=generalized periodic discharges; BIPDs =bilateral independent periodic discharges; Mf=multifocal; SIRPDs=stimulus induced rhythmic, periodic, or ictal appearing discharges; BIRDs=brief potentially ictal rhythmic discharges >4 Hz, lasting .5-10s; PFA (paroxysmal bursts >13 Hz or =8 Hz <10s).  Modifiers: +F=with fast component; +S=with spike component; +R=with rhythmic component.  S-B=burst suppression pattern.  Max=maximal. N1-drowsy; N2-stage II sleep; N3-slow wave sleep. SSS/BETS=small sharp spikes/benign epileptiform transients of sleep. HV=hyperventilation; PS=photic stimulation]]]    Daily EEG Visual Analysis    Study Date: 1/10/2024   Start Time: 4:43:39 PM    x Duration (hours) =     FINDINGS:      Background:  Symmetry: symmetric  Continuous: continuous  PDR: absent  Reactivity: present  Voltage: normal, mostly 20-150uV  Anterior Posterior Gradient: absent  Breach: absent    Background Slowing:  Generalized slowing: present. Background is diffusely slow and consists primarily of delta activities up to 4 Hz.  Focal slowing: none was present.    State Changes:   -absent    Sporadic Epileptiform Discharges:   None    Rhythmic and Periodic Patterns (RPPs):  Generalized triphasic waves with periodicity at 1 Hz abundantly seen during the recording    Electrographic and Electroclinical seizures:  None    Other Clinical Events:  None    Activation Procedures:   -Hyperventilation was not performed.    -Photic stimulation was not performed.    Artifacts:  Intermittent myogenic and movement artifacts were noted.    ECG:  The heart rate on single channel ECG was predominantly between  BPM.    EEG Summary / Classification:    Abnormal EEG/ no change in states.  -	Background slowing, moderate to severe  -	Triphasic waves    EEG Impression / Clinical Correlate:    Abnormal prolonged EEG study due to   1-	Moderate to severe diffuse slowing which indicates nonspecific moderate to severe diffuse cerebral dysfunction  2-	Triphasic waves which indicates toxic/metabolic encephalopathy  No epileptic discharges recorded.  No seizures recorded.      ________________________________________    TOMMIE Sauer  Attending Physician, [facility]    Kalyan Delgado MD  EEG/Epilepsy Attending   ------------------------------------  EEG Reading Room: [phone]  On Call Service After Hours: [phone]

## 2024-01-12 NOTE — PROCEDURE NOTE - NSPROCDETAILS_GEN_ALL_CORE
patient pre-oxygenated, tube inserted, placement confirmed
guidewire recovered/lumen(s) aspirated and flushed/sterile dressing applied/sterile technique, catheter placed
location identified, sterile technique used, catheter introduced, fluid drained/Seldinger technique/sterile dressing applied
patient pre-oxygenated, tube inserted, placement confirmed
audible air bolus/placement confirmed by auscultation/orogastric
location identified, sterile technique used, catheter introduced, fluid drained/Seldinger technique
guidewire recovered/lumen(s) aspirated and flushed/sterile dressing applied/sterile technique, catheter placed/ultrasound guidance with use of sterile gel and probe cove

## 2024-01-12 NOTE — PROGRESS NOTE ADULT - ATTENDING COMMENTS
Patient seen and examined. Patient critically ill requiring ICU level of care and frequent bedside visits with therapy change. Patient is a 58M with DM2 and recently diagnosed cirrhosis presenting with worsening renal failure and ascites. He is now on CRRT and having signs of GI bleed. He is now deemed not a candidate for liver transplant based.    He remains encephalopathic and obtunded off sedation. He remains on Lactulose and Rifaximin via his OGT. He is in shock requiring multiple vasopressors (Levophed and Vaso) to maintain MAP > 65. He is intubated and saturating. His ETT was dislodged last night by the disorganized movements of his arms and he was reintubated. He reportedly did not desaturate during that time but also did not have a mental status. His imaging has multiple potential sources of metastatic malignancy but his coagulopathy precludes biopsy. He continues to have hematochezia and his Hb is being monitored. He is being transfused as needed. He had an EGD earlier this week and we will follow-up with GI, pending discussion with family, about whether any further interventions would be appropriate. He is unfortunately not a candidate for liver transplant and in that setting is unlikely to improve. He has had a repeat paracentesis this week and he has had an EGD which notes an esophageal polyp/mass. In addition he has abnormal imaging concerning for a malignant process which can also not be intervened upon.     He remains on antibiotics in case of infection though most recent cultures are negative. He has been unable to tolerate CRRT due to clotting issues and is presently off renal replacement which was started due to hepatorenal syndrome. We will adjust his insulin regimen to maintain a FS < 180.    Palliative Care evaluation noted and plan for further discussions with family today regarding long term GOC after the family has had a  perform the prayers that they have requested. The patient is in the dying process and unfortunately has problems, liver failure and possible malignancy, that are not able to be treated or cured. In this setting he has progressed to multiorgan failure. Prognosis grave. Patient is at high risk for clinical deterioration. Patient's family is considering a comfort based approach after the patient is seen by an Imam.    Possible transition to a comfort based approach.

## 2024-01-12 NOTE — PROCEDURE NOTE - I WAS PRESENT AND SUPERVISED THE ENTIRE PROCEDURE.
Detail Level: Detailed
Statement Selected

## 2024-01-12 NOTE — PROGRESS NOTE ADULT - PROBLEM SELECTOR PLAN 1
Pt. with RAHUL on CKD, in the setting of liver cirrhosis/ascites, and hypotension. unclear baseline renal function. No significant proteinuria UPCR 0.4, normal sized kidneys on US. Initiated HD on 12/31, but Unable to tolerate HD due to hypotension. Transitioned to CRRT on 1/5. Pt. remains hemodynamically unstable, requiring IV vasopressor support in MICU. Pt. remains anuric. CRRT currently on hold pending GOC conversation. Monitor labs and urine output. Avoid nephrotoxins. Dose medications to CRRT.    If you have any questions, please feel free to contact me  Kenny Strickland  Nephrology Fellow  [phone] / [employer] Teams(Preferred)  (After 5pm or on weekends please page the on-call fellow) Pt. with RAHUL on CKD, in the setting of liver cirrhosis/ascites, and hypotension. unclear baseline renal function. No significant proteinuria UPCR 0.4, normal sized kidneys on US. Initiated HD on 12/31, but Unable to tolerate HD due to hypotension. Transitioned to CRRT on 1/5. Pt. remains hemodynamically unstable, requiring IV vasopressor support in MICU. Pt. remains anuric. CRRT currently on hold pending GOC conversation. Monitor labs and urine output. Avoid nephrotoxins. Dose medications to CRRT.    If you have any questions, please feel free to contact me  Kenny Strickland  Nephrology Fellow  [phone] / [employer] Teams(Preferred)  (After 5pm or on weekends please page the on-call fellow)

## 2024-01-12 NOTE — PROGRESS NOTE ADULT - PROBLEM SELECTOR PROBLEM 6
Type 2 diabetes mellitus
Thrombocytopenia
Thrombocytopenia
Goals of care, counseling/discussion
Thrombocytopenia
Goals of care, counseling/discussion

## 2024-01-12 NOTE — CHART NOTE - NSCHARTNOTEFT_GEN_A_CORE
59 yo M with DM2, recently diagnosed EtOH cirrhosis originally presenting 12/30 as a transfer from [facility] for liver transplant evaluation for which he presented with decompensated cirrhosis with ascites s/p multiple paracentesis and RAHUL on CKD progressing to ARF thought to be d/t HRS initiated on HD 12/31/23. On medical floors his course was c/b hypotension with inability to tolerate HD transferred to MICU on 1/6 for initiation of CVVHD. His MICU course was c/b persistent GIB s/p EGD 1/8 revealing esophageal polyp/mass for which he was intubated, encephalopathy and obtunded off sedation with inability to extubate, shock on multiple vasopressors. Pt deemed to not be a liver transplant candidate, continued to decompensate with progressive multiorgan failure and with concerns of possible underlying malignancy. After multiple GOC with ICU team, palliative team, and pt's relative pt was made comfort care with compassionate extubation on 1/12.    Called by bedside by nurse for asystole on monitor and unresponsiveness.     On physical exam, no spontaneous movements were present. Patient did not respond to verbal or physical stimuli. Pupils were mid-dilated and fixed. No breath sounds were appreciated over either lung field. No carotid pulses were palpable. No heart sounds were auscultated.   Patient pronounced dead at 1/12/2024 at 18:18. Attending notified. Family was notified. No autopsy. 59 yo M with DM2, recently diagnosed EtOH cirrhosis originally presenting 12/30 as a transfer from [facility] for liver transplant evaluation for which he presented with decompensated cirrhosis with ascites s/p multiple paracentesis and RAHUL on CKD progressing to ARF thought to be d/t HRS initiated on HD 12/31/23. On medical floors his course was c/b hypotension with inability to tolerate HD transferred to MICU on 1/6 for initiation of CVVHD. His MICU course was c/b persistent GIB s/p EGD 1/8 revealing esophageal polyp/mass for which he was intubated, encephalopathy and obtunded off sedation with inability to extubate, shock on multiple vasopressors. Pt deemed to not be a liver transplant candidate, continued to decompensate with progressive multiorgan failure and with concerns of possible underlying malignancy. After multiple GOC with ICU team, palliative team, and pt's relative pt was made comfort care with compassionate extubation on 1/12.    Called by bedside by nurse for asystole on monitor and unresponsiveness.     On physical exam, no spontaneous movements were present. Patient did not respond to verbal or physical stimuli. Pupils were mid-dilated and fixed. No breath sounds were appreciated over either lung field. No carotid pulses were palpable. No heart sounds were auscultated.   Patient pronounced dead at 1/12/2024 at 18:18. Attending notified. Family was notified. No autopsy.

## 2024-01-12 NOTE — PROGRESS NOTE ADULT - SUBJECTIVE AND OBJECTIVE BOX
INTERVAL HPI/OVERNIGHT EVENTS: No overnight events    SUBJECTIVE: Patient seen and examined at bedside.     ROS: All negative except as listed above.    VITAL SIGNS:  ICU Vital Signs Last 24 Hrs  T(C): 37.7 (12 Jan 2024 08:00), Max: 43 (11 Jan 2024 16:00)  T(F): 99.8 (12 Jan 2024 08:00), Max: 109.4 (11 Jan 2024 16:00)  HR: 91 (12 Jan 2024 10:00) (68 - 97)  BP: 95/53 (12 Jan 2024 10:00) (67/39 - 127/69)  BP(mean): 72 (12 Jan 2024 10:00) (48 - 85)  ABP: --  ABP(mean): --  RR: 24 (12 Jan 2024 10:00) (14 - 26)  SpO2: 100% (12 Jan 2024 10:00) (82% - 100%)    O2 Parameters below as of 12 Jan 2024 08:00  Patient On (Oxygen Delivery Method): ventilator    O2 Concentration (%): 30      Mode: CPAP with PS, FiO2: 30, PEEP: 5, PS: 8, MAP: 8, PIP: 14  Plateau pressure:   P/F ratio:     01-11 @ 07:01 - 01-12 @ 07:00  --------------------------------------------------------  IN: 2373.2 mL / OUT: 1439 mL / NET: 934.2 mL    01-12 @ 07:01 - 01-12 @ 10:41  --------------------------------------------------------  IN: 183.9 mL / OUT: 0 mL / NET: 183.9 mL      CAPILLARY BLOOD GLUCOSE      POCT Blood Glucose.: 205 mg/dL (11 Jan 2024 05:20)      ECG: reviewed.    PHYSICAL EXAM:    General: NAD  HEENT: NC/AT; PERRL, clear conjunctiva  Neck: supple  Respiratory: CTA b/l  Cardiovascular: +S1/S2; RRR  Abdomen: soft, NT/ND; +BS x4, large ascites  Extremities: WWP, 2+ peripheral pulses b/l; no LE edema  Skin: jaundice  Neurological: intubated sedated      MEDICATIONS:  MEDICATIONS  (STANDING):  camphor 0.5%/menthol 0.5% Topical Lotion 1 Application(s) Topical two times a day  chlorhexidine 0.12% Liquid 15 milliLiter(s) Oral Mucosa every 12 hours  chlorhexidine 4% Liquid 1 Application(s) Topical <User Schedule>  CRRT Treatment    <Continuous>  dextrose 5%. 1000 milliLiter(s) (50 mL/Hr) IV Continuous <Continuous>  dextrose 5%. 1000 milliLiter(s) (100 mL/Hr) IV Continuous <Continuous>  dextrose 50% Injectable 25 Gram(s) IV Push once  dextrose 50% Injectable 25 Gram(s) IV Push once  dextrose 50% Injectable 12.5 Gram(s) IV Push once  folic acid Injectable 1 milliGRAM(s) IV Push daily  glucagon  Injectable 1 milliGRAM(s) IntraMuscular once  heparin  Infusion Syringe 300 Unit(s)/Hr (0.6 mL/Hr) CRRT <Continuous>  lactulose Syrup 10 Gram(s) Oral three times a day  norepinephrine Infusion 0.6 MICROgram(s)/kG/Min (53.8 mL/Hr) IV Continuous <Continuous>  pantoprazole  Injectable 40 milliGRAM(s) IV Push every 12 hours  piperacillin/tazobactam IVPB.. 3.375 Gram(s) IV Intermittent every 8 hours  PrismaSATE Dialysate BGK 4 / 2.5 5000 milliLiter(s) (1500 mL/Hr) CRRT <Continuous>  PrismaSOL Filtration BGK 0 / 2.5 5000 milliLiter(s) (200 mL/Hr) CRRT <Continuous>  PrismaSOL Filtration BGK 0 / 2.5 5000 milliLiter(s) (800 mL/Hr) CRRT <Continuous>  rifAXIMin 550 milliGRAM(s) Oral two times a day  thiamine Injectable 100 milliGRAM(s) IV Push daily  vasopressin Infusion 0.04 Unit(s)/Min (6 mL/Hr) IV Continuous <Continuous>    MEDICATIONS  (PRN):  dextrose Oral Gel 15 Gram(s) Oral once PRN Blood Glucose LESS THAN 70 milliGRAM(s)/deciliter  sodium chloride 0.9% lock flush 10 milliLiter(s) IV Push every 1 hour PRN Pre/post blood products, medications, blood draw, and to maintain line patency      ALLERGIES:  Allergies    No Known Allergies    Intolerances        LABS:                        7.6    15.47 )-----------( 61       ( 11 Jan 2024 13:12 )             24.1     01-11    134<L>  |  100  |  22  ----------------------------<  178<H>  3.8   |  21<L>  |  1.45<H>    Ca    8.6      11 Jan 2024 13:12  Phos  3.1     01-11  Mg     2.5     01-11    TPro  5.4<L>  /  Alb  3.2<L>  /  TBili  19.1<H>  /  DBili  x   /  AST  133<H>  /  ALT  25  /  AlkPhos  575<H>  01-11    PT/INR - ( 11 Jan 2024 00:26 )   PT: 12.3 sec;   INR: 1.18 ratio         PTT - ( 11 Jan 2024 00:26 )  PTT:30.6 sec  Urinalysis Basic - ( 11 Jan 2024 13:12 )    Color: x / Appearance: x / SG: x / pH: x  Gluc: 178 mg/dL / Ketone: x  / Bili: x / Urobili: x   Blood: x / Protein: x / Nitrite: x   Leuk Esterase: x / RBC: x / WBC x   Sq Epi: x / Non Sq Epi: x / Bacteria: x      ABG:      vBG:  pH, Venous: 7.25 (01-11-24 @ 12:00)  pCO2, Venous: 52 mmHg (01-11-24 @ 12:00)  pO2, Venous: 34 mmHg (01-11-24 @ 12:00)  HCO3, Venous: 23 mmol/L (01-11-24 @ 12:00)    Micro:    Culture - Blood (collected 01-09-24 @ 00:17)  Source: .Blood Blood  Preliminary Report (01-12-24 @ 03:01):    No growth at 72 Hours    Culture - Blood (collected 01-09-24 @ 00:17)  Source: .Blood Blood  Preliminary Report (01-12-24 @ 03:01):    No growth at 72 Hours    Culture - Blood (collected 12-31-23 @ 07:46)  Source: .Blood Blood-Peripheral  Final Report (01-05-24 @ 09:00):    No growth at 5 days    Culture - Blood (collected 12-31-23 @ 07:37)  Source: .Blood Blood-Peripheral  Final Report (01-05-24 @ 09:00):    No growth at 5 days          RADIOLOGY & ADDITIONAL TESTS: Reviewed.

## 2024-01-12 NOTE — PROCEDURE NOTE - NSINFORMCONSENT_GEN_A_CORE
Benefits, risks, and possible complications of procedure explained to patient/caregiver who verbalized understanding and gave verbal consent.
Benefits, risks, and possible complications of procedure explained to patient/caregiver who verbalized understanding and gave verbal consent.
Phone consent from proxy/Benefits, risks, and possible complications of procedure explained to patient/caregiver who verbalized understanding and gave verbal consent.
This was an emergent procedure.
Benefits, risks, and possible complications of procedure explained to patient/caregiver who verbalized understanding and gave written consent.
Benefits, risks, and possible complications of procedure explained to patient/caregiver who verbalized understanding and gave verbal consent.
This was an emergent procedure.
Benefits, risks, and possible complications of procedure explained to patient/caregiver who verbalized understanding and gave verbal consent.
This was an emergent procedure.

## 2024-01-12 NOTE — PROGRESS NOTE ADULT - ASSESSMENT
Proxy confirmed that she wants palliative extubation today.  provided prayer this morning. Plan for extubation to room air and discontinuation of all medications except for those for comfort.     Pain-AD score is currently 5Please give 0.5 mg Dilaudid IV     Recommendations:  - Please give 0.5 mg Dilaudid IV q1h PRN for PAIN-AD score >3 for pain or RDOS >3 for respiratory distress  - Please hold extubation until demonstrates no signs of pain or dyspnea (PAIN-AD score 3 or less and RDOS score 3 or less) so that appropriate regimen can be recommended  - Palliative medicine will reassess at 2:30 PM today (in one hour) Proxy confirmed that she wants palliative extubation today. Marinaam provided prayer this morning. Plan for extubation to room air and discontinuation of all medications except for those for comfort.     Pain-AD score is currently 4.     Patient reassessed after 0.2 mg Dilaudid IV given. Comfortable, CPOT 0.    Recommendations:     - Glyco 0.4mg IV x 1 before extubation    After extubation:  Dilaudid 0.2-0.5mg IV q 1hr PRN moderate to severe pain   Dilaudid 0.2 mg IV q 1PRN respiratory distress or RR > 28  Ativan 0.5mg IV q 1PRN Anxiety, Agitation, or intractable respiratory distress  Glyco 0.2 mg IV q 6PRN oropharyngeal secretions Proxy Chato confirmed that she wants palliative extubation today. Imam provided prayer this morning. Plan for extubation to room air and discontinuation of all medications except for those for comfort.     Pain-AD score on first exam was 4, RDOS 7.    Patient reassessed after 0.2 mg Dilaudid IV given. Comfortable, CPOT 0.     Glyco 0.4mg IV x 1 given. Patient extubated.    Recommendations for medications after extubation:  Dilaudid 0.2-0.5mg IV q 1hr PRN moderate to severe pain   Dilaudid 0.2 mg IV q 1PRN respiratory distress or RR > 28  Ativan 0.5mg IV q 1PRN Anxiety, Agitation, or intractable respiratory distress  Glyco 0.2 mg IV q 6PRN oropharyngeal secretions    If patient has symptoms that are unrelieved despite regimen above or if patient requires frequent PRNs, please contact palliative medicine at [phone].    Anticipate short prognosis following extubation (hours-days). May be moved to PCU when bed becomes available, on waiting list.

## 2024-01-12 NOTE — PROGRESS NOTE ADULT - CONVERSATION DETAILS
Met Chato after she went to bedside. She reports that he has been in so much pain and at least now he seems comfortable. She wants for him to stay comfortable as he comes to the end of his life. Discussed transition to comfort measures. She would like for an imam to visit to provide a prayer. Asked if there was anything else that she would want to do before this transition, including have family visit. She reports they cannot afford to visit but that she provided video to them. Discussed that transition would include withdrawal of ventilation, stopping all medications except for those for comfort, and no further labs/imaging. Discussed that prognosis after de-escalation would likely be short - hours-days, though sometimes patient surprise us. Discussed that he may be able to survive for some time - possibly months - if medical interventions were continued. She continues to endorse her desire to see him have a peaceful death.     James Garrett provided guidance and support. Imam is not available today, but will provide prayer tomorrow.
 performed prayer. Called proxy by telephone, who confirmed she would like patient compassionately extubated as soon as possible as today holds special significance in their alin. Extubate to room air, comfort measures only - no other medications than those for comfort. Completed MOLST

## 2024-01-12 NOTE — PROGRESS NOTE ADULT - PROBLEM/PLAN-5
DISPLAY PLAN FREE TEXT
Patient
DISPLAY PLAN FREE TEXT

## 2024-01-12 NOTE — PROGRESS NOTE ADULT - NSPROGADDITIONALINFOA_GEN_ALL_CORE
The palliative medicine team will continue to follow for symptoms and goals of care as clinical course evolves.    Arcelia Melgar MD/MPA  Attending Physician, Geriatrics and Palliative Care ([place]) [facility]    Please contact me via Teams from Monday through Friday between 9am-5pm. If not answering, please call the palliative care pager [phone].    After 5pm and on weekends, please see the contact information below:    In the event of newly developing, evolving, or worsening symptoms, please contact the Palliative Medicine team via pager (if the patient is at [facility] #[phone] or if the patient is at [facility] #[phone]) The Geriatric and Palliative Medicine service has coverage 24 hours a day/ 7 days a week to provide medical recommendations regarding symptom management needs via telephone. The palliative medicine team will continue to follow for symptoms and goals of care as clinical course evolves.    Arcelia Melgar MD/MPA  Attending Physician, Geriatrics and Palliative Care ([place]) [facility]    Please contact me via Teams from Monday through Friday between 9am-5pm. If not answering, please call the palliative care pager [phone].    After 5pm and on weekends, please see the contact information below:    In the event of newly developing, evolving, or worsening symptoms, please contact the Palliative Medicine team via pager (if the patient is at [facility] #[phone] or if the patient is at [facility] #[phone]) The Geriatric and Palliative Medicine service has coverage 24 hours a day/ 7 days a week to provide medical recommendations regarding symptom management needs via telephone.

## 2024-01-12 NOTE — PROCEDURE NOTE - NSPROCNAME_GEN_A_CORE
Central Line Insertion
Central Line Insertion
Bronchoscopy
Gastric Intubation/Gastric Lavage
Tracheal Intubation
Central Line Insertion
Paracentesis
Tracheal Intubation
Paracentesis

## 2024-01-12 NOTE — PROCEDURE NOTE - NSTRACHPOSTINTU_RESP_A_CORE
Breath sounds bilateral/Breath sounds equal/Chest excursion noted/Chest X-Ray/Positive end tidal Co2 noted
Appropriate capnography/Breath sounds bilateral/Breath sounds equal/Chest excursion noted

## 2024-01-12 NOTE — PROGRESS NOTE ADULT - PROBLEM SELECTOR PROBLEM 1
RAHUL (acute kidney injury)
Decompensated hepatic cirrhosis
RAHUL (acute kidney injury)
Acute kidney injury superimposed on CKD
Acute kidney injury superimposed on CKD
Decompensated hepatic cirrhosis

## 2024-01-12 NOTE — PROGRESS NOTE ADULT - PROBLEM SELECTOR PROBLEM 3
Encephalopathy
Acute renal failure on dialysis
RAHUL (acute kidney injury)
Encephalopathy
RAHUL (acute kidney injury)
RAHUL (acute kidney injury)
Encephalopathy
Acute renal failure on dialysis

## 2024-01-12 NOTE — PROGRESS NOTE ADULT - PROBLEM SELECTOR PROBLEM 4
Anemia
Encephalopathy
Encephalopathy
Anemia
Encephalopathy
Severe hypotension
Anemia
Severe hypotension

## 2024-01-12 NOTE — PROGRESS NOTE ADULT - ASSESSMENT
57 y/o M PMH DM2, recently diagnosed alcoholic cirrhosis (3 weeks ago) presenting as a transfer from [facility] for transplant eval for decompensated liver cirrhosis. Patient initially presented to [facility] (from assisted living) last week with dizziness and worsening AMS, course c/b ARF (on CRRT), hematochezia s/p EGD on 1/8 showing PGH and polyp in esophagus (no varices) as well as CT showing ill defined lesions in liver c/f neoplasm w/ PVT, peritoneal implants and indeterminate lung nodules.     PLAN  =====Neurologic=====  #Hepatic Encephalopathy   #EtOH use disorder   #Uremic Encephalopathy   - pt with acute encephalopathy (A&Ox0, per HCP baseline A&Ox3) likely 2/2 toxic metabolic and hepatic processes   - PETH negative   - BUN improved on CRRT  - continue with lactulose 10mg TID and rifaximin, MELD 35 1/6/24  - EEG neg for epileptic waves    =====Pulmonary=====  Patient breathing comfortably on room air sating 100%   Intubated for EGD, remains intubated due to clinical status  CT ches with nodular opacity c/f malignancy iso possible metastatic disease    =====Cardiovascular=====  #Intravascular Depletion   #HRS   - pt BP in 90's/ 50's, unable to tolerate HD even with midodrine 30mg TID   - echo with normal EF 77% hyperdynamic otherwise normal diastolic function   - patient in shock requiring vasopressors to maintain MAP > 65.   - Currently on Levo and Vaso    =====GI=====  #Decompensated hepatic cirrhosis   - currently undergoing transplant eval, f/u recs  - s/p IR paracentesis on 1/3- fluid analysis unremarkable for infectious etiology (total nucleated cell count 229 with 33 neutrophils) , fluid chemistry wnl, culture no growth to date   c/w Thiamine, B12, Folic Acid, lactulose and rifaximin    - cw octreotide drip, dc tomorrow  - EGD with polyp in esophagus, unable to biopsy due to coagulopathy  - significant ascites on exam, plan for repeat paracentesis pending family discussion, will require reversal of coagulopathy  - CT 1/8 showing ill defined liver lesions, peritoneal implants and indeterminate lung nodules  - Hepatology determined not to be a candidate for transplant    #GIB  - continues to have bloody bowel movements  - likely iso coagulopathy  - active T&S    =====Renal/=====  #Renal mass  #HRS   #HD requiring pressors   - new renal mass seen on US concerning for malignancy, pending MRI for further characterization,- onc following f/u recs  - urology consulted- f/u recs   - HRS with creatinine >5, 4 on admission and 2 at baseline, shiley placed, s/p 2 rounds of HD unable to tolerate 3rd with midodrine   - CRRT 1/6/24 with levophed support   - porras placed 1/6/24 to monitor I/Os  - CT c/f malignancy wih mes    #Electrolytes  - Maintain K>4, Phos>3, Mag>2, iCal>1    =====Endocrine=====  #DM2  - on 5 units lantus and SSI  - monitor and adjust as necessary     =====Infectious Disease=====  - pt with initial WBC to 13k  - infectious w/u negative and completed 5 days of Epimeric Meropenem 12/30- 1/4   - c/w zosyn  - f/u MRSA swab neg  - f/u BCx    =====Heme/Onc=====  #DVT Ppx- heparin sub q   #Normocytic anemia on initial CBC   #Coagulopathy, possibly DIC  likely 2/2 vitamin deficiencies  and anemia of chronic disease   -normal B12/folate  >VERENA- eventual scope to evaluate   - monitor platelet count in setting of thrombocytopenia 2/2 cirrhosis   - heme felt possible DIC iso TEG  - s/p Cryo, Plt, and pRBCs  - f/u heme recs      =====Ethics=====  FULL CODE    Transition to comfort care after Imam is able to see pt tomorrow.  Will follow with palliative 1/12   59 y/o M PMH DM2, recently diagnosed alcoholic cirrhosis (3 weeks ago) presenting as a transfer from [facility] for transplant eval for decompensated liver cirrhosis. Patient initially presented to [facility] (from assisted living) last week with dizziness and worsening AMS, course c/b ARF (on CRRT), hematochezia s/p EGD on 1/8 showing PGH and polyp in esophagus (no varices) as well as CT showing ill defined lesions in liver c/f neoplasm w/ PVT, peritoneal implants and indeterminate lung nodules.     PLAN  =====Neurologic=====  #Hepatic Encephalopathy   #EtOH use disorder   #Uremic Encephalopathy   - pt with acute encephalopathy (A&Ox0, per HCP baseline A&Ox3) likely 2/2 toxic metabolic and hepatic processes   - PETH negative   - BUN improved on CRRT  - continue with lactulose 10mg TID and rifaximin, MELD 35 1/6/24  - EEG neg for epileptic waves    =====Pulmonary=====  Patient breathing comfortably on room air sating 100%   Intubated for EGD, remains intubated due to clinical status  CT ches with nodular opacity c/f malignancy iso possible metastatic disease    =====Cardiovascular=====  #Intravascular Depletion   #HRS   - pt BP in 90's/ 50's, unable to tolerate HD even with midodrine 30mg TID   - echo with normal EF 77% hyperdynamic otherwise normal diastolic function   - patient in shock requiring vasopressors to maintain MAP > 65.   - Currently on Levo and Vaso    =====GI=====  #Decompensated hepatic cirrhosis   - currently undergoing transplant eval, f/u recs  - s/p IR paracentesis on 1/3- fluid analysis unremarkable for infectious etiology (total nucleated cell count 229 with 33 neutrophils) , fluid chemistry wnl, culture no growth to date   c/w Thiamine, B12, Folic Acid, lactulose and rifaximin    - cw octreotide drip, dc tomorrow  - EGD with polyp in esophagus, unable to biopsy due to coagulopathy  - significant ascites on exam, plan for repeat paracentesis pending family discussion, will require reversal of coagulopathy  - CT 1/8 showing ill defined liver lesions, peritoneal implants and indeterminate lung nodules  - Hepatology determined not to be a candidate for transplant    #GIB  - continues to have bloody bowel movements  - likely iso coagulopathy  - active T&S    =====Renal/=====  #Renal mass  #HRS   #HD requiring pressors   - new renal mass seen on US concerning for malignancy, pending MRI for further characterization,- onc following f/u recs  - urology consulted- f/u recs   - HRS with creatinine >5, 4 on admission and 2 at baseline, shiley placed, s/p 2 rounds of HD unable to tolerate 3rd with midodrine   - CRRT 1/6/24 with levophed support   - porras placed 1/6/24 to monitor I/Os  - CT c/f malignancy wih mes    #Electrolytes  - Maintain K>4, Phos>3, Mag>2, iCal>1    =====Endocrine=====  #DM2  - on 5 units lantus and SSI  - monitor and adjust as necessary     =====Infectious Disease=====  - pt with initial WBC to 13k  - infectious w/u negative and completed 5 days of Epimeric Meropenem 12/30- 1/4   - c/w zosyn  - f/u MRSA swab neg  - f/u BCx    =====Heme/Onc=====  #DVT Ppx- heparin sub q   #Normocytic anemia on initial CBC   #Coagulopathy, possibly DIC  likely 2/2 vitamin deficiencies  and anemia of chronic disease   -normal B12/folate  >VERENA- eventual scope to evaluate   - monitor platelet count in setting of thrombocytopenia 2/2 cirrhosis   - heme felt possible DIC iso TEG  - s/p Cryo, Plt, and pRBCs  - f/u heme recs      =====Ethics=====  FULL CODE    Transition to comfort care after Imam is able to see pt tomorrow.  Will follow with palliative 1/12

## 2024-01-12 NOTE — PROGRESS NOTE ADULT - PROBLEM SELECTOR PROBLEM 5
Thrombocytopenia
Anemia
Peritoneal carcinomatosis
Thrombocytopenia
Anemia
Thrombocytopenia
Anemia
Peritoneal carcinomatosis

## 2024-01-12 NOTE — PROGRESS NOTE ADULT - PROBLEM SELECTOR PROBLEM 2
Encephalopathy
RAHUL (acute kidney injury)
RAHUL (acute kidney injury)
Renal mass
RAHUL (acute kidney injury)
Encephalopathy

## 2024-01-12 NOTE — PROGRESS NOTE ADULT - ATTENDING COMMENTS
History was provided by the parents.    Coral Pina is a 5 y.o. female who is here for this well-child visit.    Current Issues:  Current concerns include failed vision screening today. Not sitting close to the TV. Birth mother had vision problems. .    Review of Nutrition:  Current diet: appetite good, mostly milk and water  Balanced diet? yes    Review of Elimination::  Toilet trained? yes  Urination issues: none  Stools: within normal limits    Review of Sleep:  no sleep issues    Social Screening:  Patient has a dental home: yes  Concerns regarding behavior with peers? no  School performance: doing well; no concerns except  Doesn't want to finish her work  Secondhand smoke exposure? no  Patient in booster seat? Yes    Review of Systems:  Review of Systems   Constitutional: Negative for activity change, appetite change and fever.   HENT: Negative for congestion and sore throat.    Eyes: Negative for discharge and redness.   Respiratory: Negative for cough and wheezing.    Cardiovascular: Negative for chest pain and palpitations.   Gastrointestinal: Negative for constipation, diarrhea and vomiting.   Genitourinary: Negative for difficulty urinating, enuresis and hematuria.   Skin: Negative for rash and wound.   Neurological: Negative for syncope and headaches.   Psychiatric/Behavioral: Positive for behavioral problems. Negative for sleep disturbance.     Physical Exam:  Physical Exam   Constitutional: She is active.   HENT:   Head: Normocephalic and atraumatic.   Right Ear: Tympanic membrane and external ear normal.   Left Ear: Tympanic membrane and external ear normal.   Nose: Nose normal.   Mouth/Throat: Mucous membranes are moist. Oropharynx is clear.   Eyes: Conjunctivae and lids are normal. Pupils are equal, round, and reactive to light.   Neck: Neck supple. No neck adenopathy. No tenderness is present.   Cardiovascular: Normal rate, regular rhythm, S1 normal and S2 normal.  Pulses are palpable.    No  murmur heard.  Pulmonary/Chest: Effort normal and breath sounds normal. There is normal air entry.   Abdominal: Soft. Bowel sounds are normal. She exhibits no distension. There is no hepatosplenomegaly. There is no tenderness.   Genitourinary: Pelvic exam was performed with patient supine. There is no rash on the right labia. There is no rash on the left labia.   Musculoskeletal: Normal range of motion.   Skin: Skin is warm. No rash noted.   Vitals reviewed.    Assessment:      Healthy 5 y.o. female child.   Plan:   1. Anticipatory guidance discussed. Gave handout on well-child issues at this age.  2. The patient was counseled regarding nutrition  3. Discussed doing regular eye exam with an eye doctor. Advised to RTC 2-3 weeks into school if attention problems or incomplete assignments continue.    58 year old man with h/o type II DM for ~ 6-7 years, was on metformin and Jardiance, recently diagnosed with liver cirrhosis complicated by ascites. He underwent paracentesis x 3. Course complicated by worsening RAHUL and encephalopathy. He was transferred to [facility] for liver transplant evaluation. He was initiated on HD on 12/31/23.     Renal US with normal sized kidneys measuring ~ 11.6 and 11.8 cm. No hydro. Multilobulated vascular mass arises from the right kidney extending into the peritoneum. Findings are concerning for primary renal malignancy versus metastatic disease. MRI pending.     RAHUL on CKD - unclear baseline renal function. No significant proteinuria UPCR 0.4, normal sized kidneys on US. Initiated HD on 12/31, but Unable to tolerate HD due to hypotension unresponsive to increase midodrine dose.    Labs with worsening BUN/Cr and metabolic acidosis and therefore patient was moved to ICU consult for pressor support and started on CRRT since 1/5/24.   off CRRT now , pending C discussion 58 year old man with h/o type II DM for ~ 6-7 years, was on metformin and Jardiance, recently diagnosed with liver cirrhosis complicated by ascites. He underwent paracentesis x 3. Course complicated by worsening RAHUL and encephalopathy. He was transferred to [facility] for liver transplant evaluation. He was initiated on HD on 12/31/23.     Renal US with normal sized kidneys measuring ~ 11.6 and 11.8 cm. No hydro. Multilobulated vascular mass arises from the right kidney extending into the peritoneum. Findings are concerning for primary renal malignancy versus metastatic disease. MRI pending.     RAHUL on CKD - unclear baseline renal function. No significant proteinuria UPCR 0.4, normal sized kidneys on US. Initiated HD on 12/31, but Unable to tolerate HD due to hypotension unresponsive to increase midodrine dose.    Labs with worsening BUN/Cr and metabolic acidosis and therefore patient was moved to ICU consult for pressor support and started on CRRT since 1/5/24.   off CRRT now , pending C discussion

## 2024-01-12 NOTE — PROCEDURE NOTE - NSINDICATIONS_GEN_A_CORE
ascites/hemodynamic instability/suspected spontaneous bacterial peritonitis
procedure/airway protection
s/p self extubation/airway protection
ascites
critical illness/emergency venous access/venous access/volume resuscitation
dialysis/CRRT
dialysis/CRRT
feeds

## 2024-01-12 NOTE — PROGRESS NOTE ADULT - SUBJECTIVE AND OBJECTIVE BOX
[facility] Division of Kidney Diseases & Hypertension  FOLLOW UP NOTE  [phone]--------------------------------------------------------------------------------    Chief Complaint: Pt with acute renal failure requiring RRT in setting of liver failure. Undergoing liver transplant eval.     24 hour events/subjective: Pt. was seen and examined in the MICU. Intubated on 1/8/24 in setting of endoscopic procedure and remains intubated and sedated. Remains on multiple IV vasopressors. CRRT currently on hold, pending [facility] conversation. Unable to obtain ROS    PAST HISTORY  --------------------------------------------------------------------------------  No significant changes to PMH, PSH, FHx, SHx, unless otherwise noted    ALLERGIES & MEDICATIONS  --------------------------------------------------------------------------------  Allergies    No Known Allergies    Intolerances    Standing Inpatient Medications  camphor 0.5%/menthol 0.5% Topical Lotion 1 Application(s) Topical two times a day  chlorhexidine 0.12% Liquid 15 milliLiter(s) Oral Mucosa every 12 hours  chlorhexidine 4% Liquid 1 Application(s) Topical <User Schedule>  CRRT Treatment    <Continuous>  dextrose 5%. 1000 milliLiter(s) IV Continuous <Continuous>  dextrose 5%. 1000 milliLiter(s) IV Continuous <Continuous>  dextrose 50% Injectable 25 Gram(s) IV Push once  dextrose 50% Injectable 25 Gram(s) IV Push once  dextrose 50% Injectable 12.5 Gram(s) IV Push once  folic acid Injectable 1 milliGRAM(s) IV Push daily  glucagon  Injectable 1 milliGRAM(s) IntraMuscular once  heparin  Infusion Syringe 300 Unit(s)/Hr CRRT <Continuous>  lactulose Syrup 10 Gram(s) Oral three times a day  norepinephrine Infusion 0.6 MICROgram(s)/kG/Min IV Continuous <Continuous>  pantoprazole  Injectable 40 milliGRAM(s) IV Push every 12 hours  piperacillin/tazobactam IVPB.. 3.375 Gram(s) IV Intermittent every 8 hours  PrismaSATE Dialysate BGK 4 / 2.5 5000 milliLiter(s) CRRT <Continuous>  PrismaSOL Filtration BGK 0 / 2.5 5000 milliLiter(s) CRRT <Continuous>  PrismaSOL Filtration BGK 0 / 2.5 5000 milliLiter(s) CRRT <Continuous>  rifAXIMin 550 milliGRAM(s) Oral two times a day  thiamine Injectable 100 milliGRAM(s) IV Push daily  vasopressin Infusion 0.04 Unit(s)/Min IV Continuous <Continuous>    PRN Inpatient Medications  dextrose Oral Gel 15 Gram(s) Oral once PRN  sodium chloride 0.9% lock flush 10 milliLiter(s) IV Push every 1 hour PRN      REVIEW OF SYSTEMS  --------------------------------------------------------------------------------  See HPI    VITALS/PHYSICAL EXAM  --------------------------------------------------------------------------------  T(C): 37.7 (01-12-24 @ 08:00), Max: 43 (01-11-24 @ 16:00)  HR: 93 (01-12-24 @ 11:00) (68 - 97)  BP: 96/53 (01-12-24 @ 11:00) (67/39 - 127/69)  RR: 22 (01-12-24 @ 11:00) (14 - 26)  SpO2: 100% (01-12-24 @ 11:00) (82% - 100%)  Wt(kg): --    01-11-24 @ 07:01  -  01-12-24 @ 07:00  --------------------------------------------------------  IN: 2373.2 mL / OUT: 1439 mL / NET: 934.2 mL    01-12-24 @ 07:01  -  01-12-24 @ 11:20  --------------------------------------------------------  IN: 230.2 mL / OUT: 0 mL / NET: 230.2 mL    Physical Exam:  GEN: Intubated and sedated  CVS: S1, S2 heard  CHEST: Mechanical breath sounds B/L  ABD: soft, nontender, distended, +BS  EXTR: + b/l LE edema. RUE edematous R>L  NEURO: sedated  SKIN: Jaundiced  Dialysis: R IJ non tunneled HD catheter    LABS/STUDIES  --------------------------------------------------------------------------------              7.6    15.47 >-----------<  61       [01-11-24 @ 13:12]              24.1     134  |  100  |  22  ----------------------------<  178      [01-11-24 @ 13:12]  3.8   |  21  |  1.45        Ca     8.6     [01-11-24 @ 13:12]      Mg     2.5     [01-11-24 @ 13:12]      Phos  3.1     [01-11-24 @ 13:12]    TPro  5.4  /  Alb  3.2  /  TBili  19.1  /  DBili  x   /  AST  133  /  ALT  25  /  AlkPhos  575  [01-11-24 @ 13:12]    PT/INR: PT 12.3 , INR 1.18       [01-11-24 @ 00:26]  PTT: 30.6       [01-11-24 @ 00:26]    Creatinine Trend:  SCr 1.45 [01-11 @ 13:12]  SCr 1.69 [01-11 @ 05:30]  SCr 1.61 [01-11 @ 00:25]  SCr 1.80 [01-10 @ 18:09]  SCr 1.74 [01-10 @ 13:13] [facility] Division of Kidney Diseases & Hypertension  FOLLOW UP NOTE  [phone]--------------------------------------------------------------------------------    Chief Complaint: Pt with acute renal failure requiring RRT in setting of liver failure. Undergoing liver transplant eval.     24 hour events/subjective: Pt. was seen and examined in the MICU. Intubated on 1/8/24 in setting of endoscopic procedure and remains intubated and sedated. Remains on multiple IV vasopressors. CRRT currently on hold, pending [facility] conversation. Unable to obtain ROS    PAST HISTORY  --------------------------------------------------------------------------------  No significant changes to PMH, PSH, FHx, SHx, unless otherwise noted    ALLERGIES & MEDICATIONS  --------------------------------------------------------------------------------  Allergies    No Known Allergies    Intolerances    Standing Inpatient Medications  camphor 0.5%/menthol 0.5% Topical Lotion 1 Application(s) Topical two times a day  chlorhexidine 0.12% Liquid 15 milliLiter(s) Oral Mucosa every 12 hours  chlorhexidine 4% Liquid 1 Application(s) Topical <User Schedule>  CRRT Treatment    <Continuous>  dextrose 5%. 1000 milliLiter(s) IV Continuous <Continuous>  dextrose 5%. 1000 milliLiter(s) IV Continuous <Continuous>  dextrose 50% Injectable 25 Gram(s) IV Push once  dextrose 50% Injectable 25 Gram(s) IV Push once  dextrose 50% Injectable 12.5 Gram(s) IV Push once  folic acid Injectable 1 milliGRAM(s) IV Push daily  glucagon  Injectable 1 milliGRAM(s) IntraMuscular once  heparin  Infusion Syringe 300 Unit(s)/Hr CRRT <Continuous>  lactulose Syrup 10 Gram(s) Oral three times a day  norepinephrine Infusion 0.6 MICROgram(s)/kG/Min IV Continuous <Continuous>  pantoprazole  Injectable 40 milliGRAM(s) IV Push every 12 hours  piperacillin/tazobactam IVPB.. 3.375 Gram(s) IV Intermittent every 8 hours  PrismaSATE Dialysate BGK 4 / 2.5 5000 milliLiter(s) CRRT <Continuous>  PrismaSOL Filtration BGK 0 / 2.5 5000 milliLiter(s) CRRT <Continuous>  PrismaSOL Filtration BGK 0 / 2.5 5000 milliLiter(s) CRRT <Continuous>  rifAXIMin 550 milliGRAM(s) Oral two times a day  thiamine Injectable 100 milliGRAM(s) IV Push daily  vasopressin Infusion 0.04 Unit(s)/Min IV Continuous <Continuous>    PRN Inpatient Medications  dextrose Oral Gel 15 Gram(s) Oral once PRN  sodium chloride 0.9% lock flush 10 milliLiter(s) IV Push every 1 hour PRN      REVIEW OF SYSTEMS  --------------------------------------------------------------------------------  See HPI    VITALS/PHYSICAL EXAM  --------------------------------------------------------------------------------  T(C): 37.7 (01-12-24 @ 08:00), Max: 43 (01-11-24 @ 16:00)  HR: 93 (01-12-24 @ 11:00) (68 - 97)  BP: 96/53 (01-12-24 @ 11:00) (67/39 - 127/69)  RR: 22 (01-12-24 @ 11:00) (14 - 26)  SpO2: 100% (01-12-24 @ 11:00) (82% - 100%)  Wt(kg): --    01-11-24 @ 07:01  -  01-12-24 @ 07:00  --------------------------------------------------------  IN: 2373.2 mL / OUT: 1439 mL / NET: 934.2 mL    01-12-24 @ 07:01  -  01-12-24 @ 11:20  --------------------------------------------------------  IN: 230.2 mL / OUT: 0 mL / NET: 230.2 mL    Physical Exam:  GEN: Intubated and sedated  CVS: S1, S2 heard  CHEST: Mechanical breath sounds B/L  ABD: soft, nontender, distended, +BS  EXTR: + b/l LE edema. RUE edematous R>L  NEURO: sedated  SKIN: Jaundiced  Dialysis: R IJ non tunneled HD catheter    LABS/STUDIES  --------------------------------------------------------------------------------              7.6    15.47 >-----------<  61       [01-11-24 @ 13:12]              24.1     134  |  100  |  22  ----------------------------<  178      [01-11-24 @ 13:12]  3.8   |  21  |  1.45        Ca     8.6     [01-11-24 @ 13:12]      Mg     2.5     [01-11-24 @ 13:12]      Phos  3.1     [01-11-24 @ 13:12]    TPro  5.4  /  Alb  3.2  /  TBili  19.1  /  DBili  x   /  AST  133  /  ALT  25  /  AlkPhos  575  [01-11-24 @ 13:12]    PT/INR: PT 12.3 , INR 1.18       [01-11-24 @ 00:26]  PTT: 30.6       [01-11-24 @ 00:26]    Creatinine Trend:  SCr 1.45 [01-11 @ 13:12]  SCr 1.69 [01-11 @ 05:30]  SCr 1.61 [01-11 @ 00:25]  SCr 1.80 [01-10 @ 18:09]  SCr 1.74 [01-10 @ 13:13]

## 2024-01-12 NOTE — PROGRESS NOTE ADULT - REASON FOR ADMISSION
Liver Transplant Eval

## 2024-01-12 NOTE — PROGRESS NOTE ADULT - RESPIRATORY DISTRESS OBSERVATION: ACCESSORY MUSCLE
Progress Notes by Rochelle Ferreira MD at 12/20/17 01:43 PM     Author:  Rochelle Ferreira MD Service:  (none) Author Type:  Physician     Filed:  12/20/17 01:44 PM Encounter Date:  12/20/2017 Status:  Signed     :  Rochelle Ferreira MD (Physician)            [facility]     PROGRESS NOTE     PATIENT NAME: Wing Cordova    DATE: 12/20/2017 MED REC #: [id number]  YOB: 2002   PHYSICIAN: Rochelle Ferreira MD        SUBJECTIVE:[NS1.1T]   Follow-up right medial meniscal repair 10/30/2017.  Patient reports he is doing well.  Is completed physical therapy.  Reports that he just resumed PE class, was able to run a mile without difficulty.  Has no complaints.  Has had no swelling.[NS1.1M]     OBJECTIVE:[NS1.1T]   Surgical incisions are well-healed.  Range of motion is full.  No testicular effusion.  Stable Lachman's maneuver.  No pain with Serina's exam.  Sensory motor function intact distally.[NS1.1M]     ASSESSMENT/PLAN:[NS1.1T]   Clinically appears to have recovered well.  Will anticipate return to sport 3 months postop.[NS1.1M]           ______________________________  Rochelle Ferreira MD  ORTHOPAEDICS[NS1.1T]        Revision History        User Key Date/Time User Provider Type Action    > NS1.1 12/20/17 01:44 PM Rochelle Ferreira MD Physician Sign    M - Manual, T - Template            
None
None

## 2024-01-12 NOTE — AIRWAY REMOVAL NOTE  ADULT & PEDS - ARTIFICAL AIRWAY REMOVAL COMMENTS
Written order for extubation verified. The patient was identified by full name and birth date compared to the identification band. Present during the procedure was ALEX Carbajal and RT Gopal.

## 2024-01-12 NOTE — PROGRESS NOTE ADULT - NS ATTEST RISK PROBLEM GEN_ALL_CORE FT
1. Number and complexity of problems addressed for this patient:    1.1 Moderate (At least 1)  [ ] 1 or more chronic illnesses with exacerbation, progression, or side effects of treatment  [ ] 2 or more stable chronic illnesses  [ ] 1 undiagnosed new problem with uncertain prognosis  [ ] 1 acute illness with systemic symptoms  [ ] 1 acute complicated injury  1.2 High (At least 1)   [ ] 1 or more chronic illnesses with severe exacerbation, progression, or side effects of treatment  [ x] 1 acute or chronic illnesses or injuries that may pose a threat to life or bodily function    2. Amount and/or Complexity of Data that was Reviewed and Analyzed for this case:       Moderate (1 out of 3)       High (2 out of 3)  2.1. (Any combination of 3 of the following)   [ ] Prior External notes were reviewed  [x ] Each test result was reviewed (see "LABS" and "RADIOLOGY & ADDITIONAL STUDIES" above)  [ ] The following tests were ordered and/or reviewed (Only count 1 point for ordering or reviewing a unique test):  	[ ]CBC  	[ ] Chemistry   	[ ] Imaging   	[ ] Other:   [ x] Assessment requiring an independent historian   		Name of historian and relationship: [facility]  2.2  [] Personally review and interpretation of  image or testing   2.3  [ x] Discussion of management or test interpretation with external physician/other qualified health care professional\appropriate source (not separately reported) - primary team    3. Risk of Complications and/or Morbidity or Mortality of for this Patient’s Management:  3.1 Moderate risk of morbidity from additional diagnostic testing or treatment (At least 1):   [ ] Prescription drug management   [ ] Decision regarding minor surgery, treatment, or procedure with identified patient or procedure risk factors  [ ] Decision regarding elective major surgery, treatment, or procedure without identified patient or procedure risk factors   [ ] Diagnosis or treatment significantly limited by social determinants of health   [ ] Other:   3.2 High risk of morbidity from additional diagnostic testing or treatment (At least 1):   [ ] Drug therapy requiring intensive monitoring for toxicity   [ ] Decision regarding elective major surgery, treatment, or procedure with identified patient or procedure risk factors   [ ] Decision regarding emergency major surgery, treatment, or procedure   [ ] Decision regarding hospitalization or escalation of hospital-level of care  [x] Decision not to resuscitate, not to intubate, or to de-escalate care because of poor prognosis   [ ] Decision to proceed or not with artificial nutrition   [ ] Parenteral controlled substance  [x ] Other: decision for continuation of medical care until imam visits tomorrow, then likely transition to CMO 1. Number and complexity of problems addressed for this patient:    1.1 Moderate (At least 1)  [ ] 1 or more chronic illnesses with exacerbation, progression, or side effects of treatment  [ ] 2 or more stable chronic illnesses  [ ] 1 undiagnosed new problem with uncertain prognosis  [ ] 1 acute illness with systemic symptoms  [ ] 1 acute complicated injury  1.2 High (At least 1)   [ ] 1 or more chronic illnesses with severe exacerbation, progression, or side effects of treatment  [ x] 1 acute or chronic illnesses or injuries that may pose a threat to life or bodily function    2. Amount and/or Complexity of Data that was Reviewed and Analyzed for this case:       Moderate (1 out of 3)       High (2 out of 3)  2.1. (Any combination of 3 of the following)   [ ] Prior External notes were reviewed  [x ] Each test result was reviewed (see "LABS" and "RADIOLOGY & ADDITIONAL STUDIES" above)  [ ] The following tests were ordered and/or reviewed (Only count 1 point for ordering or reviewing a unique test):  	[ ]CBC  	[ ] Chemistry   	[ ] Imaging   	[ ] Other:   [ x] Assessment requiring an independent historian   		Name of historian and relationship: [facility]  2.2  [] Personally review and interpretation of  image or testing   2.3  [ x] Discussion of management or test interpretation with external physician/other qualified health care professional\appropriate source (not separately reported) - primary team    3. Risk of Complications and/or Morbidity or Mortality of for this Patient’s Management:  3.1 Moderate risk of morbidity from additional diagnostic testing or treatment (At least 1):   [ ] Prescription drug management   [ ] Decision regarding minor surgery, treatment, or procedure with identified patient or procedure risk factors  [ ] Decision regarding elective major surgery, treatment, or procedure without identified patient or procedure risk factors   [ ] Diagnosis or treatment significantly limited by social determinants of health   [ ] Other:   3.2 High risk of morbidity from additional diagnostic testing or treatment (At least 1):   [ ] Drug therapy requiring intensive monitoring for toxicity   [ ] Decision regarding elective major surgery, treatment, or procedure with identified patient or procedure risk factors   [ ] Decision regarding emergency major surgery, treatment, or procedure   [ ] Decision regarding hospitalization or escalation of hospital-level of care  [x] Decision not to resuscitate, not to intubate, or to de-escalate care because of poor prognosis   [ ] Decision to proceed or not with artificial nutrition   [ ] Parenteral controlled substance  [x ] Other: decision for continuation of medical care until imam visits tomorrow, then likely transition to CMO 1. Number and complexity of problems addressed for this patient:    1.1 Moderate (At least 1)  [ ] 1 or more chronic illnesses with exacerbation, progression, or side effects of treatment  [ ] 2 or more stable chronic illnesses  [ ] 1 undiagnosed new problem with uncertain prognosis  [ ] 1 acute illness with systemic symptoms  [ ] 1 acute complicated injury  1.2 High (At least 1)   [ ] 1 or more chronic illnesses with severe exacerbation, progression, or side effects of treatment  [ x] 1 acute or chronic illnesses or injuries that may pose a threat to life or bodily function    2. Amount and/or Complexity of Data that was Reviewed and Analyzed for this case:       Moderate (1 out of 3)       High (2 out of 3)  2.1. (Any combination of 3 of the following)   [ ] Prior External notes were reviewed  [x ] Each test result was reviewed (see "LABS" and "RADIOLOGY & ADDITIONAL STUDIES" above)  [ ] The following tests were ordered and/or reviewed (Only count 1 point for ordering or reviewing a unique test):  	[ ]CBC  	[ ] Chemistry   	[ ] Imaging   	[ ] Other:   [ x] Assessment requiring an independent historian   		Name of historian and relationship: [facility]  2.2  [] Personally review and interpretation of  image or testing   2.3  [ x] Discussion of management or test interpretation with external physician/other qualified health care professional\appropriate source (not separately reported) - primary team    3. Risk of Complications and/or Morbidity or Mortality of for this Patient’s Management:  3.1 Moderate risk of morbidity from additional diagnostic testing or treatment (At least 1):   [ ] Prescription drug management   [ ] Decision regarding minor surgery, treatment, or procedure with identified patient or procedure risk factors  [ ] Decision regarding elective major surgery, treatment, or procedure without identified patient or procedure risk factors   [ ] Diagnosis or treatment significantly limited by social determinants of health   [ ] Other:   3.2 High risk of morbidity from additional diagnostic testing or treatment (At least 1):   [ ] Drug therapy requiring intensive monitoring for toxicity   [ ] Decision regarding elective major surgery, treatment, or procedure with identified patient or procedure risk factors   [ ] Decision regarding emergency major surgery, treatment, or procedure   [ ] Decision regarding hospitalization or escalation of hospital-level of care  [x] Decision not to resuscitate, not to intubate, or to de-escalate care because of poor prognosis   [ ] Decision to proceed or not with artificial nutrition   [ ] Parenteral controlled substance  [x ] Other: de-escalation of care for poor prognosis 1. Number and complexity of problems addressed for this patient:    1.1 Moderate (At least 1)  [ ] 1 or more chronic illnesses with exacerbation, progression, or side effects of treatment  [ ] 2 or more stable chronic illnesses  [ ] 1 undiagnosed new problem with uncertain prognosis  [ ] 1 acute illness with systemic symptoms  [ ] 1 acute complicated injury  1.2 High (At least 1)   [ ] 1 or more chronic illnesses with severe exacerbation, progression, or side effects of treatment  [ x] 1 acute or chronic illnesses or injuries that may pose a threat to life or bodily function    2. Amount and/or Complexity of Data that was Reviewed and Analyzed for this case:       Moderate (1 out of 3)       High (2 out of 3)  2.1. (Any combination of 3 of the following)   [ ] Prior External notes were reviewed  [x ] Each test result was reviewed (see "LABS" and "RADIOLOGY & ADDITIONAL STUDIES" above)  [ ] The following tests were ordered and/or reviewed (Only count 1 point for ordering or reviewing a unique test):  	[ ]CBC  	[ ] Chemistry   	[ ] Imaging   	[ ] Other:   [ x] Assessment requiring an independent historian   		Name of historian and relationship: [facility]  2.2  [] Personally review and interpretation of  image or testing   2.3  [ x] Discussion of management or test interpretation with external physician/other qualified health care professional\appropriate source (not separately reported) - primary team    3. Risk of Complications and/or Morbidity or Mortality of for this Patient’s Management:  3.1 Moderate risk of morbidity from additional diagnostic testing or treatment (At least 1):   [ ] Prescription drug management   [ ] Decision regarding minor surgery, treatment, or procedure with identified patient or procedure risk factors  [ ] Decision regarding elective major surgery, treatment, or procedure without identified patient or procedure risk factors   [ ] Diagnosis or treatment significantly limited by social determinants of health   [ ] Other:   3.2 High risk of morbidity from additional diagnostic testing or treatment (At least 1):   [ ] Drug therapy requiring intensive monitoring for toxicity   [ ] Decision regarding elective major surgery, treatment, or procedure with identified patient or procedure risk factors   [ ] Decision regarding emergency major surgery, treatment, or procedure   [ ] Decision regarding hospitalization or escalation of hospital-level of care  [x] Decision not to resuscitate, not to intubate, or to de-escalate care because of poor prognosis   [ ] Decision to proceed or not with artificial nutrition   [ ] Parenteral controlled substance  [x ] Other: de-escalation of care for poor prognosis

## 2024-01-12 NOTE — PROGRESS NOTE ADULT - SUBJECTIVE AND OBJECTIVE BOX
Palliative Medicine Consult Note  Indication for Geriatrics and Palliative Care Services: GOC    Date of service: 01-12-24 @ 17:49  [facility] MICU [address] ([facility] MICU)  12-30-23 - admit date    Interval events: Patient seen at bedside. Unarousable, nonverbal, jaundiced. Imam performed prayer. Proxy confirmed she is ready for compassionate extubation.     Had GOC discussion. See GOC section for detail.     Urine habits: Normal [ ]  Incontinent [ ]  Oliguria/Anuria [ ]  Indwelling urinary catheter [ x]  External urinary cathter [ ]  Deferred [ ]     PO intake/swallow: intubated    Last Bowel Movement: 12-Jan-2024    PRESENT SYMPTOMS:  Patient is unable to communicate ---    For the critically ill: CPOT   - Facial expression: Relaxed, neutral (0) [x ]  Tense (1) [ ]  Grimacing (2) [ ]   - Body movements: Absence of movements (0) [ ]  Protection (1) [ ]  Restlessness (2) [ x]   - Muscle tension: Relaxed (0) [x]  Tense/rigid (1) [ ]  Very tense or rigid (2) [ ]   - Compliance with ventilator: Tolerating (0) [ x]  Coughing but tolerating (1) [ ]  Fighting ventilator (2) [ ]  OR  Vocalization: Talking in normal tone or no sounds (0) [ ]  Sighing, moaning [ ]  Crying out, sobbing [ ]  Total (0-8): 2    For patients with dementia: Pain Assessment in Advanced Dementia Scale (PAINAD) Score: N/a    For respiratory distress: RDOS (See RDOS tool and score below)  0 to 2  minimal or no respiratory distress   3  mild distress  4 to 6 moderate distress  >7 severe distress    PERTINENT PM/SXH:   Type 2 diabetes mellitus    Decompensated hepatic cirrhosis    No significant past surgical history      SOCIAL HISTORY:  Family: Patient's family all in [place]. Primary language is Icelandic. Per chart, patient was previously living in an assisted living facility. [facility].   Living Situation: [ ]  Home [ ]  Long term care [ ]  Rehab [ ]  Other [x] - assisted living facility Deferred [ ]  Caregiver [place]? : Yes [ ]  No [ ]  Deferred [x ]             Anticipatory Grief present?:  Yes [ x]  No [ ]  Deferred [ ]     Substance hx: [ ]  Tobacco hx: [ ]  Alcohol hx: [x ]  Deferred [ ]    DECISION MAKER(s):  Capacity: Yes [ ]  No [ x]  Deferred [ ]       Health Care Agent: Health Care Proxy(s) [x ]  Surrogate(s) [ ]  Guardian [ ]          Name(s): Phone Number(s): HCP form in chart listing Chato Grajeda ([phone])    SPIRITUALITY:  Church/Spirituality: [x ]  [facility]  PCSSQ [Palliative Care Spiritual Screening Question] Severity (0-10): Deferred (Score of 4 or > indicate consideration of Chaplaincy referral)  Chaplaincy Referral: [x ] yes [ ] refused [ ] following [ ] Deferred     FUNCTIONAL STATUS: Unable to answer  BASELINE (I)ADL(s) (prior to admission): [place]: Total [ ]  Moderate [ ]  Dependent [ ]  Deferred[x]  Palliative Performance Status Version 2:   See PPSv2 tool and score below          NUTRITION  BMI (kg/m2): 27 (01-02-24 @ 05:00)(normal 18.5-24.9)  Weight: Normal [x ]  Underweight [ ]  Morbid obesity [ ]    Protein calorie malnutrition: None [ ]  Mild [ ]  Moderate [ ]  Severe [ ]  Deferred [x ]  [x ]PPSV2 < or = 30%  [ ]significant weight loss [ ]poor nutritional intake [x ]anasarca [ ]Artificial Nutrition    Other current referrals: Hospice [ ]  Child Life [ ]  Social Work [ ]  Case management [ ]  Holistic Therapy [ ]  Patient & Family Centered Care Referral [ ]      ADVANCE DIRECTIVES:    DNR on chart: DNR/do not re-intubate    Completed documents: MOLST [ ]  Living Will [ ]  HCP [x ]  Goals of Care Document: See GOC section      CRITICAL CARE:  [x ]Shock Present  [ ]Septic [ ]Cardiogenic [ ]Neurologic [ ]Hypovolemic  [ x]Vasopressors [ ]Inotropes  [ ]Respiratory failure present: [ ]Acute  [ ]Chronic [ ]Hypoxic  [ ]Hypercarbic [ ]Other  [x ]Mechanical Ventilation [ ]Non-invasive ventilatory support [ ]  [xOther organ failure - renal, heme, encephalopathy      Allergies    No Known Allergies    Intolerances        MEDICATIONS:  MEDICATIONS  (STANDING):    MEDICATIONS  (PRN):  glycopyrrolate Injectable 0.4 milliGRAM(s) IV Push once PRN 30 minutes before extubation  glycopyrrolate Injectable 0.2 milliGRAM(s) IV Push every 6 hours PRN excessive oropharyngeal secretions  HYDROmorphone  Injectable 0.2 milliGRAM(s) IV Push every 1 hour PRN rdos >3 or evidence of respiratory discomfort  HYDROmorphone  Injectable 0.2 milliGRAM(s) IV Push every 1 hour PRN pain-ad score >3  LORazepam   Injectable 0.5 milliGRAM(s) IV Push every 1 hour PRN refractory respiratory discomfort (RDOS >3) after PRN opioid      Allergies    No Known Allergies    Intolerances      EXAM    ICU Vital Signs Last 24 Hrs  T(C): 37.2 (12 Jan 2024 12:00), Max: 43 (11 Jan 2024 18:00)  T(F): 99 (12 Jan 2024 12:00), Max: 109.4 (11 Jan 2024 18:00)  HR: 95 (12 Jan 2024 14:15) (69 - 97)  BP: 91/45 (12 Jan 2024 14:15) (67/39 - 127/69)  BP(mean): 65 (12 Jan 2024 14:15) (48 - 84)  ABP: --  ABP(mean): --  RR: 25 (12 Jan 2024 14:15) (14 - 27)  SpO2: 100% (12 Jan 2024 14:15) (82% - 100%)    O2 Parameters below as of 12 Jan 2024 14:55    O2 Flow (L/min): 2          GENERAL: Jaundice, restless, fidgeting  Nutritional Status: Normal appearance [ ]  Cachexia [ ]  Deferred [ ]  Anasarca [x]  Behavioral: Normal [ ]  Anxiety [ ]  Delirium [ ]  Agitation [ ]  Other [ ]  Deferred [x ]   HEENT:   Scleral icterus  [x]  [ ]Normal   [ ]Dry mouth   [ x]ET Tube/Trach  [ ]Oral lesions  PULMONARY: Normal work of breathing [ x]  [ ]Audible excessive secretions  [ ]Clear   [ ]Rhonchi        [ ]Right [ ]Left [ ]Bilateral  [ ]Crackles        [ ]Right [ ]Left [ ]Bilateral  [ ]Wheezing     [ ]Right [ ]Left [ ]Bilateral  [ ]Diminished breath sounds [ ]right [ ]left [ ]bilateral  Rate: Normal [x ]  Bradypnea [ ]  Cheynes-Hinds [ ]  Tachypnea [ ]   CARDIOVASCULAR:    Regular rhythm [ ]  Irregular rhythm [ ]  Regular rate [x ]  Tachycardia [ ]  Bradycardia [ ]  Murmur [ ]  Other [ ]   GASTROINTESTINAL:  Soft [ ]  Firm [ ]  Distended [ ]  Nondistended [ ]  Non-tender [ ]  Tender [ ]   Bowel sounds: Normal [ ]  Hypoactive [ ]  Hyperactive [ ]  Absent [ ]    PEG [ ]  OGT/ NGT [ ]  Scar [ ]  Other [ ]    GENITOURINARY: Urinary catheter [ ]  Deferred [ x]    MUSCULOSKELETAL: Normal [ ]  Deformity [ ]  Edema [x ]    Patient with severely swollen hands (R>L)  Range of motion: Normal [ ]  Other [ ]  Deferred [ x]   Bed/Wheelchair Bound [ x]  NEUROLOGIC: Level of consciousness:  Alert [ ]   Lethargic [ ]  Unarousable [ x]    Verbal [ ]  Non-Verbal [ x]  No focal deficits [ ]  Other: [ ]  Deferred [ x]  Strength: Normal [ ]  Paresis [ ]  Other [ ]  Deferred [x ]   Cognitive impairment [ ]   SKIN: Normal [ ]  Jaundice [x]  Rash [ ]  Pressure ulcer(s) [ ]      Present on admission [ ]y [ ]n    RESULTS  LABS: CBC Full  -  ( 11 Jan 2024 13:12 )  WBC Count : 15.47 K/uL  RBC Count : 2.52 M/uL  Hemoglobin : 7.6 g/dL  Hematocrit : 24.1 %  Platelet Count - Automated : 61 K/uL  Mean Cell Volume : 95.6 fl  Mean Cell Hemoglobin : 30.2 pg  Mean Cell Hemoglobin Concentration : 31.5 gm/dL  Auto Neutrophil # : x  Auto Lymphocyte # : x  Auto Monocyte # : x  Auto Eosinophil # : x  Auto Basophil # : x  Auto Neutrophil % : x  Auto Lymphocyte % : x  Auto Monocyte % : x  Auto Eosinophil % : x  Auto Basophil % : x    01-11    134<L>  |  100  |  22  ----------------------------<  178<H>  3.8   |  21<L>  |  1.45<H>    Ca    8.6      11 Jan 2024 13:12  Phos  3.1     01-11  Mg     2.5     01-11    TPro  5.4<L>  /  Alb  3.2<L>  /  TBili  19.1<H>  /  DBili  x   /  AST  133<H>  /  ALT  25  /  AlkPhos  575<H>  01-11    LIVER FUNCTIONS - ( 11 Jan 2024 13:12 )  Alb: 3.2 g/dL / Pro: 5.4 g/dL / ALK PHOS: 575 U/L / ALT: 25 U/L / AST: 133 U/L / GGT: x          Palliative Medicine Consult Note  Indication for Geriatrics and Palliative Care Services: GOC    Date of service: 01-12-24 @ 17:49  [facility] MICU [address] ([facility])  12-30-23 - admit date    Interval events: Patient seen at bedside. Unarousable, nonverbal, jaundiced. Imam performed prayer. Proxy confirmed she is ready for compassionate extubation.     Had GOC discussion. See GOC section for detail.     Urine habits: Normal [ ]  Incontinent [ ]  Oliguria/Anuria [ ]  Indwelling urinary catheter [ x]  External urinary cathter [ ]  Deferred [ ]     PO intake/swallow: intubated    Last Bowel Movement: 12-Jan-2024    PRESENT SYMPTOMS:  Patient is unable to communicate ---    For the critically ill: CPOT   - Facial expression: Relaxed, neutral (0) [x ]  Tense (1) [ ]  Grimacing (2) [ ]   - Body movements: Absence of movements (0) [ ]  Protection (1) [ ]  Restlessness (2) [ x]   - Muscle tension: Relaxed (0) [x]  Tense/rigid (1) [ ]  Very tense or rigid (2) [ ]   - Compliance with ventilator: Tolerating (0) [ x]  Coughing but tolerating (1) [ ]  Fighting ventilator (2) [ ]  OR  Vocalization: Talking in normal tone or no sounds (0) [ ]  Sighing, moaning [ ]  Crying out, sobbing [ ]  Total (0-8): 2    For patients with dementia: Pain Assessment in Advanced Dementia Scale (PAINAD) Score: N/a    For respiratory distress: RDOS (See RDOS tool and score below)  0 to 2  minimal or no respiratory distress   3  mild distress  4 to 6 moderate distress  >7 severe distress    PERTINENT PM/SXH:   Type 2 diabetes mellitus    Decompensated hepatic cirrhosis    No significant past surgical history      SOCIAL HISTORY:  Family: Patient's family all in [place]. Primary language is Sami. Per chart, patient was previously living in an assisted living facility. [facility].   Living Situation: [ ]  Home [ ]  Long term care [ ]  Rehab [ ]  Other [x] - assisted living facility Deferred [ ]  Caregiver [place]? : Yes [ ]  No [ ]  Deferred [x ]             Anticipatory Grief present?:  Yes [ x]  No [ ]  Deferred [ ]     Substance hx: [ ]  Tobacco hx: [ ]  Alcohol hx: [x ]  Deferred [ ]    DECISION MAKER(s):  Capacity: Yes [ ]  No [ x]  Deferred [ ]       Health Care Agent: Health Care Proxy(s) [x ]  Surrogate(s) [ ]  Guardian [ ]          Name(s): Phone Number(s): HCP form in chart listing Chato Grajeda ([phone])    SPIRITUALITY:  Zoroastrianism/Spirituality: [x ]  [facility]  PCSSQ [Palliative Care Spiritual Screening Question] Severity (0-10): Deferred (Score of 4 or > indicate consideration of Chaplaincy referral)  Chaplaincy Referral: [x ] yes [ ] refused [ ] following [ ] Deferred     FUNCTIONAL STATUS: Unable to answer  BASELINE (I)ADL(s) (prior to admission): [place]: Total [ ]  Moderate [ ]  Dependent [ ]  Deferred[x]  Palliative Performance Status Version 2:   See PPSv2 tool and score below          NUTRITION  BMI (kg/m2): 27 (01-02-24 @ 05:00)(normal 18.5-24.9)  Weight: Normal [x ]  Underweight [ ]  Morbid obesity [ ]    Protein calorie malnutrition: None [ ]  Mild [ ]  Moderate [ ]  Severe [ ]  Deferred [x ]  [x ]PPSV2 < or = 30%  [ ]significant weight loss [ ]poor nutritional intake [x ]anasarca [ ]Artificial Nutrition    Other current referrals: Hospice [ ]  Child Life [ ]  Social Work [ ]  Case management [ ]  Holistic Therapy [ ]  Patient & Family Centered Care Referral [ ]      ADVANCE DIRECTIVES:    DNR on chart: DNR/do not re-intubate    Completed documents: MOLST [ ]  Living Will [ ]  HCP [x ]  Goals of Care Document: See GOC section      CRITICAL CARE:  [x ]Shock Present  [ ]Septic [ ]Cardiogenic [ ]Neurologic [ ]Hypovolemic  [ x]Vasopressors [ ]Inotropes  [ ]Respiratory failure present: [ ]Acute  [ ]Chronic [ ]Hypoxic  [ ]Hypercarbic [ ]Other  [x ]Mechanical Ventilation [ ]Non-invasive ventilatory support [ ]  [xOther organ failure - renal, heme, encephalopathy      Allergies    No Known Allergies    Intolerances        MEDICATIONS:  MEDICATIONS  (STANDING):    MEDICATIONS  (PRN):  glycopyrrolate Injectable 0.4 milliGRAM(s) IV Push once PRN 30 minutes before extubation  glycopyrrolate Injectable 0.2 milliGRAM(s) IV Push every 6 hours PRN excessive oropharyngeal secretions  HYDROmorphone  Injectable 0.2 milliGRAM(s) IV Push every 1 hour PRN rdos >3 or evidence of respiratory discomfort  HYDROmorphone  Injectable 0.2 milliGRAM(s) IV Push every 1 hour PRN pain-ad score >3  LORazepam   Injectable 0.5 milliGRAM(s) IV Push every 1 hour PRN refractory respiratory discomfort (RDOS >3) after PRN opioid      Allergies    No Known Allergies    Intolerances      EXAM    ICU Vital Signs Last 24 Hrs  T(C): 37.2 (12 Jan 2024 12:00), Max: 43 (11 Jan 2024 18:00)  T(F): 99 (12 Jan 2024 12:00), Max: 109.4 (11 Jan 2024 18:00)  HR: 95 (12 Jan 2024 14:15) (69 - 97)  BP: 91/45 (12 Jan 2024 14:15) (67/39 - 127/69)  BP(mean): 65 (12 Jan 2024 14:15) (48 - 84)  ABP: --  ABP(mean): --  RR: 25 (12 Jan 2024 14:15) (14 - 27)  SpO2: 100% (12 Jan 2024 14:15) (82% - 100%)    O2 Parameters below as of 12 Jan 2024 14:55    O2 Flow (L/min): 2          GENERAL: Jaundice, restless, fidgeting  Nutritional Status: Normal appearance [ ]  Cachexia [ ]  Deferred [ ]  Anasarca [x]  Behavioral: Normal [ ]  Anxiety [ ]  Delirium [ ]  Agitation [ ]  Other [ ]  Deferred [x ]   HEENT:   Scleral icterus  [x]  [ ]Normal   [ ]Dry mouth   [ x]ET Tube/Trach  [ ]Oral lesions  PULMONARY: Normal work of breathing [ x]  [ ]Audible excessive secretions  [ ]Clear   [ ]Rhonchi        [ ]Right [ ]Left [ ]Bilateral  [ ]Crackles        [ ]Right [ ]Left [ ]Bilateral  [ ]Wheezing     [ ]Right [ ]Left [ ]Bilateral  [ ]Diminished breath sounds [ ]right [ ]left [ ]bilateral  Rate: Normal [x ]  Bradypnea [ ]  Cheynes-Hinds [ ]  Tachypnea [ ]   CARDIOVASCULAR:    Regular rhythm [ ]  Irregular rhythm [ ]  Regular rate [x ]  Tachycardia [ ]  Bradycardia [ ]  Murmur [ ]  Other [ ]   GASTROINTESTINAL:  Soft [ ]  Firm [ ]  Distended [ ]  Nondistended [ ]  Non-tender [ ]  Tender [ ]   Bowel sounds: Normal [ ]  Hypoactive [ ]  Hyperactive [ ]  Absent [ ]    PEG [ ]  OGT/ NGT [ ]  Scar [ ]  Other [ ]    GENITOURINARY: Urinary catheter [ ]  Deferred [ x]    MUSCULOSKELETAL: Normal [ ]  Deformity [ ]  Edema [x ]    Patient with severely swollen hands (R>L)  Range of motion: Normal [ ]  Other [ ]  Deferred [ x]   Bed/Wheelchair Bound [ x]  NEUROLOGIC: Level of consciousness:  Alert [ ]   Lethargic [ ]  Unarousable [ x]    Verbal [ ]  Non-Verbal [ x]  No focal deficits [ ]  Other: [ ]  Deferred [ x]  Strength: Normal [ ]  Paresis [ ]  Other [ ]  Deferred [x ]   Cognitive impairment [ ]   SKIN: Normal [ ]  Jaundice [x]  Rash [ ]  Pressure ulcer(s) [ ]      Present on admission [ ]y [ ]n    RESULTS  LABS: CBC Full  -  ( 11 Jan 2024 13:12 )  WBC Count : 15.47 K/uL  RBC Count : 2.52 M/uL  Hemoglobin : 7.6 g/dL  Hematocrit : 24.1 %  Platelet Count - Automated : 61 K/uL  Mean Cell Volume : 95.6 fl  Mean Cell Hemoglobin : 30.2 pg  Mean Cell Hemoglobin Concentration : 31.5 gm/dL  Auto Neutrophil # : x  Auto Lymphocyte # : x  Auto Monocyte # : x  Auto Eosinophil # : x  Auto Basophil # : x  Auto Neutrophil % : x  Auto Lymphocyte % : x  Auto Monocyte % : x  Auto Eosinophil % : x  Auto Basophil % : x    01-11    134<L>  |  100  |  22  ----------------------------<  178<H>  3.8   |  21<L>  |  1.45<H>    Ca    8.6      11 Jan 2024 13:12  Phos  3.1     01-11  Mg     2.5     01-11    TPro  5.4<L>  /  Alb  3.2<L>  /  TBili  19.1<H>  /  DBili  x   /  AST  133<H>  /  ALT  25  /  AlkPhos  575<H>  01-11    LIVER FUNCTIONS - ( 11 Jan 2024 13:12 )  Alb: 3.2 g/dL / Pro: 5.4 g/dL / ALK PHOS: 575 U/L / ALT: 25 U/L / AST: 133 U/L / GGT: x

## 2024-01-12 NOTE — DISCHARGE NOTE FOR THE EXPIRED PATIENT - HOSPITAL COURSE
59 yo M with DM2, recently diagnosed EtOH cirrhosis originally presenting 12/30 as a transfer from [facility] for liver transplant evaluation for which he presented with decompensated cirrhosis with ascites s/p multiple paracentesis and RAHUL on CKD progressing to ARF thought to be d/t HRS initiated on HD 12/31/23. On medical floors his course was c/b hypotension with inability to tolerate HD transferred to MICU on 1/6 for initiation of CVVHD. His MICU course was c/b persistent GIB s/p EGD 1/8 revealing esophageal polyp/mass for which he was intubated, encephalopathy and obtunded off sedation with inability to extubate, shock on multiple vasopressors. Pt deemed to not be a liver transplant candidate, continued to decompensate with progressive multiorgan failure and with concerns of possible underlying malignancy. After multiple GOC with ICU team, palliative team, and pt's relative pt was made comfort care with compassionate extubation on 1/12.    Called by bedside by nurse for asystole on monitor and unresponsiveness.     On physical exam, no spontaneous movements were present. Patient did not respond to verbal or physical stimuli. Pupils were mid-dilated and fixed. No breath sounds were appreciated over either lung field. No carotid pulses were palpable. No heart sounds were auscultated.   Patient pronounced dead at 1/12/2024 at 18:18. Attending notified. Family was notified. No autopsy. 59 yo M with DM2, recently diagnosed EtOH cirrhosis originally presenting 12/30 as a transfer from [facility] for liver transplant evaluation for which he presented with decompensated cirrhosis with ascites s/p multiple paracentesis and RAHUL on CKD progressing to ARF thought to be d/t HRS initiated on HD 12/31/23. On medical floors his course was c/b hypotension with inability to tolerate HD transferred to MICU on 1/6 for initiation of CVVHD. His MICU course was c/b persistent GIB s/p EGD 1/8 revealing esophageal polyp/mass for which he was intubated, encephalopathy and obtunded off sedation with inability to extubate, shock on multiple vasopressors. Pt deemed to not be a liver transplant candidate, continued to decompensate with progressive multiorgan failure and with concerns of possible underlying malignancy. After multiple GOC with ICU team, palliative team, and pt's relative pt was made comfort care with compassionate extubation on 1/12.    Called by bedside by nurse for asystole on monitor and unresponsiveness.     On physical exam, no spontaneous movements were present. Patient did not respond to verbal or physical stimuli. Pupils were mid-dilated and fixed. No breath sounds were appreciated over either lung field. No carotid pulses were palpable. No heart sounds were auscultated.   Patient pronounced dead at 1/12/2024 at 18:18. Attending notified. Family was notified. No autopsy.

## 2024-01-12 NOTE — PROCEDURE NOTE - NSPOSTCAREGUIDE_GEN_A_CORE
Verbal/written post procedure instructions were given to patient/caregiver
Verbal/written post procedure instructions were given to patient/caregiver
per unit protocols
Verbal/written post procedure instructions were given to patient/caregiver
Verbal/written post procedure instructions were given to patient/caregiver

## 2024-01-12 NOTE — PROCEDURE NOTE - PROCEDURE DATE TIME, MLM
08-Jan-2024 14:52
10-Brian-2024
31-Dec-2023 21:22
03-Jan-2024 13:18
07-Jan-2024 17:47
09-Jan-2024 14:04
03-Jan-2024 12:23
09-Jan-2024 19:27
12-Jan-2024 04:30

## 2024-01-14 LAB
CULTURE RESULTS: SIGNIFICANT CHANGE UP
SPECIMEN SOURCE: SIGNIFICANT CHANGE UP

## 2024-01-15 LAB
CULTURE RESULTS: NO GROWTH — SIGNIFICANT CHANGE UP
SPECIMEN SOURCE: SIGNIFICANT CHANGE UP

## 2024-01-16 LAB
NON-GYNECOLOGICAL CYTOLOGY STUDY: SIGNIFICANT CHANGE UP
NON-GYNECOLOGICAL CYTOLOGY STUDY: SIGNIFICANT CHANGE UP

## 2024-02-03 LAB
CULTURE RESULTS: SIGNIFICANT CHANGE UP
SPECIMEN SOURCE: SIGNIFICANT CHANGE UP

## 2024-02-07 LAB
CULTURE RESULTS: SIGNIFICANT CHANGE UP
SPECIMEN SOURCE: SIGNIFICANT CHANGE UP

## 2024-02-08 LAB
CULTURE RESULTS: ABNORMAL
SPECIMEN SOURCE: SIGNIFICANT CHANGE UP

## 2024-02-24 LAB
CULTURE RESULTS: SIGNIFICANT CHANGE UP
SPECIMEN SOURCE: SIGNIFICANT CHANGE UP

## 2024-05-02 NOTE — AIRWAY REMOVAL NOTE  ADULT & PEDS - COUGH
Detail Level: Simple Detail Level: Detailed Quality 137: Melanoma: Continuity Of Care - Recall System: Patient information entered into a recall system that includes: target date for the next exam specified AND a process to follow up with patients regarding missed or unscheduled appointments When Should The Patient Follow-Up For Their Next Full-Body Skin Exam?: 6 Months none Detail Level: Zone
